# Patient Record
Sex: MALE | Race: WHITE | NOT HISPANIC OR LATINO | Employment: FULL TIME | URBAN - NONMETROPOLITAN AREA
[De-identification: names, ages, dates, MRNs, and addresses within clinical notes are randomized per-mention and may not be internally consistent; named-entity substitution may affect disease eponyms.]

---

## 2017-01-03 ENCOUNTER — OFFICE VISIT (OUTPATIENT)
Dept: FAMILY MEDICINE CLINIC | Facility: CLINIC | Age: 32
End: 2017-01-03

## 2017-01-03 VITALS
HEIGHT: 71 IN | DIASTOLIC BLOOD PRESSURE: 100 MMHG | OXYGEN SATURATION: 98 % | SYSTOLIC BLOOD PRESSURE: 160 MMHG | WEIGHT: 258 LBS | TEMPERATURE: 99 F | BODY MASS INDEX: 36.12 KG/M2 | HEART RATE: 77 BPM

## 2017-01-03 DIAGNOSIS — M25.521 RIGHT ELBOW PAIN: Primary | ICD-10-CM

## 2017-01-03 PROCEDURE — 96372 THER/PROPH/DIAG INJ SC/IM: CPT | Performed by: NURSE PRACTITIONER

## 2017-01-03 PROCEDURE — 99213 OFFICE O/P EST LOW 20 MIN: CPT | Performed by: NURSE PRACTITIONER

## 2017-01-03 RX ORDER — KETOROLAC TROMETHAMINE 30 MG/ML
60 INJECTION, SOLUTION INTRAMUSCULAR; INTRAVENOUS ONCE
Status: COMPLETED | OUTPATIENT
Start: 2017-01-03 | End: 2017-01-03

## 2017-01-03 RX ORDER — DEXAMETHASONE SODIUM PHOSPHATE 4 MG/ML
8 INJECTION, SOLUTION INTRA-ARTICULAR; INTRALESIONAL; INTRAMUSCULAR; INTRAVENOUS; SOFT TISSUE ONCE
Status: COMPLETED | OUTPATIENT
Start: 2017-01-03 | End: 2017-01-03

## 2017-01-03 RX ORDER — KETOROLAC TROMETHAMINE 10 MG/1
10 TABLET, FILM COATED ORAL EVERY 8 HOURS
Qty: 9 TABLET | Refills: 0 | Status: SHIPPED | OUTPATIENT
Start: 2017-01-03 | End: 2017-04-27

## 2017-01-03 RX ADMIN — DEXAMETHASONE SODIUM PHOSPHATE 8 MG: 4 INJECTION, SOLUTION INTRA-ARTICULAR; INTRALESIONAL; INTRAMUSCULAR; INTRAVENOUS; SOFT TISSUE at 16:57

## 2017-01-03 RX ADMIN — KETOROLAC TROMETHAMINE 60 MG: 30 INJECTION, SOLUTION INTRAMUSCULAR; INTRAVENOUS at 16:58

## 2017-01-03 NOTE — PROGRESS NOTES
"Subjective   Nghia Roach is a 31 y.o. male.     Chief Complaint   Patient presents with   • Elbow Pain       History of Present Illness     Right elbow pain-began after jimmy twyla when he was arm wrestling.  He reports the pain is in the joint but most prominently into the tissue surrounding his elbow.  He reports when he starts hurting the pain radiates upward toward his elbow and down his forearm.  Pain is increased with any activity.  He reports some \"throbbing pulsing\" sensation that does feels somewhat numb at times.  He reports he does not have any loss of sensation.  Sensation is similar to when elbow is hit.  The throbbing \"is just like a toothache.\"  He is right hand dominant.  He reports he has taken tylenol and Ibuprofen that does help some.  NSAID does seem to help the most but nothing relieves the pain the most.  Not at goal.     The following portions of the patient's history were reviewed and updated as appropriate: allergies, current medications, past family history, past medical history, past social history, past surgical history and problem list.    Review of Systems   Musculoskeletal: Positive for arthralgias (right elbow) and myalgias (right upper and lower arm).   All other systems reviewed and are negative.      Objective     Visit Vitals   • /100 (BP Location: Left arm, Patient Position: Sitting)   • Pulse 77   • Temp 99 °F (37.2 °C) (Tympanic)   • Ht 71\" (180.3 cm)   • Wt 258 lb (117 kg)   • SpO2 98%   • BMI 35.98 kg/m2       Physical Exam   Constitutional: He is oriented to person, place, and time. He appears well-developed and well-nourished. No distress.   HENT:   Head: Normocephalic and atraumatic.   Right Ear: External ear normal.   Left Ear: External ear normal.   Nose: Nose normal.   Mouth/Throat: No oropharyngeal exudate.   Eyes: EOM are normal. Pupils are equal, round, and reactive to light.   Neck: Normal range of motion. Neck supple. No thyromegaly present. "   Cardiovascular: Normal rate, regular rhythm, normal heart sounds and intact distal pulses.    No murmur heard.  Pulmonary/Chest: Effort normal and breath sounds normal. No respiratory distress.   Abdominal: Soft. Bowel sounds are normal. There is no tenderness.   Musculoskeletal:        Right elbow: He exhibits swelling (along top of arm below elbow). He exhibits normal range of motion. Tenderness found. Medial epicondyle, lateral epicondyle and olecranon process tenderness noted.        Lumbar back: He exhibits decreased range of motion, tenderness, pain and spasm.   Decreased lumbar curvature, there is pain with forward flexion   Lymphadenopathy:     He has no cervical adenopathy.   Neurological: He is alert and oriented to person, place, and time.   Skin: Skin is warm and dry. He is not diaphoretic. No pallor.   Psychiatric: He has a normal mood and affect. His behavior is normal. Judgment and thought content normal. His affect is not inappropriate. Cognition and memory are normal.   Denies thoughts of hurting self or others.   Nursing note and vitals reviewed.      Assessment/Plan     Nghia was seen today for elbow pain.    Diagnoses and all orders for this visit:    Right elbow pain  -     XR elbow 3+ vw right  -     ketorolac (TORADOL) injection 60 mg; Inject 60 mg into the shoulder, thigh, or buttocks 1 (One) Time.  -     dexamethasone (DECADRON) injection 8 mg; Inject 2 mL into the shoulder, thigh, or buttocks 1 (One) Time.  -     ketorolac (TORADOL) 10 MG tablet; Take 1 tablet by mouth Every 8 (Eight) Hours.      Injections today for acute relief  Xray to be done if not improved by Friday  NSAIDs for pain relief.  Do not take any OTC meds while taking Toradol.  Understands disease processes and need for medications.  Understands reasons for urgent and emergent care.  Patient (& family) verbalized agreement for treatment plan.   Heat/ice.  Avoid overuse   RTC PRN for non-resolving symptoms.

## 2017-01-03 NOTE — MR AVS SNAPSHOT
Nghia Roach   1/3/2017 4:00 PM   Office Visit    Dept Phone:  [phone]   Encounter #:  [id number]    Provider:  CORDELIA Mojica   Department:  [facility] FAMILY MEDICINE                Your Full Care Plan              Today's Medication Changes          These changes are accurate as of: 1/3/17  4:58 PM.  If you have any questions, ask your nurse or doctor.               New Medication(s)Ordered:     ketorolac 10 MG tablet   Commonly known as:  TORADOL   Take 1 tablet by mouth Every 8 (Eight) Hours.   Started by:  CORDELIA Mojica         Stop taking medication(s)listed here:     PredniSONE 5 MG tablet therapy pack dosepak   Stopped by:  CORDELIA Mojica           sulfamethoxazole-trimethoprim 800-160 MG per tablet   Commonly known as:  BACTRIM DS   Stopped by:  CORDELIA Mojica                Where to Get Your Medications      These medications were sent to [facility] [address] [place] [address] AT [address] - [phone]  - [phone]   [address] [place] [id number]     Phone:  [phone]     ketorolac 10 MG tablet                  Your Updated Medication List          This list is accurate as of: 1/3/17  4:58 PM.  Always use your most recent med list.                allopurinol 100 MG tablet   Commonly known as:  ZYLOPRIM       amitriptyline 10 MG tablet   Commonly known as:  ELAVIL       amLODIPine 10 MG tablet   Commonly known as:  NORVASC   Take 1 tablet by mouth Daily.       atorvastatin 10 MG tablet   Commonly known as:  LIPITOR       budesonide-formoterol 160-4.5 MCG/ACT inhaler   Commonly known as:  SYMBICORT       CIALIS 5 MG tablet   Generic drug:  tadalafil       fluticasone 50 MCG/ACT nasal spray   Commonly known as:  FLONASE   2 sprays into each nostril Daily. Administer 2 sprays in each nostril for each dose.       ipratropium-albuterol  MCG/ACT  inhaler   Commonly known as:  COMBIVENT RESPIMAT       ketorolac 10 MG tablet   Commonly known as:  TORADOL   Take 1 tablet by mouth Every 8 (Eight) Hours.       montelukast 10 MG tablet   Commonly known as:  SINGULAIR   TAKE 1 TABLET BY MOUTH EVERY DAY       pantoprazole 40 MG EC tablet   Commonly known as:  PROTONIX   TAKE 1 TABLET BY MOUTH EVERY DAY       raNITIdine 150 MG tablet   Commonly known as:  ZANTAC   Take 1 tablet by mouth 2 (Two) Times a Day.       Testosterone Cypionate 200 MG/ML injection   Commonly known as:  DEPOTESTOTERONE CYPIONATE       topiramate 50 MG tablet   Commonly known as:  TOPAMAX   Take 1 tablet by mouth Daily.       valsartan 160 MG tablet   Commonly known as:  DIOVAN   Take 1 tablet by mouth Daily.               We Performed the Following     XR elbow 3+ vw right       You Were Diagnosed With        Codes Comments    Right elbow pain    -  Primary ICD-10-CM: M25.521  ICD-9-CM: 719.42       Medications to be Given to You by a Medical Professional     Due       Frequency    1/3/2017 ketorolac (TORADOL) injection 60 mg  Once    1/3/2017 dexamethasone (DECADRON) injection 8 mg  Once      Instructions     None    Patient Instructions History      Upcoming Appointments     Visit Type Date Time Department    OFFICE VISIT 1/3/2017  4:00 PM [facility]    OFFICE VISIT 1/11/2017  3:00 PM [facility]      MyChart Signup     Our records indicate that you have declined [facility] sign[facility]. If you would like to sign up for [facility], please email [email or web address] or call [phone] to obtain an activation code.             Other Info from Your Visit           Your Appointments     Jan 11, 2017  3:00 PM EST   Office Visit with CORDELIA Alexandre   [facility] (--)    [address] [facility] [address] [phone]           Please arrive 10 minutes early, bring a complete list of all medications and bring any  "previous records or diagnostic testing results.              Allergies     Erythromycin        Reason for Visit     Elbow Pain           Vital Signs     Blood Pressure Pulse Temperature Height Weight Oxygen Saturation    160/100 (BP Location: Left arm, Patient Position: Sitting) 77 99 °F (37.2 °C) (Tympanic) 71\" (180.3 cm) 258 lb (117 kg) 98%    Body Mass Index Smoking Status                35.98 kg/m2 Never Smoker          Problems and Diagnoses Noted     Right elbow pain    -  Primary        "

## 2017-01-19 RX ORDER — ATORVASTATIN CALCIUM 10 MG/1
TABLET, FILM COATED ORAL
Qty: 30 TABLET | Refills: 5 | Status: SHIPPED | OUTPATIENT
Start: 2017-01-19 | End: 2017-01-26 | Stop reason: SDUPTHER

## 2017-01-26 ENCOUNTER — OFFICE VISIT (OUTPATIENT)
Dept: FAMILY MEDICINE CLINIC | Facility: CLINIC | Age: 32
End: 2017-01-26

## 2017-01-26 VITALS
HEIGHT: 71 IN | HEART RATE: 75 BPM | OXYGEN SATURATION: 97 % | DIASTOLIC BLOOD PRESSURE: 80 MMHG | SYSTOLIC BLOOD PRESSURE: 122 MMHG | BODY MASS INDEX: 36.12 KG/M2 | TEMPERATURE: 98.5 F | WEIGHT: 258 LBS

## 2017-01-26 DIAGNOSIS — G44.221 CHRONIC TENSION-TYPE HEADACHE, INTRACTABLE: ICD-10-CM

## 2017-01-26 DIAGNOSIS — J45.30 MILD PERSISTENT ASTHMA WITHOUT COMPLICATION: ICD-10-CM

## 2017-01-26 DIAGNOSIS — R10.11 RIGHT UPPER QUADRANT ABDOMINAL PAIN: ICD-10-CM

## 2017-01-26 DIAGNOSIS — R94.31 ABNORMAL EKG: ICD-10-CM

## 2017-01-26 DIAGNOSIS — K21.00 GASTROESOPHAGEAL REFLUX DISEASE WITH ESOPHAGITIS: Primary | ICD-10-CM

## 2017-01-26 PROBLEM — K21.9 ACID REFLUX: Status: ACTIVE | Noted: 2017-01-26

## 2017-01-26 LAB
ALBUMIN SERPL-MCNC: 4.5 G/DL (ref 3.5–5)
ALBUMIN/GLOB SERPL: 1.7 G/DL (ref 1.5–2.5)
ALP SERPL-CCNC: 61 U/L (ref 46–116)
ALT SERPL W P-5'-P-CCNC: 47 U/L (ref 10–44)
ANION GAP SERPL CALCULATED.3IONS-SCNC: 6.4 MMOL/L (ref 3.6–11.2)
AST SERPL-CCNC: 33 U/L (ref 10–34)
BASOPHILS # BLD AUTO: 0.02 10*3/MM3 (ref 0–0.3)
BASOPHILS NFR BLD AUTO: 0.3 % (ref 0–2)
BILIRUB SERPL-MCNC: 1.5 MG/DL (ref 0.2–1.8)
BUN BLD-MCNC: 13 MG/DL (ref 7–21)
BUN/CREAT SERPL: 8.6 (ref 7–25)
CALCIUM SPEC-SCNC: 9.2 MG/DL (ref 7.7–10)
CHLORIDE SERPL-SCNC: 109 MMOL/L (ref 99–112)
CO2 SERPL-SCNC: 28.6 MMOL/L (ref 24.3–31.9)
CREAT BLD-MCNC: 1.51 MG/DL (ref 0.43–1.29)
DEPRECATED RDW RBC AUTO: 40.6 FL (ref 37–54)
EOSINOPHIL # BLD AUTO: 0.35 10*3/MM3 (ref 0–0.7)
EOSINOPHIL NFR BLD AUTO: 5.8 % (ref 0–5)
ERYTHROCYTE [DISTWIDTH] IN BLOOD BY AUTOMATED COUNT: 13.4 % (ref 11.5–14.5)
GFR SERPL CREATININE-BSD FRML MDRD: 54 ML/MIN/1.73
GLOBULIN UR ELPH-MCNC: 2.7 GM/DL
GLUCOSE BLD-MCNC: 87 MG/DL (ref 70–110)
HCT VFR BLD AUTO: 50.6 % (ref 42–52)
HGB BLD-MCNC: 17.6 G/DL (ref 14–18)
IMM GRANULOCYTES # BLD: 0.02 10*3/MM3 (ref 0–0.03)
IMM GRANULOCYTES NFR BLD: 0.3 % (ref 0–0.5)
LYMPHOCYTES # BLD AUTO: 1.53 10*3/MM3 (ref 1–3)
LYMPHOCYTES NFR BLD AUTO: 25.2 % (ref 21–51)
MCH RBC QN AUTO: 29.1 PG (ref 27–33)
MCHC RBC AUTO-ENTMCNC: 34.8 G/DL (ref 33–37)
MCV RBC AUTO: 83.8 FL (ref 80–94)
MONOCYTES # BLD AUTO: 0.64 10*3/MM3 (ref 0.1–0.9)
MONOCYTES NFR BLD AUTO: 10.5 % (ref 0–10)
NEUTROPHILS # BLD AUTO: 3.51 10*3/MM3 (ref 1.4–6.5)
NEUTROPHILS NFR BLD AUTO: 57.9 % (ref 30–70)
OSMOLALITY SERPL CALC.SUM OF ELEC: 286.3 MOSM/KG (ref 273–305)
PLATELET # BLD AUTO: 204 10*3/MM3 (ref 130–400)
PMV BLD AUTO: 12.1 FL (ref 6–10)
POTASSIUM BLD-SCNC: 4.3 MMOL/L (ref 3.5–5.3)
PROT SERPL-MCNC: 7.2 G/DL (ref 6–8)
RBC # BLD AUTO: 6.04 10*6/MM3 (ref 4.7–6.1)
SODIUM BLD-SCNC: 144 MMOL/L (ref 135–153)
WBC NRBC COR # BLD: 6.07 10*3/MM3 (ref 4.5–12.5)

## 2017-01-26 PROCEDURE — 99214 OFFICE O/P EST MOD 30 MIN: CPT | Performed by: NURSE PRACTITIONER

## 2017-01-26 PROCEDURE — 36415 COLL VENOUS BLD VENIPUNCTURE: CPT | Performed by: NURSE PRACTITIONER

## 2017-01-26 PROCEDURE — 93005 ELECTROCARDIOGRAM TRACING: CPT | Performed by: NURSE PRACTITIONER

## 2017-01-26 PROCEDURE — 80053 COMPREHEN METABOLIC PANEL: CPT | Performed by: NURSE PRACTITIONER

## 2017-01-26 PROCEDURE — 85025 COMPLETE CBC W/AUTO DIFF WBC: CPT | Performed by: NURSE PRACTITIONER

## 2017-01-26 RX ORDER — BUDESONIDE AND FORMOTEROL FUMARATE DIHYDRATE 160; 4.5 UG/1; UG/1
2 AEROSOL RESPIRATORY (INHALATION)
Qty: 1 INHALER | Refills: 5 | Status: SHIPPED | OUTPATIENT
Start: 2017-01-26 | End: 2017-04-27 | Stop reason: SDUPTHER

## 2017-01-26 RX ORDER — RANITIDINE 150 MG/1
150 TABLET ORAL 2 TIMES DAILY
Qty: 60 TABLET | Refills: 5 | Status: SHIPPED | OUTPATIENT
Start: 2017-01-26 | End: 2017-04-27 | Stop reason: SDUPTHER

## 2017-01-26 RX ORDER — MONTELUKAST SODIUM 10 MG/1
10 TABLET ORAL NIGHTLY
Qty: 30 TABLET | Refills: 5 | Status: SHIPPED | OUTPATIENT
Start: 2017-01-26 | End: 2017-04-27 | Stop reason: SDUPTHER

## 2017-01-26 RX ORDER — ALLOPURINOL 100 MG/1
100 TABLET ORAL 2 TIMES DAILY
Qty: 60 TABLET | Refills: 5 | Status: SHIPPED | OUTPATIENT
Start: 2017-01-26 | End: 2017-04-27 | Stop reason: SDUPTHER

## 2017-01-26 RX ORDER — ATORVASTATIN CALCIUM 10 MG/1
10 TABLET, FILM COATED ORAL DAILY
Qty: 30 TABLET | Refills: 5 | Status: SHIPPED | OUTPATIENT
Start: 2017-01-26 | End: 2017-04-27 | Stop reason: SDUPTHER

## 2017-01-26 RX ORDER — AMLODIPINE BESYLATE 10 MG/1
10 TABLET ORAL DAILY
Qty: 30 TABLET | Refills: 5 | Status: SHIPPED | OUTPATIENT
Start: 2017-01-26 | End: 2017-04-27 | Stop reason: SDUPTHER

## 2017-01-26 RX ORDER — DICYCLOMINE HYDROCHLORIDE 10 MG/1
10 CAPSULE ORAL
Qty: 120 CAPSULE | Refills: 5 | Status: SHIPPED | OUTPATIENT
Start: 2017-01-26 | End: 2017-04-27 | Stop reason: SDUPTHER

## 2017-01-26 RX ORDER — VALSARTAN 160 MG/1
160 TABLET ORAL DAILY
Qty: 30 TABLET | Refills: 5 | Status: SHIPPED | OUTPATIENT
Start: 2017-01-26 | End: 2017-04-27 | Stop reason: SDUPTHER

## 2017-01-26 RX ORDER — AMITRIPTYLINE HYDROCHLORIDE 10 MG/1
10 TABLET, FILM COATED ORAL NIGHTLY
Qty: 30 TABLET | Refills: 5 | Status: SHIPPED | OUTPATIENT
Start: 2017-01-26 | End: 2017-04-27 | Stop reason: SDUPTHER

## 2017-01-26 RX ORDER — PANTOPRAZOLE SODIUM 40 MG/1
40 TABLET, DELAYED RELEASE ORAL DAILY
Qty: 30 TABLET | Refills: 5 | Status: SHIPPED | OUTPATIENT
Start: 2017-01-26 | End: 2017-04-27 | Stop reason: SDUPTHER

## 2017-01-26 RX ORDER — TOPIRAMATE 50 MG/1
50 TABLET, FILM COATED ORAL DAILY
Qty: 30 TABLET | Refills: 5 | Status: SHIPPED | OUTPATIENT
Start: 2017-01-26 | End: 2017-04-27 | Stop reason: SDUPTHER

## 2017-01-26 RX ORDER — SUCRALFATE ORAL 1 G/10ML
1 SUSPENSION ORAL 2 TIMES DAILY PRN
Qty: 420 ML | Refills: 2 | Status: SHIPPED | OUTPATIENT
Start: 2017-01-26 | End: 2017-04-27 | Stop reason: SDUPTHER

## 2017-01-26 NOTE — MR AVS SNAPSHOT
Nghia Roach   1/26/2017 10:20 AM   Office Visit    Dept Phone:  [phone]   Encounter #:  [id number]    Provider:  CORDELIA Alexandre   Department:  [facility] FAMILY MEDICINE                Your Full Care Plan              Today's Medication Changes          These changes are accurate as of: 1/26/17 11:15 AM.  If you have any questions, ask your nurse or doctor.               New Medication(s)Ordered:     dicyclomine 10 MG capsule   Commonly known as:  BENTYL   Take 1 capsule by mouth 4 (Four) Times a Day Before Meals & at Bedtime.   Started by:  CORDELIA Alexandre       sucralfate 1 GM/10ML suspension   Commonly known as:  CARAFATE   Take 10 mL by mouth 2 (Two) Times a Day As Needed (heart burn).   Started by:  CORDELIA Alexandre         Medication(s)that have changed:     atorvastatin 10 MG tablet   Commonly known as:  LIPITOR   Take 1 tablet by mouth Daily.   What changed:  See the new instructions.   Changed by:  CORDELIA Alexandre       montelukast 10 MG tablet   Commonly known as:  SINGULAIR   Take 1 tablet by mouth Every Night.   What changed:  See the new instructions.   Changed by:  CORDELIA Alexandre       pantoprazole 40 MG EC tablet   Commonly known as:  PROTONIX   Take 1 tablet by mouth Daily.   What changed:  See the new instructions.   Changed by:  CORDELIA Alexandre            Where to Get Your Medications      These medications were sent to [facility] [address] [address] AT [facility] [address] - [phone]  - [phone]   [address] [address]     Phone:  [phone]     allopurinol 100 MG tablet    amitriptyline 10 MG tablet    amLODIPine 10 MG tablet    atorvastatin 10 MG tablet    budesonide-formoterol 160-4.5 MCG/ACT inhaler    dicyclomine 10 MG capsule    ipratropium-albuterol   MCG/ACT inhaler    montelukast 10 MG tablet    pantoprazole 40 MG EC tablet    raNITIdine 150 MG tablet    sucralfate 1 GM/10ML suspension    topiramate 50 MG tablet    valsartan 160 MG tablet                  Your Updated Medication List          This list is accurate as of: 1/26/17 11:15 AM.  Always use your most recent med list.                allopurinol 100 MG tablet   Commonly known as:  ZYLOPRIM   Take 1 tablet by mouth 2 (Two) Times a Day.       amitriptyline 10 MG tablet   Commonly known as:  ELAVIL   Take 1 tablet by mouth Every Night.       amLODIPine 10 MG tablet   Commonly known as:  NORVASC   Take 1 tablet by mouth Daily.       atorvastatin 10 MG tablet   Commonly known as:  LIPITOR   Take 1 tablet by mouth Daily.       budesonide-formoterol 160-4.5 MCG/ACT inhaler   Commonly known as:  SYMBICORT   Inhale 2 puffs 2 (Two) Times a Day.       CIALIS 5 MG tablet   Generic drug:  tadalafil       dicyclomine 10 MG capsule   Commonly known as:  BENTYL   Take 1 capsule by mouth 4 (Four) Times a Day Before Meals & at Bedtime.       fluticasone 50 MCG/ACT nasal spray   Commonly known as:  FLONASE   2 sprays into each nostril Daily. Administer 2 sprays in each nostril for each dose.       ipratropium-albuterol  MCG/ACT inhaler   Commonly known as:  COMBIVENT RESPIMAT   Inhale 1 puff 4 (Four) Times a Day As Needed for wheezing. 1-2 PUFFS EVERY 4 HOURS AS NEEDED       ketorolac 10 MG tablet   Commonly known as:  TORADOL   Take 1 tablet by mouth Every 8 (Eight) Hours.       montelukast 10 MG tablet   Commonly known as:  SINGULAIR   Take 1 tablet by mouth Every Night.       pantoprazole 40 MG EC tablet   Commonly known as:  PROTONIX   Take 1 tablet by mouth Daily.       raNITIdine 150 MG tablet   Commonly known as:  ZANTAC   Take 1 tablet by mouth 2 (Two) Times a Day.       sucralfate 1 GM/10ML suspension   Commonly known as:  CARAFATE   Take 10 mL by mouth 2 (Two) Times a Day As Needed (heart burn).        Testosterone Cypionate 200 MG/ML injection   Commonly known as:  DEPOTESTOTERONE CYPIONATE       topiramate 50 MG tablet   Commonly known as:  TOPAMAX   Take 1 tablet by mouth Daily.       valsartan 160 MG tablet   Commonly known as:  DIOVAN   Take 1 tablet by mouth Daily.               We Performed the Following     Ambulatory Referral to Cardiology     CBC & Differential     CBC Auto Differential     Comprehensive Metabolic Panel     ECG 12 Lead     Helicobacter Pylori, IgA IgG IgM       You Were Diagnosed With        Codes Comments    Gastroesophageal reflux disease with esophagitis    -  Primary ICD-10-CM: K21.0  ICD-9-CM: 530.11     Chronic tension-type headache, intractable     ICD-10-CM: G44.221  ICD-9-CM: 339.12     Mild persistent asthma without complication     ICD-10-CM: J45.30  ICD-9-CM: 493.90     Right upper quadrant abdominal pain     ICD-10-CM: R10.11  ICD-9-CM: 789.01     Abnormal EKG     ICD-10-CM: R94.31  ICD-9-CM: 794.31       Instructions     None    Patient Instructions History      Upcoming Appointments     Visit Type Date Time Department    OFFICE VISIT 1/26/2017 10:20 AM [facility]    OFFICE VISIT 4/27/2017  3:00 PM [facility]      [employer] Signup     Our records indicate that you have declined [facility] signup. If you would like to sign up for [employer], please email [email or web address] or call [phone] to obtain an activation code.             Other Info from Your Visit           Your Appointments     Apr 27, 2017  3:00 PM EDT   Office Visit with CORDELIA Alexandre   [facility] (--)    [address] [place] [id number] [phone]           Please arrive 10 minutes early, bring a complete list of all medications and bring any previous records or diagnostic testing results.              Allergies     Erythromycin        Reason for Visit     Hyperlipidemia stomach pAIN    Asthma      "Osteoarthritis     Fibromyalgia           Vital Signs     Blood Pressure Pulse Temperature Height Weight Oxygen Saturation    122/80 (BP Location: Left arm, Patient Position: Sitting, Cuff Size: Adult) 75 98.5 °F (36.9 °C) (Oral) 71\" (180.3 cm) 258 lb (117 kg) 97%    Body Mass Index Smoking Status                35.98 kg/m2 Never Smoker          Problems and Diagnoses Noted     Acid reflux disease    Asthma    Chronic tension headache    Abdominal pain, right upper quadrant        Abnormal EKG          Results         "

## 2017-01-26 NOTE — PROGRESS NOTES
Subjective   Nghia Roach is a 31 y.o. male.     History of Present Illness     Stomach pain/Right upper quad pain  Worse with heavy or spicy meal. Right sided pain at times. Has episodes of constipation then diarrhea. Has cramping in his epigastric region. Most recent colonoscopy approximately three years ago. He also had an EGD at the time which showed Esophagitis .    Diarrhea for 24 hours. Had been constipated for several days.   Ultrasound of gallbladder is on file from 2014.       GERD with chest discomfort   Some exacerbation with large meals or eating prior to laying down. Recent EGD on file. Pain in the epigastric area does radiate upward and between his shoulder blades at times. Nghia has had a cardiac evaluation less than 5 years ago with stress test. His EKG has shown abnormal in the past. He is agreeable for a return to cardiology for evaluation and understands the reasons for emergent medical attention.     Tension headache  Less often than daily. Much improved with use of Topamax. Denies side effects.     Asthma  Stable with current medications. No recent exacerbations.             The following portions of the patient's history were reviewed and updated as appropriate: allergies, current medications, past family history, past medical history, past social history, past surgical history and problem list.    Review of Systems   Constitutional: Negative for activity change, appetite change, chills, diaphoresis and fever.   HENT: Positive for sneezing.    Eyes: Negative.    Respiratory: Positive for chest tightness. Negative for cough, shortness of breath and wheezing.    Cardiovascular: Negative for chest pain, palpitations and leg swelling.   Gastrointestinal: Positive for abdominal pain, constipation and diarrhea. Negative for anal bleeding, blood in stool, nausea and vomiting.   Endocrine: Negative.    Genitourinary: Negative for frequency.   Musculoskeletal: Positive for arthralgias, back pain,  myalgias and neck pain. Negative for neck stiffness.   Skin: Negative for pallor and rash.   Allergic/Immunologic: Positive for environmental allergies. Negative for food allergies and immunocompromised state.   Neurological: Positive for headaches. Negative for dizziness, tremors, seizures, syncope, facial asymmetry, speech difficulty, weakness, light-headedness and numbness.   Hematological: Negative for adenopathy. Does not bruise/bleed easily.   Psychiatric/Behavioral: Negative for self-injury and suicidal ideas. The patient is not nervous/anxious.    All other systems reviewed and are negative.      Objective   Physical Exam   Constitutional: He is oriented to person, place, and time. He appears well-developed and well-nourished.   HENT:   Head: Normocephalic and atraumatic.   Right Ear: External ear normal.   Left Ear: External ear normal.   Nose: Nose normal.   Mouth/Throat: Oropharynx is clear and moist. No oropharyngeal exudate.   Eyes: EOM are normal. Pupils are equal, round, and reactive to light.   Neck: Normal range of motion. Neck supple. No tracheal deviation present. No thyromegaly present.   Cardiovascular: Normal rate, regular rhythm, normal heart sounds and intact distal pulses.  Exam reveals no gallop and no friction rub.    No murmur heard.  EKG reviewed by MARIANELA LESLIE and JAYNE Miles PA-C   Pulmonary/Chest: Effort normal and breath sounds normal. No respiratory distress. He has no wheezes. He has no rales. He exhibits no tenderness.   Abdominal: Soft. Bowel sounds are normal. He exhibits no distension, no fluid wave, no ascites and no mass. There is no hepatosplenomegaly. There is tenderness in the right upper quadrant and epigastric area. There is no rebound, no guarding, no tenderness at McBurney's point and negative Bustamante's sign.   Musculoskeletal: Normal range of motion.        Lumbar back: He exhibits tenderness, pain and spasm.   Decreased lumbar curvature, there is pain with forward  flexion. DTR's +2. No edema.    Lymphadenopathy:     He has no cervical adenopathy.   Neurological: He is alert and oriented to person, place, and time. He has normal strength and normal reflexes.   CN 2-12 grossly intact    Skin: Skin is warm and dry. No rash noted. No erythema. No pallor.   Psychiatric: He has a normal mood and affect. His speech is normal and behavior is normal. Judgment and thought content normal. His affect is not inappropriate. Cognition and memory are normal.   Denies thoughts of hurting self or others.   Nursing note and vitals reviewed.      Assessment/Plan   Nghia was seen today for hyperlipidemia, asthma, osteoarthritis and fibromyalgia.    Diagnoses and all orders for this visit:    Gastroesophageal reflux disease with esophagitis  -     ECG 12 Lead  -     NM HIDA Scan With Pharmacological Intervention; Future    Chronic tension-type headache, intractable  -     topiramate (TOPAMAX) 50 MG tablet; Take 1 tablet by mouth Daily.    Mild persistent asthma without complication    Right upper quadrant abdominal pain  -     Cancel: NM HIDA Scan With Pharmacological Intervention  -     Comprehensive Metabolic Panel  -     CBC & Differential  -     Helicobacter Pylori, IgA IgG IgM  -     NM HIDA Scan With Pharmacological Intervention; Future  -     CBC Auto Differential    Abnormal EKG  -     Ambulatory Referral to Cardiology    Other orders  -     ipratropium-albuterol (COMBIVENT RESPIMAT)  MCG/ACT inhaler; Inhale 1 puff 4 (Four) Times a Day As Needed for wheezing. 1-2 PUFFS EVERY 4 HOURS AS NEEDED  -     budesonide-formoterol (SYMBICORT) 160-4.5 MCG/ACT inhaler; Inhale 2 puffs 2 (Two) Times a Day.  -     atorvastatin (LIPITOR) 10 MG tablet; Take 1 tablet by mouth Daily.  -     amLODIPine (NORVASC) 10 MG tablet; Take 1 tablet by mouth Daily.  -     amitriptyline (ELAVIL) 10 MG tablet; Take 1 tablet by mouth Every Night.  -     allopurinol (ZYLOPRIM) 100 MG tablet; Take 1 tablet by mouth 2  (Two) Times a Day.  -     montelukast (SINGULAIR) 10 MG tablet; Take 1 tablet by mouth Every Night.  -     pantoprazole (PROTONIX) 40 MG EC tablet; Take 1 tablet by mouth Daily.  -     raNITIdine (ZANTAC) 150 MG tablet; Take 1 tablet by mouth 2 (Two) Times a Day.  -     valsartan (DIOVAN) 160 MG tablet; Take 1 tablet by mouth Daily.  -     dicyclomine (BENTYL) 10 MG capsule; Take 1 capsule by mouth 4 (Four) Times a Day Before Meals & at Bedtime.  -     sucralfate (CARAFATE) 1 GM/10ML suspension; Take 10 mL by mouth 2 (Two) Times a Day As Needed (heart burn).        I have discussed his EKG  In detail today with Nghia. He agrees to cardiology evaluation.   Schedule hida scan.   Labs today.  Follow up Monday January 30, 2017 is symptoms not resolved.   Continue current medications. Add Carafate prn and Bentyl.   Encouraged pt to obtain the stool samples that were ordered in October 2016 for further testing.   Will refer back to GI in symptoms not improved or as indicated. Routine visits q 3 months.

## 2017-01-27 LAB
H PYLORI IGA SER IA-ACNC: <9 UNITS (ref 0–8.9)
H PYLORI IGG SER IA-ACNC: <0.9 U/ML (ref 0–0.8)
H PYLORI IGM SER-ACNC: <9 UNITS (ref 0–8.9)

## 2017-01-30 DIAGNOSIS — R94.4 DECREASED GLOMERULAR FILTRATION RATE (GFR): Primary | ICD-10-CM

## 2017-02-04 ENCOUNTER — HOSPITAL ENCOUNTER (OUTPATIENT)
Dept: NUCLEAR MEDICINE | Facility: HOSPITAL | Age: 32
Discharge: HOME | End: 2017-02-04

## 2017-02-04 DIAGNOSIS — R10.11 RIGHT UPPER QUADRANT ABDOMINAL PAIN: ICD-10-CM

## 2017-02-04 DIAGNOSIS — K21.00 GASTROESOPHAGEAL REFLUX DISEASE WITH ESOPHAGITIS: ICD-10-CM

## 2017-02-04 PROCEDURE — 78226 HEPATOBILIARY SYSTEM IMAGING: CPT | Performed by: RADIOLOGY

## 2017-02-04 PROCEDURE — A9537 TC99M MEBROFENIN: HCPCS | Performed by: FAMILY MEDICINE

## 2017-02-04 PROCEDURE — 78226 HEPATOBILIARY SYSTEM IMAGING: CPT

## 2017-02-04 PROCEDURE — 0 TECHNETIUM TC 99M MEBROFENIN KIT: Performed by: FAMILY MEDICINE

## 2017-02-04 RX ORDER — KIT FOR THE PREPARATION OF TECHNETIUM TC 99M MEBROFENIN 45 MG/10ML
1 INJECTION, POWDER, LYOPHILIZED, FOR SOLUTION INTRAVENOUS
Status: COMPLETED | OUTPATIENT
Start: 2017-02-04 | End: 2017-02-04

## 2017-02-04 RX ADMIN — MEBROFENIN 1 DOSE: 45 INJECTION, POWDER, LYOPHILIZED, FOR SOLUTION INTRAVENOUS at 13:00

## 2017-02-17 ENCOUNTER — TRANSCRIBE ORDERS (OUTPATIENT)
Dept: ADMINISTRATIVE | Facility: HOSPITAL | Age: 32
End: 2017-02-17

## 2017-02-17 DIAGNOSIS — N17.9 ACUTE KIDNEY INJURY (NONTRAUMATIC) (HCC): Primary | ICD-10-CM

## 2017-03-05 DIAGNOSIS — G44.221 CHRONIC TENSION-TYPE HEADACHE, INTRACTABLE: ICD-10-CM

## 2017-03-06 RX ORDER — TOPIRAMATE 50 MG/1
TABLET, FILM COATED ORAL
Qty: 30 TABLET | Refills: 0 | OUTPATIENT
Start: 2017-03-06

## 2017-03-06 NOTE — TELEPHONE ENCOUNTER
I called the Pharmacy and they said you were the one who has filled this med. They had a fill done on 3/4/17. It is waiting for the pt to pick it up.  PKF

## 2017-03-10 ENCOUNTER — TRANSCRIBE ORDERS (OUTPATIENT)
Dept: ADMINISTRATIVE | Facility: HOSPITAL | Age: 32
End: 2017-03-10

## 2017-03-10 ENCOUNTER — HOSPITAL ENCOUNTER (OUTPATIENT)
Dept: ULTRASOUND IMAGING | Facility: HOSPITAL | Age: 32
Discharge: HOME | End: 2017-03-10
Attending: INTERNAL MEDICINE | Admitting: INTERNAL MEDICINE

## 2017-03-10 ENCOUNTER — LAB (OUTPATIENT)
Dept: LAB | Facility: HOSPITAL | Age: 32
End: 2017-03-10
Attending: INTERNAL MEDICINE

## 2017-03-10 DIAGNOSIS — N17.9 ACUTE KIDNEY FAILURE, UNSPECIFIED (HCC): Primary | ICD-10-CM

## 2017-03-10 DIAGNOSIS — N17.9 ACUTE KIDNEY INJURY (NONTRAUMATIC) (HCC): ICD-10-CM

## 2017-03-10 DIAGNOSIS — N17.9 ACUTE KIDNEY FAILURE, UNSPECIFIED (HCC): ICD-10-CM

## 2017-03-10 LAB
ALBUMIN SERPL-MCNC: 4.4 G/DL (ref 3.5–5)
ALBUMIN UR-MCNC: 6.9 MG/L
ALBUMIN/GLOB SERPL: 1.8 G/DL (ref 1.5–2.5)
ALP SERPL-CCNC: 55 U/L (ref 40–129)
ALT SERPL W P-5'-P-CCNC: 41 U/L (ref 10–44)
ANION GAP SERPL CALCULATED.3IONS-SCNC: 5.4 MMOL/L (ref 3.6–11.2)
AST SERPL-CCNC: 35 U/L (ref 10–34)
BACTERIA UR QL AUTO: ABNORMAL /HPF
BASOPHILS # BLD AUTO: 0.02 10*3/MM3 (ref 0–0.3)
BASOPHILS NFR BLD AUTO: 0.3 % (ref 0–2)
BILIRUB SERPL-MCNC: 1.8 MG/DL (ref 0.2–1.8)
BILIRUB UR QL STRIP: NEGATIVE
BUN BLD-MCNC: 13 MG/DL (ref 7–21)
BUN/CREAT SERPL: 10.2 (ref 7–25)
CALCIUM SPEC-SCNC: 9.4 MG/DL (ref 7.7–10)
CHLORIDE SERPL-SCNC: 108 MMOL/L (ref 99–112)
CLARITY UR: CLEAR
CO2 SERPL-SCNC: 25.6 MMOL/L (ref 24.3–31.9)
COLOR UR: YELLOW
CREAT BLD-MCNC: 1.28 MG/DL (ref 0.43–1.29)
CREAT UR-MCNC: 311.5 MG/DL
CREAT UR-MCNC: 311.5 MG/DL
DEPRECATED RDW RBC AUTO: 39.3 FL (ref 37–54)
EOSINOPHIL # BLD AUTO: 0.29 10*3/MM3 (ref 0–0.7)
EOSINOPHIL NFR BLD AUTO: 3.7 % (ref 0–5)
ERYTHROCYTE [DISTWIDTH] IN BLOOD BY AUTOMATED COUNT: 13.1 % (ref 11.5–14.5)
GFR SERPL CREATININE-BSD FRML MDRD: 65 ML/MIN/1.73
GLOBULIN UR ELPH-MCNC: 2.5 GM/DL
GLUCOSE BLD-MCNC: 82 MG/DL (ref 70–110)
GLUCOSE UR STRIP-MCNC: NEGATIVE MG/DL
HCT VFR BLD AUTO: 47.6 % (ref 42–52)
HGB BLD-MCNC: 16.8 G/DL (ref 14–18)
HGB UR QL STRIP.AUTO: NEGATIVE
HYALINE CASTS UR QL AUTO: ABNORMAL /LPF
IMM GRANULOCYTES # BLD: 0.03 10*3/MM3 (ref 0–0.03)
IMM GRANULOCYTES NFR BLD: 0.4 % (ref 0–0.5)
KETONES UR QL STRIP: ABNORMAL
LEUKOCYTE ESTERASE UR QL STRIP.AUTO: NEGATIVE
LYMPHOCYTES # BLD AUTO: 1.72 10*3/MM3 (ref 1–3)
LYMPHOCYTES NFR BLD AUTO: 21.9 % (ref 21–51)
MCH RBC QN AUTO: 29.3 PG (ref 27–33)
MCHC RBC AUTO-ENTMCNC: 35.3 G/DL (ref 33–37)
MCV RBC AUTO: 83.1 FL (ref 80–94)
MICROALBUMIN/CREAT UR: 2.2 MG/G
MONOCYTES # BLD AUTO: 0.73 10*3/MM3 (ref 0.1–0.9)
MONOCYTES NFR BLD AUTO: 9.3 % (ref 0–10)
NEUTROPHILS # BLD AUTO: 5.06 10*3/MM3 (ref 1.4–6.5)
NEUTROPHILS NFR BLD AUTO: 64.4 % (ref 30–70)
NITRITE UR QL STRIP: NEGATIVE
OSMOLALITY SERPL CALC.SUM OF ELEC: 276.7 MOSM/KG (ref 273–305)
PH UR STRIP.AUTO: 6 [PH] (ref 5–8)
PLATELET # BLD AUTO: 206 10*3/MM3 (ref 130–400)
PMV BLD AUTO: 11.4 FL (ref 6–10)
POTASSIUM BLD-SCNC: 3.7 MMOL/L (ref 3.5–5.3)
PROT SERPL-MCNC: 6.9 G/DL (ref 6–8)
PROT UR QL STRIP: NEGATIVE
PROT UR-MCNC: 10 MG/DL
PROT/CREAT UR: 32.1 MG/G CREA (ref 0–200)
RBC # BLD AUTO: 5.73 10*6/MM3 (ref 4.7–6.1)
RBC # UR: ABNORMAL /HPF
REF LAB TEST METHOD: ABNORMAL
SODIUM BLD-SCNC: 139 MMOL/L (ref 135–153)
SP GR UR STRIP: 1.03 (ref 1–1.03)
SQUAMOUS #/AREA URNS HPF: ABNORMAL /HPF
URATE SERPL-MCNC: 6.3 MG/DL (ref 3.7–7)
UROBILINOGEN UR QL STRIP: ABNORMAL
WBC NRBC COR # BLD: 7.85 10*3/MM3 (ref 4.5–12.5)
WBC UR QL AUTO: ABNORMAL /HPF

## 2017-03-10 PROCEDURE — 82043 UR ALBUMIN QUANTITATIVE: CPT | Performed by: INTERNAL MEDICINE

## 2017-03-10 PROCEDURE — 76775 US EXAM ABDO BACK WALL LIM: CPT

## 2017-03-10 PROCEDURE — 81001 URINALYSIS AUTO W/SCOPE: CPT | Performed by: INTERNAL MEDICINE

## 2017-03-10 PROCEDURE — 76775 US EXAM ABDO BACK WALL LIM: CPT | Performed by: RADIOLOGY

## 2017-03-10 PROCEDURE — 84156 ASSAY OF PROTEIN URINE: CPT | Performed by: INTERNAL MEDICINE

## 2017-03-10 PROCEDURE — 85025 COMPLETE CBC W/AUTO DIFF WBC: CPT | Performed by: INTERNAL MEDICINE

## 2017-03-10 PROCEDURE — 82570 ASSAY OF URINE CREATININE: CPT | Performed by: INTERNAL MEDICINE

## 2017-03-10 PROCEDURE — 80053 COMPREHEN METABOLIC PANEL: CPT | Performed by: INTERNAL MEDICINE

## 2017-03-10 PROCEDURE — 84550 ASSAY OF BLOOD/URIC ACID: CPT | Performed by: INTERNAL MEDICINE

## 2017-03-10 PROCEDURE — 36415 COLL VENOUS BLD VENIPUNCTURE: CPT

## 2017-03-12 ENCOUNTER — LAB (OUTPATIENT)
Dept: LAB | Facility: HOSPITAL | Age: 32
End: 2017-03-12
Attending: INTERNAL MEDICINE

## 2017-03-12 ENCOUNTER — TRANSCRIBE ORDERS (OUTPATIENT)
Dept: ADMINISTRATIVE | Facility: HOSPITAL | Age: 32
End: 2017-03-12

## 2017-03-12 DIAGNOSIS — N17.9 ACUTE KIDNEY INJURY (HCC): ICD-10-CM

## 2017-03-12 DIAGNOSIS — N17.9 ACUTE KIDNEY INJURY (HCC): Primary | ICD-10-CM

## 2017-03-12 LAB
ALBUMIN UR-MCNC: 3.6 MG/L
COLLECT DURATION TIME UR: 24 HRS
CREAT CL 24H UR+SERPL-VRATE: 121.3 L/24 HR (ref 95–205.9)
CREAT CL 24H UR+SERPL-VRATE: 84.2 ML/MIN (ref 63–143)
CREAT UR-MCNC: 149.1 MG/DL
CREAT UR-MCNC: 150 MG/DL
CREATINE 24H UR-MRATE: 2.1 G/24 HR (ref 0.98–2.2)
CREATINE 24H UR-MRATE: 2.1 G/24 HR (ref 0.98–2.2)
MICRO TOTAL PROTEIN: 3.5 MG/DL
MICROALBUMIN 24H MFR UR: 49 MG/24 HR (ref 0–29)
MICROALBUMIN/CREAT UR: 2.4 MG/G
PROT UR-MCNC: 3.7 MG/DL
PROT/CREAT UR: 24.8 MG/G CREA (ref 0–200)
SPECIMEN VOL 24H UR: 1400 ML
SPECIMEN VOL 24H UR: 1400 ML

## 2017-03-12 PROCEDURE — 81050 URINALYSIS VOLUME MEASURE: CPT | Performed by: INTERNAL MEDICINE

## 2017-03-12 PROCEDURE — 82043 UR ALBUMIN QUANTITATIVE: CPT | Performed by: INTERNAL MEDICINE

## 2017-03-12 PROCEDURE — 82575 CREATININE CLEARANCE TEST: CPT | Performed by: INTERNAL MEDICINE

## 2017-03-12 PROCEDURE — 82570 ASSAY OF URINE CREATININE: CPT | Performed by: INTERNAL MEDICINE

## 2017-03-12 PROCEDURE — 84156 ASSAY OF PROTEIN URINE: CPT | Performed by: INTERNAL MEDICINE

## 2017-04-27 ENCOUNTER — OFFICE VISIT (OUTPATIENT)
Dept: FAMILY MEDICINE CLINIC | Facility: CLINIC | Age: 32
End: 2017-04-27

## 2017-04-27 VITALS
SYSTOLIC BLOOD PRESSURE: 140 MMHG | DIASTOLIC BLOOD PRESSURE: 90 MMHG | BODY MASS INDEX: 35.98 KG/M2 | HEART RATE: 81 BPM | OXYGEN SATURATION: 97 % | WEIGHT: 257 LBS | TEMPERATURE: 98.5 F | HEIGHT: 71 IN

## 2017-04-27 DIAGNOSIS — J45.20 MILD INTERMITTENT ASTHMA WITHOUT COMPLICATION: ICD-10-CM

## 2017-04-27 DIAGNOSIS — N18.2 CHRONIC RENAL INSUFFICIENCY, STAGE 2 (MILD): ICD-10-CM

## 2017-04-27 DIAGNOSIS — E78.2 MIXED HYPERLIPIDEMIA: ICD-10-CM

## 2017-04-27 DIAGNOSIS — M79.7 FIBROMYALGIA: ICD-10-CM

## 2017-04-27 DIAGNOSIS — H61.23 IMPACTED CERUMEN OF BOTH EARS: ICD-10-CM

## 2017-04-27 DIAGNOSIS — K21.00 GASTROESOPHAGEAL REFLUX DISEASE WITH ESOPHAGITIS: Primary | ICD-10-CM

## 2017-04-27 DIAGNOSIS — G44.221 CHRONIC TENSION-TYPE HEADACHE, INTRACTABLE: ICD-10-CM

## 2017-04-27 DIAGNOSIS — J01.01 ACUTE RECURRENT MAXILLARY SINUSITIS: ICD-10-CM

## 2017-04-27 LAB
ALBUMIN SERPL-MCNC: 4.7 G/DL (ref 3.5–5)
ALBUMIN/GLOB SERPL: 1.6 G/DL (ref 1.5–2.5)
ALP SERPL-CCNC: 69 U/L (ref 40–129)
ALT SERPL W P-5'-P-CCNC: 36 U/L (ref 10–44)
ANION GAP SERPL CALCULATED.3IONS-SCNC: 8 MMOL/L (ref 3.6–11.2)
AST SERPL-CCNC: 28 U/L (ref 10–34)
BILIRUB SERPL-MCNC: 1.5 MG/DL (ref 0.2–1.8)
BUN BLD-MCNC: 12 MG/DL (ref 7–21)
BUN/CREAT SERPL: 10.6 (ref 7–25)
CALCIUM SPEC-SCNC: 9.9 MG/DL (ref 7.7–10)
CHLORIDE SERPL-SCNC: 103 MMOL/L (ref 99–112)
CO2 SERPL-SCNC: 30 MMOL/L (ref 24.3–31.9)
CREAT BLD-MCNC: 1.13 MG/DL (ref 0.43–1.29)
GFR SERPL CREATININE-BSD FRML MDRD: 75 ML/MIN/1.73
GLOBULIN UR ELPH-MCNC: 3 GM/DL
GLUCOSE BLD-MCNC: 80 MG/DL (ref 70–110)
OSMOLALITY SERPL CALC.SUM OF ELEC: 280 MOSM/KG (ref 273–305)
POTASSIUM BLD-SCNC: 4 MMOL/L (ref 3.5–5.3)
PROT SERPL-MCNC: 7.7 G/DL (ref 6–8)
SODIUM BLD-SCNC: 141 MMOL/L (ref 135–153)

## 2017-04-27 PROCEDURE — 96372 THER/PROPH/DIAG INJ SC/IM: CPT | Performed by: NURSE PRACTITIONER

## 2017-04-27 PROCEDURE — 36415 COLL VENOUS BLD VENIPUNCTURE: CPT | Performed by: NURSE PRACTITIONER

## 2017-04-27 PROCEDURE — 99214 OFFICE O/P EST MOD 30 MIN: CPT | Performed by: NURSE PRACTITIONER

## 2017-04-27 PROCEDURE — 80053 COMPREHEN METABOLIC PANEL: CPT | Performed by: NURSE PRACTITIONER

## 2017-04-27 PROCEDURE — 69210 REMOVE IMPACTED EAR WAX UNI: CPT | Performed by: NURSE PRACTITIONER

## 2017-04-27 RX ORDER — AMOXICILLIN 500 MG/1
500 CAPSULE ORAL 3 TIMES DAILY
Qty: 30 CAPSULE | Refills: 0 | Status: SHIPPED | OUTPATIENT
Start: 2017-04-27 | End: 2017-06-12

## 2017-04-27 RX ORDER — AMITRIPTYLINE HYDROCHLORIDE 10 MG/1
10 TABLET, FILM COATED ORAL NIGHTLY
Qty: 30 TABLET | Refills: 5 | Status: SHIPPED | OUTPATIENT
Start: 2017-04-27 | End: 2017-10-25 | Stop reason: SDUPTHER

## 2017-04-27 RX ORDER — CEFTRIAXONE 1 G/1
1 INJECTION, POWDER, FOR SOLUTION INTRAMUSCULAR; INTRAVENOUS ONCE
Status: COMPLETED | OUTPATIENT
Start: 2017-04-27 | End: 2017-04-27

## 2017-04-27 RX ORDER — TOPIRAMATE 50 MG/1
50 TABLET, FILM COATED ORAL DAILY
Qty: 30 TABLET | Refills: 5 | Status: SHIPPED | OUTPATIENT
Start: 2017-04-27 | End: 2017-11-28 | Stop reason: SDUPTHER

## 2017-04-27 RX ORDER — AMLODIPINE BESYLATE 10 MG/1
10 TABLET ORAL DAILY
Qty: 30 TABLET | Refills: 5 | Status: SHIPPED | OUTPATIENT
Start: 2017-04-27 | End: 2017-10-25 | Stop reason: SDUPTHER

## 2017-04-27 RX ORDER — FLUTICASONE PROPIONATE 50 MCG
2 SPRAY, SUSPENSION (ML) NASAL DAILY
Qty: 1 EACH | Refills: 1
Start: 2017-04-27 | End: 2017-11-28 | Stop reason: SDUPTHER

## 2017-04-27 RX ORDER — ATORVASTATIN CALCIUM 10 MG/1
10 TABLET, FILM COATED ORAL DAILY
Qty: 30 TABLET | Refills: 5 | Status: SHIPPED | OUTPATIENT
Start: 2017-04-27 | End: 2017-11-28 | Stop reason: SDUPTHER

## 2017-04-27 RX ORDER — BUDESONIDE AND FORMOTEROL FUMARATE DIHYDRATE 160; 4.5 UG/1; UG/1
2 AEROSOL RESPIRATORY (INHALATION)
Qty: 1 INHALER | Refills: 5 | Status: SHIPPED | OUTPATIENT
Start: 2017-04-27 | End: 2017-11-28 | Stop reason: SDUPTHER

## 2017-04-27 RX ORDER — SUCRALFATE ORAL 1 G/10ML
1 SUSPENSION ORAL 2 TIMES DAILY PRN
Qty: 420 ML | Refills: 5 | Status: SHIPPED | OUTPATIENT
Start: 2017-04-27 | End: 2017-10-06

## 2017-04-27 RX ORDER — ALLOPURINOL 100 MG/1
100 TABLET ORAL 2 TIMES DAILY
Qty: 60 TABLET | Refills: 5 | Status: SHIPPED | OUTPATIENT
Start: 2017-04-27 | End: 2017-10-25 | Stop reason: SDUPTHER

## 2017-04-27 RX ORDER — MONTELUKAST SODIUM 10 MG/1
10 TABLET ORAL NIGHTLY
Qty: 30 TABLET | Refills: 5 | Status: SHIPPED | OUTPATIENT
Start: 2017-04-27 | End: 2017-11-28 | Stop reason: SDUPTHER

## 2017-04-27 RX ORDER — PANTOPRAZOLE SODIUM 40 MG/1
40 TABLET, DELAYED RELEASE ORAL DAILY
Qty: 30 TABLET | Refills: 5 | Status: SHIPPED | OUTPATIENT
Start: 2017-04-27 | End: 2017-08-28 | Stop reason: ALTCHOICE

## 2017-04-27 RX ORDER — RANITIDINE 150 MG/1
150 TABLET ORAL 2 TIMES DAILY
Qty: 60 TABLET | Refills: 5 | Status: SHIPPED | OUTPATIENT
Start: 2017-04-27 | End: 2017-08-28 | Stop reason: ALTCHOICE

## 2017-04-27 RX ORDER — DICYCLOMINE HYDROCHLORIDE 10 MG/1
10 CAPSULE ORAL
Qty: 120 CAPSULE | Refills: 5 | Status: SHIPPED | OUTPATIENT
Start: 2017-04-27 | End: 2017-11-28

## 2017-04-27 RX ORDER — VALSARTAN 160 MG/1
160 TABLET ORAL DAILY
Qty: 30 TABLET | Refills: 5 | Status: SHIPPED | OUTPATIENT
Start: 2017-04-27 | End: 2017-08-28 | Stop reason: DRUGHIGH

## 2017-04-27 RX ADMIN — CEFTRIAXONE 1 G: 1 INJECTION, POWDER, FOR SOLUTION INTRAMUSCULAR; INTRAVENOUS at 16:27

## 2017-04-27 NOTE — PROGRESS NOTES
Subjective   Nghia Roach is a 31 y.o. male.     No chief complaint on file.      History of Present Illness     Chronic sinus infection - mildly sore throat from drainage. Sinus pressure. Symptoms present for several weeks. Green sinus drainage.     Fibromyalgia - chronic pain. Has been seen by pain clinic in the past. Currently not taking any arthritis medications due to recent dx of renal impairment.     Hypertension: at goal. Evidence of target organ damage: none and chronic kidney disease.  Encouraged to continue to work on diet and exercise plan.   Reminded of the importance of salt avoidance.  Continue current medication  Remain under care of nephrology     Asthma  Stable on current medication regimen.     Low Testosterone   Stable. Under the care of Dr. Pa.     Gerd   Improved with current treatment. Medications are controlling symptoms. Pt understands the possible side effects and states desire to continue current medication.     Hyperlipidemia   Working on diet and exercise.     Tension headaches  Controlled with Topamax. Headaches are less often than weekly. No side effects .    Renal impairment- under the care of nephrology.     The following portions of the patient's history were reviewed and updated as appropriate: allergies, current medications, past family history, past medical history, past social history, past surgical history and problem list.    Review of Systems   Constitutional: Negative for activity change, appetite change, chills, fatigue and fever.   HENT: Positive for congestion, ear pain, postnasal drip, sinus pressure and sore throat. Negative for facial swelling, hearing loss, trouble swallowing and voice change.    Eyes: Negative for pain, discharge and visual disturbance.   Respiratory: Positive for cough. Negative for apnea, chest tightness, shortness of breath and wheezing.    Cardiovascular: Negative for chest pain, palpitations and leg swelling.   Gastrointestinal: Negative for  "abdominal pain, blood in stool, constipation, diarrhea, nausea and vomiting.   Endocrine: Negative.    Genitourinary: Negative for dysuria.   Musculoskeletal: Negative for arthralgias and neck stiffness.   Skin: Negative for color change.   Allergic/Immunologic: Positive for environmental allergies.   Neurological: Negative for headaches.   Hematological: Negative.    Psychiatric/Behavioral: Negative for confusion and suicidal ideas. The patient is not nervous/anxious.        Objective     /90 (BP Location: Left arm, Patient Position: Sitting, Cuff Size: Adult)  Pulse 81  Temp 98.5 °F (36.9 °C) (Tympanic)   Ht 71\" (180.3 cm)  Wt 257 lb (117 kg)  SpO2 97%  BMI 35.84 kg/m2  Lab on 03/12/2017   Component Date Value Ref Range Status   • Protein/Creatinine Ratio, Urine 03/12/2017 24.8  0.0 - 200.0 mg/G Crea Final   • Creatinine, Urine 03/12/2017 149.1  mg/dL Final   • Total Protein, Urine 03/12/2017 3.7  mg/dL Final   • Microalbumin/Creatinine Ratio 03/12/2017 2.4  mg/g Final   • Creatinine, Urine 03/12/2017 150.0  mg/dL Final   • Microalbumin, Urine 03/12/2017 3.6  mg/L Final   • Creatinine, 24H 03/12/2017 2.10  0.98 - 2.20 g/24 hr Final   • Creatinine, Urine 03/12/2017 150.0  mg/dL Final   • 24H Urine Volume 03/12/2017 1400  mL Final   • Time (Hours) 03/12/2017 24  hrs Final   • 24H Urine Volume 03/12/2017 1400  mL Final   • MICRO TOTAL PROTEIN 24 HOUR URINE 03/12/2017 49* 0 - 29 mg/24 hr Final   • Micro Total  Protein 03/12/2017 3.5  mg/dL Final   • Time (Hours) 03/12/2017 24  hrs Final   • Creatinine Clearance 03/12/2017 84.2  63.0 - 143.0 ml/min Final   • Creatinine, Urine 03/12/2017 150.0  mg/dL Final   • Time (Hours) 03/12/2017 24  hrs Final   • Creatinine, 24H 03/12/2017 2.10  0.98 - 2.20 g/24 hr Final   • CREATININE CLEARANCE L/24 HOUR 03/12/2017 121.3  95.0 - 205.9 L/24 hr Final       Physical Exam   Constitutional: He is oriented to person, place, and time. He appears well-developed and " well-nourished.   HENT:   Head: Normocephalic.   Right Ear: Hearing, tympanic membrane and external ear normal. There is drainage.   Left Ear: Hearing, tympanic membrane and external ear normal. There is drainage.   Nose: Right sinus exhibits maxillary sinus tenderness and frontal sinus tenderness. Left sinus exhibits maxillary sinus tenderness and frontal sinus tenderness.   Mouth/Throat: Oropharyngeal exudate and posterior oropharyngeal erythema present. No posterior oropharyngeal edema.   Bilateral ear canals impacted with cerumen, removed via provider with flexi-loop. Honey colored cerumen removed, tolerated well.    Eyes: Pupils are equal, round, and reactive to light.   Neck: Normal range of motion. Neck supple. No tracheal deviation present. No thyromegaly present.   Cardiovascular: Normal rate, regular rhythm and normal heart sounds.  Exam reveals no gallop and no friction rub.    No murmur heard.  Pulmonary/Chest: Effort normal and breath sounds normal. No respiratory distress. He has no wheezes. He has no rales. He exhibits no tenderness.   Abdominal: Soft. Bowel sounds are normal. He exhibits no distension and no mass. There is no tenderness. There is no rebound and no guarding.   Musculoskeletal: Normal range of motion.   Neurological: He is alert and oriented to person, place, and time. He has normal reflexes.   CN 2-12 grossly intact    Skin: Skin is warm and dry. No rash noted. No erythema.   Psychiatric: He has a normal mood and affect. His speech is normal and behavior is normal. Judgment and thought content normal. Cognition and memory are normal.   Vitals reviewed.      Assessment/Plan     Problem List Items Addressed This Visit        Cardiovascular and Mediastinum    Hyperlipidemia    Relevant Medications    atorvastatin (LIPITOR) 10 MG tablet       Respiratory    Asthma    Relevant Medications    budesonide-formoterol (SYMBICORT) 160-4.5 MCG/ACT inhaler    ipratropium-albuterol (COMBIVENT  RESPIMAT)  MCG/ACT inhaler    montelukast (SINGULAIR) 10 MG tablet       Digestive    Acid reflux - Primary    Relevant Medications    dicyclomine (BENTYL) 10 MG capsule    pantoprazole (PROTONIX) 40 MG EC tablet    raNITIdine (ZANTAC) 150 MG tablet    sucralfate (CARAFATE) 1 GM/10ML suspension       Nervous and Auditory    Tension headache, chronic    Relevant Medications    topiramate (TOPAMAX) 50 MG tablet    allopurinol (ZYLOPRIM) 100 MG tablet    amitriptyline (ELAVIL) 10 MG tablet    amLODIPine (NORVASC) 10 MG tablet       Musculoskeletal and Integument    Fibromyalgia      Other Visit Diagnoses     Chronic renal insufficiency, stage 2 (mild)        Relevant Orders    Comprehensive Metabolic Panel    Acute recurrent maxillary sinusitis        Relevant Medications    cefTRIAXone (ROCEPHIN) injection 1 g (Start on 4/27/2017  4:45 PM)    Impacted cerumen of both ears          Sinus infection- Fluids, rest, symptomatic treatment advised. Discussed symptoms to report as well as reasons to seek urgent or emergent medical attention. Understanding stated.   RTC 2-5 days if not improved, sooner if condition worsens/changes. Symptomatic care advised as well as reasons for urgent or emergent care. Pt / family state understanding.     Bilateral ear canals impacted with cerumen, removed via provider with flexi-loop. Honey colored cerumen removed, tolerated well.   No q tips.   I have discussed diagnosis in detail today allowing time for questions and answers. Pt is aware of reasons to seek urgent or emergent medical care as well as reasons to return to the clinic for evaluation. Possible side effects, interactions and progression of symptoms discussed as well. Pt / family states understanding.   Repeat CMP today.  Refill routine medication.   Follow up 3-4 months, sooner if needed.          This document has been electronically signed by:  CORDELIA Flores, NP-C

## 2017-05-04 ENCOUNTER — CLINICAL SUPPORT (OUTPATIENT)
Dept: FAMILY MEDICINE CLINIC | Facility: CLINIC | Age: 32
End: 2017-05-04

## 2017-05-04 DIAGNOSIS — J45.21 MILD INTERMITTENT ASTHMA WITH ACUTE EXACERBATION: Primary | ICD-10-CM

## 2017-05-04 PROCEDURE — 96372 THER/PROPH/DIAG INJ SC/IM: CPT | Performed by: NURSE PRACTITIONER

## 2017-05-04 RX ORDER — METHYLPREDNISOLONE ACETATE 80 MG/ML
80 INJECTION, SUSPENSION INTRA-ARTICULAR; INTRALESIONAL; INTRAMUSCULAR; SOFT TISSUE ONCE
Status: COMPLETED | OUTPATIENT
Start: 2017-05-04 | End: 2017-05-04

## 2017-05-04 RX ADMIN — METHYLPREDNISOLONE ACETATE 80 MG: 80 INJECTION, SUSPENSION INTRA-ARTICULAR; INTRALESIONAL; INTRAMUSCULAR; SOFT TISSUE at 14:19

## 2017-05-12 ENCOUNTER — TELEPHONE (OUTPATIENT)
Dept: FAMILY MEDICINE CLINIC | Facility: CLINIC | Age: 32
End: 2017-05-12

## 2017-05-17 ENCOUNTER — TELEPHONE (OUTPATIENT)
Dept: FAMILY MEDICINE CLINIC | Facility: CLINIC | Age: 32
End: 2017-05-17

## 2017-05-17 DIAGNOSIS — J45.21 MILD INTERMITTENT ASTHMA WITH ACUTE EXACERBATION: Primary | ICD-10-CM

## 2017-05-17 RX ORDER — DESLORATADINE 5 MG/1
5 TABLET ORAL DAILY
Qty: 30 TABLET | Refills: 6 | Status: SHIPPED | OUTPATIENT
Start: 2017-05-17 | End: 2017-10-06

## 2017-05-18 ENCOUNTER — TELEPHONE (OUTPATIENT)
Dept: FAMILY MEDICINE CLINIC | Facility: CLINIC | Age: 32
End: 2017-05-18

## 2017-05-18 ENCOUNTER — HOSPITAL ENCOUNTER (EMERGENCY)
Facility: HOSPITAL | Age: 32
Discharge: HOME | End: 2017-05-18
Attending: EMERGENCY MEDICINE | Admitting: EMERGENCY MEDICINE

## 2017-05-18 ENCOUNTER — APPOINTMENT (OUTPATIENT)
Dept: GENERAL RADIOLOGY | Facility: HOSPITAL | Age: 32
End: 2017-05-18

## 2017-05-18 VITALS
RESPIRATION RATE: 18 BRPM | OXYGEN SATURATION: 98 % | HEIGHT: 71 IN | BODY MASS INDEX: 35.7 KG/M2 | WEIGHT: 255 LBS | DIASTOLIC BLOOD PRESSURE: 97 MMHG | SYSTOLIC BLOOD PRESSURE: 164 MMHG | TEMPERATURE: 98.2 F | HEART RATE: 84 BPM

## 2017-05-18 DIAGNOSIS — R06.00 DYSPNEA, UNSPECIFIED TYPE: Primary | ICD-10-CM

## 2017-05-18 LAB
ALBUMIN SERPL-MCNC: 4.3 G/DL (ref 3.5–5)
ALBUMIN/GLOB SERPL: 1.5 G/DL (ref 1.5–2.5)
ALP SERPL-CCNC: 59 U/L (ref 40–129)
ALT SERPL W P-5'-P-CCNC: 23 U/L (ref 10–44)
ANION GAP SERPL CALCULATED.3IONS-SCNC: 7.4 MMOL/L (ref 3.6–11.2)
AST SERPL-CCNC: 23 U/L (ref 10–34)
BASOPHILS # BLD AUTO: 0.02 10*3/MM3 (ref 0–0.3)
BASOPHILS NFR BLD AUTO: 0.3 % (ref 0–2)
BILIRUB SERPL-MCNC: 2 MG/DL (ref 0.2–1.8)
BNP SERPL-MCNC: <2 PG/ML (ref 0–100)
BUN BLD-MCNC: 14 MG/DL (ref 7–21)
BUN/CREAT SERPL: 11 (ref 7–25)
CALCIUM SPEC-SCNC: 9.3 MG/DL (ref 7.7–10)
CHLORIDE SERPL-SCNC: 104 MMOL/L (ref 99–112)
CK MB SERPL-CCNC: 2.25 NG/ML (ref 0–5)
CK MB SERPL-RTO: 1.2 % (ref 0–3)
CK SERPL-CCNC: 187 U/L (ref 24–204)
CO2 SERPL-SCNC: 27.6 MMOL/L (ref 24.3–31.9)
CREAT BLD-MCNC: 1.27 MG/DL (ref 0.43–1.29)
DEPRECATED RDW RBC AUTO: 39.3 FL (ref 37–54)
EOSINOPHIL # BLD AUTO: 0.15 10*3/MM3 (ref 0–0.7)
EOSINOPHIL NFR BLD AUTO: 2.3 % (ref 0–5)
ERYTHROCYTE [DISTWIDTH] IN BLOOD BY AUTOMATED COUNT: 13.2 % (ref 11.5–14.5)
GFR SERPL CREATININE-BSD FRML MDRD: 66 ML/MIN/1.73
GLOBULIN UR ELPH-MCNC: 2.9 GM/DL
GLUCOSE BLD-MCNC: 92 MG/DL (ref 70–110)
HCT VFR BLD AUTO: 48.8 % (ref 42–52)
HGB BLD-MCNC: 17.4 G/DL (ref 14–18)
IMM GRANULOCYTES # BLD: 0.01 10*3/MM3 (ref 0–0.03)
IMM GRANULOCYTES NFR BLD: 0.2 % (ref 0–0.5)
LYMPHOCYTES # BLD AUTO: 1.58 10*3/MM3 (ref 1–3)
LYMPHOCYTES NFR BLD AUTO: 24.4 % (ref 21–51)
MCH RBC QN AUTO: 29.9 PG (ref 27–33)
MCHC RBC AUTO-ENTMCNC: 35.7 G/DL (ref 33–37)
MCV RBC AUTO: 84 FL (ref 80–94)
MONOCYTES # BLD AUTO: 0.51 10*3/MM3 (ref 0.1–0.9)
MONOCYTES NFR BLD AUTO: 7.9 % (ref 0–10)
NEUTROPHILS # BLD AUTO: 4.21 10*3/MM3 (ref 1.4–6.5)
NEUTROPHILS NFR BLD AUTO: 64.9 % (ref 30–70)
OSMOLALITY SERPL CALC.SUM OF ELEC: 277.7 MOSM/KG (ref 273–305)
PLATELET # BLD AUTO: 193 10*3/MM3 (ref 130–400)
PMV BLD AUTO: 11.8 FL (ref 6–10)
POTASSIUM BLD-SCNC: 3.3 MMOL/L (ref 3.5–5.3)
PROT SERPL-MCNC: 7.2 G/DL (ref 6–8)
RBC # BLD AUTO: 5.81 10*6/MM3 (ref 4.7–6.1)
SODIUM BLD-SCNC: 139 MMOL/L (ref 135–153)
TROPONIN I SERPL-MCNC: <0.006 NG/ML
TROPONIN I SERPL-MCNC: <0.006 NG/ML
WBC NRBC COR # BLD: 6.48 10*3/MM3 (ref 4.5–12.5)

## 2017-05-18 PROCEDURE — 99284 EMERGENCY DEPT VISIT MOD MDM: CPT

## 2017-05-18 PROCEDURE — 80053 COMPREHEN METABOLIC PANEL: CPT | Performed by: PHYSICIAN ASSISTANT

## 2017-05-18 PROCEDURE — 71020 XR CHEST 2 VW: CPT | Performed by: RADIOLOGY

## 2017-05-18 PROCEDURE — 82553 CREATINE MB FRACTION: CPT | Performed by: PHYSICIAN ASSISTANT

## 2017-05-18 PROCEDURE — 93005 ELECTROCARDIOGRAM TRACING: CPT | Performed by: PHYSICIAN ASSISTANT

## 2017-05-18 PROCEDURE — 36415 COLL VENOUS BLD VENIPUNCTURE: CPT

## 2017-05-18 PROCEDURE — 93010 ELECTROCARDIOGRAM REPORT: CPT | Performed by: INTERNAL MEDICINE

## 2017-05-18 PROCEDURE — 83880 ASSAY OF NATRIURETIC PEPTIDE: CPT | Performed by: PHYSICIAN ASSISTANT

## 2017-05-18 PROCEDURE — 82550 ASSAY OF CK (CPK): CPT | Performed by: PHYSICIAN ASSISTANT

## 2017-05-18 PROCEDURE — 84484 ASSAY OF TROPONIN QUANT: CPT | Performed by: PHYSICIAN ASSISTANT

## 2017-05-18 PROCEDURE — 71020 HC CHEST PA AND LATERAL: CPT

## 2017-05-18 PROCEDURE — 85025 COMPLETE CBC W/AUTO DIFF WBC: CPT | Performed by: PHYSICIAN ASSISTANT

## 2017-05-18 RX ORDER — SODIUM CHLORIDE 0.9 % (FLUSH) 0.9 %
10 SYRINGE (ML) INJECTION AS NEEDED
Status: DISCONTINUED | OUTPATIENT
Start: 2017-05-18 | End: 2017-05-18 | Stop reason: HOSPADM

## 2017-05-22 ENCOUNTER — TELEPHONE (OUTPATIENT)
Dept: FAMILY MEDICINE CLINIC | Facility: CLINIC | Age: 32
End: 2017-05-22

## 2017-05-23 DIAGNOSIS — J45.21 MILD INTERMITTENT ASTHMA WITH ACUTE EXACERBATION: Primary | ICD-10-CM

## 2017-05-23 RX ORDER — METHYLPREDNISOLONE 4 MG/1
TABLET ORAL
Qty: 21 TABLET | Refills: 0 | Status: ON HOLD | OUTPATIENT
Start: 2017-05-23 | End: 2017-08-04

## 2017-05-25 ENCOUNTER — TELEPHONE (OUTPATIENT)
Dept: FAMILY MEDICINE CLINIC | Facility: CLINIC | Age: 32
End: 2017-05-25

## 2017-06-12 ENCOUNTER — OFFICE VISIT (OUTPATIENT)
Dept: FAMILY MEDICINE CLINIC | Facility: CLINIC | Age: 32
End: 2017-06-12

## 2017-06-12 VITALS
HEART RATE: 94 BPM | WEIGHT: 256 LBS | HEIGHT: 71 IN | DIASTOLIC BLOOD PRESSURE: 90 MMHG | OXYGEN SATURATION: 97 % | TEMPERATURE: 98.6 F | BODY MASS INDEX: 35.84 KG/M2 | SYSTOLIC BLOOD PRESSURE: 140 MMHG

## 2017-06-12 DIAGNOSIS — R13.10 DYSPHAGIA, UNSPECIFIED TYPE: ICD-10-CM

## 2017-06-12 DIAGNOSIS — J06.9 ACUTE URI: ICD-10-CM

## 2017-06-12 DIAGNOSIS — R49.0 HOARSENESS: Primary | ICD-10-CM

## 2017-06-12 LAB
EXPIRATION DATE: NORMAL
INTERNAL CONTROL: NORMAL
Lab: NORMAL
S PYO AG THROAT QL: NEGATIVE

## 2017-06-12 PROCEDURE — 86403 PARTICLE AGGLUT ANTBDY SCRN: CPT | Performed by: NURSE PRACTITIONER

## 2017-06-12 PROCEDURE — 99214 OFFICE O/P EST MOD 30 MIN: CPT | Performed by: NURSE PRACTITIONER

## 2017-06-12 PROCEDURE — 87880 STREP A ASSAY W/OPTIC: CPT | Performed by: NURSE PRACTITIONER

## 2017-06-12 RX ORDER — AMOXICILLIN 500 MG/1
500 CAPSULE ORAL 3 TIMES DAILY
Qty: 30 CAPSULE | Refills: 0 | Status: ON HOLD | OUTPATIENT
Start: 2017-06-12 | End: 2017-08-04

## 2017-06-12 RX ORDER — ALBUTEROL SULFATE 90 UG/1
2 AEROSOL, METERED RESPIRATORY (INHALATION) EVERY 4 HOURS PRN
Qty: 1 INHALER | Refills: 5 | Status: SHIPPED | OUTPATIENT
Start: 2017-06-12 | End: 2017-11-28 | Stop reason: SDUPTHER

## 2017-06-12 RX ORDER — ECHINACEA PURPUREA EXTRACT 125 MG
1 TABLET ORAL AS NEEDED
Qty: 1 EACH | Refills: 3 | Status: SHIPPED | OUTPATIENT
Start: 2017-06-12 | End: 2017-08-28

## 2017-06-12 NOTE — PROGRESS NOTES
"Subjective   Nghia Roach is a 31 y.o. male.     Chief Complaint   Patient presents with   • Sore Throat   • Difficulty Swallowing       History of Present Illness     Sore throat for one week.   Difficulty swallowing - exacerbation over the past week. Has occurred in the past. Was told on last EGD that he would need esophogeal dilation. He has not scheduled for follow up. Foods such as corn bread or think meats are difficult going down. He is able to drink fluid between bites which seems to force the food down.     No fever, no chills, no cough. Does not feel tired or sick in any other way.        The following portions of the patient's history were reviewed and updated as appropriate: allergies, current medications, past family history, past medical history, past social history, past surgical history and problem list.    Review of Systems   Constitutional: Negative for activity change, appetite change and diaphoresis.   HENT: Positive for sore throat and voice change.    Eyes: Negative.    Respiratory: Positive for chest tightness (at times when exposed to extreme heat or chemical exposure ). Negative for cough, shortness of breath and wheezing.    Cardiovascular: Negative for chest pain and palpitations.   Gastrointestinal: Negative for abdominal pain, blood in stool, constipation, diarrhea, nausea and vomiting.   Endocrine: Positive for heat intolerance.   Genitourinary: Negative for difficulty urinating and dysuria.   Musculoskeletal: Positive for arthralgias and back pain.   Skin: Negative for rash.   Neurological: Negative.    Hematological: Negative.    Psychiatric/Behavioral: Negative for dysphoric mood, self-injury and suicidal ideas. The patient is not nervous/anxious.    All other systems reviewed and are negative.      Objective     /90 (BP Location: Left arm, Patient Position: Sitting, Cuff Size: Adult)  Pulse 94  Temp 98.6 °F (37 °C) (Oral)   Ht 71\" (180.3 cm)  Wt 256 lb (116 kg)  SpO2 97%  " BMI 35.7 kg/m2      Physical Exam   Constitutional: He is oriented to person, place, and time. He appears well-developed and well-nourished. No distress.   HENT:   Head: Normocephalic and atraumatic.   Right Ear: External ear normal. Tympanic membrane is erythematous. A middle ear effusion is present.   Left Ear: External ear normal. Tympanic membrane is erythematous. A middle ear effusion is present.   Nose: Nose normal.   Mouth/Throat: Uvula is midline. Oropharyngeal exudate and posterior oropharyngeal erythema present. No posterior oropharyngeal edema or tonsillar abscesses.   No difficulty swallowing saliva or breathing     Eyes: Pupils are equal, round, and reactive to light.   Neck: Trachea normal and normal range of motion. Neck supple. No tracheal deviation present. No thyromegaly present.   Cardiovascular: Normal rate, regular rhythm and normal heart sounds.  Exam reveals no gallop and no friction rub.    No murmur heard.  Pulmonary/Chest: Effort normal and breath sounds normal. No respiratory distress. He has no wheezes. He has no rales. He exhibits no tenderness.   Abdominal: Soft. Bowel sounds are normal. He exhibits no distension and no mass. There is no tenderness. There is no rebound and no guarding.   Musculoskeletal: Normal range of motion.   Neurological: He is alert and oriented to person, place, and time. He has normal reflexes.   CN 2-12 grossly intact    Skin: Skin is warm and dry. No rash noted. He is not diaphoretic. No erythema.   Psychiatric: He has a normal mood and affect. His speech is normal and behavior is normal. Judgment and thought content normal. Cognition and memory are normal.   Nursing note and vitals reviewed.      Assessment/Plan     Problem List Items Addressed This Visit        Digestive    Difficulty swallowing    Relevant Orders    Ambulatory Referral to ENT (Otolaryngology) (Completed)       Other    Hoarseness - Primary    Relevant Orders    Ambulatory Referral to ENT  (Otolaryngology) (Completed)    POCT rapid strep A (Completed)      Other Visit Diagnoses     Acute URI        Relevant Medications    albuterol (PROVENTIL HFA;VENTOLIN HFA) 108 (90 BASE) MCG/ACT inhaler    amoxicillin (AMOXIL) 500 MG capsule        Amoxicillin as prescribed.   Use of albuterol prn.  Refer to ENT for evaluation.  Magic mouthwash script provided for 15 ml po q 8 hours prn use. Do not eat or drink for 1 hour after taking due to choking risk.  Tylenol/motrin for the pain.   Fluids, rest, symptomatic treatment advised. Discussed symptoms to report as well as reasons to seek urgent or emergent medical attention. Understanding stated.   RTC 2-5 days if not improved, sooner if condition worsens/changes. Symptomatic care advised as well as reasons for urgent or emergent care. Pt / family state understanding.                This document has been electronically signed by:  CORDELIA Flores, NP-C

## 2017-07-25 ENCOUNTER — TELEPHONE (OUTPATIENT)
Dept: SURGERY | Facility: CLINIC | Age: 32
End: 2017-07-25

## 2017-07-26 ENCOUNTER — OFFICE VISIT (OUTPATIENT)
Dept: UROLOGY | Facility: CLINIC | Age: 32
End: 2017-07-26

## 2017-07-26 DIAGNOSIS — N52.02 CORPORO-VENOUS OCCLUSIVE ERECTILE DYSFUNCTION: Primary | ICD-10-CM

## 2017-07-26 PROCEDURE — 99213 OFFICE O/P EST LOW 20 MIN: CPT | Performed by: UROLOGY

## 2017-07-26 NOTE — PROGRESS NOTES
Chief Complaint:          Chief Complaint   Patient presents with   • Hypogonadism       HPI:   31 y.o. male.    31-year-old white male well known to me on testosterone who ran out of his Cialis.  This was before his vacation he's here for refill of his Cialis.  This works very well formed gives him a good erection he's having no burning, blood, discharge, fevers chills, he is doing well on his testosterone with excellent improvement.  I'll see him back in a month and a half for review of this appropriate labs      Past Medical History:        Past Medical History:   Diagnosis Date   • Allergic rhinitis    • Asthma    • Atrial flutter    • Diarrhea    • Disturbance of skin sensation    • GERD (gastroesophageal reflux disease)    • Gout    • History of MRI of spine 12/16/2014    Done at [facility]    • Hyperlipidemia    • Hypertension    • Lymphadenitis    • Malaise and fatigue    • Osteoarthrosis    • Rash    • Renal disorder          Current Meds:     Current Outpatient Prescriptions   Medication Sig Dispense Refill   • albuterol (PROVENTIL HFA;VENTOLIN HFA) 108 (90 BASE) MCG/ACT inhaler Inhale 2 puffs Every 4 (Four) Hours As Needed for Shortness of Air. 1 inhaler 5   • allopurinol (ZYLOPRIM) 100 MG tablet Take 1 tablet by mouth 2 (Two) Times a Day. 60 tablet 5   • amitriptyline (ELAVIL) 10 MG tablet Take 1 tablet by mouth Every Night. 30 tablet 5   • amLODIPine (NORVASC) 10 MG tablet Take 1 tablet by mouth Daily. 30 tablet 5   • amoxicillin (AMOXIL) 500 MG capsule Take 1 capsule by mouth 3 (Three) Times a Day. 30 capsule 0   • atorvastatin (LIPITOR) 10 MG tablet Take 1 tablet by mouth Daily. 30 tablet 5   • budesonide-formoterol (SYMBICORT) 160-4.5 MCG/ACT inhaler Inhale 2 puffs 2 (Two) Times a Day. 1 inhaler 5   • CIALIS 5 MG tablet   5   • desloratadine (CLARINEX) 5 MG tablet Take 1 tablet by mouth Daily. 30 tablet 6   • dicyclomine (BENTYL) 10 MG capsule Take 1 capsule by mouth 4 (Four) Times a Day Before  Meals & at Bedtime. 120 capsule 5   • fluticasone (FLONASE) 50 MCG/ACT nasal spray 2 sprays into each nostril Daily. Administer 2 sprays in each nostril for each dose. 1 each 1   • ipratropium-albuterol (COMBIVENT RESPIMAT)  MCG/ACT inhaler Inhale 1 puff 4 (Four) Times a Day As Needed for Wheezing. 1-2 PUFFS EVERY 4 HOURS AS NEEDED 4 g 11   • MethylPREDNISolone (MEDROL, JOSEF,) 4 MG tablet Take as directed on package instructions. 21 tablet 0   • montelukast (SINGULAIR) 10 MG tablet Take 1 tablet by mouth Every Night. 30 tablet 5   • pantoprazole (PROTONIX) 40 MG EC tablet Take 1 tablet by mouth Daily. 30 tablet 5   • raNITIdine (ZANTAC) 150 MG tablet Take 1 tablet by mouth 2 (Two) Times a Day. 60 tablet 5   • sodium chloride (OCEAN NASAL SPRAY) 0.65 % nasal spray 1 spray into each nostril As Needed for Congestion. 1 each 3   • sucralfate (CARAFATE) 1 GM/10ML suspension Take 10 mL by mouth 2 (Two) Times a Day As Needed (heart burn). 420 mL 5   • Testosterone Cypionate (DEPOTESTOTERONE CYPIONATE) 200 MG/ML injection   1   • topiramate (TOPAMAX) 50 MG tablet Take 1 tablet by mouth Daily. 30 tablet 5   • valsartan (DIOVAN) 160 MG tablet Take 1 tablet by mouth Daily. 30 tablet 5     No current facility-administered medications for this visit.         Allergies:      Allergies   Allergen Reactions   • Erythromycin         Past Surgical History:     Past Surgical History:   Procedure Laterality Date   • COLONOSCOPY     • VASECTOMY           Social History:     Social History     Social History   • Marital status:      Spouse name: Radha    • Number of children: N/A   • Years of education: N/A     Occupational History   • [employer]      Social History Main Topics   • Smoking status: Never Smoker   • Smokeless tobacco: Never Used   • Alcohol use No   • Drug use: No   • Sexual activity: Yes     Partners: Female     Other Topics Concern   • Not on file     Social History Narrative       Family History:      Family History   Problem Relation Age of Onset   • Asthma Other    • Cancer Other    • COPD Other    • Diabetes Other    • Stroke Other    • Asthma Mother    • COPD Mother    • Asthma Father    • Depression Father    • Drug abuse Father    • Arthritis Maternal Grandmother    • Cancer Maternal Grandmother    • Diabetes Maternal Grandmother    • Heart disease Maternal Grandmother    • Hyperlipidemia Maternal Grandmother    • Stroke Paternal Grandfather        Review of Systems:     Review of Systems   Constitutional: Negative.    HENT: Negative.    Eyes: Negative.    Respiratory: Negative.    Cardiovascular: Negative.    Gastrointestinal: Negative.    Endocrine: Negative.    Musculoskeletal: Negative.    Allergic/Immunologic: Negative.    Neurological: Negative.    Hematological: Negative.    Psychiatric/Behavioral: Negative.        Physical Exam:     Physical Exam   Constitutional: He is oriented to person, place, and time. He appears well-developed and well-nourished.   HENT:   Head: Normocephalic and atraumatic.   Eyes: Conjunctivae and EOM are normal. Pupils are equal, round, and reactive to light.   Neck: Normal range of motion.   Cardiovascular: Normal rate, regular rhythm, normal heart sounds and intact distal pulses.    Pulmonary/Chest: Effort normal and breath sounds normal.   Abdominal: Soft. Bowel sounds are normal.   Musculoskeletal: Normal range of motion.   Neurological: He is alert and oriented to person, place, and time. He has normal reflexes.   Skin: Skin is warm and dry.   Psychiatric: He has a normal mood and affect. His behavior is normal. Judgment and thought content normal.   Nursing note and vitals reviewed.      Procedure:       Assessment:   No diagnosis found.  No orders of the defined types were placed in this encounter.      Plan:   Erectile [id number] discussed the anatomy and physiology of the penis and the endothelium.  We discussed the various treatment options available  including oral medication and its various forms.  Penile injections, vacuum erection device and surgical intervention reserved for only the most severe cases.  We discussed the need for testosterone and about 20% of cases of erectile dysfunction. He gave him a refill of his medication Cialis 5 mg daily and I'll see him back based on this for his testosterone monitoring appropriately.           This document has been electronically signed by STEPHEN BANGURA MD July 26, 2017 3:28 PM

## 2017-07-27 PROBLEM — N52.02 CORPORO-VENOUS OCCLUSIVE ERECTILE DYSFUNCTION: Status: ACTIVE | Noted: 2017-07-27

## 2017-08-01 ENCOUNTER — OFFICE VISIT (OUTPATIENT)
Dept: SURGERY | Facility: CLINIC | Age: 32
End: 2017-08-01

## 2017-08-01 VITALS
BODY MASS INDEX: 35.84 KG/M2 | HEART RATE: 85 BPM | HEIGHT: 71 IN | DIASTOLIC BLOOD PRESSURE: 80 MMHG | WEIGHT: 256 LBS | SYSTOLIC BLOOD PRESSURE: 135 MMHG

## 2017-08-01 DIAGNOSIS — R49.0 HOARSENESS: ICD-10-CM

## 2017-08-01 DIAGNOSIS — R13.10 DYSPHAGIA, UNSPECIFIED TYPE: ICD-10-CM

## 2017-08-01 DIAGNOSIS — K21.00 GASTROESOPHAGEAL REFLUX DISEASE WITH ESOPHAGITIS: Primary | ICD-10-CM

## 2017-08-01 PROCEDURE — 99204 OFFICE O/P NEW MOD 45 MIN: CPT | Performed by: SURGERY

## 2017-08-01 RX ORDER — AZELASTINE HYDROCHLORIDE, FLUTICASONE PROPIONATE 137; 50 UG/1; UG/1
SPRAY, METERED NASAL
COMMUNITY
End: 2017-10-06

## 2017-08-01 RX ORDER — AMLODIPINE BESYLATE AND BENAZEPRIL HYDROCHLORIDE 10; 20 MG/1; MG/1
1 CAPSULE ORAL DAILY
Status: ON HOLD | COMMUNITY
End: 2017-08-04

## 2017-08-01 NOTE — PROGRESS NOTES
8/1/2017    Patient Information  Nghia Roach  [address] [facility] [place] [address]  1985  [phone] (home)     Chief Complaint   Patient presents with   • Heartburn     G[facility] Referral       HPI  Patient is a 31-year-old white male referred by Dr. Alston.  He has diagnosed him with laryngeal pharyngeal reflux.  Patient reports he has severe heartburn and water brash.  This been going on for many years.  He also has a chronic cough and hoarseness.    Review of Systems:    Review of systems reviewed and confirmed    General: weight goes up and down  Integumentary: rash  Eyes: eyesight problems, glasses, red eyes, eyes itch, discharge from eyes  ENT: earache, recurrent sore throat and hoarseness  Respiratory: shortness of breath, chronic cough and wheezing  Gastrointestinal: nausea/vomiting, heartburn, diarrhea, constipation, change in stool and abdominal pain  Cardiovascular: chest pain and rapid heart rate  Neurological: dizziness  Psychiatric: insomnia and mood swings  Hematologic/Lymphatic: swollen glands  Genitourinary: frequent urination  Musculoskeletal: painful joints, sore muscles, back pain and joint stiffness  Endocrine: negative  Breasts: negative      Patient Active Problem List   Diagnosis   • Osteoarthrosis   • Fibromyalgia   • Tension headache, chronic   • Hyperlipidemia   • Asthma   • Acid reflux   • Hoarseness   • Difficulty swallowing   • Corporo-venous occlusive erectile dysfunction         Past Medical History:   Diagnosis Date   • Allergic rhinitis    • Asthma    • Atrial flutter    • Diarrhea    • Disturbance of skin sensation    • GERD (gastroesophageal reflux disease)    • Gout    • History of MRI of spine 12/16/2014    Done at [facility]    • Hyperlipidemia    • Hypertension    • Lymphadenitis    • Malaise and fatigue    • Osteoarthrosis    • Rash    • Renal disorder          Past Surgical History:   Procedure Laterality Date   • COLONOSCOPY     • VASECTOMY           Family  History   Problem Relation Age of Onset   • Asthma Other    • Cancer Other    • COPD Other    • Diabetes Other    • Stroke Other    • Asthma Mother    • COPD Mother    • Asthma Father    • Depression Father    • Drug abuse Father    • Arthritis Maternal Grandmother    • Cancer Maternal Grandmother    • Diabetes Maternal Grandmother    • Heart disease Maternal Grandmother    • Hyperlipidemia Maternal Grandmother    • Stroke Paternal Grandfather          Social History   Substance Use Topics   • Smoking status: Never Smoker   • Smokeless tobacco: Never Used   • Alcohol use No       Current Outpatient Prescriptions   Medication Sig Dispense Refill   • allopurinol (ZYLOPRIM) 100 MG tablet Take 1 tablet by mouth 2 (Two) Times a Day. 60 tablet 5   • amitriptyline (ELAVIL) 10 MG tablet Take 1 tablet by mouth Every Night. 30 tablet 5   • amLODIPine-benazepril (LOTREL) 10-20 MG per capsule Take 1 capsule by mouth Daily.     • Azelastine-Fluticasone 137-50 MCG/ACT suspension into each nostril.     • budesonide-formoterol (SYMBICORT) 160-4.5 MCG/ACT inhaler Inhale 2 puffs 2 (Two) Times a Day. 1 inhaler 5   • CIALIS 5 MG tablet   5   • desloratadine (CLARINEX) 5 MG tablet Take 1 tablet by mouth Daily. 30 tablet 6   • dicyclomine (BENTYL) 10 MG capsule Take 1 capsule by mouth 4 (Four) Times a Day Before Meals & at Bedtime. 120 capsule 5   • fluticasone (FLONASE) 50 MCG/ACT nasal spray 2 sprays into each nostril Daily. Administer 2 sprays in each nostril for each dose. 1 each 1   • ipratropium-albuterol (COMBIVENT RESPIMAT)  MCG/ACT inhaler Inhale 1 puff 4 (Four) Times a Day As Needed for Wheezing. 1-2 PUFFS EVERY 4 HOURS AS NEEDED 4 g 11   • montelukast (SINGULAIR) 10 MG tablet Take 1 tablet by mouth Every Night. 30 tablet 5   • raNITIdine (ZANTAC) 150 MG tablet Take 1 tablet by mouth 2 (Two) Times a Day. 60 tablet 5   • sodium chloride (OCEAN NASAL SPRAY) 0.65 % nasal spray 1 spray into each nostril As Needed for  Congestion. 1 each 3   • sucralfate (CARAFATE) 1 GM/10ML suspension Take 10 mL by mouth 2 (Two) Times a Day As Needed (heart burn). 420 mL 5   • Testosterone Cypionate (DEPOTESTOTERONE CYPIONATE) 200 MG/ML injection   1   • topiramate (TOPAMAX) 50 MG tablet Take 1 tablet by mouth Daily. 30 tablet 5   • valsartan (DIOVAN) 160 MG tablet Take 1 tablet by mouth Daily. 30 tablet 5   • albuterol (PROVENTIL HFA;VENTOLIN HFA) 108 (90 BASE) MCG/ACT inhaler Inhale 2 puffs Every 4 (Four) Hours As Needed for Shortness of Air. 1 inhaler 5   • amLODIPine (NORVASC) 10 MG tablet Take 1 tablet by mouth Daily. 30 tablet 5   • amoxicillin (AMOXIL) 500 MG capsule Take 1 capsule by mouth 3 (Three) Times a Day. 30 capsule 0   • atorvastatin (LIPITOR) 10 MG tablet Take 1 tablet by mouth Daily. 30 tablet 5   • MethylPREDNISolone (MEDROL, JOSEF,) 4 MG tablet Take as directed on package instructions. 21 tablet 0   • pantoprazole (PROTONIX) 40 MG EC tablet Take 1 tablet by mouth Daily. 30 tablet 5     No current facility-administered medications for this visit.          Allergies  Erythromycin      Physical Exam   Constitutional: He is oriented to person, place, and time. He appears well-developed and well-nourished. No distress.   HENT:   Head: Normocephalic.   Right Ear: External ear normal.   Left Ear: External ear normal.   Nose: Nose normal.   Mouth/Throat: Oropharynx is clear and moist.   Eyes: Conjunctivae and EOM are normal. Right eye exhibits no discharge. Left eye exhibits no discharge.   Neck: Normal range of motion. No JVD present. No tracheal deviation present. No thyromegaly present.   Cardiovascular: Normal rate, regular rhythm, normal heart sounds and intact distal pulses.  Exam reveals no gallop and no friction rub.    No murmur heard.  Pulmonary/Chest: Effort normal and breath sounds normal. No stridor. No respiratory distress. He has no wheezes. He has no rales. He exhibits no tenderness.   Abdominal: Soft. Bowel sounds are  "normal. He exhibits no distension and no mass. There is no tenderness. There is no rebound and no guarding. No hernia.   Genitourinary: Rectal exam shows guaiac negative stool.   Musculoskeletal: Normal range of motion. He exhibits no edema, tenderness or deformity.   Lymphadenopathy:     He has no cervical adenopathy.   Neurological: He is alert and oriented to person, place, and time. He has normal reflexes. He displays normal reflexes. No cranial nerve deficit. He exhibits normal muscle tone. Coordination normal.   Skin: Skin is warm and dry. No rash noted. He is not diaphoretic. No erythema. No pallor.   Psychiatric: He has a normal mood and affect. His behavior is normal. Judgment and thought content normal.   Nursing note and vitals reviewed.        /80  Pulse 85  Ht 71\" (180.3 cm)  Wt 256 lb (116 kg)  BMI 35.7 kg/m2        ASSESSMENT  Gastroesophageal reflux disease        PLAN    EGD with pH study        Efra Suero MD    "

## 2017-08-02 PROBLEM — K21.00 GASTROESOPHAGEAL REFLUX DISEASE WITH ESOPHAGITIS: Status: ACTIVE | Noted: 2017-08-02

## 2017-08-02 PROBLEM — R13.10 DYSPHAGIA: Status: ACTIVE | Noted: 2017-08-02

## 2017-08-03 ENCOUNTER — TELEPHONE (OUTPATIENT)
Dept: SURGERY | Facility: CLINIC | Age: 32
End: 2017-08-03

## 2017-08-04 ENCOUNTER — ANESTHESIA EVENT (OUTPATIENT)
Dept: PERIOP | Facility: HOSPITAL | Age: 32
End: 2017-08-04

## 2017-08-04 ENCOUNTER — HOSPITAL ENCOUNTER (OUTPATIENT)
Facility: HOSPITAL | Age: 32
Setting detail: HOSPITAL OUTPATIENT SURGERY
Discharge: HOME | End: 2017-08-04
Attending: SURGERY | Admitting: SURGERY

## 2017-08-04 ENCOUNTER — ANESTHESIA (OUTPATIENT)
Dept: PERIOP | Facility: HOSPITAL | Age: 32
End: 2017-08-04

## 2017-08-04 VITALS
OXYGEN SATURATION: 95 % | HEIGHT: 71 IN | SYSTOLIC BLOOD PRESSURE: 144 MMHG | RESPIRATION RATE: 20 BRPM | HEART RATE: 73 BPM | DIASTOLIC BLOOD PRESSURE: 87 MMHG | TEMPERATURE: 98.1 F | WEIGHT: 255 LBS | BODY MASS INDEX: 35.7 KG/M2

## 2017-08-04 DIAGNOSIS — R13.10 DYSPHAGIA, UNSPECIFIED TYPE: ICD-10-CM

## 2017-08-04 DIAGNOSIS — R49.0 HOARSENESS: ICD-10-CM

## 2017-08-04 DIAGNOSIS — K21.00 GASTROESOPHAGEAL REFLUX DISEASE WITH ESOPHAGITIS: ICD-10-CM

## 2017-08-04 PROCEDURE — 25010000002 PROPOFOL 10 MG/ML EMULSION: Performed by: NURSE ANESTHETIST, CERTIFIED REGISTERED

## 2017-08-04 PROCEDURE — 25010000002 PROPOFOL 1000 MG/ML EMULSION: Performed by: NURSE ANESTHETIST, CERTIFIED REGISTERED

## 2017-08-04 PROCEDURE — 45380 COLONOSCOPY AND BIOPSY: CPT | Performed by: SURGERY

## 2017-08-04 PROCEDURE — 91035 G-ESOPH REFLX TST W/ELECTROD: CPT | Performed by: SURGERY

## 2017-08-04 RX ORDER — OXYCODONE HYDROCHLORIDE AND ACETAMINOPHEN 5; 325 MG/1; MG/1
1 TABLET ORAL ONCE AS NEEDED
Status: DISCONTINUED | OUTPATIENT
Start: 2017-08-04 | End: 2017-08-04 | Stop reason: HOSPADM

## 2017-08-04 RX ORDER — IPRATROPIUM BROMIDE AND ALBUTEROL SULFATE 2.5; .5 MG/3ML; MG/3ML
3 SOLUTION RESPIRATORY (INHALATION) ONCE AS NEEDED
Status: DISCONTINUED | OUTPATIENT
Start: 2017-08-04 | End: 2017-08-04 | Stop reason: HOSPADM

## 2017-08-04 RX ORDER — SODIUM CHLORIDE, SODIUM LACTATE, POTASSIUM CHLORIDE, CALCIUM CHLORIDE 600; 310; 30; 20 MG/100ML; MG/100ML; MG/100ML; MG/100ML
125 INJECTION, SOLUTION INTRAVENOUS CONTINUOUS
Status: DISCONTINUED | OUTPATIENT
Start: 2017-08-04 | End: 2017-08-04 | Stop reason: HOSPADM

## 2017-08-04 RX ORDER — PROPOFOL 10 MG/ML
VIAL (ML) INTRAVENOUS AS NEEDED
Status: DISCONTINUED | OUTPATIENT
Start: 2017-08-04 | End: 2017-08-04 | Stop reason: SURG

## 2017-08-04 RX ORDER — ONDANSETRON 2 MG/ML
4 INJECTION INTRAMUSCULAR; INTRAVENOUS ONCE AS NEEDED
Status: DISCONTINUED | OUTPATIENT
Start: 2017-08-04 | End: 2017-08-04 | Stop reason: HOSPADM

## 2017-08-04 RX ORDER — FENTANYL CITRATE 50 UG/ML
50 INJECTION, SOLUTION INTRAMUSCULAR; INTRAVENOUS
Status: DISCONTINUED | OUTPATIENT
Start: 2017-08-04 | End: 2017-08-04 | Stop reason: HOSPADM

## 2017-08-04 RX ORDER — MEPERIDINE HYDROCHLORIDE 25 MG/ML
12.5 INJECTION INTRAMUSCULAR; INTRAVENOUS; SUBCUTANEOUS
Status: DISCONTINUED | OUTPATIENT
Start: 2017-08-04 | End: 2017-08-04 | Stop reason: HOSPADM

## 2017-08-04 RX ORDER — SODIUM CHLORIDE 0.9 % (FLUSH) 0.9 %
1-10 SYRINGE (ML) INJECTION AS NEEDED
Status: DISCONTINUED | OUTPATIENT
Start: 2017-08-04 | End: 2017-08-04 | Stop reason: HOSPADM

## 2017-08-04 RX ADMIN — PROPOFOL 180 MCG/KG/MIN: 10 INJECTION, EMULSION INTRAVENOUS at 08:35

## 2017-08-04 RX ADMIN — PROPOFOL 50 MG: 10 INJECTION, EMULSION INTRAVENOUS at 08:35

## 2017-08-04 RX ADMIN — SODIUM CHLORIDE, POTASSIUM CHLORIDE, SODIUM LACTATE AND CALCIUM CHLORIDE 125 ML/HR: 600; 310; 30; 20 INJECTION, SOLUTION INTRAVENOUS at 08:11

## 2017-08-04 NOTE — OP NOTE
Nghia Roach  8/4/2017      Operative Progress Note:   Surgeon and Assistant: Dr. Suero     Pre-Operative Diagnosis: gastroesophageal reflux disease     Post-Operative Diagnosis: same with hiatal hernia, severe ulcerative esophagitis     Procedure(s):  EGD with biopsies and placement of pH probe     Indication: patient is a 31-year-old white male above diagnosis     Type of Anesthesia Administered: IV general     Estimated Blood Loss: Minimal     Blood Products: None     Specimen Obtained/Removed: antrum, duodenal, esophageal biopsies     Complication(s):  None     Graft/Implant/Prosthetics/Implanted Device/Transplants: pH probe at 32 cm     Findings: duodenum normal, stomach normal except for hiatal hernia, esophagitis with linear ulcerations.  Gastroesophageal junction at 38 cm    Operative Report:  Patient was taken operating room and placed in a left lateral decubitus position.  IV sedation was given per anesthesia.  Gastroscope was introduced and passed into the duodenum.  The scope was slowly withdrawn.  I examined the duodenum, stomach and esophagus thoroughly.  Biopsies were taken as indicated above.  Findings are listed as above.    After recovery patient will be discharged home and seen back in the office in one week.       Electronically Signed by: MD Marvin Hayes disclaimer:  Much of this encounter note is an electronic transcription/translation of spoken language to printed text. The electronic translation of spoken language may permit erroneous, or at times, nonsensical words or phrases to be inadvertently transcribed; Although I have reviewed the note for such errors, some may still exist.

## 2017-08-04 NOTE — PLAN OF CARE
Problem: Patient Care Overview (Adult)  Goal: Discharge Needs Assessment  Outcome: Ongoing (interventions implemented as appropriate)    08/04/17 9730   Discharge Needs Assessment   Concerns To Be Addressed no discharge needs identified

## 2017-08-04 NOTE — PLAN OF CARE
Problem: GI Endoscopy (Adult)  Goal: Signs and Symptoms of Listed Potential Problems Will be Absent or Manageable (GI Endoscopy)  Outcome: Ongoing (interventions implemented as appropriate)    08/04/17 07   GI Endoscopy   Problems Assessed (GI Endoscopy) all   Problems Present (GI Endoscopy) none

## 2017-08-04 NOTE — ANESTHESIA POSTPROCEDURE EVALUATION
Patient: Nghia Roach    Procedure Summary     Date Anesthesia Start Anesthesia Stop Room / Location    08/04/17 0832 0850  [facility] OR 07 / [facility]       Procedure Diagnosis Surgeon Provider    ESOPHAGOGASTRODUODENOSCOPY AND BRAVO (N/A Esophagus) Hoarseness; Gastroesophageal reflux disease with esophagitis; Dysphagia, unspecified type  (Hoarseness [R49.0]; Gastroesophageal reflux disease with esophagitis [K21.0]; Dysphagia, unspecified type [R13.10]) MD Anthony Tovar MD          Anesthesia Type: general  Last vitals  BP   144/87 (08/04/17 0921)    Temp   98.1 °F (36.7 °C) (08/04/17 0921)    Pulse   73 (08/04/17 0921)   Resp   20 (08/04/17 0921)    SpO2   95 % (08/04/17 0921)      Post Anesthesia Care and Evaluation    Patient location during evaluation: bedside  Patient participation: complete - patient participated  Level of consciousness: awake and alert  Pain score: 1  Pain management: adequate  Airway patency: patent  Anesthetic complications: No anesthetic complications  PONV Status: none  Cardiovascular status: acceptable  Respiratory status: acceptable  Hydration status: acceptable

## 2017-08-04 NOTE — DISCHARGE INSTRUCTIONS
General Anesthesia, Adult, Care After  Refer to this sheet in the next few weeks. These instructions provide you with information on caring for yourself after your procedure. Your health care provider may also give you more specific instructions. Your treatment has been planned according to current medical practices, but problems sometimes occur. Call your health care provider if you have any problems or questions after your procedure.  WHAT TO EXPECT AFTER THE PROCEDURE  After the procedure, it is typical to experience:  · Sleepiness.  · Nausea and vomiting.  HOME CARE INSTRUCTIONS  · For the first 24 hours after general anesthesia:  ¨ Have a responsible person with you.  ¨ Do not drive a car. If you are alone, do not take public transportation.  ¨ Do not drink alcohol.  ¨ Do not take medicine that has not been prescribed by your health care provider.  ¨ Do not sign important papers or make important decisions.  ¨ You may resume a normal diet and activities as directed by your health care provider.  · Change bandages (dressings) as directed.  · If you have questions or problems that seem related to general anesthesia, call the hospital and ask for the anesthetist or anesthesiologist on call.  SEEK MEDICAL CARE IF:  · You have nausea and vomiting that continue the day after anesthesia.  · You develop a rash.  SEEK IMMEDIATE MEDICAL CARE IF:    · You have difficulty breathing.  · You have chest pain.  · You have any allergic problems.  This information is not intended to replace advice given to you by your health care provider. Make sure you discuss any questions you have with your health care provider.  Document Released: 03/26/2002 Document Revised: 01/08/2016 Document Reviewed: 07/03/2014  [employer] Interactive Patient Education ©2016 [employer]  Esophagogastroduodenoscopy, Care After  Refer to this sheet in the next few weeks. These instructions provide you with information about caring for yourself after your  procedure. Your health care provider may also give you more specific instructions. Your treatment has been planned according to current medical practices, but problems sometimes occur. Call your health care provider if you have any problems or questions after your procedure.  WHAT TO EXPECT AFTER THE PROCEDURE  After your procedure, it is typical to feel:  · Soreness in your throat.  · Pain with swallowing.  · Sick to your stomach (nauseous).  · Bloated.  · Dizzy.  · Fatigued.  HOME CARE INSTRUCTIONS  · Do not eat or drink anything until the numbing medicine (local anesthetic) has worn off and your gag reflex has returned. You will know that the local anesthetic has worn off when you can swallow comfortably.  · Do not drive or operate machinery until directed by your health care provider.  · Take medicines only as directed by your health care provider.  SEEK MEDICAL CARE IF:    · You cannot stop coughing.  · You are not urinating at all or less than usual.  SEEK IMMEDIATE MEDICAL CARE IF:  · You have difficulty swallowing.  · You cannot eat or drink.  · You have worsening throat or chest pain.  · You have dizziness or lightheadedness or you faint.  · You have nausea or vomiting.  · You have chills.  · You have a fever.  · You have severe abdominal pain.  · You have black, tarry, or bloody stools.  This information is not intended to replace advice given to you by your health care provider. Make sure you discuss any questions you have with your health care provider.  Document Released: 12/04/2013 Document Revised: 01/08/2016 Document Reviewed: 12/04/2013  [employer] Interactive Patient Education ©2016 [employer]    Esophagitis    Esophagitis is inflammation of the esophagus. It can involve swelling, soreness, and pain in the esophagus. This condition can make it difficult and painful to swallow.  CAUSES    Most causes of esophagitis are not serious. Many different factors can cause esophagitis,  including:  · Gastroesophageal reflux disease (GERD). This is when acid from your stomach flows up into the esophagus.  · Recurrent vomiting.  · An allergic-type reaction.  · Certain medicines, especially those that come in large pills.  · Ingestion of harmful chemicals, such as household cleaning products.  · Heavy alcohol use.  · An infection of the esophagus.  · Radiation treatment for cancer.  · Certain diseases such as sarcoidosis, Crohn's disease, and scleroderma. These diseases may cause recurrent esophagitis.  SYMPTOMS    · Trouble swallowing.  · Painful swallowing.  · Chest pain.  · Difficulty breathing.  · Nausea.  · Vomiting.  · Abdominal pain.  DIAGNOSIS    Your caregiver will take your history and do a physical exam. Depending upon what your caregiver finds, certain tests may also be done, including:  · Barium X-ray. You will drink a solution that coats the esophagus, and X-rays will be taken.  · Endoscopy. A lighted tube is put down the esophagus so your caregiver can examine the area.  · Allergy tests. These can sometimes be arranged through follow-up visits.  TREATMENT    Treatment will depend on the cause of your esophagitis. In some cases, steroids or other medicines may be given to help relieve your symptoms or to treat the underlying cause of your condition. Medicines that may be recommended include:  · Viscous lidocaine, to soothe the esophagus.  · Antacids.  · Acid reducers.  · Proton pump inhibitors.  · Antiviral medicines for certain viral infections of the esophagus.  · Antifungal medicines for certain fungal infections of the esophagus.  · Antibiotic medicines, depending on the cause of the esophagitis.  HOME CARE INSTRUCTIONS    · Avoid foods and drinks that seem to make your symptoms worse.  · Eat small, frequent meals instead of large meals.  · Avoid eating for the 3 hours prior to your bedtime.  · If you have trouble taking pills, use a pill splitter to decrease the size and likelihood  of the pill getting stuck or injuring the esophagus on the way down. Drinking water after taking a pill also helps.  · Stop smoking if you smoke.  · Maintain a healthy weight.  · Wear loose-fitting clothing. Do not wear anything tight around your waist that causes pressure on your stomach.  · Raise the head of your bed 6 to 8 inches with wood blocks to help you sleep. Extra pillows will not help.  · Only take over-the-counter or prescription medicines as directed by your caregiver.  SEEK IMMEDIATE MEDICAL CARE IF:  · You have severe chest pain that radiates into your arm, neck, or jaw.  · You feel sweaty, dizzy, or lightheaded.  · You have shortness of breath.  · You vomit blood.  · You have difficulty or pain with swallowing.  · You have bloody or black, tarry stools.  · You have a fever.  · You have a burning sensation in the chest more than 3 times a week for more than 2 weeks.  · You cannot swallow, drink, or eat.  · You drool because you cannot swallow your saliva.  MAKE SURE YOU:  · Understand these instructions.  · Will watch your condition.  · Will get help right away if you are not doing well or get worse.  This information is not intended to replace advice given to you by your health care provider. Make sure you discuss any questions you have with your health care provider.  Document Released: 01/25/2006 Document Revised: 01/08/2016 Document Reviewed: 04/13/2016  [employer] Interactive Patient Education ©2016 [employer]    Hiatal Hernia  A hiatal hernia occurs when part of your stomach slides above the muscle that separates your abdomen from your chest (diaphragm). You can be born with a hiatal hernia (congenital), or it may develop over time. In almost all cases of hiatal hernia, only the top part of the stomach pushes through.      Many people have a hiatal hernia with no symptoms. The larger the hernia, the more likely that you will have symptoms. In some cases, a hiatal hernia allows stomach acid to  flow back into the tube that carries food from your mouth to your stomach (esophagus). This may cause heartburn symptoms. Severe heartburn symptoms may mean you have developed a condition called gastroesophageal reflux disease (GERD).    CAUSES    Hiatal hernias are caused by a weakness in the opening (hiatus) where your esophagus passes through your diaphragm to attach to the upper part of your stomach. You may be born with a weakness in your hiatus, or a weakness can develop.  RISK FACTORS  Older age is a major risk factor for a hiatal hernia. Anything that increases pressure on your diaphragm can also increase your risk of a hiatal hernia. This includes:  · Pregnancy.  · Excess weight.  · Frequent constipation.  SIGNS AND SYMPTOMS    People with a hiatal hernia often have no symptoms. If symptoms develop, they are almost always caused by GERD. They may include:  · Heartburn.  · Belching.  · Indigestion.  · Trouble swallowing.  · Coughing or wheezing.   · Sore throat.  · Hoarseness.  · Chest pain.  DIAGNOSIS    A hiatal hernia is sometimes found during an exam for another problem. Your health care provider may suspect a hiatal hernia if you have symptoms of GERD. Tests may be done to diagnose GERD. These may include:  · X-rays of your stomach or chest.  · An upper gastrointestinal (GI) series. This is an X-ray exam of your GI tract involving the use of a chalky liquid that you swallow. The liquid shows up clearly on the X-ray.  · Endoscopy. This is a procedure to look into your stomach using a thin, flexible tube that has a tiny camera and light on the end of it.  TREATMENT    If you have no symptoms, you may not need treatment. If you have symptoms, treatment may include:  · Dietary and lifestyle changes to help reduce GERD symptoms.  · Medicines. These may include:  ¨ Over-the-counter antacids.  ¨ Medicines that make your stomach empty more quickly.  ¨ Medicines that block the production of stomach acid (H2  blockers).  ¨ Stronger medicines to reduce stomach acid (proton pump inhibitors).  · You may need surgery to repair the hernia if other treatments are not helping.  HOME CARE INSTRUCTIONS    · Take all medicines as directed by your health care provider.  · Quit smoking, if you smoke.  · Try to achieve and maintain a healthy body weight.  · Eat frequent small meals instead of three large meals a day. This keeps your stomach from getting too full.  ¨ Eat slowly.  ¨ Do not lie down right after eating.   ¨ Do not eat 1-2 hours before bed.    · Do not drink beverages with caffeine. These include cola, coffee, cocoa, and tea.  · Do not drink alcohol.  · Avoid foods that can make symptoms of GERD worse. These may include:  ¨ Fatty foods.  ¨ Citrus fruits.  ¨ Other foods and drinks that contain acid.  · Avoid putting pressure on your belly. Anything that puts pressure on your belly increases the amount of acid that may be pushed up into your esophagus.    ¨ Avoid bending over, especially after eating.  ¨ Raise the head of your bed by putting blocks under the legs. This keeps your head and esophagus higher than your stomach.  ¨ Do not wear tight clothing around your chest or stomach.  ¨ Try not to strain when having a bowel movement, when urinating, or when lifting heavy objects.  SEEK MEDICAL CARE IF:  · Your symptoms are not controlled with medicines or lifestyle changes.  · You are having trouble swallowing.  · You have coughing or wheezing that will not go away.  SEEK IMMEDIATE MEDICAL CARE IF:  · Your pain is getting worse.  · Your pain spreads to your arms, neck, jaw, teeth, or back.  · You have shortness of breath.  · You sweat for no reason.  · You feel sick to your stomach (nauseous) or vomit.  · You vomit blood.  · You have bright red blood in your stools.  · You have black, tarry stools.    This information is not intended to replace advice given to you by your health care provider. Make sure you discuss any  questions you have with your health care provider.  Document Released: 03/09/2005 Document Revised: 01/08/2016 Document Reviewed: 12/05/2014  [employer] Interactive Patient Education ©2016 [employer]

## 2017-08-04 NOTE — ANESTHESIA POSTPROCEDURE EVALUATION
Patient: Nghia Roach    Procedure Summary     Date Anesthesia Start Anesthesia Stop Room / Location    08/04/17 0832 0850  [facility] / [facility]       Procedure Diagnosis Surgeon Provider    ESOPHAGOGASTRODUODENOSCOPY AND BRAVO (N/A Esophagus) Hoarseness; Gastroesophageal reflux disease with esophagitis; Dysphagia, unspecified type  (Hoarseness [R49.0]; Gastroesophageal reflux disease with esophagitis [K21.0]; Dysphagia, unspecified type [R13.10]) MD Anthony Tovar MD          Anesthesia Type: general  Last vitals  BP        Temp        Pulse       Resp        SpO2          Post Anesthesia Care and Evaluation    Patient location during evaluation: PHASE II  Patient participation: complete - patient participated  Level of consciousness: awake and alert  Pain score: 1  Pain management: adequate  Airway patency: patent  Anesthetic complications: No anesthetic complications  PONV Status: controlled  Cardiovascular status: acceptable  Respiratory status: acceptable  Hydration status: acceptable

## 2017-08-04 NOTE — PLAN OF CARE
Problem: Patient Care Overview (Adult)  Goal: Plan of Care Review  Outcome: Ongoing (interventions implemented as appropriate)    08/04/17 0724   Coping/Psychosocial Response Interventions   Plan Of Care Reviewed With patient   Patient Care Overview   Progress progress toward functional goals as expected

## 2017-08-04 NOTE — H&P (VIEW-ONLY)
8/1/2017    Patient Information  Nghia Roach  [address] [facility] [place] [address]  1985  [phone] (home)     Chief Complaint   Patient presents with   • Heartburn     G[facility] Referral       HPI  Patient is a 31-year-old white male referred by Dr. Alston.  He has diagnosed him with laryngeal pharyngeal reflux.  Patient reports he has severe heartburn and water brash.  This been going on for many years.  He also has a chronic cough and hoarseness.    Review of Systems:    Review of systems reviewed and confirmed    General: weight goes up and down  Integumentary: rash  Eyes: eyesight problems, glasses, red eyes, eyes itch, discharge from eyes  ENT: earache, recurrent sore throat and hoarseness  Respiratory: shortness of breath, chronic cough and wheezing  Gastrointestinal: nausea/vomiting, heartburn, diarrhea, constipation, change in stool and abdominal pain  Cardiovascular: chest pain and rapid heart rate  Neurological: dizziness  Psychiatric: insomnia and mood swings  Hematologic/Lymphatic: swollen glands  Genitourinary: frequent urination  Musculoskeletal: painful joints, sore muscles, back pain and joint stiffness  Endocrine: negative  Breasts: negative      Patient Active Problem List   Diagnosis   • Osteoarthrosis   • Fibromyalgia   • Tension headache, chronic   • Hyperlipidemia   • Asthma   • Acid reflux   • Hoarseness   • Difficulty swallowing   • Corporo-venous occlusive erectile dysfunction         Past Medical History:   Diagnosis Date   • Allergic rhinitis    • Asthma    • Atrial flutter    • Diarrhea    • Disturbance of skin sensation    • GERD (gastroesophageal reflux disease)    • Gout    • History of MRI of spine 12/16/2014    Done at [facility]    • Hyperlipidemia    • Hypertension    • Lymphadenitis    • Malaise and fatigue    • Osteoarthrosis    • Rash    • Renal disorder          Past Surgical History:   Procedure Laterality Date   • COLONOSCOPY     • VASECTOMY           Family  History   Problem Relation Age of Onset   • Asthma Other    • Cancer Other    • COPD Other    • Diabetes Other    • Stroke Other    • Asthma Mother    • COPD Mother    • Asthma Father    • Depression Father    • Drug abuse Father    • Arthritis Maternal Grandmother    • Cancer Maternal Grandmother    • Diabetes Maternal Grandmother    • Heart disease Maternal Grandmother    • Hyperlipidemia Maternal Grandmother    • Stroke Paternal Grandfather          Social History   Substance Use Topics   • Smoking status: Never Smoker   • Smokeless tobacco: Never Used   • Alcohol use No       Current Outpatient Prescriptions   Medication Sig Dispense Refill   • allopurinol (ZYLOPRIM) 100 MG tablet Take 1 tablet by mouth 2 (Two) Times a Day. 60 tablet 5   • amitriptyline (ELAVIL) 10 MG tablet Take 1 tablet by mouth Every Night. 30 tablet 5   • amLODIPine-benazepril (LOTREL) 10-20 MG per capsule Take 1 capsule by mouth Daily.     • Azelastine-Fluticasone 137-50 MCG/ACT suspension into each nostril.     • budesonide-formoterol (SYMBICORT) 160-4.5 MCG/ACT inhaler Inhale 2 puffs 2 (Two) Times a Day. 1 inhaler 5   • CIALIS 5 MG tablet   5   • desloratadine (CLARINEX) 5 MG tablet Take 1 tablet by mouth Daily. 30 tablet 6   • dicyclomine (BENTYL) 10 MG capsule Take 1 capsule by mouth 4 (Four) Times a Day Before Meals & at Bedtime. 120 capsule 5   • fluticasone (FLONASE) 50 MCG/ACT nasal spray 2 sprays into each nostril Daily. Administer 2 sprays in each nostril for each dose. 1 each 1   • ipratropium-albuterol (COMBIVENT RESPIMAT)  MCG/ACT inhaler Inhale 1 puff 4 (Four) Times a Day As Needed for Wheezing. 1-2 PUFFS EVERY 4 HOURS AS NEEDED 4 g 11   • montelukast (SINGULAIR) 10 MG tablet Take 1 tablet by mouth Every Night. 30 tablet 5   • raNITIdine (ZANTAC) 150 MG tablet Take 1 tablet by mouth 2 (Two) Times a Day. 60 tablet 5   • sodium chloride (OCEAN NASAL SPRAY) 0.65 % nasal spray 1 spray into each nostril As Needed for  Congestion. 1 each 3   • sucralfate (CARAFATE) 1 GM/10ML suspension Take 10 mL by mouth 2 (Two) Times a Day As Needed (heart burn). 420 mL 5   • Testosterone Cypionate (DEPOTESTOTERONE CYPIONATE) 200 MG/ML injection   1   • topiramate (TOPAMAX) 50 MG tablet Take 1 tablet by mouth Daily. 30 tablet 5   • valsartan (DIOVAN) 160 MG tablet Take 1 tablet by mouth Daily. 30 tablet 5   • albuterol (PROVENTIL HFA;VENTOLIN HFA) 108 (90 BASE) MCG/ACT inhaler Inhale 2 puffs Every 4 (Four) Hours As Needed for Shortness of Air. 1 inhaler 5   • amLODIPine (NORVASC) 10 MG tablet Take 1 tablet by mouth Daily. 30 tablet 5   • amoxicillin (AMOXIL) 500 MG capsule Take 1 capsule by mouth 3 (Three) Times a Day. 30 capsule 0   • atorvastatin (LIPITOR) 10 MG tablet Take 1 tablet by mouth Daily. 30 tablet 5   • MethylPREDNISolone (MEDROL, JOSEF,) 4 MG tablet Take as directed on package instructions. 21 tablet 0   • pantoprazole (PROTONIX) 40 MG EC tablet Take 1 tablet by mouth Daily. 30 tablet 5     No current facility-administered medications for this visit.          Allergies  Erythromycin      Physical Exam   Constitutional: He is oriented to person, place, and time. He appears well-developed and well-nourished. No distress.   HENT:   Head: Normocephalic.   Right Ear: External ear normal.   Left Ear: External ear normal.   Nose: Nose normal.   Mouth/Throat: Oropharynx is clear and moist.   Eyes: Conjunctivae and EOM are normal. Right eye exhibits no discharge. Left eye exhibits no discharge.   Neck: Normal range of motion. No JVD present. No tracheal deviation present. No thyromegaly present.   Cardiovascular: Normal rate, regular rhythm, normal heart sounds and intact distal pulses.  Exam reveals no gallop and no friction rub.    No murmur heard.  Pulmonary/Chest: Effort normal and breath sounds normal. No stridor. No respiratory distress. He has no wheezes. He has no rales. He exhibits no tenderness.   Abdominal: Soft. Bowel sounds are  "normal. He exhibits no distension and no mass. There is no tenderness. There is no rebound and no guarding. No hernia.   Genitourinary: Rectal exam shows guaiac negative stool.   Musculoskeletal: Normal range of motion. He exhibits no edema, tenderness or deformity.   Lymphadenopathy:     He has no cervical adenopathy.   Neurological: He is alert and oriented to person, place, and time. He has normal reflexes. He displays normal reflexes. No cranial nerve deficit. He exhibits normal muscle tone. Coordination normal.   Skin: Skin is warm and dry. No rash noted. He is not diaphoretic. No erythema. No pallor.   Psychiatric: He has a normal mood and affect. His behavior is normal. Judgment and thought content normal.   Nursing note and vitals reviewed.        /80  Pulse 85  Ht 71\" (180.3 cm)  Wt 256 lb (116 kg)  BMI 35.7 kg/m2        ASSESSMENT  Gastroesophageal reflux disease        PLAN    EGD with pH study        Efra Suero MD    "

## 2017-08-04 NOTE — ANESTHESIA PREPROCEDURE EVALUATION
Anesthesia Evaluation     Patient summary reviewed   NPO Solid Status: > 8 hours  NPO Liquid Status: > 8 hours     Airway   Mallampati: II  TM distance: >3 FB  Neck ROM: full  no difficulty expected  Dental          Pulmonary - normal exam   (+) asthma,   Cardiovascular - normal exam    (+) hypertension, dysrhythmias Atrial Flutter, hyperlipidemia      Neuro/Psych  (+) headaches,    GI/Hepatic/Renal/Endo    (+)  GERD,     Musculoskeletal     (+) myalgias,   Abdominal  - normal exam   Substance History      OB/GYN          Other            Phys Exam Other: Lower partial                            Anesthesia Plan    ASA 2     general     intravenous induction   Anesthetic plan and risks discussed with patient.

## 2017-08-08 LAB
LAB AP CASE REPORT: NORMAL
Lab: NORMAL
PATH REPORT.FINAL DX SPEC: NORMAL

## 2017-08-15 ENCOUNTER — OFFICE VISIT (OUTPATIENT)
Dept: SURGERY | Facility: CLINIC | Age: 32
End: 2017-08-15

## 2017-08-15 VITALS
HEIGHT: 71 IN | BODY MASS INDEX: 35.7 KG/M2 | HEART RATE: 73 BPM | DIASTOLIC BLOOD PRESSURE: 80 MMHG | WEIGHT: 255 LBS | SYSTOLIC BLOOD PRESSURE: 140 MMHG | RESPIRATION RATE: 18 BRPM | TEMPERATURE: 97.6 F

## 2017-08-15 DIAGNOSIS — R49.0 HOARSENESS: ICD-10-CM

## 2017-08-15 DIAGNOSIS — R13.10 DYSPHAGIA, UNSPECIFIED TYPE: ICD-10-CM

## 2017-08-15 DIAGNOSIS — K21.00 GASTROESOPHAGEAL REFLUX DISEASE WITH ESOPHAGITIS: Primary | ICD-10-CM

## 2017-08-15 PROCEDURE — 99214 OFFICE O/P EST MOD 30 MIN: CPT | Performed by: SURGERY

## 2017-08-15 NOTE — PROGRESS NOTES
8/15/2017    Patient Information  Nghia Roach  [address] [facility] [place] [address]  1985  [phone] (home)     Chief Complaint   Patient presents with   • Follow-up     EGD and Bravo       HPI  Patient is a 31-year-old white male severe reflux disease.  EGD showed him to have ulcerative esophagitis.  DeMeester scores are in the 200s.  He is extremely symptomatic    Review of Systems:  ROS reviewed and confirmed.  General: weight goes up and down  Integumentary: rash  Eyes: eyesight problems, glasses, red eyes, eyes itch, discharge from eyes  ENT: earache, recurrent sore throat and hoarseness  Respiratory: shortness of breath, chronic cough and wheezing  Gastrointestinal: nausea/vomiting, heartburn, diarrhea, constipation, change in stool and abdominal pain  Cardiovascular: chest pain and rapid heart rate  Neurological: dizziness  Psychiatric: insomnia and mood swings  Hematologic/Lymphatic: swollen glands  Genitourinary: frequent urination  Musculoskeletal: painful joints, sore muscles, back pain and joint stiffness  Endocrine: negative  Breasts: negative      Patient Active Problem List   Diagnosis   • Osteoarthrosis   • Fibromyalgia   • Tension headache, chronic   • Hyperlipidemia   • Asthma   • Acid reflux   • Hoarseness   • Difficulty swallowing   • Corporo-venous occlusive erectile dysfunction   • Gastroesophageal reflux disease with esophagitis   • Dysphagia         Past Medical History:   Diagnosis Date   • Allergic rhinitis    • Asthma    • Atrial flutter    • Diarrhea    • Disturbance of skin sensation    • GERD (gastroesophageal reflux disease)    • Gout    • History of MRI of spine 12/16/2014    Done at [facility]    • Hyperlipidemia    • Hypertension    • Lymphadenitis    • Malaise and fatigue    • Osteoarthrosis    • Rash    • Renal disorder          Past Surgical History:   Procedure Laterality Date   • BRAVO PROCEDURE N/A 8/4/2017    Procedure: ESOPHAGOGASTRODUODENOSCOPY AND BRAVO;   Surgeon: Efra Suero MD;  Location: [facility] OR;  Service:    • COLONOSCOPY  2014   • VASECTOMY           Family History   Problem Relation Age of Onset   • Asthma Other    • Cancer Other    • COPD Other    • Diabetes Other    • Stroke Other    • Asthma Mother    • COPD Mother    • Asthma Father    • Depression Father    • Drug abuse Father    • Arthritis Maternal Grandmother    • Cancer Maternal Grandmother    • Diabetes Maternal Grandmother    • Heart disease Maternal Grandmother    • Hyperlipidemia Maternal Grandmother    • Stroke Paternal Grandfather          Social History   Substance Use Topics   • Smoking status: Never Smoker   • Smokeless tobacco: Never Used   • Alcohol use Yes      Comment: occasional       Current Outpatient Prescriptions   Medication Sig Dispense Refill   • albuterol (PROVENTIL HFA;VENTOLIN HFA) 108 (90 BASE) MCG/ACT inhaler Inhale 2 puffs Every 4 (Four) Hours As Needed for Shortness of Air. 1 inhaler 5   • allopurinol (ZYLOPRIM) 100 MG tablet Take 1 tablet by mouth 2 (Two) Times a Day. 60 tablet 5   • amitriptyline (ELAVIL) 10 MG tablet Take 1 tablet by mouth Every Night. 30 tablet 5   • amLODIPine (NORVASC) 10 MG tablet Take 1 tablet by mouth Daily. 30 tablet 5   • atorvastatin (LIPITOR) 10 MG tablet Take 1 tablet by mouth Daily. 30 tablet 5   • Azelastine-Fluticasone 137-50 MCG/ACT suspension into each nostril.     • budesonide-formoterol (SYMBICORT) 160-4.5 MCG/ACT inhaler Inhale 2 puffs 2 (Two) Times a Day. 1 inhaler 5   • CIALIS 5 MG tablet   5   • desloratadine (CLARINEX) 5 MG tablet Take 1 tablet by mouth Daily. 30 tablet 6   • dicyclomine (BENTYL) 10 MG capsule Take 1 capsule by mouth 4 (Four) Times a Day Before Meals & at Bedtime. 120 capsule 5   • fluticasone (FLONASE) 50 MCG/ACT nasal spray 2 sprays into each nostril Daily. Administer 2 sprays in each nostril for each dose. 1 each 1   • ipratropium-albuterol (COMBIVENT RESPIMAT)  MCG/ACT inhaler Inhale 1 puff 4 (Four)  "Times a Day As Needed for Wheezing. 1-2 PUFFS EVERY 4 HOURS AS NEEDED 4 g 11   • montelukast (SINGULAIR) 10 MG tablet Take 1 tablet by mouth Every Night. 30 tablet 5   • pantoprazole (PROTONIX) 40 MG EC tablet Take 1 tablet by mouth Daily. 30 tablet 5   • raNITIdine (ZANTAC) 150 MG tablet Take 1 tablet by mouth 2 (Two) Times a Day. 60 tablet 5   • sodium chloride (OCEAN NASAL SPRAY) 0.65 % nasal spray 1 spray into each nostril As Needed for Congestion. 1 each 3   • sucralfate (CARAFATE) 1 GM/10ML suspension Take 10 mL by mouth 2 (Two) Times a Day As Needed (heart burn). 420 mL 5   • Testosterone Cypionate (DEPOTESTOTERONE CYPIONATE) 200 MG/ML injection   1   • topiramate (TOPAMAX) 50 MG tablet Take 1 tablet by mouth Daily. 30 tablet 5   • valsartan (DIOVAN) 160 MG tablet Take 1 tablet by mouth Daily. 30 tablet 5     No current facility-administered medications for this visit.          Allergies  Erythromycin      Physical Exam  Gen. 31-year-old white male no distress    Abdomen soft    /80  Pulse 73  Temp 97.6 °F (36.4 °C)  Resp 18  Ht 71\" (180.3 cm)  Wt 255 lb (116 kg)  BMI 35.57 kg/m2        ASSESSMENT  Severe gastroesophageal reflux disease        PLAN    EMS, barium swallow followed by laparoscopic Nissen fundoplication.  We'll see him back after the EMS and barium swallow scheduled for surgery.        Efra Suero MD    "

## 2017-08-16 ENCOUNTER — TELEPHONE (OUTPATIENT)
Dept: SURGERY | Facility: CLINIC | Age: 32
End: 2017-08-16

## 2017-08-24 ENCOUNTER — TELEPHONE (OUTPATIENT)
Dept: SURGERY | Facility: CLINIC | Age: 32
End: 2017-08-24

## 2017-08-25 ENCOUNTER — TRANSCRIBE ORDERS (OUTPATIENT)
Dept: ADMINISTRATIVE | Facility: HOSPITAL | Age: 32
End: 2017-08-25

## 2017-08-25 DIAGNOSIS — J38.00: Primary | ICD-10-CM

## 2017-08-28 ENCOUNTER — OFFICE VISIT (OUTPATIENT)
Dept: FAMILY MEDICINE CLINIC | Facility: CLINIC | Age: 32
End: 2017-08-28

## 2017-08-28 VITALS
WEIGHT: 263 LBS | OXYGEN SATURATION: 97 % | TEMPERATURE: 99 F | DIASTOLIC BLOOD PRESSURE: 80 MMHG | HEIGHT: 71 IN | BODY MASS INDEX: 36.82 KG/M2 | SYSTOLIC BLOOD PRESSURE: 140 MMHG | HEART RATE: 76 BPM

## 2017-08-28 DIAGNOSIS — M79.7 FIBROMYALGIA: ICD-10-CM

## 2017-08-28 DIAGNOSIS — M15.9 OSTEOARTHRITIS OF MULTIPLE JOINTS, UNSPECIFIED OSTEOARTHRITIS TYPE: ICD-10-CM

## 2017-08-28 DIAGNOSIS — K21.00 GASTROESOPHAGEAL REFLUX DISEASE WITH ESOPHAGITIS: ICD-10-CM

## 2017-08-28 DIAGNOSIS — N52.02 CORPORO-VENOUS OCCLUSIVE ERECTILE DYSFUNCTION: ICD-10-CM

## 2017-08-28 DIAGNOSIS — Z13.9 SCREENING: ICD-10-CM

## 2017-08-28 DIAGNOSIS — K44.9 HERNIA, HIATAL: ICD-10-CM

## 2017-08-28 DIAGNOSIS — G89.4 CHRONIC PAIN SYNDROME: ICD-10-CM

## 2017-08-28 DIAGNOSIS — E78.2 MIXED HYPERLIPIDEMIA: ICD-10-CM

## 2017-08-28 DIAGNOSIS — G44.229 CHRONIC TENSION-TYPE HEADACHE, NOT INTRACTABLE: ICD-10-CM

## 2017-08-28 DIAGNOSIS — M1A.09X0 CHRONIC GOUT OF MULTIPLE SITES, UNSPECIFIED CAUSE: ICD-10-CM

## 2017-08-28 DIAGNOSIS — R13.10 DYSPHAGIA, UNSPECIFIED TYPE: Primary | ICD-10-CM

## 2017-08-28 DIAGNOSIS — J45.40 MODERATE PERSISTENT ASTHMA WITHOUT COMPLICATION: ICD-10-CM

## 2017-08-28 DIAGNOSIS — I10 ESSENTIAL HYPERTENSION: ICD-10-CM

## 2017-08-28 PROCEDURE — 99214 OFFICE O/P EST MOD 30 MIN: CPT | Performed by: NURSE PRACTITIONER

## 2017-08-28 RX ORDER — RANITIDINE 300 MG/1
300 TABLET ORAL NIGHTLY
COMMUNITY
End: 2017-11-28

## 2017-08-28 RX ORDER — VALSARTAN AND HYDROCHLOROTHIAZIDE 160; 12.5 MG/1; MG/1
1 TABLET, FILM COATED ORAL DAILY
Qty: 30 TABLET | Refills: 5 | Status: SHIPPED | OUTPATIENT
Start: 2017-08-28 | End: 2017-11-28 | Stop reason: SDUPTHER

## 2017-08-28 NOTE — PROGRESS NOTES
Subjective   Nghia Roach is a 31 y.o. male.     Chief Complaint   Patient presents with   • Hypertension   • Back Pain   • Heartburn   • Asthma       History of Present Illness     Patient is here today to discuss multiple chronic disease processes.    GERD - recent GI consult is on file for review. Patient is currently taking Zantac 300 mg at bedtime.  PPI treatment was recently stopped by GI.  He does use Carafate when necessary to help control symptoms.    New diagnosis hiatal hernia-patient is under the care of Dr. Suero at this time. Has additional testing is  scheduled.       Chronic back pain - pt has been seen by ortho and neurology in the past. He has been evaluated for joint injections.  Patient has recently been seen by Dr. Sena.  He complains of chronic mid back pain and neck pain.  Patient has been present for greater than 5 years.  He has been to physical therapy in the past.  He does have radiology on file in [facility] as well as on file and all scripts.  He rates his pain as 8 on pain scale rating.  At times pain interferes with his ability to be active with his young family.  He works in a factory and feels pain constantly.  Pain is described as sharp and constant.  Pain is made worse with prolonged standing or lifting.  He denies any substance abuse or addiction.    Hyponatremia-stable on Lipitor 10 mg 1 daily    IBS symptoms-under the care of GI.  Currently taking Bentyl.  Feel symptoms are stable.    Gout-chronic of multiple sites.  Currently taking allopurinol.  No recent gout flare.    Chronic headaches-patient reports improvement in headaches with use of prophylactic Topamax.  He denies any side effects    Hypertension-patient does check his blood pressure frequently.  He does feel as if his hands and feet are swelling at times.  He is drinking adequate water.  At times his blood pressure is mildly elevated.    Erectile dysfunction-patient is currently taking Cialis 5 mg daily and testosterone  "injections.  He is under the care of urology.    Asthma-patient is under care asthma and allergy.  He has not had any recent exacerbation of symptoms.  He usually has exacerbations during the fall and spring allergy seasons.  He is currently taking Clarinex, albuterol, Symbicort, Combivent and Singulair.        The following portions of the patient's history were reviewed and updated as appropriate: allergies, current medications, past family history, past medical history, past social history, past surgical history and problem list.    Review of Systems   Constitutional: Positive for fatigue. Negative for chills and fever.   Eyes: Negative for visual disturbance.   Respiratory: Negative for cough, chest tightness, shortness of breath and wheezing.    Cardiovascular: Negative for chest pain and palpitations.   Gastrointestinal: Positive for abdominal pain (Epigastric at times, worse after large meals). Negative for blood in stool, constipation, nausea and vomiting.   Endocrine: Negative.    Genitourinary: Negative for difficulty urinating and dysuria.   Musculoskeletal: Positive for arthralgias, back pain and neck pain.   Skin: Negative for rash.   Allergic/Immunologic: Positive for environmental allergies.   Neurological: Positive for headaches. Negative for facial asymmetry and speech difficulty.   Hematological: Negative.    Psychiatric/Behavioral: Positive for sleep disturbance (Sleeping well with current medication regimen). Negative for dysphoric mood and suicidal ideas. The patient is not nervous/anxious.        Objective     /80 (BP Location: Left arm, Patient Position: Sitting)  Pulse 76  Temp 99 °F (37.2 °C) (Tympanic)   Ht 71\" (180.3 cm)  Wt 263 lb (119 kg)  SpO2 97%  BMI 36.68 kg/m2  Admission on 08/04/2017, Discharged on 08/04/2017   Component Date Value Ref Range Status   • Case Report 08/04/2017    Final                    Value:Surgical Pathology Report                         Case: " [id number]                                  Authorizing Provider:  Efra Suero MD         Collected:           08/04/2017 08:40 AM          Ordering Location:     [facility]      Received:            08/04/2017 09:30 AM                                 OPERATING ROOM DEPARTMENT                                                    Pathologist:           Oscar Martínez MD                                                      Specimens:   1) - Small Intestine, Duodenum, bx duodenum                                                         2) - Stomach, bx antrum                                                                             3) - Esophagus, bx esophagus                                                              • Final Diagnosis 08/04/2017    Final                    Value:This result contains rich text formatting which cannot be displayed here.       Physical Exam   Constitutional: He is oriented to person, place, and time. He appears well-developed and well-nourished. No distress.   HENT:   Head: Normocephalic and atraumatic.   Right Ear: External ear normal.   Left Ear: External ear normal.   Nose: Nose normal.   Mouth/Throat: Oropharynx is clear and moist. No oropharyngeal exudate.   Eyes: EOM are normal. Pupils are equal, round, and reactive to light.   Neck: Normal range of motion. Neck supple. No tracheal deviation present. No thyromegaly present.   Cardiovascular: Normal rate, regular rhythm, normal heart sounds and intact distal pulses.  Exam reveals no gallop and no friction rub.    No murmur heard.  Pulmonary/Chest: Effort normal and breath sounds normal. No respiratory distress. He has no wheezes. He has no rales. He exhibits no tenderness.   Abdominal: Soft. Bowel sounds are normal. He exhibits no distension and no mass. There is no tenderness. There is no rebound and no guarding.   Musculoskeletal: Normal range of motion.        Lumbar back: He exhibits tenderness, pain and  spasm.   Decreased lumbar curvature, there is pain with forward flexion. DTR's +2. No edema.     Positive tender points bilateral of above and below the waist.   Lymphadenopathy:     He has no cervical adenopathy.   Neurological: He is alert and oriented to person, place, and time. He has normal reflexes.   CN 2-12 grossly intact    Skin: Skin is warm and dry. No rash noted. He is not diaphoretic. No erythema. No pallor.   Psychiatric: He has a normal mood and affect. His speech is normal and behavior is normal. Judgment and thought content normal. His affect is not inappropriate. Cognition and memory are normal.   Denies thoughts of hurting self or others.   Nursing note and vitals reviewed.      Assessment/Plan     Problem List Items Addressed This Visit        Cardiovascular and Mediastinum    Hyperlipidemia    Hypertension    Relevant Medications    valsartan-hydrochlorothiazide (DIOVAN HCT) 160-12.5 MG per tablet       Respiratory    Asthma    Hernia, hiatal    Relevant Medications    raNITIdine (ZANTAC) 300 MG tablet       Digestive    Difficulty swallowing - Primary    Gastroesophageal reflux disease with esophagitis    Overview     Added automatically from request for surgery [id number]         Relevant Medications    raNITIdine (ZANTAC) 300 MG tablet       Nervous and Auditory    Tension headache, chronic       Musculoskeletal and Integument    Osteoarthrosis    Fibromyalgia    Chronic gout of multiple sites       Genitourinary    Corporo-venous occlusive erectile dysfunction      Other Visit Diagnoses     Chronic pain syndrome        Relevant Orders    Ambulatory Referral to Pain Management (Completed)        I have discussed diagnosis in detail today allowing time for questions and answers. Pt is aware of reasons to seek urgent or emergent medical care as well as reasons to return to the clinic for evaluation. Possible side effects, interactions and progression of symptoms discussed as well. Pt / family  states understanding.   I have requested staff obtained Mr. Roach is MRI from his old chart and placed in Epic.    Add HCTZ 12. 5 mg to his current Diovan dose.  Refer to Dr. Schreiber in [facility] for pain clinic consult.   Remain under the care of urology.  Remain under the care of a GI/Dr. Suero for further investigation of his GI symptoms.  Continue current medication regimen.  Most recent GI and surgical consultation been reviewed and discussed with patient today.  Greater than for chronic conditions have been reviewed and discussed today.  Recommend fasting labs.  Recommend patient follow-up in 2-3 months, sooner if needed.         This document has been electronically signed by:  CORDELIA Flores, NP-C

## 2017-08-30 ENCOUNTER — HOSPITAL ENCOUNTER (OUTPATIENT)
Dept: GENERAL RADIOLOGY | Facility: HOSPITAL | Age: 32
Discharge: HOME | End: 2017-08-30
Attending: SURGERY | Admitting: SURGERY

## 2017-08-30 ENCOUNTER — HOSPITAL ENCOUNTER (OUTPATIENT)
Dept: GENERAL RADIOLOGY | Facility: HOSPITAL | Age: 32
Discharge: HOME | End: 2017-08-30
Attending: SURGERY

## 2017-08-30 DIAGNOSIS — R13.10 DYSPHAGIA, UNSPECIFIED TYPE: ICD-10-CM

## 2017-08-30 DIAGNOSIS — R49.0 HOARSENESS: ICD-10-CM

## 2017-08-30 DIAGNOSIS — K21.00 GASTROESOPHAGEAL REFLUX DISEASE WITH ESOPHAGITIS: ICD-10-CM

## 2017-08-30 PROBLEM — M1A.09X0 CHRONIC GOUT OF MULTIPLE SITES: Status: ACTIVE | Noted: 2017-08-30

## 2017-08-30 PROBLEM — I10 HYPERTENSION: Status: ACTIVE | Noted: 2017-08-30

## 2017-08-30 PROBLEM — M54.9 BACK ACHE: Status: ACTIVE | Noted: 2017-08-30

## 2017-08-30 PROBLEM — K44.9 HERNIA, HIATAL: Status: ACTIVE | Noted: 2017-08-30

## 2017-08-30 PROCEDURE — 74220 X-RAY XM ESOPHAGUS 1CNTRST: CPT

## 2017-08-30 PROCEDURE — 74246 X-RAY XM UPR GI TRC 2CNTRST: CPT

## 2017-08-30 PROCEDURE — 74246 X-RAY XM UPR GI TRC 2CNTRST: CPT | Performed by: RADIOLOGY

## 2017-09-07 ENCOUNTER — HOSPITAL ENCOUNTER (OUTPATIENT)
Dept: CT IMAGING | Facility: HOSPITAL | Age: 32
Discharge: HOME | End: 2017-09-07
Attending: OTOLARYNGOLOGY

## 2017-09-07 ENCOUNTER — HOSPITAL ENCOUNTER (OUTPATIENT)
Dept: CT IMAGING | Facility: HOSPITAL | Age: 32
Discharge: HOME | End: 2017-09-07
Attending: OTOLARYNGOLOGY | Admitting: OTOLARYNGOLOGY

## 2017-09-07 DIAGNOSIS — J38.00: ICD-10-CM

## 2017-09-07 LAB — CREAT BLDA-MCNC: 1.2 MG/DL (ref 0.6–1.3)

## 2017-09-07 PROCEDURE — 0 IOPAMIDOL 61 % SOLUTION: Performed by: OTOLARYNGOLOGY

## 2017-09-07 PROCEDURE — 70491 CT SOFT TISSUE NECK W/DYE: CPT | Performed by: RADIOLOGY

## 2017-09-07 PROCEDURE — 71270 CT THORAX DX C-/C+: CPT

## 2017-09-07 PROCEDURE — 71270 CT THORAX DX C-/C+: CPT | Performed by: RADIOLOGY

## 2017-09-07 PROCEDURE — 82565 ASSAY OF CREATININE: CPT

## 2017-09-07 PROCEDURE — 70491 CT SOFT TISSUE NECK W/DYE: CPT

## 2017-09-07 RX ADMIN — IOPAMIDOL 100 ML: 612 INJECTION, SOLUTION INTRAVENOUS at 11:45

## 2017-09-14 ENCOUNTER — HOSPITAL ENCOUNTER (OUTPATIENT)
Dept: PREOP | Facility: HOSPITAL | Age: 32
Setting detail: HOSPITAL OUTPATIENT SURGERY
Discharge: HOME | End: 2017-09-14
Attending: SURGERY | Admitting: SURGERY

## 2017-09-14 VITALS
BODY MASS INDEX: 36.12 KG/M2 | OXYGEN SATURATION: 98 % | TEMPERATURE: 98.5 F | WEIGHT: 258 LBS | HEART RATE: 82 BPM | HEIGHT: 71 IN | SYSTOLIC BLOOD PRESSURE: 132 MMHG | RESPIRATION RATE: 20 BRPM | DIASTOLIC BLOOD PRESSURE: 73 MMHG

## 2017-09-14 DIAGNOSIS — K21.00 GASTROESOPHAGEAL REFLUX DISEASE WITH ESOPHAGITIS: ICD-10-CM

## 2017-09-14 DIAGNOSIS — R13.10 DYSPHAGIA, UNSPECIFIED TYPE: ICD-10-CM

## 2017-09-14 DIAGNOSIS — R49.0 HOARSENESS: ICD-10-CM

## 2017-09-14 PROCEDURE — 91010 ESOPHAGUS MOTILITY STUDY: CPT

## 2017-09-14 RX ADMIN — LIDOCAINE HYDROCHLORIDE 15 ML: 20 SOLUTION ORAL; TOPICAL at 11:24

## 2017-09-14 NOTE — PLAN OF CARE
Problem: GI Endoscopy (Adult)  Goal: Signs and Symptoms of Listed Potential Problems Will be Absent or Manageable (GI Endoscopy)  Outcome: Ongoing (interventions implemented as appropriate)    09/14/17 1112   GI Endoscopy   Problems Assessed (GI Endoscopy) all   Problems Present (GI Endoscopy) none

## 2017-09-14 NOTE — NURSING NOTE
Patient tolerated EMS well. EMS catheter inserted in right nare for exam, then removed once exam finished. Patient has no signs or symptoms of discomfort. V/S good. Patient discharged home.

## 2017-09-15 ENCOUNTER — OFFICE VISIT (OUTPATIENT)
Dept: SURGERY | Facility: CLINIC | Age: 32
End: 2017-09-15

## 2017-09-15 VITALS
WEIGHT: 258 LBS | RESPIRATION RATE: 20 BRPM | HEART RATE: 80 BPM | HEIGHT: 71 IN | BODY MASS INDEX: 36.12 KG/M2 | DIASTOLIC BLOOD PRESSURE: 70 MMHG | SYSTOLIC BLOOD PRESSURE: 130 MMHG

## 2017-09-15 DIAGNOSIS — R49.0 HOARSENESS: ICD-10-CM

## 2017-09-15 DIAGNOSIS — K21.00 GASTROESOPHAGEAL REFLUX DISEASE WITH ESOPHAGITIS: ICD-10-CM

## 2017-09-15 DIAGNOSIS — J45.40 MODERATE PERSISTENT ASTHMA WITHOUT COMPLICATION: ICD-10-CM

## 2017-09-15 DIAGNOSIS — R13.10 DYSPHAGIA, UNSPECIFIED TYPE: ICD-10-CM

## 2017-09-15 DIAGNOSIS — K44.9 HERNIA, HIATAL: Primary | ICD-10-CM

## 2017-09-15 PROCEDURE — 99214 OFFICE O/P EST MOD 30 MIN: CPT | Performed by: SURGERY

## 2017-09-15 NOTE — PROGRESS NOTES
9/15/2017    Patient Information  Nghia Roach  [address] [facility] [place] [address]  1985  [phone] (home)     Chief Complaint   Patient presents with   • Follow-up     FU UGI AND BARIUM SWALLOW       HPI  Patient is a 32-year-old white male who has severe reflux disease.  His DeMeester scores are in the 200s.  EGD shows him to have ulcerative esophagitis.  He reports all this is been going on for years.  EMS shows some nonspecific esophageal motility disorder is related to his reflux.    Review of Systems:  The Review of Systems has been reviewed and confirmed.    General: weight goes up and down  Integumentary: rash  Eyes: eyesight problems, glasses, red eyes, eyes itch, discharge from eyes  ENT: earache, recurrent sore throat and hoarseness  Respiratory: shortness of breath, chronic cough and wheezing  Gastrointestinal: nausea/vomiting, heartburn, diarrhea, constipation, change in stool and abdominal pain  Cardiovascular: chest pain and rapid heart rate  Neurological: dizziness  Psychiatric: insomnia and mood swings  Hematologic/Lymphatic: swollen glands  Genitourinary: frequent urination  Musculoskeletal: painful joints, sore muscles, back pain and joint stiffness  Endocrine: negative  Breasts: negative      Patient Active Problem List   Diagnosis   • Osteoarthrosis   • Fibromyalgia   • Tension headache, chronic   • Hyperlipidemia   • Asthma   • Acid reflux   • Hoarseness   • Difficulty swallowing   • Corporo-venous occlusive erectile dysfunction   • Gastroesophageal reflux disease with esophagitis   • Dysphagia   • Hernia, hiatal   • Back ache   • Chronic gout of multiple sites   • Hypertension         Past Medical History:   Diagnosis Date   • Allergic rhinitis    • Asthma    • Atrial flutter    • Diarrhea    • Disturbance of skin sensation    • GERD (gastroesophageal reflux disease)    • Gout    • History of MRI of spine 12/16/2014    Done at [facility]    • Hyperlipidemia    • Hypertension    •  Lymphadenitis    • Malaise and fatigue    • Osteoarthrosis    • Rash    • Renal disorder          Past Surgical History:   Procedure Laterality Date   • BRAVO PROCEDURE N/A 8/4/2017    Procedure: ESOPHAGOGASTRODUODENOSCOPY AND FULLER;  Surgeon: Efra Suero MD;  Location: [facility];  Service:    • COLONOSCOPY  2014   • VASECTOMY           Family History   Problem Relation Age of Onset   • Asthma Other    • Cancer Other    • COPD Other    • Diabetes Other    • Stroke Other    • Asthma Mother    • COPD Mother    • Asthma Father    • Depression Father    • Drug abuse Father    • Arthritis Maternal Grandmother    • Cancer Maternal Grandmother    • Diabetes Maternal Grandmother    • Heart disease Maternal Grandmother    • Hyperlipidemia Maternal Grandmother    • Stroke Paternal Grandfather          Social History   Substance Use Topics   • Smoking status: Never Smoker   • Smokeless tobacco: Never Used   • Alcohol use No       Current Outpatient Prescriptions   Medication Sig Dispense Refill   • albuterol (PROVENTIL HFA;VENTOLIN HFA) 108 (90 BASE) MCG/ACT inhaler Inhale 2 puffs Every 4 (Four) Hours As Needed for Shortness of Air. 1 inhaler 5   • allopurinol (ZYLOPRIM) 100 MG tablet Take 1 tablet by mouth 2 (Two) Times a Day. 60 tablet 5   • amitriptyline (ELAVIL) 10 MG tablet Take 1 tablet by mouth Every Night. 30 tablet 5   • amLODIPine (NORVASC) 10 MG tablet Take 1 tablet by mouth Daily. 30 tablet 5   • atorvastatin (LIPITOR) 10 MG tablet Take 1 tablet by mouth Daily. 30 tablet 5   • Azelastine-Fluticasone 137-50 MCG/ACT suspension into each nostril.     • budesonide-formoterol (SYMBICORT) 160-4.5 MCG/ACT inhaler Inhale 2 puffs 2 (Two) Times a Day. 1 inhaler 5   • CIALIS 5 MG tablet   5   • desloratadine (CLARINEX) 5 MG tablet Take 1 tablet by mouth Daily. 30 tablet 6   • dicyclomine (BENTYL) 10 MG capsule Take 1 capsule by mouth 4 (Four) Times a Day Before Meals & at Bedtime. 120 capsule 5   • fluticasone (FLONASE)  50 MCG/ACT nasal spray 2 sprays into each nostril Daily. Administer 2 sprays in each nostril for each dose. 1 each 1   • ipratropium-albuterol (COMBIVENT RESPIMAT)  MCG/ACT inhaler Inhale 1 puff 4 (Four) Times a Day As Needed for Wheezing. 1-2 PUFFS EVERY 4 HOURS AS NEEDED 4 g 11   • montelukast (SINGULAIR) 10 MG tablet Take 1 tablet by mouth Every Night. 30 tablet 5   • raNITIdine (ZANTAC) 300 MG tablet Take 300 mg by mouth Every Night.     • sucralfate (CARAFATE) 1 GM/10ML suspension Take 10 mL by mouth 2 (Two) Times a Day As Needed (heart burn). 420 mL 5   • Testosterone Cypionate (DEPOTESTOTERONE CYPIONATE) 200 MG/ML injection   1   • topiramate (TOPAMAX) 50 MG tablet Take 1 tablet by mouth Daily. 30 tablet 5   • valsartan-hydrochlorothiazide (DIOVAN HCT) 160-12.5 MG per tablet Take 1 tablet by mouth Daily. 30 tablet 5     No current facility-administered medications for this visit.          Allergies  Erythromycin      Physical Exam   Constitutional: He is oriented to person, place, and time. He appears well-developed and well-nourished. No distress.   HENT:   Head: Normocephalic.   Right Ear: External ear normal.   Left Ear: External ear normal.   Nose: Nose normal.   Mouth/Throat: Oropharynx is clear and moist.   Eyes: Conjunctivae and EOM are normal. Right eye exhibits no discharge. Left eye exhibits no discharge.   Neck: Normal range of motion. No JVD present. No tracheal deviation present. No thyromegaly present.   Cardiovascular: Normal rate, regular rhythm, normal heart sounds and intact distal pulses.  Exam reveals no gallop and no friction rub.    No murmur heard.  Pulmonary/Chest: Effort normal and breath sounds normal. No stridor. No respiratory distress. He has no wheezes. He has no rales. He exhibits no tenderness.   Abdominal: Soft. Bowel sounds are normal. He exhibits no distension and no mass. There is no tenderness. There is no rebound and no guarding. No hernia.   Genitourinary: Rectal  "exam shows guaiac negative stool.   Musculoskeletal: Normal range of motion. He exhibits no edema, tenderness or deformity.   Lymphadenopathy:     He has no cervical adenopathy.   Neurological: He is alert and oriented to person, place, and time. He has normal reflexes. He displays normal reflexes. No cranial nerve deficit. He exhibits normal muscle tone. Coordination normal.   Skin: Skin is warm and dry. No rash noted. He is not diaphoretic. No erythema. No pallor.   Psychiatric: He has a normal mood and affect. His behavior is normal. Judgment and thought content normal.   Nursing note and vitals reviewed.        /70  Pulse 80  Resp 20  Ht 71\" (180.3 cm)  Wt 258 lb (117 kg)  BMI 35.98 kg/m2        ASSESSMENT  Severe gastroesophageal reflux disease with hiatal hernia        PLAN    Laparoscopic Nissen fundoplication and repair of hiatal hernia.  Risks, benefits, and possible complications have been discussed with the patient in great detail.  Pathophysiology is been discussed.  Statistical outcomes of the procedures been discussed.  Occasional material has been given to the patient about the procedure and gastroesophageal reflux disease in general.  I have also encouraged him to go on U tube to actually visualize the procedure.  He understands and agrees to the outlined plan.        Efra Suero MD    "

## 2017-09-18 PROBLEM — J45.40 MODERATE PERSISTENT ASTHMA WITHOUT COMPLICATION: Status: ACTIVE | Noted: 2017-09-18

## 2017-09-20 DIAGNOSIS — M25.50 ARTHRALGIA, UNSPECIFIED JOINT: ICD-10-CM

## 2017-09-20 DIAGNOSIS — M79.7 FIBROMYALGIA: Primary | ICD-10-CM

## 2017-10-05 ENCOUNTER — OFFICE VISIT (OUTPATIENT)
Dept: UROLOGY | Facility: CLINIC | Age: 32
End: 2017-10-05

## 2017-10-05 DIAGNOSIS — N52.02 CORPORO-VENOUS OCCLUSIVE ERECTILE DYSFUNCTION: ICD-10-CM

## 2017-10-05 DIAGNOSIS — R79.89 LOW TESTOSTERONE: Primary | ICD-10-CM

## 2017-10-05 LAB
DEPRECATED RDW RBC AUTO: 40.9 FL (ref 37–54)
ERYTHROCYTE [DISTWIDTH] IN BLOOD BY AUTOMATED COUNT: 13.4 % (ref 11.5–14.5)
HCT VFR BLD AUTO: 48.1 % (ref 42–52)
HGB BLD-MCNC: 17.1 G/DL (ref 14–18)
MCH RBC QN AUTO: 30.3 PG (ref 27–33)
MCHC RBC AUTO-ENTMCNC: 35.6 G/DL (ref 33–37)
MCV RBC AUTO: 85.1 FL (ref 80–94)
PLATELET # BLD AUTO: 195 10*3/MM3 (ref 130–400)
PMV BLD AUTO: 12.3 FL (ref 6–10)
PSA SERPL-MCNC: 0.28 NG/ML (ref 0–4)
RBC # BLD AUTO: 5.65 10*6/MM3 (ref 4.7–6.1)
TESTOST SERPL-MCNC: >1500 NG/DL (ref 123.06–813.86)
WBC NRBC COR # BLD: 7.6 10*3/MM3 (ref 4.5–12.5)

## 2017-10-05 PROCEDURE — 84403 ASSAY OF TOTAL TESTOSTERONE: CPT | Performed by: UROLOGY

## 2017-10-05 PROCEDURE — 85027 COMPLETE CBC AUTOMATED: CPT | Performed by: UROLOGY

## 2017-10-05 PROCEDURE — 36415 COLL VENOUS BLD VENIPUNCTURE: CPT | Performed by: UROLOGY

## 2017-10-05 PROCEDURE — 99213 OFFICE O/P EST LOW 20 MIN: CPT | Performed by: UROLOGY

## 2017-10-05 PROCEDURE — 84153 ASSAY OF PSA TOTAL: CPT | Performed by: UROLOGY

## 2017-10-05 RX ORDER — TESTOSTERONE CYPIONATE 200 MG/ML
INJECTION, SOLUTION INTRAMUSCULAR
Qty: 10 ML | Refills: 2 | Status: SHIPPED | OUTPATIENT
Start: 2017-10-05 | End: 2018-10-11 | Stop reason: SDUPTHER

## 2017-10-05 NOTE — PROGRESS NOTES
"Chief Complaint:          Chief Complaint   Patient presents with   • Erectile Dysfunction       HPI:   32 y.o. male.  32-year-old white male on hormonal therapy.Patient returns today for follow-up.  He is been on testosterone replacement therapy.  He reports a dramatic improvement in his Sánchez questionnaire: -SÁNCHEZ-androgen deficiency in the age male questionnaire  The patient was queried regarding the androgen deficiency in the age male questionnaire.  This is a validated questionnaire that was performed on a set of 314 [place] male physicians when it was positive it correlated directly with a 94% chance of low testosterone.  Patient indicates there is a decrease in libido or sex drive, a lack of energy, Decreased  strength and endurance, a decreased \"enjoyment of life\", sad and grumpy feelings with significant difficulty maintaining erections.  He is also been a recent deterioration regarding work performance.  He reports weight loss.  He has good facility and the use of subcutaneous and intramuscular injections as well as comfort level and using the medication in a sterile fashion.  He understands he should use only the prescribed dose.  He's here for appropriate lab monitoring regarding this.  He understands this is a controlled substance and therefore must be watched closely will not be refilled and the medical loss or miss calculation of the dose.  He is very happy with the treatment and therefore wants to continue it.        Past Medical History:        Past Medical History:   Diagnosis Date   • Allergic rhinitis    • Asthma    • Atrial flutter    • Diarrhea    • Disturbance of skin sensation    • GERD (gastroesophageal reflux disease)    • Gout    • History of MRI of spine 12/16/2014    Done at [facility]    • Hyperlipidemia    • Hypertension    • Lymphadenitis    • Malaise and fatigue    • Osteoarthrosis    • Rash    • Renal disorder          Current Meds:     Current Outpatient Prescriptions   Medication " Sig Dispense Refill   • albuterol (PROVENTIL HFA;VENTOLIN HFA) 108 (90 BASE) MCG/ACT inhaler Inhale 2 puffs Every 4 (Four) Hours As Needed for Shortness of Air. 1 inhaler 5   • allopurinol (ZYLOPRIM) 100 MG tablet Take 1 tablet by mouth 2 (Two) Times a Day. 60 tablet 5   • amitriptyline (ELAVIL) 10 MG tablet Take 1 tablet by mouth Every Night. 30 tablet 5   • amLODIPine (NORVASC) 10 MG tablet Take 1 tablet by mouth Daily. 30 tablet 5   • atorvastatin (LIPITOR) 10 MG tablet Take 1 tablet by mouth Daily. 30 tablet 5   • Azelastine-Fluticasone 137-50 MCG/ACT suspension into each nostril.     • budesonide-formoterol (SYMBICORT) 160-4.5 MCG/ACT inhaler Inhale 2 puffs 2 (Two) Times a Day. 1 inhaler 5   • CIALIS 5 MG tablet   5   • desloratadine (CLARINEX) 5 MG tablet Take 1 tablet by mouth Daily. 30 tablet 6   • dicyclomine (BENTYL) 10 MG capsule Take 1 capsule by mouth 4 (Four) Times a Day Before Meals & at Bedtime. 120 capsule 5   • fluticasone (FLONASE) 50 MCG/ACT nasal spray 2 sprays into each nostril Daily. Administer 2 sprays in each nostril for each dose. 1 each 1   • ipratropium-albuterol (COMBIVENT RESPIMAT)  MCG/ACT inhaler Inhale 1 puff 4 (Four) Times a Day As Needed for Wheezing. 1-2 PUFFS EVERY 4 HOURS AS NEEDED 4 g 11   • montelukast (SINGULAIR) 10 MG tablet Take 1 tablet by mouth Every Night. 30 tablet 5   • raNITIdine (ZANTAC) 300 MG tablet Take 300 mg by mouth Every Night.     • sucralfate (CARAFATE) 1 GM/10ML suspension Take 10 mL by mouth 2 (Two) Times a Day As Needed (heart burn). 420 mL 5   • Testosterone Cypionate (DEPOTESTOTERONE CYPIONATE) 200 MG/ML injection   1   • topiramate (TOPAMAX) 50 MG tablet Take 1 tablet by mouth Daily. 30 tablet 5   • valsartan-hydrochlorothiazide (DIOVAN HCT) 160-12.5 MG per tablet Take 1 tablet by mouth Daily. 30 tablet 5     No current facility-administered medications for this visit.         Allergies:      Allergies   Allergen Reactions   • Erythromycin          Past Surgical History:     Past Surgical History:   Procedure Laterality Date   • BRAVO PROCEDURE N/A 8/4/2017    Procedure: ESOPHAGOGASTRODUODENOSCOPY AND FULELR;  Surgeon: Efra Suero MD;  Location: [facility];  Service:    • COLONOSCOPY  2014   • VASECTOMY           Social History:     Social History     Social History   • Marital status:      Spouse name: Radha    • Number of children: N/A   • Years of education: N/A     Occupational History   • [employer]      Social History Main Topics   • Smoking status: Never Smoker   • Smokeless tobacco: Never Used   • Alcohol use No   • Drug use: No   • Sexual activity: Yes     Partners: Female     Other Topics Concern   • Not on file     Social History Narrative       Family History:     Family History   Problem Relation Age of Onset   • Asthma Other    • Cancer Other    • COPD Other    • Diabetes Other    • Stroke Other    • Asthma Mother    • COPD Mother    • Asthma Father    • Depression Father    • Drug abuse Father    • Arthritis Maternal Grandmother    • Cancer Maternal Grandmother    • Diabetes Maternal Grandmother    • Heart disease Maternal Grandmother    • Hyperlipidemia Maternal Grandmother    • Stroke Paternal Grandfather        Review of Systems:     Review of Systems   Constitutional: Negative.    HENT: Negative.    Eyes: Negative.    Respiratory: Negative.    Cardiovascular: Negative.    Gastrointestinal: Negative.    Endocrine: Negative.    Musculoskeletal: Negative.    Allergic/Immunologic: Negative.    Neurological: Negative.    Hematological: Negative.    Psychiatric/Behavioral: Negative.        Physical Exam:     Physical Exam   Constitutional: He is oriented to person, place, and time. He appears well-developed and well-nourished.   HENT:   Head: Normocephalic and atraumatic.   Eyes: Conjunctivae and EOM are normal. Pupils are equal, round, and reactive to light.   Neck: Normal range of motion.   Cardiovascular: Normal rate, regular  rhythm, normal heart sounds and intact distal pulses.    Pulmonary/Chest: Effort normal and breath sounds normal.   Abdominal: Soft. Bowel sounds are normal.   Genitourinary: Rectum normal, prostate normal and penis normal.   Musculoskeletal: Normal range of motion.   Neurological: He is alert and oriented to person, place, and time. He has normal reflexes.   Skin: Skin is warm and dry.   Psychiatric: He has a normal mood and affect. His behavior is normal. Judgment and thought content normal.   Nursing note and vitals reviewed.      Procedure:       Assessment:   No diagnosis found.  No orders of the defined types were placed in this encounter.      Plan:   -Low testosterone: patient is here for follow-up.  Since beginning the medication he's been very pleased.  He reports a dramatic improvement in his erections, ability to achieve and maintain an erection, improvement in libido, increase in frequency of morning erections, a noticeable weight loss consistent with the treatment.  No development of breast problems or abnormalities.  He's going to have appropriate safety laboratory parameters checked.   He understands that the new data implicates testosterone with the development of prostate cancer and this is all but been disproven and the medical literature as well as the risks of cardiovascular disease which is actually also been disproven.  He understands that while he is a candidate for topical therapy if he is in contact with children this is not an option because it's been shown to accentuate genitalia development at an early age that this frequently irreversible.  He also understands this is a controlled substance and as such will not be prescribed without appropriate follow-up and appropriate laboratory investigation.  He understands effects on spermatogenesis including the fact that this is not always completely reversible and not always completely limited his ability to father a child.  He has demonstrated  facility in the technique of both intramuscular and subcutaneous injection.  And has been taught sterility one drawing up the medication.  Days getting appropriate safety labs including a PSA, CBC and testosterone level.  Erectile dysfunction-we discussed the anatomy and physiology of the penis and the endothelium.  We discussed the various forms of erectile dysfunction including peripheral venous occlusive disease, postoperative, secondary to radiation treatments of the prostate, and arterial inflow.  We discussed the various treatment options available including oral medication and its various forms.  We discussed the use of both generic and non-generic Viagra.  We discussed Cialis and a longer half-life of 17 hours as well as the other 2 medications.  We discussed cost involved with this including the fact that the generic is much cheaper but is taken has multiple pills because they are 20 mg dosages.  We did discuss the other alternatives including Penile injections, vacuum erection devices and surgical intervention reserved for only the most severe cases.  We discussed the need for testosterone in about 20% of cases of erectile dysfunction.  He has adequate supplies of Cialis           This document has been electronically signed by STEPHEN BANGURA MD October 5, 2017 3:39 PM

## 2017-10-06 ENCOUNTER — APPOINTMENT (OUTPATIENT)
Dept: PREADMISSION TESTING | Facility: HOSPITAL | Age: 32
End: 2017-10-06

## 2017-10-06 DIAGNOSIS — R49.0 HOARSENESS: ICD-10-CM

## 2017-10-06 DIAGNOSIS — K21.00 GASTROESOPHAGEAL REFLUX DISEASE WITH ESOPHAGITIS: ICD-10-CM

## 2017-10-06 DIAGNOSIS — R13.10 DYSPHAGIA, UNSPECIFIED TYPE: ICD-10-CM

## 2017-10-06 PROBLEM — R79.89 LOW TESTOSTERONE: Status: ACTIVE | Noted: 2017-10-06

## 2017-10-06 LAB
ALBUMIN SERPL-MCNC: 4.4 G/DL (ref 3.5–5)
ALBUMIN/GLOB SERPL: 1.6 G/DL (ref 1.5–2.5)
ALP SERPL-CCNC: 56 U/L (ref 40–129)
ALT SERPL W P-5'-P-CCNC: 43 U/L (ref 10–44)
ANION GAP SERPL CALCULATED.3IONS-SCNC: 10.1 MMOL/L (ref 3.6–11.2)
AST SERPL-CCNC: 32 U/L (ref 10–34)
BILIRUB SERPL-MCNC: 1.3 MG/DL (ref 0.2–1.8)
BUN BLD-MCNC: 13 MG/DL (ref 7–21)
BUN/CREAT SERPL: 10.6 (ref 7–25)
CALCIUM SPEC-SCNC: 9.3 MG/DL (ref 7.7–10)
CHLORIDE SERPL-SCNC: 104 MMOL/L (ref 99–112)
CO2 SERPL-SCNC: 23.9 MMOL/L (ref 24.3–31.9)
CREAT BLD-MCNC: 1.23 MG/DL (ref 0.43–1.29)
DEPRECATED RDW RBC AUTO: 40.9 FL (ref 37–54)
ERYTHROCYTE [DISTWIDTH] IN BLOOD BY AUTOMATED COUNT: 13.4 % (ref 11.5–14.5)
GFR SERPL CREATININE-BSD FRML MDRD: 68 ML/MIN/1.73
GLOBULIN UR ELPH-MCNC: 2.7 GM/DL
GLUCOSE BLD-MCNC: 92 MG/DL (ref 70–110)
HCT VFR BLD AUTO: 47.3 % (ref 42–52)
HGB BLD-MCNC: 16.8 G/DL (ref 14–18)
MCH RBC QN AUTO: 29.9 PG (ref 27–33)
MCHC RBC AUTO-ENTMCNC: 35.5 G/DL (ref 33–37)
MCV RBC AUTO: 84.3 FL (ref 80–94)
OSMOLALITY SERPL CALC.SUM OF ELEC: 275.4 MOSM/KG (ref 273–305)
PLATELET # BLD AUTO: 198 10*3/MM3 (ref 130–400)
PMV BLD AUTO: 11.9 FL (ref 6–10)
POTASSIUM BLD-SCNC: 3.5 MMOL/L (ref 3.5–5.3)
PROT SERPL-MCNC: 7.1 G/DL (ref 6–8)
RBC # BLD AUTO: 5.61 10*6/MM3 (ref 4.7–6.1)
SODIUM BLD-SCNC: 138 MMOL/L (ref 135–153)
WBC NRBC COR # BLD: 7.01 10*3/MM3 (ref 4.5–12.5)

## 2017-10-06 PROCEDURE — 80053 COMPREHEN METABOLIC PANEL: CPT | Performed by: SURGERY

## 2017-10-06 PROCEDURE — 85027 COMPLETE CBC AUTOMATED: CPT | Performed by: SURGERY

## 2017-10-06 PROCEDURE — 36415 COLL VENOUS BLD VENIPUNCTURE: CPT

## 2017-10-06 RX ORDER — LORATADINE 10 MG/1
10 TABLET ORAL NIGHTLY
COMMUNITY
End: 2017-11-28 | Stop reason: SDUPTHER

## 2017-10-06 NOTE — PAT
Called eduardo at dr villar  Lab orders were held for preop  Said  Can release them and do today

## 2017-10-06 NOTE — DISCHARGE INSTRUCTIONS
TAKE the following medications the morning of surgery:  All heart or blood pressure medications    Please discontinue all blood thinners and anticoagulants (except aspirin) prior to surgery as per your surgeon and cardiologist instructions.  Aspirin may be continued up to the day prior to surgery.    HOLD all diabetic medications the morning of surgery as order by physician.    Please follow instructions on use of prep cloths provided by nurse. Return instruction sheet with stickers attached to pre-op nurse on day of surgery.  Surgery date 10/11/17  Arrival time  6:30  General Instructions:  • Do NOT eat or drink after midnight  10/10/17which includes water, mints, or gum.  • You may brush your teeth. Dental appliances that are removable must be taken out day of surgery.  • Do NOT smoke, chew tobacco, or drink alcohol within 24 hours prior to surgery.  • Bring medications in original bottles, any inhalers and if applicable your C-PAP/BI-PAP machine  • Bring any papers given to you in the doctor’s office  • Wear clean, comfortable clothes and socks  • Do NOT wear contact lenses or make-up or dark nail polish.  Bring a case for your glasses if applicable.  • Bring crutches or walker if applicable  • Leave all other valuables and jewelry at home  • If you were given a blood bank armband, continue to wear it until discharged.    Preventing a Surgical Site Infection:  • Shower on the morning of surgery using a fresh bar of anti-bacterial soap (such as Dial) and clean washcloth.  Dry with a clean towel and dress in clean clothing.  • For 2 to 3 days before surgery, avoid shaving with a razor near where you will have surgery because the razor can irritate skin and make it easier to develop an infection.  Ask your surgeon if you will be receiving antibiotics prior to surgery.  • Make sure you, your family, and all healthcare providers clean their hands with soap and water or an alcohol-based hand  before caring for  you or your wound.  • If at all possible, quit smoking as many days before surgery as you can.    Day of Surgery:  Upon arrival, a pre-op nurse and anesthesiologist will review your health history, obtain vital signs, and answer questions you may have.  The only belongings needed at this time will be your home medications and if applicable you C-PAP/BI-PAP machine.  If you are staying overnight, your family can leave the rest of your belongings in the car and bring them to your room later.  A pre-op nurse will start an IV and you may receive medication in preparation for surgery.  Due to patient privacy and limited space, only one member of your family will be able to accompany you in the pre-op area.  While you are in surgery your family should notify the waiting room  if they leave the waiting room area and provide a contact number.  Please be aware that surgery does come with discomfort.  We want to make every effort to control your discomfort so please discuss any uncontrolled symptoms with your nurse.  Your doctor will most likely have prescribed pain medications.  If you are going home after surgery you will receive individualized written care instructions before being discharged.  A responsible adult must drive you to and from the hospital on the day of surgery and stay with you for 24 hours.  If you are staying overnight following surgery, you will be transported to your hospital room following the recovery period.

## 2017-10-09 ENCOUNTER — OFFICE VISIT (OUTPATIENT)
Dept: FAMILY MEDICINE CLINIC | Facility: CLINIC | Age: 32
End: 2017-10-09

## 2017-10-09 VITALS
HEIGHT: 71 IN | HEART RATE: 83 BPM | BODY MASS INDEX: 37.52 KG/M2 | WEIGHT: 268 LBS | TEMPERATURE: 98.9 F | DIASTOLIC BLOOD PRESSURE: 82 MMHG | OXYGEN SATURATION: 97 % | SYSTOLIC BLOOD PRESSURE: 136 MMHG

## 2017-10-09 DIAGNOSIS — Z02.4 ENCOUNTER FOR CDL (COMMERCIAL DRIVING LICENSE) EXAM: Primary | ICD-10-CM

## 2017-10-09 LAB
BILIRUB BLD-MCNC: ABNORMAL MG/DL
CLARITY, POC: CLEAR
COLOR UR: YELLOW
GLUCOSE UR STRIP-MCNC: NEGATIVE MG/DL
KETONES UR QL: ABNORMAL
LEUKOCYTE EST, POC: NEGATIVE
NITRITE UR-MCNC: NEGATIVE MG/ML
PH UR: 5.5 [PH] (ref 5–8)
PROT UR STRIP-MCNC: NEGATIVE MG/DL
RBC # UR STRIP: NEGATIVE /UL
SP GR UR: 1.03 (ref 1–1.03)
UROBILINOGEN UR QL: NORMAL

## 2017-10-09 PROCEDURE — 81002 URINALYSIS NONAUTO W/O SCOPE: CPT | Performed by: PHYSICIAN ASSISTANT

## 2017-10-09 PROCEDURE — DOTPHY: Performed by: PHYSICIAN ASSISTANT

## 2017-10-11 ENCOUNTER — ANESTHESIA EVENT (OUTPATIENT)
Dept: PERIOP | Facility: HOSPITAL | Age: 32
End: 2017-10-11

## 2017-10-11 ENCOUNTER — ANESTHESIA (OUTPATIENT)
Dept: PERIOP | Facility: HOSPITAL | Age: 32
End: 2017-10-11

## 2017-10-11 ENCOUNTER — HOSPITAL ENCOUNTER (INPATIENT)
Facility: HOSPITAL | Age: 32
LOS: 1 days | Discharge: HOME | End: 2017-10-12
Attending: SURGERY | Admitting: SURGERY

## 2017-10-11 DIAGNOSIS — R49.0 HOARSENESS: ICD-10-CM

## 2017-10-11 DIAGNOSIS — R13.10 DYSPHAGIA, UNSPECIFIED TYPE: ICD-10-CM

## 2017-10-11 DIAGNOSIS — J45.40 MODERATE PERSISTENT ASTHMA WITHOUT COMPLICATION: ICD-10-CM

## 2017-10-11 DIAGNOSIS — K44.9 HERNIA, HIATAL: ICD-10-CM

## 2017-10-11 PROCEDURE — 25010000002 PHENYLEPHRINE PER 1 ML: Performed by: NURSE ANESTHETIST, CERTIFIED REGISTERED

## 2017-10-11 PROCEDURE — 0BQT0ZZ REPAIR DIAPHRAGM, OPEN APPROACH: ICD-10-PCS | Performed by: SURGERY

## 2017-10-11 PROCEDURE — 25010000002 MORPHINE PER 10 MG: Performed by: NURSE ANESTHETIST, CERTIFIED REGISTERED

## 2017-10-11 PROCEDURE — 25010000002 FENTANYL CITRATE (PF) 100 MCG/2ML SOLUTION: Performed by: NURSE ANESTHETIST, CERTIFIED REGISTERED

## 2017-10-11 PROCEDURE — 25010000002 CEFOXITIN: Performed by: SURGERY

## 2017-10-11 PROCEDURE — 0DV40ZZ RESTRICTION OF ESOPHAGOGASTRIC JUNCTION, OPEN APPROACH: ICD-10-PCS | Performed by: SURGERY

## 2017-10-11 PROCEDURE — 25010000002 PROPOFOL 10 MG/ML EMULSION: Performed by: NURSE ANESTHETIST, CERTIFIED REGISTERED

## 2017-10-11 PROCEDURE — 25010000002 HYDROMORPHONE PER 4 MG: Performed by: ANESTHESIOLOGY

## 2017-10-11 PROCEDURE — 94799 UNLISTED PULMONARY SVC/PX: CPT

## 2017-10-11 PROCEDURE — 43281 LAP PARAESOPHAG HERN REPAIR: CPT | Performed by: SURGERY

## 2017-10-11 PROCEDURE — 25010000002 MIDAZOLAM PER 1 MG: Performed by: NURSE ANESTHETIST, CERTIFIED REGISTERED

## 2017-10-11 RX ORDER — DICYCLOMINE HYDROCHLORIDE 10 MG/1
10 CAPSULE ORAL 2 TIMES DAILY
Status: CANCELLED | OUTPATIENT
Start: 2017-10-11

## 2017-10-11 RX ORDER — MORPHINE SULFATE 10 MG/ML
6 INJECTION INTRAMUSCULAR; INTRAVENOUS; SUBCUTANEOUS
Status: DISCONTINUED | OUTPATIENT
Start: 2017-10-11 | End: 2017-10-12 | Stop reason: HOSPADM

## 2017-10-11 RX ORDER — PROPOFOL 10 MG/ML
VIAL (ML) INTRAVENOUS AS NEEDED
Status: DISCONTINUED | OUTPATIENT
Start: 2017-10-11 | End: 2017-10-11 | Stop reason: SURG

## 2017-10-11 RX ORDER — BUPIVACAINE HYDROCHLORIDE 5 MG/ML
INJECTION, SOLUTION EPIDURAL; INTRACAUDAL AS NEEDED
Status: DISCONTINUED | OUTPATIENT
Start: 2017-10-11 | End: 2017-10-11 | Stop reason: HOSPADM

## 2017-10-11 RX ORDER — SODIUM CHLORIDE 9 MG/ML
INJECTION, SOLUTION INTRAVENOUS AS NEEDED
Status: DISCONTINUED | OUTPATIENT
Start: 2017-10-11 | End: 2017-10-11 | Stop reason: HOSPADM

## 2017-10-11 RX ORDER — ATORVASTATIN CALCIUM 10 MG/1
10 TABLET, FILM COATED ORAL DAILY
Status: DISCONTINUED | OUTPATIENT
Start: 2017-10-11 | End: 2017-10-12 | Stop reason: HOSPADM

## 2017-10-11 RX ORDER — MIDAZOLAM HYDROCHLORIDE 1 MG/ML
INJECTION INTRAMUSCULAR; INTRAVENOUS AS NEEDED
Status: DISCONTINUED | OUTPATIENT
Start: 2017-10-11 | End: 2017-10-11 | Stop reason: SURG

## 2017-10-11 RX ORDER — OXYCODONE AND ACETAMINOPHEN 10; 325 MG/1; MG/1
1 TABLET ORAL EVERY 4 HOURS PRN
Status: DISCONTINUED | OUTPATIENT
Start: 2017-10-11 | End: 2017-10-12 | Stop reason: HOSPADM

## 2017-10-11 RX ORDER — NALOXONE HCL 0.4 MG/ML
0.4 VIAL (ML) INJECTION
Status: DISCONTINUED | OUTPATIENT
Start: 2017-10-11 | End: 2017-10-12 | Stop reason: HOSPADM

## 2017-10-11 RX ORDER — ONDANSETRON 4 MG/1
4 TABLET, FILM COATED ORAL EVERY 6 HOURS PRN
Status: DISCONTINUED | OUTPATIENT
Start: 2017-10-11 | End: 2017-10-12 | Stop reason: HOSPADM

## 2017-10-11 RX ORDER — LIDOCAINE HYDROCHLORIDE 20 MG/ML
INJECTION, SOLUTION INFILTRATION; PERINEURAL AS NEEDED
Status: DISCONTINUED | OUTPATIENT
Start: 2017-10-11 | End: 2017-10-11 | Stop reason: SURG

## 2017-10-11 RX ORDER — MONTELUKAST SODIUM 10 MG/1
10 TABLET ORAL NIGHTLY
Status: DISCONTINUED | OUTPATIENT
Start: 2017-10-11 | End: 2017-10-12 | Stop reason: HOSPADM

## 2017-10-11 RX ORDER — VECURONIUM BROMIDE 1 MG/ML
INJECTION, POWDER, LYOPHILIZED, FOR SOLUTION INTRAVENOUS AS NEEDED
Status: DISCONTINUED | OUTPATIENT
Start: 2017-10-11 | End: 2017-10-11 | Stop reason: SURG

## 2017-10-11 RX ORDER — IPRATROPIUM BROMIDE AND ALBUTEROL SULFATE 2.5; .5 MG/3ML; MG/3ML
3 SOLUTION RESPIRATORY (INHALATION) ONCE AS NEEDED
Status: DISCONTINUED | OUTPATIENT
Start: 2017-10-11 | End: 2017-10-11 | Stop reason: HOSPADM

## 2017-10-11 RX ORDER — HYDROCHLOROTHIAZIDE 12.5 MG/1
12.5 CAPSULE, GELATIN COATED ORAL
Status: CANCELLED | OUTPATIENT
Start: 2017-10-11

## 2017-10-11 RX ORDER — MORPHINE SULFATE 2 MG/ML
INJECTION, SOLUTION INTRAMUSCULAR; INTRAVENOUS AS NEEDED
Status: DISCONTINUED | OUTPATIENT
Start: 2017-10-11 | End: 2017-10-11 | Stop reason: SURG

## 2017-10-11 RX ORDER — ALBUTEROL SULFATE 2.5 MG/3ML
SOLUTION RESPIRATORY (INHALATION) EVERY 4 HOURS PRN
Status: DISCONTINUED | OUTPATIENT
Start: 2017-10-11 | End: 2017-10-12 | Stop reason: HOSPADM

## 2017-10-11 RX ORDER — BUDESONIDE AND FORMOTEROL FUMARATE DIHYDRATE 160; 4.5 UG/1; UG/1
2 AEROSOL RESPIRATORY (INHALATION)
Status: DISCONTINUED | OUTPATIENT
Start: 2017-10-11 | End: 2017-10-12 | Stop reason: HOSPADM

## 2017-10-11 RX ORDER — ALLOPURINOL 100 MG/1
100 TABLET ORAL EVERY 12 HOURS SCHEDULED
Status: DISCONTINUED | OUTPATIENT
Start: 2017-10-11 | End: 2017-10-12 | Stop reason: HOSPADM

## 2017-10-11 RX ORDER — CETIRIZINE HYDROCHLORIDE 10 MG/1
10 TABLET ORAL DAILY
Status: CANCELLED | OUTPATIENT
Start: 2017-10-11

## 2017-10-11 RX ORDER — SODIUM CHLORIDE, SODIUM LACTATE, POTASSIUM CHLORIDE, CALCIUM CHLORIDE 600; 310; 30; 20 MG/100ML; MG/100ML; MG/100ML; MG/100ML
125 INJECTION, SOLUTION INTRAVENOUS CONTINUOUS
Status: DISCONTINUED | OUTPATIENT
Start: 2017-10-11 | End: 2017-10-12 | Stop reason: HOSPADM

## 2017-10-11 RX ORDER — FENTANYL CITRATE 50 UG/ML
INJECTION, SOLUTION INTRAMUSCULAR; INTRAVENOUS AS NEEDED
Status: DISCONTINUED | OUTPATIENT
Start: 2017-10-11 | End: 2017-10-11 | Stop reason: SURG

## 2017-10-11 RX ORDER — VALSARTAN 160 MG/1
160 TABLET ORAL NIGHTLY
Status: CANCELLED | OUTPATIENT
Start: 2017-10-11

## 2017-10-11 RX ORDER — FLUTICASONE PROPIONATE 50 MCG
2 SPRAY, SUSPENSION (ML) NASAL DAILY
Status: CANCELLED | OUTPATIENT
Start: 2017-10-11

## 2017-10-11 RX ORDER — ONDANSETRON 2 MG/ML
4 INJECTION INTRAMUSCULAR; INTRAVENOUS EVERY 6 HOURS PRN
Status: DISCONTINUED | OUTPATIENT
Start: 2017-10-11 | End: 2017-10-12 | Stop reason: HOSPADM

## 2017-10-11 RX ORDER — SODIUM CHLORIDE 0.9 % (FLUSH) 0.9 %
1-10 SYRINGE (ML) INJECTION AS NEEDED
Status: DISCONTINUED | OUTPATIENT
Start: 2017-10-11 | End: 2017-10-11 | Stop reason: HOSPADM

## 2017-10-11 RX ORDER — ONDANSETRON 4 MG/1
4 TABLET, ORALLY DISINTEGRATING ORAL EVERY 6 HOURS PRN
Status: DISCONTINUED | OUTPATIENT
Start: 2017-10-11 | End: 2017-10-12 | Stop reason: HOSPADM

## 2017-10-11 RX ORDER — MONTELUKAST SODIUM 10 MG/1
10 TABLET ORAL NIGHTLY
Status: CANCELLED | OUTPATIENT
Start: 2017-10-11

## 2017-10-11 RX ORDER — IPRATROPIUM BROMIDE AND ALBUTEROL SULFATE 2.5; .5 MG/3ML; MG/3ML
3 SOLUTION RESPIRATORY (INHALATION) EVERY 4 HOURS PRN
Status: CANCELLED | OUTPATIENT
Start: 2017-10-11

## 2017-10-11 RX ORDER — AMLODIPINE BESYLATE 10 MG/1
10 TABLET ORAL DAILY
Status: DISCONTINUED | OUTPATIENT
Start: 2017-10-11 | End: 2017-10-12 | Stop reason: HOSPADM

## 2017-10-11 RX ORDER — DICYCLOMINE HYDROCHLORIDE 10 MG/1
10 CAPSULE ORAL
Status: DISCONTINUED | OUTPATIENT
Start: 2017-10-11 | End: 2017-10-12 | Stop reason: HOSPADM

## 2017-10-11 RX ORDER — FENTANYL CITRATE 50 UG/ML
50 INJECTION, SOLUTION INTRAMUSCULAR; INTRAVENOUS
Status: DISCONTINUED | OUTPATIENT
Start: 2017-10-11 | End: 2017-10-11 | Stop reason: HOSPADM

## 2017-10-11 RX ORDER — MEPERIDINE HYDROCHLORIDE 25 MG/ML
12.5 INJECTION INTRAMUSCULAR; INTRAVENOUS; SUBCUTANEOUS
Status: DISCONTINUED | OUTPATIENT
Start: 2017-10-11 | End: 2017-10-11 | Stop reason: HOSPADM

## 2017-10-11 RX ORDER — AMITRIPTYLINE HYDROCHLORIDE 10 MG/1
10 TABLET, FILM COATED ORAL NIGHTLY
Status: DISCONTINUED | OUTPATIENT
Start: 2017-10-11 | End: 2017-10-12 | Stop reason: HOSPADM

## 2017-10-11 RX ORDER — ALLOPURINOL 100 MG/1
100 TABLET ORAL 2 TIMES DAILY
Status: CANCELLED | OUTPATIENT
Start: 2017-10-11

## 2017-10-11 RX ORDER — BUDESONIDE AND FORMOTEROL FUMARATE DIHYDRATE 160; 4.5 UG/1; UG/1
2 AEROSOL RESPIRATORY (INHALATION)
Status: CANCELLED | OUTPATIENT
Start: 2017-10-11

## 2017-10-11 RX ORDER — TESTOSTERONE CYPIONATE 200 MG/ML
100 INJECTION, SOLUTION INTRAMUSCULAR ONCE
Status: CANCELLED | OUTPATIENT
Start: 2017-10-11

## 2017-10-11 RX ORDER — ONDANSETRON 2 MG/ML
4 INJECTION INTRAMUSCULAR; INTRAVENOUS ONCE AS NEEDED
Status: DISCONTINUED | OUTPATIENT
Start: 2017-10-11 | End: 2017-10-11 | Stop reason: HOSPADM

## 2017-10-11 RX ORDER — FLUTICASONE PROPIONATE 50 MCG
2 SPRAY, SUSPENSION (ML) NASAL DAILY
Status: DISCONTINUED | OUTPATIENT
Start: 2017-10-11 | End: 2017-10-12 | Stop reason: HOSPADM

## 2017-10-11 RX ORDER — HYDROMORPHONE HCL 110MG/55ML
PATIENT CONTROLLED ANALGESIA SYRINGE INTRAVENOUS AS NEEDED
Status: DISCONTINUED | OUTPATIENT
Start: 2017-10-11 | End: 2017-10-11 | Stop reason: SURG

## 2017-10-11 RX ORDER — AMLODIPINE BESYLATE 10 MG/1
10 TABLET ORAL NIGHTLY
Status: CANCELLED | OUTPATIENT
Start: 2017-10-11

## 2017-10-11 RX ORDER — OXYCODONE HYDROCHLORIDE AND ACETAMINOPHEN 5; 325 MG/1; MG/1
1 TABLET ORAL ONCE AS NEEDED
Status: DISCONTINUED | OUTPATIENT
Start: 2017-10-11 | End: 2017-10-11 | Stop reason: HOSPADM

## 2017-10-11 RX ORDER — ALBUTEROL SULFATE 2.5 MG/3ML
2.5 SOLUTION RESPIRATORY (INHALATION) EVERY 6 HOURS PRN
Status: CANCELLED | OUTPATIENT
Start: 2017-10-11

## 2017-10-11 RX ORDER — TOPIRAMATE 25 MG/1
50 TABLET ORAL NIGHTLY
Status: CANCELLED | OUTPATIENT
Start: 2017-10-11

## 2017-10-11 RX ORDER — MAGNESIUM HYDROXIDE 1200 MG/15ML
LIQUID ORAL AS NEEDED
Status: DISCONTINUED | OUTPATIENT
Start: 2017-10-11 | End: 2017-10-11 | Stop reason: HOSPADM

## 2017-10-11 RX ORDER — AMITRIPTYLINE HYDROCHLORIDE 10 MG/1
10 TABLET, FILM COATED ORAL NIGHTLY
Status: CANCELLED | OUTPATIENT
Start: 2017-10-11

## 2017-10-11 RX ORDER — ATORVASTATIN CALCIUM 10 MG/1
10 TABLET, FILM COATED ORAL NIGHTLY
Status: CANCELLED | OUTPATIENT
Start: 2017-10-11

## 2017-10-11 RX ORDER — FAMOTIDINE 20 MG/1
20 TABLET, FILM COATED ORAL DAILY
Status: CANCELLED | OUTPATIENT
Start: 2017-10-11

## 2017-10-11 RX ADMIN — FENTANYL CITRATE 50 MCG: 50 INJECTION INTRAMUSCULAR; INTRAVENOUS at 10:20

## 2017-10-11 RX ADMIN — ATORVASTATIN CALCIUM 10 MG: 10 TABLET, FILM COATED ORAL at 12:30

## 2017-10-11 RX ADMIN — OXYCODONE HYDROCHLORIDE AND ACETAMINOPHEN 1 TABLET: 10; 325 TABLET ORAL at 18:48

## 2017-10-11 RX ADMIN — VECURONIUM BROMIDE 5 MG: 1 INJECTION, POWDER, LYOPHILIZED, FOR SOLUTION INTRAVENOUS at 07:56

## 2017-10-11 RX ADMIN — PROPOFOL 150 MG: 10 INJECTION, EMULSION INTRAVENOUS at 07:56

## 2017-10-11 RX ADMIN — SODIUM CHLORIDE, POTASSIUM CHLORIDE, SODIUM LACTATE AND CALCIUM CHLORIDE 125 ML/HR: 600; 310; 30; 20 INJECTION, SOLUTION INTRAVENOUS at 07:06

## 2017-10-11 RX ADMIN — VECURONIUM BROMIDE 3 MG: 1 INJECTION, POWDER, LYOPHILIZED, FOR SOLUTION INTRAVENOUS at 08:35

## 2017-10-11 RX ADMIN — AMITRIPTYLINE HYDROCHLORIDE 10 MG: 10 TABLET, FILM COATED ORAL at 19:50

## 2017-10-11 RX ADMIN — VECURONIUM BROMIDE 2 MG: 1 INJECTION, POWDER, LYOPHILIZED, FOR SOLUTION INTRAVENOUS at 08:20

## 2017-10-11 RX ADMIN — MONTELUKAST SODIUM 10 MG: 10 TABLET ORAL at 19:51

## 2017-10-11 RX ADMIN — SODIUM CHLORIDE, POTASSIUM CHLORIDE, SODIUM LACTATE AND CALCIUM CHLORIDE: 600; 310; 30; 20 INJECTION, SOLUTION INTRAVENOUS at 07:50

## 2017-10-11 RX ADMIN — SODIUM CHLORIDE, POTASSIUM CHLORIDE, SODIUM LACTATE AND CALCIUM CHLORIDE 125 ML/HR: 600; 310; 30; 20 INJECTION, SOLUTION INTRAVENOUS at 12:27

## 2017-10-11 RX ADMIN — FENTANYL CITRATE 50 MCG: 50 INJECTION INTRAMUSCULAR; INTRAVENOUS at 10:06

## 2017-10-11 RX ADMIN — AMLODIPINE BESYLATE 10 MG: 10 TABLET ORAL at 12:29

## 2017-10-11 RX ADMIN — FENTANYL CITRATE 100 MCG: 50 INJECTION INTRAMUSCULAR; INTRAVENOUS at 07:51

## 2017-10-11 RX ADMIN — OXYCODONE HYDROCHLORIDE AND ACETAMINOPHEN 1 TABLET: 10; 325 TABLET ORAL at 15:15

## 2017-10-11 RX ADMIN — PHENYLEPHRINE HYDROCHLORIDE 100 MCG: 10 INJECTION, SOLUTION INTRAMUSCULAR; INTRAVENOUS; SUBCUTANEOUS at 08:35

## 2017-10-11 RX ADMIN — OXYCODONE HYDROCHLORIDE AND ACETAMINOPHEN 1 TABLET: 10; 325 TABLET ORAL at 11:37

## 2017-10-11 RX ADMIN — FLUTICASONE PROPIONATE 2 SPRAY: 50 SPRAY, METERED NASAL at 12:30

## 2017-10-11 RX ADMIN — MORPHINE SULFATE 4 MG: 2 INJECTION, SOLUTION INTRAMUSCULAR; INTRAVENOUS at 09:53

## 2017-10-11 RX ADMIN — ALLOPURINOL 100 MG: 100 TABLET ORAL at 12:29

## 2017-10-11 RX ADMIN — CEFOXITIN 2 G: 2 INJECTION, POWDER, FOR SOLUTION INTRAVENOUS at 14:37

## 2017-10-11 RX ADMIN — MORPHINE SULFATE 3 MG: 2 INJECTION, SOLUTION INTRAMUSCULAR; INTRAVENOUS at 08:57

## 2017-10-11 RX ADMIN — SODIUM CHLORIDE, POTASSIUM CHLORIDE, SODIUM LACTATE AND CALCIUM CHLORIDE: 600; 310; 30; 20 INJECTION, SOLUTION INTRAVENOUS at 08:35

## 2017-10-11 RX ADMIN — DICYCLOMINE HYDROCHLORIDE 10 MG: 10 CAPSULE ORAL at 19:51

## 2017-10-11 RX ADMIN — SODIUM CHLORIDE, POTASSIUM CHLORIDE, SODIUM LACTATE AND CALCIUM CHLORIDE 125 ML/HR: 600; 310; 30; 20 INJECTION, SOLUTION INTRAVENOUS at 19:51

## 2017-10-11 RX ADMIN — MIDAZOLAM HYDROCHLORIDE 2 MG: 1 INJECTION, SOLUTION INTRAMUSCULAR; INTRAVENOUS at 07:51

## 2017-10-11 RX ADMIN — DICYCLOMINE HYDROCHLORIDE 10 MG: 10 CAPSULE ORAL at 17:33

## 2017-10-11 RX ADMIN — MORPHINE SULFATE 3 MG: 2 INJECTION, SOLUTION INTRAMUSCULAR; INTRAVENOUS at 08:14

## 2017-10-11 RX ADMIN — CEFOXITIN 2 G: 2 INJECTION, POWDER, FOR SOLUTION INTRAVENOUS at 19:51

## 2017-10-11 RX ADMIN — OXYCODONE HYDROCHLORIDE AND ACETAMINOPHEN 1 TABLET: 10; 325 TABLET ORAL at 22:37

## 2017-10-11 RX ADMIN — BUDESONIDE AND FORMOTEROL FUMARATE DIHYDRATE 2 PUFF: 160; 4.5 AEROSOL RESPIRATORY (INHALATION) at 19:35

## 2017-10-11 RX ADMIN — ALLOPURINOL 100 MG: 100 TABLET ORAL at 19:51

## 2017-10-11 RX ADMIN — DICYCLOMINE HYDROCHLORIDE 10 MG: 10 CAPSULE ORAL at 12:29

## 2017-10-11 RX ADMIN — LIDOCAINE HYDROCHLORIDE 40 MG: 20 INJECTION, SOLUTION INFILTRATION; PERINEURAL at 07:51

## 2017-10-11 RX ADMIN — HYDROMORPHONE HYDROCHLORIDE 1 MG: 2 INJECTION, SOLUTION INTRAMUSCULAR; INTRAVENOUS; SUBCUTANEOUS at 10:24

## 2017-10-11 RX ADMIN — CEFAZOLIN 1 G: 1 INJECTION, POWDER, FOR SOLUTION INTRAMUSCULAR; INTRAVENOUS; PARENTERAL at 08:05

## 2017-10-11 NOTE — ANESTHESIA POSTPROCEDURE EVALUATION
Patient: Nghia Roach    Procedure Summary     Date Anesthesia Start Anesthesia Stop Room / Location    10/11/17 [id number]  [facility] OR 01 / [facility]       Procedure Diagnosis Surgeon Provider    NISSEN FUNDOPLICATION LAPAROSCOPIC (N/A Abdomen); HIATAL HERNIA REPAIR LAPAROSCOPIC (N/A Abdomen) Hoarseness; Hernia, hiatal; Moderate persistent asthma without complication; Dysphagia, unspecified type  (Hoarseness [R49.0]; Hernia, hiatal [K44.9]; Moderate persistent asthma without complication [J45.40]; Dysphagia, unspecified type [R13.10]) MD Reyes Tovar MD          Anesthesia Type: general  Last vitals  BP   157/74 (10/11/17 1053)    Temp   100 °F (37.8 °C) (10/11/17 1053)    Pulse   70 (10/11/17 1053)   Resp   18 (10/11/17 1053)    SpO2   94 % (10/11/17 1053)      Post Anesthesia Care and Evaluation    Patient location during evaluation: PACU  Patient participation: complete - patient participated  Level of consciousness: awake and alert  Pain score: 1  Pain management: adequate  Airway patency: patent  Anesthetic complications: No anesthetic complications  PONV Status: controlled  Cardiovascular status: acceptable  Respiratory status: acceptable  Hydration status: acceptable

## 2017-10-11 NOTE — OP NOTE
Nghia Roach  10/11/2017      Operative Progress Note:   Surgeon and Assistant: Dr. Suero     Pre-Operative Diagnosis: severe gastroesophageal reflux disease     Post-Operative Diagnosis: same     Procedure(s):  laparoscopic Nissen fundoplication and repair of hiatal hernia       Indication: patient is a 32-year-old white male     Type of Anesthesia Administered: general endotracheal infiltration Marcaine 0.5% with epinephrine Estimated Blood Loss: Minimal     Blood Products: None     Specimen Obtained/Removed:None     Complication(s):  None     Graft/Implant/Prosthetics/Implanted Device/Transplants: None     Findings: normal anatomies, small hiatal hernia.  Passage of the 56 Scottish bougie was was difficult and had to use the glide scope in order to accomplish this.    Operative Report:  Patient was taken operating room and laid in a supine position on the operating table. General endotracheal anesthesia was induced. Patient was placed in a low lithotomy position. 5 trochars were placed in the routine fashion under direct vision. The one at the umbilicus was placed by Ordaz technique. Marcaine 0.5% with epinephrine was infiltrated in each of the trocar sites prior to placing the trochars. The patient was then placed in a reverse Trendelenburg position. The liver was retracted with a Ronnie retractor through the epigastric port.. My assistant used a grasper through the left flank trocar to retract tissue to aid with the dissection during the entire case. I then dissected out the gastrohepatic ligament using Harmonic scalpel. I dissected a posterior window to the esophagus. I encountered the posterior vagus took great care not to injure this. I dissected out both the left and right lucinda. I dissected out anterior to the esophagus as well. Harmonic scalpel was used for all this. Using Harmonic scalpel I also dissected out the greater curvature of the stomach. A Penrose drain was placed around the esophagus in routine  fashion and used for retraction. I then used 30 Surgidek sutures to close the hiatus. A 56 Swiss lighted bougie was placed. I brought the fundus of the stomach posterior to the esophagus and created a 360° wrap. O Surgidek ×3 was used to create the wrap. Bites of the esophagus were taken with each of the sutures. This created a very floppy wrap, and excellent position. Pictures were taken. I then removed all trochars under direct vision. The trocar site in the left upper quadrant was closed with a oh Vicryls suture. Fascia at the umbilical incision was closed with oh Vicryls ×2. Wounds are then closed with subcuticular 4-0 Monocryl. Marcaine 0.5% with epinephrine is infiltrated in the umbilical site. Pressure dressing was placed at the umbilicus. Dermabond was placed at all of the wounds. The procedures and terminated. Sponge, instrument, and needle counts correct ×2. Patient tolerated procedure very well and was returned recovery room in satisfactory condition.       Electronically Signed by: MD Marvin Hayes disclaimer:  Much of this encounter note is an electronic transcription/translation of spoken language to printed text. The electronic translation of spoken language may permit erroneous, or at times, nonsensical words or phrases to be inadvertently transcribed; Although I have reviewed the note for such errors, some may still exist.

## 2017-10-11 NOTE — ANESTHESIA PROCEDURE NOTES
Airway  Urgency: elective    Date/Time: 10/11/2017 7:55 AM  Airway not difficult    General Information and Staff    Patient location during procedure: OR  Anesthesiologist: ROSHAN GARCIAS  CRNA: GEOVANNI CLARK    Indications and Patient Condition  Indications for airway management: airway protection    Preoxygenated: yes  MILS maintained throughout  Mask difficulty assessment: 2 - vent by mask + OA or adjuvant +/- NMBA    Final Airway Details  Final airway type: endotracheal airway      Successful airway: ETT  Cuffed: yes   Successful intubation technique: direct laryngoscopy and video laryngoscopy  Endotracheal tube insertion site: oral  ETT size: 7.5 mm  Placement verified by: chest auscultation   Measured from: lips  ETT to lips (cm): 22  Number of attempts at approach: 1    Additional Comments  No trauma or change to dentition     Right central incisor chipped prior to induction witnessed per trav ramirez rn

## 2017-10-11 NOTE — PLAN OF CARE
Problem: Pain, Acute (Adult)  Goal: Acceptable Pain Control/Comfort Level  Outcome: Ongoing (interventions implemented as appropriate)    Problem: Infection, Risk/Actual (Adult)  Goal: Identify Related Risk Factors and Signs and Symptoms  Outcome: Ongoing (interventions implemented as appropriate)  Goal: Infection Prevention/Resolution  Outcome: Ongoing (interventions implemented as appropriate)

## 2017-10-11 NOTE — H&P (VIEW-ONLY)
9/15/2017    Patient Information  Nghia Roach  [address] [facility] [place] [address]  1985  [phone] (home)     Chief Complaint   Patient presents with   • Follow-up     FU UGI AND BARIUM SWALLOW       HPI  Patient is a 32-year-old white male who has severe reflux disease.  His DeMeester scores are in the 200s.  EGD shows him to have ulcerative esophagitis.  He reports all this is been going on for years.  EMS shows some nonspecific esophageal motility disorder is related to his reflux.    Review of Systems:  The Review of Systems has been reviewed and confirmed.    General: weight goes up and down  Integumentary: rash  Eyes: eyesight problems, glasses, red eyes, eyes itch, discharge from eyes  ENT: earache, recurrent sore throat and hoarseness  Respiratory: shortness of breath, chronic cough and wheezing  Gastrointestinal: nausea/vomiting, heartburn, diarrhea, constipation, change in stool and abdominal pain  Cardiovascular: chest pain and rapid heart rate  Neurological: dizziness  Psychiatric: insomnia and mood swings  Hematologic/Lymphatic: swollen glands  Genitourinary: frequent urination  Musculoskeletal: painful joints, sore muscles, back pain and joint stiffness  Endocrine: negative  Breasts: negative      Patient Active Problem List   Diagnosis   • Osteoarthrosis   • Fibromyalgia   • Tension headache, chronic   • Hyperlipidemia   • Asthma   • Acid reflux   • Hoarseness   • Difficulty swallowing   • Corporo-venous occlusive erectile dysfunction   • Gastroesophageal reflux disease with esophagitis   • Dysphagia   • Hernia, hiatal   • Back ache   • Chronic gout of multiple sites   • Hypertension         Past Medical History:   Diagnosis Date   • Allergic rhinitis    • Asthma    • Atrial flutter    • Diarrhea    • Disturbance of skin sensation    • GERD (gastroesophageal reflux disease)    • Gout    • History of MRI of spine 12/16/2014    Done at [facility]    • Hyperlipidemia    • Hypertension    •  Lymphadenitis    • Malaise and fatigue    • Osteoarthrosis    • Rash    • Renal disorder          Past Surgical History:   Procedure Laterality Date   • BRAVO PROCEDURE N/A 8/4/2017    Procedure: ESOPHAGOGASTRODUODENOSCOPY AND FULLER;  Surgeon: Efra Suero MD;  Location: [facility];  Service:    • COLONOSCOPY  2014   • VASECTOMY           Family History   Problem Relation Age of Onset   • Asthma Other    • Cancer Other    • COPD Other    • Diabetes Other    • Stroke Other    • Asthma Mother    • COPD Mother    • Asthma Father    • Depression Father    • Drug abuse Father    • Arthritis Maternal Grandmother    • Cancer Maternal Grandmother    • Diabetes Maternal Grandmother    • Heart disease Maternal Grandmother    • Hyperlipidemia Maternal Grandmother    • Stroke Paternal Grandfather          Social History   Substance Use Topics   • Smoking status: Never Smoker   • Smokeless tobacco: Never Used   • Alcohol use No       Current Outpatient Prescriptions   Medication Sig Dispense Refill   • albuterol (PROVENTIL HFA;VENTOLIN HFA) 108 (90 BASE) MCG/ACT inhaler Inhale 2 puffs Every 4 (Four) Hours As Needed for Shortness of Air. 1 inhaler 5   • allopurinol (ZYLOPRIM) 100 MG tablet Take 1 tablet by mouth 2 (Two) Times a Day. 60 tablet 5   • amitriptyline (ELAVIL) 10 MG tablet Take 1 tablet by mouth Every Night. 30 tablet 5   • amLODIPine (NORVASC) 10 MG tablet Take 1 tablet by mouth Daily. 30 tablet 5   • atorvastatin (LIPITOR) 10 MG tablet Take 1 tablet by mouth Daily. 30 tablet 5   • Azelastine-Fluticasone 137-50 MCG/ACT suspension into each nostril.     • budesonide-formoterol (SYMBICORT) 160-4.5 MCG/ACT inhaler Inhale 2 puffs 2 (Two) Times a Day. 1 inhaler 5   • CIALIS 5 MG tablet   5   • desloratadine (CLARINEX) 5 MG tablet Take 1 tablet by mouth Daily. 30 tablet 6   • dicyclomine (BENTYL) 10 MG capsule Take 1 capsule by mouth 4 (Four) Times a Day Before Meals & at Bedtime. 120 capsule 5   • fluticasone (FLONASE)  50 MCG/ACT nasal spray 2 sprays into each nostril Daily. Administer 2 sprays in each nostril for each dose. 1 each 1   • ipratropium-albuterol (COMBIVENT RESPIMAT)  MCG/ACT inhaler Inhale 1 puff 4 (Four) Times a Day As Needed for Wheezing. 1-2 PUFFS EVERY 4 HOURS AS NEEDED 4 g 11   • montelukast (SINGULAIR) 10 MG tablet Take 1 tablet by mouth Every Night. 30 tablet 5   • raNITIdine (ZANTAC) 300 MG tablet Take 300 mg by mouth Every Night.     • sucralfate (CARAFATE) 1 GM/10ML suspension Take 10 mL by mouth 2 (Two) Times a Day As Needed (heart burn). 420 mL 5   • Testosterone Cypionate (DEPOTESTOTERONE CYPIONATE) 200 MG/ML injection   1   • topiramate (TOPAMAX) 50 MG tablet Take 1 tablet by mouth Daily. 30 tablet 5   • valsartan-hydrochlorothiazide (DIOVAN HCT) 160-12.5 MG per tablet Take 1 tablet by mouth Daily. 30 tablet 5     No current facility-administered medications for this visit.          Allergies  Erythromycin      Physical Exam   Constitutional: He is oriented to person, place, and time. He appears well-developed and well-nourished. No distress.   HENT:   Head: Normocephalic.   Right Ear: External ear normal.   Left Ear: External ear normal.   Nose: Nose normal.   Mouth/Throat: Oropharynx is clear and moist.   Eyes: Conjunctivae and EOM are normal. Right eye exhibits no discharge. Left eye exhibits no discharge.   Neck: Normal range of motion. No JVD present. No tracheal deviation present. No thyromegaly present.   Cardiovascular: Normal rate, regular rhythm, normal heart sounds and intact distal pulses.  Exam reveals no gallop and no friction rub.    No murmur heard.  Pulmonary/Chest: Effort normal and breath sounds normal. No stridor. No respiratory distress. He has no wheezes. He has no rales. He exhibits no tenderness.   Abdominal: Soft. Bowel sounds are normal. He exhibits no distension and no mass. There is no tenderness. There is no rebound and no guarding. No hernia.   Genitourinary: Rectal  "exam shows guaiac negative stool.   Musculoskeletal: Normal range of motion. He exhibits no edema, tenderness or deformity.   Lymphadenopathy:     He has no cervical adenopathy.   Neurological: He is alert and oriented to person, place, and time. He has normal reflexes. He displays normal reflexes. No cranial nerve deficit. He exhibits normal muscle tone. Coordination normal.   Skin: Skin is warm and dry. No rash noted. He is not diaphoretic. No erythema. No pallor.   Psychiatric: He has a normal mood and affect. His behavior is normal. Judgment and thought content normal.   Nursing note and vitals reviewed.        /70  Pulse 80  Resp 20  Ht 71\" (180.3 cm)  Wt 258 lb (117 kg)  BMI 35.98 kg/m2        ASSESSMENT  Severe gastroesophageal reflux disease with hiatal hernia        PLAN    Laparoscopic Nissen fundoplication and repair of hiatal hernia.  Risks, benefits, and possible complications have been discussed with the patient in great detail.  Pathophysiology is been discussed.  Statistical outcomes of the procedures been discussed.  Occasional material has been given to the patient about the procedure and gastroesophageal reflux disease in general.  I have also encouraged him to go on U tube to actually visualize the procedure.  He understands and agrees to the outlined plan.        Efra Suero MD    "

## 2017-10-11 NOTE — ANESTHESIA PREPROCEDURE EVALUATION
Anesthesia Evaluation     Patient summary reviewed and Nursing notes reviewed   no history of anesthetic complications:  NPO Solid Status: > 8 hours  NPO Liquid Status: > 8 hours     Airway   Mallampati: II  TM distance: >3 FB  Neck ROM: full  no difficulty expected  Dental - normal exam         Pulmonary - normal exam   (+) asthma,   (-) not a smoker  Cardiovascular - normal exam  Exercise tolerance: good (4-7 METS)    NYHA Classification: II    (+) hypertension, dysrhythmias Atrial Flutter, hyperlipidemia  (-) past MI, angina      Neuro/Psych  (+) headaches,    (-) seizures, CVA  GI/Hepatic/Renal/Endo    (+) obesity,  hiatal hernia, GERD, renal disease,   (-) diabetes, hypothyroidism    Musculoskeletal     (+) back pain, myalgias,   (-) radiculopathy  Abdominal  - normal exam    Bowel sounds: normal.   Substance History - negative use     OB/GYN negative ob/gyn ROS         Other   (+) arthritis         Phys Exam Other: Lower partial                                Anesthesia Plan    ASA 3     general     intravenous induction   Anesthetic plan and risks discussed with patient.  Use of blood products discussed with patient  Consented to blood products.

## 2017-10-12 VITALS
TEMPERATURE: 98.9 F | RESPIRATION RATE: 18 BRPM | HEART RATE: 97 BPM | OXYGEN SATURATION: 95 % | HEIGHT: 71 IN | SYSTOLIC BLOOD PRESSURE: 124 MMHG | DIASTOLIC BLOOD PRESSURE: 76 MMHG | WEIGHT: 265 LBS | BODY MASS INDEX: 37.1 KG/M2

## 2017-10-12 PROCEDURE — 63710000001 DIPHENHYDRAMINE PER 50 MG: Performed by: SURGERY

## 2017-10-12 PROCEDURE — 25010000002 ENOXAPARIN PER 10 MG: Performed by: SURGERY

## 2017-10-12 PROCEDURE — 25010000002 CEFOXITIN: Performed by: SURGERY

## 2017-10-12 PROCEDURE — 94799 UNLISTED PULMONARY SVC/PX: CPT

## 2017-10-12 PROCEDURE — 99024 POSTOP FOLLOW-UP VISIT: CPT | Performed by: SURGERY

## 2017-10-12 RX ORDER — POLYETHYLENE GLYCOL 3350 17 G/17G
17 POWDER, FOR SOLUTION ORAL DAILY
Status: DISCONTINUED | OUTPATIENT
Start: 2017-10-12 | End: 2017-10-12 | Stop reason: HOSPADM

## 2017-10-12 RX ORDER — SIMETHICONE 80 MG
80 TABLET,CHEWABLE ORAL 4 TIMES DAILY PRN
Status: DISCONTINUED | OUTPATIENT
Start: 2017-10-12 | End: 2017-10-12 | Stop reason: HOSPADM

## 2017-10-12 RX ORDER — OXYCODONE AND ACETAMINOPHEN 10; 325 MG/1; MG/1
1 TABLET ORAL EVERY 4 HOURS PRN
Qty: 20 TABLET | Refills: 0
Start: 2017-10-12 | End: 2017-10-21

## 2017-10-12 RX ORDER — DIPHENHYDRAMINE HCL 25 MG
50 CAPSULE ORAL EVERY 6 HOURS PRN
Status: DISCONTINUED | OUTPATIENT
Start: 2017-10-12 | End: 2017-10-12 | Stop reason: HOSPADM

## 2017-10-12 RX ADMIN — OXYCODONE HYDROCHLORIDE AND ACETAMINOPHEN 1 TABLET: 10; 325 TABLET ORAL at 10:11

## 2017-10-12 RX ADMIN — SODIUM CHLORIDE, POTASSIUM CHLORIDE, SODIUM LACTATE AND CALCIUM CHLORIDE 125 ML/HR: 600; 310; 30; 20 INJECTION, SOLUTION INTRAVENOUS at 12:16

## 2017-10-12 RX ADMIN — ENOXAPARIN SODIUM 40 MG: 40 INJECTION SUBCUTANEOUS at 08:03

## 2017-10-12 RX ADMIN — OXYCODONE HYDROCHLORIDE AND ACETAMINOPHEN 1 TABLET: 10; 325 TABLET ORAL at 14:13

## 2017-10-12 RX ADMIN — OXYCODONE HYDROCHLORIDE AND ACETAMINOPHEN 1 TABLET: 10; 325 TABLET ORAL at 06:22

## 2017-10-12 RX ADMIN — SODIUM CHLORIDE, POTASSIUM CHLORIDE, SODIUM LACTATE AND CALCIUM CHLORIDE 125 ML/HR: 600; 310; 30; 20 INJECTION, SOLUTION INTRAVENOUS at 04:09

## 2017-10-12 RX ADMIN — AMLODIPINE BESYLATE 10 MG: 10 TABLET ORAL at 08:03

## 2017-10-12 RX ADMIN — POLYETHYLENE GLYCOL 3350 17 G: 17 POWDER, FOR SOLUTION ORAL at 10:11

## 2017-10-12 RX ADMIN — ATORVASTATIN CALCIUM 10 MG: 10 TABLET, FILM COATED ORAL at 08:03

## 2017-10-12 RX ADMIN — BUDESONIDE AND FORMOTEROL FUMARATE DIHYDRATE 2 PUFF: 160; 4.5 AEROSOL RESPIRATORY (INHALATION) at 07:06

## 2017-10-12 RX ADMIN — ALLOPURINOL 100 MG: 100 TABLET ORAL at 08:03

## 2017-10-12 RX ADMIN — DIPHENHYDRAMINE HYDROCHLORIDE 50 MG: 25 CAPSULE ORAL at 04:09

## 2017-10-12 RX ADMIN — CEFOXITIN 2 G: 2 INJECTION, POWDER, FOR SOLUTION INTRAVENOUS at 02:22

## 2017-10-12 RX ADMIN — OXYCODONE HYDROCHLORIDE AND ACETAMINOPHEN 1 TABLET: 10; 325 TABLET ORAL at 02:21

## 2017-10-12 RX ADMIN — DICYCLOMINE HYDROCHLORIDE 10 MG: 10 CAPSULE ORAL at 08:03

## 2017-10-12 NOTE — PLAN OF CARE
Problem: Patient Care Overview (Adult)  Goal: Plan of Care Review  Outcome: Ongoing (interventions implemented as appropriate)  Goal: Adult Individualization and Mutuality  Outcome: Ongoing (interventions implemented as appropriate)  Goal: Discharge Needs Assessment  Outcome: Ongoing (interventions implemented as appropriate)    Problem: Perioperative Period (Adult)  Goal: Signs and Symptoms of Listed Potential Problems Will be Absent or Manageable (Perioperative Period)  Outcome: Ongoing (interventions implemented as appropriate)    Problem: Pain, Acute (Adult)  Goal: Identify Related Risk Factors and Signs and Symptoms  Outcome: Ongoing (interventions implemented as appropriate)  Goal: Acceptable Pain Control/Comfort Level  Outcome: Ongoing (interventions implemented as appropriate)    Problem: Infection, Risk/Actual (Adult)  Goal: Identify Related Risk Factors and Signs and Symptoms  Outcome: Ongoing (interventions implemented as appropriate)  Goal: Infection Prevention/Resolution  Outcome: Ongoing (interventions implemented as appropriate)

## 2017-10-12 NOTE — PROGRESS NOTES
Postoperative day #1 laparoscopic Nissen.    Feels okay except for some bloating and constipation.  Tolerated diet    Afebrile vital stable.    Wounds with good.

## 2017-10-12 NOTE — PAYOR COMM NOTE
"  Contact: Floresita Epps RN @ [facility]  Phone: [phone]  Fax: [phone]    Ref# [id number]  Inpatient status          Delroy Elder (32 y.o. Male)     Date of Birth Social Security Number Address Home Phone MRN    1985  [address] [facility] [address] [phone] [id number]    Sabianism Marital Status          None        Admission Date Admission Type Admitting Provider Attending Provider Department, Room/Bed    10/11/17 Elective Efra Suero MD Brown, Donald E, MD [address], [address]/    Discharge Date Discharge Disposition Discharge Destination         Home or Self Care             Attending Provider: Efra Suero MD     Allergies:  Erythromycin    Isolation:  None   Infection:  None   Code Status:  FULL    Ht:  71\" (180.3 cm)   Wt:  265 lb (120 kg)    Admission Cmt:  None   Principal Problem:  None                Active Insurance as of 10/11/2017     Primary Coverage     Payor Plan Insurance Group Employer/Plan Group    CIGNA CIGNA [id number]     Payor Plan Address Payor Plan Phone Number Effective From Effective To    [address] [phone] 1/1/2016     [place] [address]       Subscriber Name Subscriber Birth Date Member ID       DELROY ELDER 1985 [id number]                 Emergency Contacts      (Rel.) Home Phone Work Phone Mobile Phone    Radha Elder (Spouse) -- -- [phone]               History & Physical      Efra Suero MD at 9/15/2017  2:45 PM          9/15/2017    Patient Information  Delroy Griffin [facility] [address]  1985  [phone] (home)     Chief Complaint   Patient presents with   • Follow-up     FU UGI AND BARIUM SWALLOW       HPI  Patient is a 32-year-old white male who has severe reflux disease.  His DeMeester scores are in the 200s.  EGD shows him to have ulcerative esophagitis.  He reports all this is been going on for years.  EMS shows some nonspecific esophageal " motility disorder is related to his reflux.    Review of Systems:  The Review of Systems has been reviewed and confirmed.    General: weight goes up and down  Integumentary: rash  Eyes: eyesight problems, glasses, red eyes, eyes itch, discharge from eyes  ENT: earache, recurrent sore throat and hoarseness  Respiratory: shortness of breath, chronic cough and wheezing  Gastrointestinal: nausea/vomiting, heartburn, diarrhea, constipation, change in stool and abdominal pain  Cardiovascular: chest pain and rapid heart rate  Neurological: dizziness  Psychiatric: insomnia and mood swings  Hematologic/Lymphatic: swollen glands  Genitourinary: frequent urination  Musculoskeletal: painful joints, sore muscles, back pain and joint stiffness  Endocrine: negative  Breasts: negative      Patient Active Problem List   Diagnosis   • Osteoarthrosis   • Fibromyalgia   • Tension headache, chronic   • Hyperlipidemia   • Asthma   • Acid reflux   • Hoarseness   • Difficulty swallowing   • Corporo-venous occlusive erectile dysfunction   • Gastroesophageal reflux disease with esophagitis   • Dysphagia   • Hernia, hiatal   • Back ache   • Chronic gout of multiple sites   • Hypertension         Past Medical History:   Diagnosis Date   • Allergic rhinitis    • Asthma    • Atrial flutter    • Diarrhea    • Disturbance of skin sensation    • GERD (gastroesophageal reflux disease)    • Gout    • History of MRI of spine 12/16/2014    Done at [facility]    • Hyperlipidemia    • Hypertension    • Lymphadenitis    • Malaise and fatigue    • Osteoarthrosis    • Rash    • Renal disorder          Past Surgical History:   Procedure Laterality Date   • BRAVO PROCEDURE N/A 8/4/2017    Procedure: ESOPHAGOGASTRODUODENOSCOPY AND FULLER;  Surgeon: Efra Suero MD;  Location: [facility];  Service:    • COLONOSCOPY  2014   • VASECTOMY           Family History   Problem Relation Age of Onset   • Asthma Other    • Cancer Other    • COPD Other    • Diabetes  Other    • Stroke Other    • Asthma Mother    • COPD Mother    • Asthma Father    • Depression Father    • Drug abuse Father    • Arthritis Maternal Grandmother    • Cancer Maternal Grandmother    • Diabetes Maternal Grandmother    • Heart disease Maternal Grandmother    • Hyperlipidemia Maternal Grandmother    • Stroke Paternal Grandfather          Social History   Substance Use Topics   • Smoking status: Never Smoker   • Smokeless tobacco: Never Used   • Alcohol use No       Current Outpatient Prescriptions   Medication Sig Dispense Refill   • albuterol (PROVENTIL HFA;VENTOLIN HFA) 108 (90 BASE) MCG/ACT inhaler Inhale 2 puffs Every 4 (Four) Hours As Needed for Shortness of Air. 1 inhaler 5   • allopurinol (ZYLOPRIM) 100 MG tablet Take 1 tablet by mouth 2 (Two) Times a Day. 60 tablet 5   • amitriptyline (ELAVIL) 10 MG tablet Take 1 tablet by mouth Every Night. 30 tablet 5   • amLODIPine (NORVASC) 10 MG tablet Take 1 tablet by mouth Daily. 30 tablet 5   • atorvastatin (LIPITOR) 10 MG tablet Take 1 tablet by mouth Daily. 30 tablet 5   • Azelastine-Fluticasone 137-50 MCG/ACT suspension into each nostril.     • budesonide-formoterol (SYMBICORT) 160-4.5 MCG/ACT inhaler Inhale 2 puffs 2 (Two) Times a Day. 1 inhaler 5   • CIALIS 5 MG tablet   5   • desloratadine (CLARINEX) 5 MG tablet Take 1 tablet by mouth Daily. 30 tablet 6   • dicyclomine (BENTYL) 10 MG capsule Take 1 capsule by mouth 4 (Four) Times a Day Before Meals & at Bedtime. 120 capsule 5   • fluticasone (FLONASE) 50 MCG/ACT nasal spray 2 sprays into each nostril Daily. Administer 2 sprays in each nostril for each dose. 1 each 1   • ipratropium-albuterol (COMBIVENT RESPIMAT)  MCG/ACT inhaler Inhale 1 puff 4 (Four) Times a Day As Needed for Wheezing. 1-2 PUFFS EVERY 4 HOURS AS NEEDED 4 g 11   • montelukast (SINGULAIR) 10 MG tablet Take 1 tablet by mouth Every Night. 30 tablet 5   • raNITIdine (ZANTAC) 300 MG tablet Take 300 mg by mouth Every Night.     •  sucralfate (CARAFATE) 1 GM/10ML suspension Take 10 mL by mouth 2 (Two) Times a Day As Needed (heart burn). 420 mL 5   • Testosterone Cypionate (DEPOTESTOTERONE CYPIONATE) 200 MG/ML injection   1   • topiramate (TOPAMAX) 50 MG tablet Take 1 tablet by mouth Daily. 30 tablet 5   • valsartan-hydrochlorothiazide (DIOVAN HCT) 160-12.5 MG per tablet Take 1 tablet by mouth Daily. 30 tablet 5     No current facility-administered medications for this visit.          Allergies  Erythromycin      Physical Exam   Constitutional: He is oriented to person, place, and time. He appears well-developed and well-nourished. No distress.   HENT:   Head: Normocephalic.   Right Ear: External ear normal.   Left Ear: External ear normal.   Nose: Nose normal.   Mouth/Throat: Oropharynx is clear and moist.   Eyes: Conjunctivae and EOM are normal. Right eye exhibits no discharge. Left eye exhibits no discharge.   Neck: Normal range of motion. No JVD present. No tracheal deviation present. No thyromegaly present.   Cardiovascular: Normal rate, regular rhythm, normal heart sounds and intact distal pulses.  Exam reveals no gallop and no friction rub.    No murmur heard.  Pulmonary/Chest: Effort normal and breath sounds normal. No stridor. No respiratory distress. He has no wheezes. He has no rales. He exhibits no tenderness.   Abdominal: Soft. Bowel sounds are normal. He exhibits no distension and no mass. There is no tenderness. There is no rebound and no guarding. No hernia.   Genitourinary: Rectal exam shows guaiac negative stool.   Musculoskeletal: Normal range of motion. He exhibits no edema, tenderness or deformity.   Lymphadenopathy:     He has no cervical adenopathy.   Neurological: He is alert and oriented to person, place, and time. He has normal reflexes. He displays normal reflexes. No cranial nerve deficit. He exhibits normal muscle tone. Coordination normal.   Skin: Skin is warm and dry. No rash noted. He is not diaphoretic. No  "erythema. No pallor.   Psychiatric: He has a normal mood and affect. His behavior is normal. Judgment and thought content normal.   Nursing note and vitals reviewed.        /70  Pulse 80  Resp 20  Ht 71\" (180.3 cm)  Wt 258 lb (117 kg)  BMI 35.98 kg/m2        ASSESSMENT  Severe gastroesophageal reflux disease with hiatal hernia        PLAN    Laparoscopic Nissen fundoplication and repair of hiatal hernia.  Risks, benefits, and possible complications have been discussed with the patient in great detail.  Pathophysiology is been discussed.  Statistical outcomes of the procedures been discussed.  Occasional material has been given to the patient about the procedure and gastroesophageal reflux disease in general.  I have also encouraged him to go on U tube to actually visualize the procedure.  He understands and agrees to the outlined plan.        Efra Suero MD       Electronically signed by Efra Suero MD at 9/15/2017  3:04 PM      Efra Suero MD at 10/11/2017  7:49 AM            H&P reviewed. The patient was examined and there are no changes to the H&P.         Electronically signed by Efra Suero MD at 10/11/2017  7:49 AM    Source Note             9/15/2017    Patient Information  Nghia Roach  [address] [facility] [address]  1985  [phone] (home)     Chief Complaint   Patient presents with   • Follow-up     FU UGI AND BARIUM SWALLOW       HPI  Patient is a 32-year-old white male who has severe reflux disease.  His DeMeester scores are in the 200s.  EGD shows him to have ulcerative esophagitis.  He reports all this is been going on for years.  EMS shows some nonspecific esophageal motility disorder is related to his reflux.    Review of Systems:  The Review of Systems has been reviewed and confirmed.    General: weight goes up and down  Integumentary: rash  Eyes: eyesight problems, glasses, red eyes, eyes itch, discharge from eyes  ENT: earache, recurrent sore throat and " hoarseness  Respiratory: shortness of breath, chronic cough and wheezing  Gastrointestinal: nausea/vomiting, heartburn, diarrhea, constipation, change in stool and abdominal pain  Cardiovascular: chest pain and rapid heart rate  Neurological: dizziness  Psychiatric: insomnia and mood swings  Hematologic/Lymphatic: swollen glands  Genitourinary: frequent urination  Musculoskeletal: painful joints, sore muscles, back pain and joint stiffness  Endocrine: negative  Breasts: negative      Patient Active Problem List   Diagnosis   • Osteoarthrosis   • Fibromyalgia   • Tension headache, chronic   • Hyperlipidemia   • Asthma   • Acid reflux   • Hoarseness   • Difficulty swallowing   • Corporo-venous occlusive erectile dysfunction   • Gastroesophageal reflux disease with esophagitis   • Dysphagia   • Hernia, hiatal   • Back ache   • Chronic gout of multiple sites   • Hypertension         Past Medical History:   Diagnosis Date   • Allergic rhinitis    • Asthma    • Atrial flutter    • Diarrhea    • Disturbance of skin sensation    • GERD (gastroesophageal reflux disease)    • Gout    • History of MRI of spine 12/16/2014    Done at [facility]    • Hyperlipidemia    • Hypertension    • Lymphadenitis    • Malaise and fatigue    • Osteoarthrosis    • Rash    • Renal disorder          Past Surgical History:   Procedure Laterality Date   • BRAVO PROCEDURE N/A 8/4/2017    Procedure: ESOPHAGOGASTRODUODENOSCOPY AND FULLER;  Surgeon: Efra Suero MD;  Location: [facility];  Service:    • COLONOSCOPY  2014   • VASECTOMY           Family History   Problem Relation Age of Onset   • Asthma Other    • Cancer Other    • COPD Other    • Diabetes Other    • Stroke Other    • Asthma Mother    • COPD Mother    • Asthma Father    • Depression Father    • Drug abuse Father    • Arthritis Maternal Grandmother    • Cancer Maternal Grandmother    • Diabetes Maternal Grandmother    • Heart disease Maternal Grandmother    • Hyperlipidemia Maternal  Grandmother    • Stroke Paternal Grandfather          Social History   Substance Use Topics   • Smoking status: Never Smoker   • Smokeless tobacco: Never Used   • Alcohol use No       Current Outpatient Prescriptions   Medication Sig Dispense Refill   • albuterol (PROVENTIL HFA;VENTOLIN HFA) 108 (90 BASE) MCG/ACT inhaler Inhale 2 puffs Every 4 (Four) Hours As Needed for Shortness of Air. 1 inhaler 5   • allopurinol (ZYLOPRIM) 100 MG tablet Take 1 tablet by mouth 2 (Two) Times a Day. 60 tablet 5   • amitriptyline (ELAVIL) 10 MG tablet Take 1 tablet by mouth Every Night. 30 tablet 5   • amLODIPine (NORVASC) 10 MG tablet Take 1 tablet by mouth Daily. 30 tablet 5   • atorvastatin (LIPITOR) 10 MG tablet Take 1 tablet by mouth Daily. 30 tablet 5   • Azelastine-Fluticasone 137-50 MCG/ACT suspension into each nostril.     • budesonide-formoterol (SYMBICORT) 160-4.5 MCG/ACT inhaler Inhale 2 puffs 2 (Two) Times a Day. 1 inhaler 5   • CIALIS 5 MG tablet   5   • desloratadine (CLARINEX) 5 MG tablet Take 1 tablet by mouth Daily. 30 tablet 6   • dicyclomine (BENTYL) 10 MG capsule Take 1 capsule by mouth 4 (Four) Times a Day Before Meals & at Bedtime. 120 capsule 5   • fluticasone (FLONASE) 50 MCG/ACT nasal spray 2 sprays into each nostril Daily. Administer 2 sprays in each nostril for each dose. 1 each 1   • ipratropium-albuterol (COMBIVENT RESPIMAT)  MCG/ACT inhaler Inhale 1 puff 4 (Four) Times a Day As Needed for Wheezing. 1-2 PUFFS EVERY 4 HOURS AS NEEDED 4 g 11   • montelukast (SINGULAIR) 10 MG tablet Take 1 tablet by mouth Every Night. 30 tablet 5   • raNITIdine (ZANTAC) 300 MG tablet Take 300 mg by mouth Every Night.     • sucralfate (CARAFATE) 1 GM/10ML suspension Take 10 mL by mouth 2 (Two) Times a Day As Needed (heart burn). 420 mL 5   • Testosterone Cypionate (DEPOTESTOTERONE CYPIONATE) 200 MG/ML injection   1   • topiramate (TOPAMAX) 50 MG tablet Take 1 tablet by mouth Daily. 30 tablet 5   •  "valsartan-hydrochlorothiazide (DIOVAN HCT) 160-12.5 MG per tablet Take 1 tablet by mouth Daily. 30 tablet 5     No current facility-administered medications for this visit.          Allergies  Erythromycin      Physical Exam   Constitutional: He is oriented to person, place, and time. He appears well-developed and well-nourished. No distress.   HENT:   Head: Normocephalic.   Right Ear: External ear normal.   Left Ear: External ear normal.   Nose: Nose normal.   Mouth/Throat: Oropharynx is clear and moist.   Eyes: Conjunctivae and EOM are normal. Right eye exhibits no discharge. Left eye exhibits no discharge.   Neck: Normal range of motion. No JVD present. No tracheal deviation present. No thyromegaly present.   Cardiovascular: Normal rate, regular rhythm, normal heart sounds and intact distal pulses.  Exam reveals no gallop and no friction rub.    No murmur heard.  Pulmonary/Chest: Effort normal and breath sounds normal. No stridor. No respiratory distress. He has no wheezes. He has no rales. He exhibits no tenderness.   Abdominal: Soft. Bowel sounds are normal. He exhibits no distension and no mass. There is no tenderness. There is no rebound and no guarding. No hernia.   Genitourinary: Rectal exam shows guaiac negative stool.   Musculoskeletal: Normal range of motion. He exhibits no edema, tenderness or deformity.   Lymphadenopathy:     He has no cervical adenopathy.   Neurological: He is alert and oriented to person, place, and time. He has normal reflexes. He displays normal reflexes. No cranial nerve deficit. He exhibits normal muscle tone. Coordination normal.   Skin: Skin is warm and dry. No rash noted. He is not diaphoretic. No erythema. No pallor.   Psychiatric: He has a normal mood and affect. His behavior is normal. Judgment and thought content normal.   Nursing note and vitals reviewed.        /70  Pulse 80  Resp 20  Ht 71\" (180.3 cm)  Wt 258 lb (117 kg)  BMI 35.98 kg/m2      "   ASSESSMENT  Severe gastroesophageal reflux disease with hiatal hernia        PLAN    Laparoscopic Nissen fundoplication and repair of hiatal hernia.  Risks, benefits, and possible complications have been discussed with the patient in great detail.  Pathophysiology is been discussed.  Statistical outcomes of the procedures been discussed.  Occasional material has been given to the patient about the procedure and gastroesophageal reflux disease in general.  I have also encouraged him to go on U tube to actually visualize the procedure.  He understands and agrees to the outlined plan.        Efra Suero MD        Electronically signed by Efra Suero MD at 9/15/2017  3:04 PM              Emergency Department Notes     No notes of this type exist for this encounter.           Physician Progress Notes (last 24 hours) (Notes from 10/11/2017  4:44 PM through 10/12/2017  4:44 PM)      Efra Suero MD at 10/12/2017  8:35 AM  Version 1 of 1         Postoperative day #1 laparoscopic Nissen.    Feels okay except for some bloating and constipation.  Tolerated diet    Afebrile vital stable.    Wounds with good.     Electronically signed by Efra Suero MD at 10/12/2017  8:36 AM        All medication doses during the admission are shown, including meds that are no longer on order.     Scheduled Meds Sorted by Name for Nghia Roach as of 10/12/17 0214       1 Day 3 Days 7 Days 10 Days < Today >    Legend:                           Inactive     Active     Other Encounter    Linked               Medications 10/03 10/04 10/05 10/06 10/07 10/08 10/09 10/10 10/11 10/12   allopurinol (ZYLOPRIM) tablet 100 mg  Dose: 100 mg Freq: Every 12 Hours Scheduled Route: PO  Start: 10/11/17 1200    Admin Instructions:   ([facility]) Take with food if GI upset occurs.            1229       1951 2100        0803       2100          amitriptyline (ELAVIL) tablet 10 mg  Dose: 10 mg Freq: Nightly Route: PO  Start: 10/11/17 2100             1950       2100 2100          amLODIPine (NORVASC) tablet 10 mg  Dose: 10 mg Freq: Daily Route: PO  Start: 10/11/17 1200    Admin Instructions:   Avoid grapefruit juice.            1229        0803          atorvastatin (LIPITOR) tablet 10 mg  Dose: 10 mg Freq: Daily Route: PO  Start: 10/11/17 1200    Admin Instructions:   Avoid grapefruit juice.            1230        0803          budesonide-formoterol (SYMBICORT) 160-4.5 MCG/ACT inhaler 2 puff  Dose: 2 puff Freq: 2 Times Daily - RT Route: IN  Start: 10/11/17 1145    Admin Instructions:   Shake well.  Rinse mouth after use, do not swallow water.  Send aerosols to pharmacy in ziplock bag for proper disposal.            (2631) [C]       [id number]       1900          ceFAZolin (ANCEF) 1 g/100 mL 0.9% NS IVPB (mbp)  Dose: 1 g Freq: Once Route: IV  Indications of Use: SURGICAL PROPHYLAXIS  Start: 10/11/17 0700 End: 10/11/17 0805    Admin Instructions:   Administer within 1 hour of surgical incision. Redose 4 hours from pre-op dose if procedure ongoing or >1.5 L blood loss.  Activate vial before using.            0805           cefoxitin (MEFOXIN) 2 g/100 mL 0.9% NS (MBP)  Dose: 2 g Freq: Every 6 Hours Route: IV  Indications of Use: SURGICAL PROPHYLAXIS  Start: 10/11/17 1400 End: 10/12/17 0252    Admin Instructions:   Time first dose from pre-op dose.  Activate vial before using.            4565       1952        0225          dicyclomine (BENTYL) capsule 10 mg  Dose: 10 mg Freq: 4 Times Daily Before Meals & Nightly Route: PO  Start: 10/11/17 1200            1229       1733       1952 2100        0803       ([phone]          enoxaparin (LOVENOX) syringe 40 mg  Dose: 40 mg Freq: Daily Route: SC  Start: 10/12/17 0900    Admin Instructions:   Give subcutaneous in abdomen only. Do not massage site after injection. Do not change dose time until after 48 hrs.             0803          fluticasone (FLONASE) 50 MCG/ACT nasal  spray 2 spray  Dose: 2 spray Freq: Daily Route: NA  Start: 10/11/17 1200            1230        (1357)          montelukast (SINGULAIR) tablet 10 mg  Dose: 10 mg Freq: Nightly Route: PO  Start: 10/11/17 2100            1951 2100        2100          polyethylene glycol (MIRALAX) powder 17 g  Dose: 17 g Freq: Daily Route: PO  Start: 10/12/17 0915    Admin Instructions:   Mix dose in 6-8 ounces of water.             1011          Medications 10/03 10/04 10/05 10/06 10/07 10/08 10/09 10/10 10/11 10/12         Continuous Meds Sorted by Name for Nghia Roach as of 10/12/17 1644      Legend:         Inactive     Active     Other Encounter    Linked               Medications 10/03 10/04 10/05 10/06 10/07 10/08 10/09 10/10 10/11 10/12   lactated ringers infusion  Rate: 125 mL/hr Freq: Continuous Route: IV  Last Dose: 125 mL/hr (10/12/17 1216)  Start: 10/11/17 0730            0706       0750       0835       1227       1951        0409       1216                PRN Meds Sorted by Name for Nghia Roach as of 10/12/17 1644      Legend:         Inactive     Active     Other Encounter    Linked               Medications 10/03 10/04 10/05 10/06 10/07 10/08 10/09 10/10 10/11 10/12   albuterol (PROVENTIL) nebulizer solution 0.083% 2.5 mg/3mL  Freq: Every 4 Hours PRN Route: NEBULIZATION  PRN Reason: Shortness of Air  Start: 10/11/17 1105                bupivacaine (PF) (MARCAINE) 0.5 % injection  Freq: As Needed  Start: 10/11/17 0755 End: 10/11/17 0954            0755       0954-D/C'd       diphenhydrAMINE (BENADRYL) capsule 50 mg  Dose: 50 mg Freq: Every 6 Hours PRN Route: PO  PRN Reason: Itching  Start: 10/12/17 0355    Admin Instructions:   This med may be ordered in other forms and routes. Before giving verify the last time the drug was given by any route/form.             0409          fentaNYL citrate (PF) (SUBLIMAZE) injection  Freq: As Needed Route: IV  PRN Reason: Severe Pain   Start: 10/11/17 0751 End: 10/11/17 0945             0751       0954-D/C'd       fentaNYL citrate (PF) (SUBLIMAZE) injection 50 mcg  Dose: 50 mcg Freq: Every 5 Minutes PRN Route: IV  PRN Reasons: Moderate Pain ,Severe Pain   Start: 10/11/17 1003 End: 10/11/17 1055    Admin Instructions:   Maximum total dose of fentanyl is 100 mcg.  If given for pain, use the following pain scale:  Mild Pain = Pain Score of 1-3, CPOT 1-2  Moderate Pain = Pain Score of 4-6, CPOT 3-4  Severe Pain = Pain Score of 7-10, CPOT 5-8            1006       1020       1055-D/C'd       HYDROmorphone (DILAUDID) injection  Freq: As Needed  PRN Reason: Severe Pain   Start: 10/11/17 1024 End: 10/11/17 1024            1024       1024-D/C'd       ipratropium-albuterol (DUO-NEB) nebulizer solution 3 mL  Dose: 3 mL Freq: Once As Needed Route: NEBULIZATION  PRN Reasons: Wheezing,Shortness of Air  PRN Comment: bronchospasm  Start: 10/11/17 1003 End: 10/11/17 1055            1055-D/C'd       lidocaine (XYLOCAINE) 2% injection  Freq: As Needed Route: IJ  Start: 10/11/17 0751 End: 10/11/17 0954            0751       0954-D/C'd       meperidine (DEMEROL) injection 12.5 mg  Dose: 12.5 mg Freq: Every 5 Minutes PRN Route: IV  PRN Reason: Shivering  PRN Comment: May repeat x 1  Start: 10/11/17 1003 End: 10/11/17 1055    Admin Instructions:   If given for pain, use the following pain scale:  Mild Pain = Pain Score of 1-3, CPOT 1-2  Moderate Pain = Pain Score of 4-6, CPOT 3-4  Severe Pain = Pain Score of 7-10, CPOT 5-8            1055-D/C'd       midazolam (VERSED) injection  Freq: As Needed Route: IV  Start: 10/11/17 0751 End: 10/11/17 0954            0751       0954-D/C'd       morphine injection  Freq: As Needed  PRN Reason: Severe Pain   Start: 10/11/17 0814 End: 10/11/17 0954            0814       0857       0953       0954-D/C'd       Morphine injection 6 mg  Dose: 6 mg Freq: Every 2 Hours PRN Route: IV  PRN Reason: Severe Pain   Start: 10/11/17 1105 End: 10/21/17 1104    Admin Instructions:   If  given for pain, use the following pain scale:  Mild Pain = Pain Score of 1-3, CPOT 1-2  Moderate Pain = Pain Score of 4-6, CPOT 3-4  Severe Pain = Pain Score of 7-10, CPOT 5-8                And  naloxone (NARCAN) injection 0.4 mg  Dose: 0.4 mg Freq: Every 5 Minutes PRN Route: IV  PRN Reason: Respiratory Depression  Start: 10/11/17 1105    Admin Instructions:   If respiratory rate is less than 8 breaths/minute or patient is difficult to arouse stop any narcotics and contact physician.   Administer slow IV push. Repeat as ordered until patient's respiratory rate is greater than 12 breaths/minute.                ondansetron (ZOFRAN) injection 4 mg  Dose: 4 mg Freq: Once As Needed Route: IV  PRN Reasons: Nausea,Vomiting  PRN Comment: may repeat times one dose  Start: 10/11/17 1003 End: 10/11/17 1055    Admin Instructions:   If BOTH ondansetron (ZOFRAN) and promethazine (PHENERGAN) are ordered use ondansetron first and THEN promethazine IF ondansetron is ineffective.            1055-D/C'd       ondansetron (ZOFRAN) tablet 4 mg  Dose: 4 mg Freq: Every 6 Hours PRN Route: PO  PRN Reasons: Nausea,Vomiting  Start: 10/11/17 1105    Admin Instructions:   Give Zofran first. Give Phenergan if Zofran not effective. Then if Phenergan not effective, give metoclopramide.                Or  ondansetron ODT (ZOFRAN-ODT) disintegrating tablet 4 mg  Dose: 4 mg Freq: Every 6 Hours PRN Route: PO  PRN Reasons: Nausea,Vomiting  Start: 10/11/17 1105    Admin Instructions:   Give Zofran first. Give Phenergan if Zofran not effective. Then if Phenergan not effective, give metoclopramide.  Place on tongue and allow to dissolve.                Or  ondansetron (ZOFRAN) injection 4 mg  Dose: 4 mg Freq: Every 6 Hours PRN Route: IV  PRN Reasons: Nausea,Vomiting  Start: 10/11/17 1105    Admin Instructions:   Give Zofran first. Give Phenergan if Zofran not effective. Then if Phenergan not effective, give metoclopramide.                 oxyCODONE-acetaminophen (PERCOCET)  MG per tablet 1 tablet  Dose: 1 tablet Freq: Every 4 Hours PRN Route: PO  PRN Reason: Moderate Pain   Start: 10/11/17 1105 End: 10/21/17 1104    Admin Instructions:   Do not exceed 4 grams of acetaminophen in a 24 hr period.    If given for pain, use the following pain scale:   Mild Pain = Pain Score of 1-3, CPOT 1-2  Moderate Pain = Pain Score of 4-6, CPOT 3-4  Severe Pain = Pain Score of 7-10, CPOT 5-8            1137       1515       1848       2237        0221       0622       1011       1413          oxyCODONE-acetaminophen (PERCOCET) 5-325 MG per tablet 1 tablet  Dose: 1 tablet Freq: Once As Needed Route: PO  PRN Comment: pain  Start: 10/11/17 1003 End: 10/11/17 1055    Admin Instructions:   Do not exceed 4 grams of acetaminophen in a 24 hr period.    If given for pain, use the following pain scale:   Mild Pain = Pain Score of 1-3, CPOT 1-2  Moderate Pain = Pain Score of 4-6, CPOT 3-4  Severe Pain = Pain Score of 7-10, CPOT 5-8            1055-D/C'd       phenylephrine (CAROLE-SYNEPHRINE) injection  Freq: As Needed Route: IV  Start: 10/11/17 0835 End: 10/11/17 0954            0835       0954-D/C'd       Propofol (DIPRIVAN) injection  Freq: As Needed Route: IV  Start: 10/11/17 0756 End: 10/11/17 0954            0756       0954-D/C'd       simethicone (MYLICON) chewable tablet 80 mg  Dose: 80 mg Freq: 4 Times Daily PRN Route: PO  PRN Reason: Flatulence  Start: 10/12/17 0837                sodium chloride 0.9 % flush 1-10 mL  Dose: 1-10 mL Freq: As Needed Route: IV  PRN Reason: Line Care  Start: 10/11/17 0646 End: 10/11/17 0958            0958-D/C'd       sodium chloride 0.9 % solution  Freq: As Needed  Start: 10/11/17 0755 End: 10/11/17 0954            0755       0954-D/C'd       sterile water irrigation solution  Freq: As Needed  Start: 10/11/17 0755 End: 10/11/17 0954            0755       0954-D/C'd       vecuronium (NORCURON) injection  Freq: As Needed  Start:  10/11/17 0756 End: 10/11/17 0954            0756       0820       0835       0954-D/C'd       Medications 10/03 10/04 10/05 10/06 10/07 10/08 10/09 10/10 10/11 10/12           Consult Notes (last 24 hours) (Notes from 10/11/2017  4:44 PM through 10/12/2017  4:44 PM)     No notes of this type exist for this encounter.

## 2017-10-12 NOTE — DISCHARGE INSTR - DIET
No carbonated beverages.  Clear liquid diet advance slowly as tolerated.  ( see enclosed instructions )

## 2017-10-12 NOTE — DISCHARGE INSTR - ACTIVITY
No heavy lifting of more than 10 pounds until released by Dr. Suero.     May shower, otherwise keep incisions clean and dry.

## 2017-10-13 NOTE — PAYOR COMM NOTE
"[facility]  NPI:[id number]    Utilization Review  Contact: Randi Gardner RN  Phone: [phone]  Fax:[phone]    DISCHARGE NOTIFICATION  Discharged to home on 10/12/2017  REFERENCE# [id number]  Delroy Elder (32 y.o. Male)     Date of Birth Social Security Number Address Home Phone MRN    1985  [address] [place] [address] [phone] [id number]    [employer] Marital Status          None        Admission Date Admission Type Admitting Provider Attending Provider Department, Room/Bed    10/11/17 Elective Efra Suero MD  [address], [address]/    Discharge Date Discharge Disposition Discharge Destination        10/12/2017 Home or Self Care             Attending Provider: (none)    Allergies:  Erythromycin    Isolation:  None   Infection:  None   Code Status:  Prior    Ht:  71\" (180.3 cm)   Wt:  265 lb (120 kg)    Admission Cmt:  None   Principal Problem:  None                Active Insurance as of 10/11/2017     Primary Coverage     Payor Plan Insurance Group Employer/Plan Group    [employer] [id number]     Payor Plan Address Payor Plan Phone Number Effective From Effective To    [address] [phone] 1/1/2016     [place] [id number]       Subscriber Name Subscriber Birth Date Member ID       DELROY ELDER 1985 [id number]                 Emergency Contacts      (Rel.) Home Phone Work Phone Mobile Phone    Matthew Elderisha (Spouse) -- -- [phone]            Discharge Summary     No notes of this type exist for this encounter.        "

## 2017-10-17 NOTE — DISCHARGE SUMMARY
Date of Discharge:  10/17/2017    Discharge Diagnosis: severe gastroesophageal reflux disease and hiatal hernia, asthma    Presenting Problem/History of Present Illness  Hoarseness [R49.0]  Hernia, hiatal [K44.9]  Moderate persistent asthma without complication [J45.40]  Dysphagia, unspecified type [R13.10]  Hernia, hiatal [K44.9]       Hospital Course  Patient is a 31-year-old white male with diagnosis as listed above.  He underwent the procedures listed below.  He is maintained in the hospital overnight and discharged home the following day    Procedures Performed  Procedure(s):  NISSEN FUNDOPLICATION LAPAROSCOPIC  HIATAL HERNIA REPAIR LAPAROSCOPIC       Consults:  Consults     No orders found from 9/12/2017 to 10/12/2017.          Condition on Discharge:  Stable    Vital Signs       Physical Exam:     General Appearance:    Alert, cooperative, in no acute distress   Head:    Normocephalic, without obvious abnormality, atraumatic   Eyes:            Lids and lashes normal, conjunctivae and sclerae normal, no   icterus, no pallor, corneas clear, PERRLA   Ears:    Ears appear intact with no abnormalities noted   Throat:   No oral lesions, no thrush, oral mucosa moist   Neck:   No adenopathy, supple, trachea midline, no thyromegaly, no   carotid bruit, no JVD   Back:     No kyphosis present, no scoliosis present, no skin lesions,      erythema or scars, no tenderness to percussion or                   palpation,   range of motion normal   Lungs:     Clear to auscultation,respirations regular, even and                  unlabored    Heart:    Regular rhythm and normal rate, normal S1 and S2, no            murmur, no gallop, no rub, no click   Chest Wall:    No abnormalities observed   Abdomen:   Appropriately tender for this stage postop.  Wounds look good   Rectal:     Deferred   Extremities:   Moves all extremities well, no edema, no cyanosis, no             redness   Pulses:   Pulses palpable and equal bilaterally    Skin:   No bleeding, bruising or rash   Lymph nodes:   No palpable adenopathy   Neurologic:   Cranial nerves 2 - 12 grossly intact, sensation intact, DTR       present and equal bilaterally         Discharge DispositionHome or Self Care    Discharge Medications   Nghia Roach   Home Medication Instructions [id number]    Printed on:10/17/17 0919   Medication Information                      albuterol (PROVENTIL HFA;VENTOLIN HFA) 108 (90 BASE) MCG/ACT inhaler  Inhale 2 puffs Every 4 (Four) Hours As Needed for Shortness of Air.             allopurinol (ZYLOPRIM) 100 MG tablet  Take 1 tablet by mouth 2 (Two) Times a Day.             amitriptyline (ELAVIL) 10 MG tablet  Take 1 tablet by mouth Every Night.             amLODIPine (NORVASC) 10 MG tablet  Take 1 tablet by mouth Daily.             atorvastatin (LIPITOR) 10 MG tablet  Take 1 tablet by mouth Daily.             budesonide-formoterol (SYMBICORT) 160-4.5 MCG/ACT inhaler  Inhale 2 puffs 2 (Two) Times a Day.             CIALIS 5 MG tablet  Take 5 mg by mouth Every Night.             dicyclomine (BENTYL) 10 MG capsule  Take 1 capsule by mouth 4 (Four) Times a Day Before Meals & at Bedtime.             fluticasone (FLONASE) 50 MCG/ACT nasal spray  2 sprays into each nostril Daily. Administer 2 sprays in each nostril for each dose.             ipratropium-albuterol (COMBIVENT RESPIMAT)  MCG/ACT inhaler  Inhale 1 puff 4 (Four) Times a Day As Needed for Wheezing. 1-2 PUFFS EVERY 4 HOURS AS NEEDED             loratadine (CLARITIN) 10 MG tablet  Take 10 mg by mouth Every Night.             montelukast (SINGULAIR) 10 MG tablet  Take 1 tablet by mouth Every Night.             oxyCODONE-acetaminophen (PERCOCET)  MG per tablet  Take 1 tablet by mouth Every 4 (Four) Hours As Needed for Pain             raNITIdine (ZANTAC) 300 MG tablet  Take 300 mg by mouth Every Night.             Testosterone Cypionate (DEPO-TESTOSTERONE) 200 MG/ML injection  He is to use  1/2 cc every Monday and Thursday SQ             topiramate (TOPAMAX) 50 MG tablet  Take 1 tablet by mouth Daily.             valsartan-hydrochlorothiazide (DIOVAN HCT) 160-12.5 MG per tablet  Take 1 tablet by mouth Daily.                 Discharge Disposition  Patient is discharged home.  He is to have a soft diet, stable condition, prognosis is good.     Efra Suero MD  10/17/17  9:19 AM            Dragon disclaimer:  Much of this encounter note is an electronic transcription/translation of spoken language to printed text. The electronic translation of spoken language may permit erroneous, or at times, nonsensical words or phrases to be inadvertently transcribed; Although I have reviewed the note for such errors, some may still exist.

## 2017-10-18 ENCOUNTER — OFFICE VISIT (OUTPATIENT)
Dept: FAMILY MEDICINE CLINIC | Facility: CLINIC | Age: 32
End: 2017-10-18

## 2017-10-18 VITALS
HEIGHT: 71 IN | BODY MASS INDEX: 36.82 KG/M2 | HEART RATE: 82 BPM | SYSTOLIC BLOOD PRESSURE: 140 MMHG | OXYGEN SATURATION: 97 % | TEMPERATURE: 98.2 F | WEIGHT: 263 LBS | DIASTOLIC BLOOD PRESSURE: 80 MMHG

## 2017-10-18 DIAGNOSIS — K44.9 HERNIA, HIATAL: ICD-10-CM

## 2017-10-18 DIAGNOSIS — M79.7 FIBROMYALGIA: ICD-10-CM

## 2017-10-18 DIAGNOSIS — R79.89 LOW TESTOSTERONE: ICD-10-CM

## 2017-10-18 DIAGNOSIS — K21.00 GASTROESOPHAGEAL REFLUX DISEASE WITH ESOPHAGITIS: ICD-10-CM

## 2017-10-18 DIAGNOSIS — I10 ESSENTIAL HYPERTENSION: ICD-10-CM

## 2017-10-18 DIAGNOSIS — J45.40 MODERATE PERSISTENT ASTHMA WITHOUT COMPLICATION: ICD-10-CM

## 2017-10-18 DIAGNOSIS — M1A.09X0 IDIOPATHIC CHRONIC GOUT OF MULTIPLE SITES WITHOUT TOPHUS: ICD-10-CM

## 2017-10-18 DIAGNOSIS — N52.02 CORPORO-VENOUS OCCLUSIVE ERECTILE DYSFUNCTION: ICD-10-CM

## 2017-10-18 DIAGNOSIS — M15.9 PRIMARY OSTEOARTHRITIS INVOLVING MULTIPLE JOINTS: ICD-10-CM

## 2017-10-18 DIAGNOSIS — E78.2 MIXED HYPERLIPIDEMIA: ICD-10-CM

## 2017-10-18 DIAGNOSIS — B37.0 ORAL CANDIDIASIS: Primary | ICD-10-CM

## 2017-10-18 PROCEDURE — 99214 OFFICE O/P EST MOD 30 MIN: CPT | Performed by: NURSE PRACTITIONER

## 2017-10-18 RX ORDER — FLUCONAZOLE 150 MG/1
150 TABLET ORAL ONCE
Qty: 1 TABLET | Refills: 0 | Status: SHIPPED | OUTPATIENT
Start: 2017-10-18 | End: 2017-10-18

## 2017-10-18 NOTE — PROGRESS NOTES
Subjective   Nghia Roach is a 32 y.o. male.     Chief Complaint   Patient presents with   • hospital follow up       History of Present Illness   Hospital follow up Following recent GI surgery for hiatal hernia repair and severe gastroesophageal reflux disease.    Hiatal hernia-repaired 10/11/2017 by Dr. Efra Suero at [facility].  Patient reports that he continues to have some mild heartburn.  He does report that he is able to get soft foods in easier than prior to surgery.  He has a follow-up scheduled with his surgeon tomorrow.    Moderate persistent asthma without constipation-patient reports at that his asthma symptoms have been mildly exacerbated with the onset of fall allergy season.  He is using his inhalers and avoiding known triggers.     Dysphagia - improved with recent surgery.    Frequent diarrhea-not tolerating food well after surgery.  Feels as if food either sets in his stomach or goes straight through him causing almost instant diarrhea.  Patient does have an appointment tomorrow to follow-up with his surgeon.  He is on soft foods and no spots at this time.    Patient is complaining of mouth pain and tenderness as well as patches of white areas on his tongue.      History is significant for hyperlipidemia, hypertension, asthma, acid reflux, low testosterone, frequent tension headaches, chronic joint pain, fibromyalgia and erectile dysfunction.                 The following portions of the patient's history were reviewed and updated as appropriate: allergies, current medications, past family history, past medical history, past social history, past surgical history and problem list.    Review of Systems   Constitutional: Positive for activity change, appetite change and fatigue. Negative for chills, fever and unexpected weight change.   HENT: Positive for mouth sores. Negative for trouble swallowing.    Eyes: Negative for visual disturbance.   Respiratory: Positive for cough. Negative for chest  "tightness, shortness of breath and wheezing.    Cardiovascular: Negative for chest pain, palpitations and leg swelling.   Gastrointestinal: Positive for diarrhea and nausea. Negative for abdominal pain and blood in stool.   Endocrine: Negative.    Genitourinary: Negative for dysuria and flank pain.   Musculoskeletal: Positive for arthralgias and back pain.   Allergic/Immunologic: Positive for environmental allergies.   Neurological: Negative.    Hematological: Negative.    Psychiatric/Behavioral: Negative for dysphoric mood, self-injury and suicidal ideas. The patient is not nervous/anxious.    All other systems reviewed and are negative.      Objective     /80 (BP Location: Left arm, Patient Position: Sitting, Cuff Size: Adult)  Pulse 82  Temp 98.2 °F (36.8 °C) (Tympanic)   Ht 71\" (180.3 cm)  Wt 263 lb (119 kg)  SpO2 97%  BMI 36.68 kg/m2  Office Visit on 10/09/2017   Component Date Value Ref Range Status   • Color 10/09/2017 Yellow  Yellow, Straw, Dark Yellow, Natasha Final   • Clarity, UA 10/09/2017 Clear  Clear Final   • Glucose, UA 10/09/2017 Negative  Negative, 1000 mg/dL (3+) mg/dL Final   • Bilirubin 10/09/2017 Small (1+)* Negative Final   • Ketones, UA 10/09/2017 Trace* Negative Final   • Specific Gravity  10/09/2017 1.030  1.005 - 1.030 Final   • Blood, UA 10/09/2017 Negative  Negative Final   • pH, Urine 10/09/2017 5.5  5.0 - 8.0 Final   • Protein, POC 10/09/2017 Negative  Negative mg/dL Final   • Urobilinogen, UA 10/09/2017 Normal  Normal Final   • Leukocytes 10/09/2017 Negative  Negative Final   • Nitrite, UA 10/09/2017 Negative  Negative Final       Physical Exam   Constitutional: He is oriented to person, place, and time. He appears well-developed and well-nourished. No distress.   HENT:   Head: Normocephalic.   Right Ear: External ear normal.   Left Ear: External ear normal.   Nose: Nose normal.   Mouth/Throat: Oral lesions (Scattered patches of elevated white patches on tongue and oral " mucosa) present. Posterior oropharyngeal erythema present. No oropharyngeal exudate or posterior oropharyngeal edema.   Eyes: Pupils are equal, round, and reactive to light.   Neck: Normal range of motion. Neck supple. No tracheal deviation present. No thyromegaly present.   Cardiovascular: Normal rate, regular rhythm and normal heart sounds.  Exam reveals no gallop and no friction rub.    No murmur heard.  Pulmonary/Chest: Effort normal and breath sounds normal. No respiratory distress. He has no wheezes. He has no rales. He exhibits no tenderness.   Abdominal: Soft. Bowel sounds are normal. He exhibits no distension and no mass. There is no tenderness. There is no rebound and no guarding.   Musculoskeletal: Normal range of motion.        Lumbar back: He exhibits tenderness and spasm.   Neurological: He is alert and oriented to person, place, and time. He has normal reflexes.   CN 2-12 grossly intact    Skin: Skin is warm and dry. No rash noted. He is not diaphoretic. No erythema.   Psychiatric: He has a normal mood and affect. His speech is normal and behavior is normal. Judgment and thought content normal. Cognition and memory are normal.   Vitals reviewed.      Assessment/Plan     Problem List Items Addressed This Visit        Cardiovascular and Mediastinum    Hyperlipidemia    Hypertension       Respiratory    Hernia, hiatal    Moderate persistent asthma without complication    Overview     Added automatically from request for surgery [id number]            Digestive    Gastroesophageal reflux disease with esophagitis    Overview     Added automatically from request for surgery [id number]            Endocrine    Low testosterone       Musculoskeletal and Integument    Osteoarthrosis    Chronic gout of multiple sites       Genitourinary    Corporo-venous occlusive erectile dysfunction       Other    Fibromyalgia      Other Visit Diagnoses     Oral candidiasis    -  Primary    Relevant Medications    fluconazole  (DIFLUCAN) 150 MG tablet          Current Outpatient Prescriptions:   •  albuterol (PROVENTIL HFA;VENTOLIN HFA) 108 (90 BASE) MCG/ACT inhaler, Inhale 2 puffs Every 4 (Four) Hours As Needed for Shortness of Air., Disp: 1 inhaler, Rfl: 5  •  allopurinol (ZYLOPRIM) 100 MG tablet, Take 1 tablet by mouth 2 (Two) Times a Day., Disp: 60 tablet, Rfl: 5  •  amitriptyline (ELAVIL) 10 MG tablet, Take 1 tablet by mouth Every Night., Disp: 30 tablet, Rfl: 5  •  amLODIPine (NORVASC) 10 MG tablet, Take 1 tablet by mouth Daily. (Patient taking differently: Take 10 mg by mouth Every Night.), Disp: 30 tablet, Rfl: 5  •  atorvastatin (LIPITOR) 10 MG tablet, Take 1 tablet by mouth Daily. (Patient taking differently: Take 10 mg by mouth Every Night.), Disp: 30 tablet, Rfl: 5  •  budesonide-formoterol (SYMBICORT) 160-4.5 MCG/ACT inhaler, Inhale 2 puffs 2 (Two) Times a Day., Disp: 1 inhaler, Rfl: 5  •  CIALIS 5 MG tablet, Take 5 mg by mouth Every Night., Disp: , Rfl: 5  •  dicyclomine (BENTYL) 10 MG capsule, Take 1 capsule by mouth 4 (Four) Times a Day Before Meals & at Bedtime. (Patient taking differently: Take 10 mg by mouth 2 (Two) Times a Day.), Disp: 120 capsule, Rfl: 5  •  fluticasone (FLONASE) 50 MCG/ACT nasal spray, 2 sprays into each nostril Daily. Administer 2 sprays in each nostril for each dose., Disp: 1 each, Rfl: 1  •  ipratropium-albuterol (COMBIVENT RESPIMAT)  MCG/ACT inhaler, Inhale 1 puff 4 (Four) Times a Day As Needed for Wheezing. 1-2 PUFFS EVERY 4 HOURS AS NEEDED (Patient taking differently: Inhale 1 puff 4 (Four) Times a Day As Needed for Wheezing.), Disp: 4 g, Rfl: 11  •  loratadine (CLARITIN) 10 MG tablet, Take 10 mg by mouth Every Night., Disp: , Rfl:   •  montelukast (SINGULAIR) 10 MG tablet, Take 1 tablet by mouth Every Night., Disp: 30 tablet, Rfl: 5  •  oxyCODONE-acetaminophen (PERCOCET)  MG per tablet, Take 1 tablet by mouth Every 4 (Four) Hours As Needed for Pain, Disp: 20 tablet, Rfl: 0  •   raNITIdine (ZANTAC) 300 MG tablet, Take 300 mg by mouth Every Night., Disp: , Rfl:   •  Testosterone Cypionate (DEPO-TESTOSTERONE) 200 MG/ML injection, He is to use 1/2 cc every Monday and Thursday SQ (Patient taking differently: Inject 100 mg under the skin 2 (Two) Times a Week. He is to use 1/2 cc every Monday and Thursday SQ), Disp: 10 mL, Rfl: 2  •  topiramate (TOPAMAX) 50 MG tablet, Take 1 tablet by mouth Daily. (Patient taking differently: Take 50 mg by mouth Every Night.), Disp: 30 tablet, Rfl: 5  •  valsartan-hydrochlorothiazide (DIOVAN HCT) 160-12.5 MG per tablet, Take 1 tablet by mouth Daily. (Patient taking differently: Take 1 tablet by mouth Every Night.), Disp: 30 tablet, Rfl: 5  •  fluconazole (DIFLUCAN) 150 MG tablet, Take 1 tablet by mouth 1 (One) Time for 1 dose., Disp: 1 tablet, Rfl: 0  •  magic mouthwash with nystatin, Swish and spit 10 mL Every 4 (Four) Hours As Needed (thrush)., Disp: 195 mL, Rfl: 1    I have discussed diagnosis in detail today allowing time for questions and answers. Pt is aware of reasons to seek urgent or emergent medical care as well as reasons to return to the clinic for evaluation. Possible side effects, interactions and progression of symptoms discussed as well. Pt / family states understanding.   Diflucan 150 mg 1 by mouth ×1 dose  Start Magic mouthwash: Nystatin swish and swallow 2 ounces, lidocaine viscous 2 ounces, liquid Benadryl 2 ounces, Mylanta 2 ounces.  15 mL by mouth swish and swallow every 6-8 hours when necessary dispense 8 ounces.  Medication regimen has been reviewed and discussed.  Postop precautions have been reviewed and discussed.  Remain on soft, non-spicy or acidic diet.  Keep follow-up with Dr. Suero tomorrow.  Recommend plain yogurt daily.  Hospital records have been reviewed and discussed.  Follow-up 2-4 weeks, sooner if needed.           This document has been electronically signed by:  CORDELIA Flores, NP-C

## 2017-10-19 ENCOUNTER — OFFICE VISIT (OUTPATIENT)
Dept: SURGERY | Facility: CLINIC | Age: 32
End: 2017-10-19

## 2017-10-19 VITALS
SYSTOLIC BLOOD PRESSURE: 139 MMHG | HEIGHT: 71 IN | DIASTOLIC BLOOD PRESSURE: 81 MMHG | WEIGHT: 259 LBS | RESPIRATION RATE: 18 BRPM | HEART RATE: 63 BPM | BODY MASS INDEX: 36.26 KG/M2 | TEMPERATURE: 98.4 F

## 2017-10-19 DIAGNOSIS — R49.0 HOARSENESS: ICD-10-CM

## 2017-10-19 DIAGNOSIS — R13.10 DYSPHAGIA, UNSPECIFIED TYPE: ICD-10-CM

## 2017-10-19 DIAGNOSIS — K44.9 HERNIA, HIATAL: Primary | ICD-10-CM

## 2017-10-19 PROBLEM — Z98.890 POST-OPERATIVE STATE: Status: ACTIVE | Noted: 2017-10-19

## 2017-10-19 PROCEDURE — 99024 POSTOP FOLLOW-UP VISIT: CPT | Performed by: SURGERY

## 2017-10-19 NOTE — PROGRESS NOTES
10/19/2017    Patient Information  Nghia Roach  [address] [facility] [place] [address]  1985  [phone] (home)     Chief Complaint   Patient presents with   • Post-op Follow-up     Post Lap Nissen       HPI  Patient is a 32-year-old white male one week status post laparoscopic Nissen fundoplication.  He was discharged home on postop day #1.  He is having some bloating and occasional dysphagia but overall he is happy with results as he is having no more reflux    Review of Systems:  The Review of Systems has been reviewed and confirmed.    General: weight goes up and down  Integumentary: rash  Eyes: eyesight problems, glasses, red eyes, eyes itch, discharge from eyes  ENT: earache, recurrent sore throat and hoarseness  Respiratory: shortness of breath, chronic cough and wheezing  Gastrointestinal: nausea/vomiting, heartburn, diarrhea, constipation, change in stool and abdominal pain  Cardiovascular: chest pain and rapid heart rate  Neurological: dizziness  Psychiatric: insomnia and mood swings  Hematologic/Lymphatic: swollen glands  Genitourinary: frequent urination  Musculoskeletal: painful joints, sore muscles, back pain and joint stiffness  Endocrine: negative  Breasts: negative    Patient Active Problem List   Diagnosis   • Osteoarthrosis   • Fibromyalgia   • Tension headache, chronic   • Hyperlipidemia   • Asthma   • Acid reflux   • Hoarseness   • Difficulty swallowing   • Corporo-venous occlusive erectile dysfunction   • Gastroesophageal reflux disease with esophagitis   • Dysphagia   • Hernia, hiatal   • Back ache   • Chronic gout of multiple sites   • Hypertension   • Moderate persistent asthma without complication   • Low testosterone   • Post-operative state         Past Medical History:   Diagnosis Date   • Allergic rhinitis    • Asthma    • Atrial flutter    • Diarrhea    • Disturbance of skin sensation    • GERD (gastroesophageal reflux disease)    • Gout    • History of MRI of spine 12/16/2014     Done at [facility]    • Hyperlipidemia    • Hypertension    • Kidney disease     stage 2   • Lymphadenitis    • Malaise and fatigue    • Osteoarthrosis    • Rash    • Renal disorder          Past Surgical History:   Procedure Laterality Date   • BRAVO PROCEDURE N/A 8/4/2017    Procedure: ESOPHAGOGASTRODUODENOSCOPY AND FULLER;  Surgeon: Efra Suero MD;  Location:  [facility];  Service:    • COLONOSCOPY  2014   • NISSEN FUNDOPLICATION     • NISSEN FUNDOPLICATION N/A 10/11/2017    Procedure: NISSEN FUNDOPLICATION LAPAROSCOPIC;  Surgeon: Efra Suero MD;  Location:  [facility];  Service:    • ND LAP,ESOPHAGOGAST FUNDOPLASTY N/A 10/11/2017    Procedure: HIATAL HERNIA REPAIR LAPAROSCOPIC;  Surgeon: Efra Suero MD;  Location:  [facility];  Service: General   • VASECTOMY           Family History   Problem Relation Age of Onset   • Asthma Other    • Cancer Other    • COPD Other    • Diabetes Other    • Stroke Other    • Asthma Mother    • COPD Mother    • Asthma Father    • Depression Father    • Drug abuse Father    • Arthritis Maternal Grandmother    • Cancer Maternal Grandmother    • Diabetes Maternal Grandmother    • Heart disease Maternal Grandmother    • Hyperlipidemia Maternal Grandmother    • Stroke Paternal Grandfather          Social History   Substance Use Topics   • Smoking status: Never Smoker   • Smokeless tobacco: Never Used   • Alcohol use No       Current Outpatient Prescriptions   Medication Sig Dispense Refill   • albuterol (PROVENTIL HFA;VENTOLIN HFA) 108 (90 BASE) MCG/ACT inhaler Inhale 2 puffs Every 4 (Four) Hours As Needed for Shortness of Air. 1 inhaler 5   • allopurinol (ZYLOPRIM) 100 MG tablet Take 1 tablet by mouth 2 (Two) Times a Day. 60 tablet 5   • amitriptyline (ELAVIL) 10 MG tablet Take 1 tablet by mouth Every Night. 30 tablet 5   • amLODIPine (NORVASC) 10 MG tablet Take 1 tablet by mouth Daily. (Patient taking differently: Take 10 mg by mouth Every Night.) 30 tablet 5   •  atorvastatin (LIPITOR) 10 MG tablet Take 1 tablet by mouth Daily. (Patient taking differently: Take 10 mg by mouth Every Night.) 30 tablet 5   • budesonide-formoterol (SYMBICORT) 160-4.5 MCG/ACT inhaler Inhale 2 puffs 2 (Two) Times a Day. 1 inhaler 5   • CIALIS 5 MG tablet Take 5 mg by mouth Every Night.  5   • dicyclomine (BENTYL) 10 MG capsule Take 1 capsule by mouth 4 (Four) Times a Day Before Meals & at Bedtime. (Patient taking differently: Take 10 mg by mouth 2 (Two) Times a Day.) 120 capsule 5   • fluticasone (FLONASE) 50 MCG/ACT nasal spray 2 sprays into each nostril Daily. Administer 2 sprays in each nostril for each dose. 1 each 1   • ipratropium-albuterol (COMBIVENT RESPIMAT)  MCG/ACT inhaler Inhale 1 puff 4 (Four) Times a Day As Needed for Wheezing. 1-2 PUFFS EVERY 4 HOURS AS NEEDED (Patient taking differently: Inhale 1 puff 4 (Four) Times a Day As Needed for Wheezing.) 4 g 11   • loratadine (CLARITIN) 10 MG tablet Take 10 mg by mouth Every Night.     • magic mouthwash with nystatin Swish and spit 10 mL Every 4 (Four) Hours As Needed (thrush). 195 mL 1   • montelukast (SINGULAIR) 10 MG tablet Take 1 tablet by mouth Every Night. 30 tablet 5   • oxyCODONE-acetaminophen (PERCOCET)  MG per tablet Take 1 tablet by mouth Every 4 (Four) Hours As Needed for Pain 20 tablet 0   • raNITIdine (ZANTAC) 300 MG tablet Take 300 mg by mouth Every Night.     • Testosterone Cypionate (DEPO-TESTOSTERONE) 200 MG/ML injection He is to use 1/2 cc every Monday and Thursday SQ (Patient taking differently: Inject 100 mg under the skin 2 (Two) Times a Week. He is to use 1/2 cc every Monday and Thursday SQ) 10 mL 2   • topiramate (TOPAMAX) 50 MG tablet Take 1 tablet by mouth Daily. (Patient taking differently: Take 50 mg by mouth Every Night.) 30 tablet 5   • valsartan-hydrochlorothiazide (DIOVAN HCT) 160-12.5 MG per tablet Take 1 tablet by mouth Daily. (Patient taking differently: Take 1 tablet by mouth Every Night.) 30  "tablet 5     No current facility-administered medications for this visit.          Allergies  Erythromycin      Physical Exam  32-year-old white male in no distress    Wounds look excellent    /81  Pulse 63  Temp 98.4 °F (36.9 °C)  Resp 18  Ht 71\" (180.3 cm)  Wt 259 lb (117 kg)  BMI 36.12 kg/m2        ASSESSMENT  Status post Nissen fundoplication doing well        PLAN    Return in 1 month.  Stop all PPIs.  Okay to return to work.        Efra Suero MD    "

## 2017-10-31 RX ORDER — AMLODIPINE BESYLATE 10 MG/1
TABLET ORAL
Qty: 30 TABLET | Refills: 5 | Status: SHIPPED | OUTPATIENT
Start: 2017-10-31 | End: 2017-11-28 | Stop reason: SDUPTHER

## 2017-10-31 RX ORDER — ALLOPURINOL 100 MG/1
TABLET ORAL
Qty: 60 TABLET | Refills: 5 | Status: SHIPPED | OUTPATIENT
Start: 2017-10-31 | End: 2017-11-28 | Stop reason: SDUPTHER

## 2017-10-31 RX ORDER — AMITRIPTYLINE HYDROCHLORIDE 10 MG/1
TABLET, FILM COATED ORAL
Qty: 30 TABLET | Refills: 5 | Status: SHIPPED | OUTPATIENT
Start: 2017-10-31 | End: 2017-11-28 | Stop reason: SDUPTHER

## 2017-11-21 ENCOUNTER — LAB (OUTPATIENT)
Dept: FAMILY MEDICINE CLINIC | Facility: CLINIC | Age: 32
End: 2017-11-21

## 2017-11-21 DIAGNOSIS — M1A.09X0 CHRONIC GOUT OF MULTIPLE SITES, UNSPECIFIED CAUSE: ICD-10-CM

## 2017-11-21 DIAGNOSIS — N52.02 CORPORO-VENOUS OCCLUSIVE ERECTILE DYSFUNCTION: ICD-10-CM

## 2017-11-21 DIAGNOSIS — Z13.9 SCREENING: ICD-10-CM

## 2017-11-21 DIAGNOSIS — I10 ESSENTIAL HYPERTENSION: ICD-10-CM

## 2017-11-21 LAB
25(OH)D3 SERPL-MCNC: 25 NG/ML
ALBUMIN SERPL-MCNC: 4.6 G/DL (ref 3.5–5)
ALBUMIN UR-MCNC: 8.5 MG/L
ALBUMIN/GLOB SERPL: 1.8 G/DL (ref 1.5–2.5)
ALP SERPL-CCNC: 74 U/L (ref 40–129)
ALT SERPL W P-5'-P-CCNC: 32 U/L (ref 10–44)
ANION GAP SERPL CALCULATED.3IONS-SCNC: 6 MMOL/L (ref 3.6–11.2)
AST SERPL-CCNC: 26 U/L (ref 10–34)
BASOPHILS # BLD AUTO: 0.02 10*3/MM3 (ref 0–0.3)
BASOPHILS NFR BLD AUTO: 0.4 % (ref 0–2)
BILIRUB SERPL-MCNC: 1.7 MG/DL (ref 0.2–1.8)
BUN BLD-MCNC: 12 MG/DL (ref 7–21)
BUN/CREAT SERPL: 10.1 (ref 7–25)
CALCIUM SPEC-SCNC: 9.4 MG/DL (ref 7.7–10)
CHLORIDE SERPL-SCNC: 105 MMOL/L (ref 99–112)
CHOLEST SERPL-MCNC: 129 MG/DL (ref 0–200)
CO2 SERPL-SCNC: 30 MMOL/L (ref 24.3–31.9)
CREAT BLD-MCNC: 1.19 MG/DL (ref 0.43–1.29)
DEPRECATED RDW RBC AUTO: 39.6 FL (ref 37–54)
EOSINOPHIL # BLD AUTO: 0.33 10*3/MM3 (ref 0–0.7)
EOSINOPHIL NFR BLD AUTO: 5.8 % (ref 0–5)
ERYTHROCYTE [DISTWIDTH] IN BLOOD BY AUTOMATED COUNT: 13.1 % (ref 11.5–14.5)
GFR SERPL CREATININE-BSD FRML MDRD: 70 ML/MIN/1.73
GLOBULIN UR ELPH-MCNC: 2.5 GM/DL
GLUCOSE BLD-MCNC: 90 MG/DL (ref 70–110)
HBA1C MFR BLD: 5.1 % (ref 4.5–5.7)
HCT VFR BLD AUTO: 49.4 % (ref 42–52)
HDLC SERPL-MCNC: 37 MG/DL (ref 60–100)
HGB BLD-MCNC: 17.6 G/DL (ref 14–18)
IMM GRANULOCYTES # BLD: 0.01 10*3/MM3 (ref 0–0.03)
IMM GRANULOCYTES NFR BLD: 0.2 % (ref 0–0.5)
LDLC SERPL CALC-MCNC: 64 MG/DL (ref 0–100)
LDLC/HDLC SERPL: 1.72 {RATIO}
LYMPHOCYTES # BLD AUTO: 1.32 10*3/MM3 (ref 1–3)
LYMPHOCYTES NFR BLD AUTO: 23.2 % (ref 21–51)
MCH RBC QN AUTO: 29.9 PG (ref 27–33)
MCHC RBC AUTO-ENTMCNC: 35.6 G/DL (ref 33–37)
MCV RBC AUTO: 84 FL (ref 80–94)
MONOCYTES # BLD AUTO: 0.48 10*3/MM3 (ref 0.1–0.9)
MONOCYTES NFR BLD AUTO: 8.4 % (ref 0–10)
NEUTROPHILS # BLD AUTO: 3.53 10*3/MM3 (ref 1.4–6.5)
NEUTROPHILS NFR BLD AUTO: 62 % (ref 30–70)
OSMOLALITY SERPL CALC.SUM OF ELEC: 280.5 MOSM/KG (ref 273–305)
PLATELET # BLD AUTO: 215 10*3/MM3 (ref 130–400)
PMV BLD AUTO: 12.2 FL (ref 6–10)
POTASSIUM BLD-SCNC: 4.3 MMOL/L (ref 3.5–5.3)
PROT SERPL-MCNC: 7.1 G/DL (ref 6–8)
RBC # BLD AUTO: 5.88 10*6/MM3 (ref 4.7–6.1)
SODIUM BLD-SCNC: 141 MMOL/L (ref 135–153)
TRIGL SERPL-MCNC: 142 MG/DL (ref 0–150)
TSH SERPL DL<=0.05 MIU/L-ACNC: 1.42 MIU/ML (ref 0.55–4.78)
URATE SERPL-MCNC: 6.3 MG/DL (ref 3.7–7)
VIT B12 BLD-MCNC: 502 PG/ML (ref 211–911)
VLDLC SERPL-MCNC: 28.4 MG/DL
WBC NRBC COR # BLD: 5.69 10*3/MM3 (ref 4.5–12.5)

## 2017-11-21 PROCEDURE — 80053 COMPREHEN METABOLIC PANEL: CPT | Performed by: NURSE PRACTITIONER

## 2017-11-21 PROCEDURE — 84550 ASSAY OF BLOOD/URIC ACID: CPT | Performed by: NURSE PRACTITIONER

## 2017-11-21 PROCEDURE — 82607 VITAMIN B-12: CPT | Performed by: NURSE PRACTITIONER

## 2017-11-21 PROCEDURE — 36415 COLL VENOUS BLD VENIPUNCTURE: CPT

## 2017-11-21 PROCEDURE — 84443 ASSAY THYROID STIM HORMONE: CPT | Performed by: NURSE PRACTITIONER

## 2017-11-21 PROCEDURE — 84154 ASSAY OF PSA FREE: CPT | Performed by: NURSE PRACTITIONER

## 2017-11-21 PROCEDURE — 85025 COMPLETE CBC W/AUTO DIFF WBC: CPT | Performed by: NURSE PRACTITIONER

## 2017-11-21 PROCEDURE — 82306 VITAMIN D 25 HYDROXY: CPT | Performed by: NURSE PRACTITIONER

## 2017-11-21 PROCEDURE — 82043 UR ALBUMIN QUANTITATIVE: CPT | Performed by: NURSE PRACTITIONER

## 2017-11-21 PROCEDURE — 80061 LIPID PANEL: CPT | Performed by: NURSE PRACTITIONER

## 2017-11-21 PROCEDURE — 84153 ASSAY OF PSA TOTAL: CPT | Performed by: NURSE PRACTITIONER

## 2017-11-21 PROCEDURE — 83036 HEMOGLOBIN GLYCOSYLATED A1C: CPT | Performed by: NURSE PRACTITIONER

## 2017-11-22 LAB
PSA FREE MFR SERPL: 27.5 %
PSA FREE SERPL-MCNC: 0.11 NG/ML
PSA SERPL-MCNC: 0.4 NG/ML (ref 0–4)

## 2017-11-28 ENCOUNTER — OFFICE VISIT (OUTPATIENT)
Dept: FAMILY MEDICINE CLINIC | Facility: CLINIC | Age: 32
End: 2017-11-28

## 2017-11-28 VITALS
BODY MASS INDEX: 35.42 KG/M2 | TEMPERATURE: 97.7 F | WEIGHT: 253 LBS | OXYGEN SATURATION: 95 % | SYSTOLIC BLOOD PRESSURE: 130 MMHG | HEIGHT: 71 IN | HEART RATE: 74 BPM | DIASTOLIC BLOOD PRESSURE: 80 MMHG

## 2017-11-28 DIAGNOSIS — M15.9 PRIMARY OSTEOARTHRITIS INVOLVING MULTIPLE JOINTS: Primary | ICD-10-CM

## 2017-11-28 DIAGNOSIS — J06.9 ACUTE URI: ICD-10-CM

## 2017-11-28 DIAGNOSIS — M1A.09X0 IDIOPATHIC CHRONIC GOUT OF MULTIPLE SITES WITHOUT TOPHUS: ICD-10-CM

## 2017-11-28 DIAGNOSIS — G44.221 CHRONIC TENSION-TYPE HEADACHE, INTRACTABLE: ICD-10-CM

## 2017-11-28 DIAGNOSIS — M79.7 FIBROMYALGIA: ICD-10-CM

## 2017-11-28 PROCEDURE — 99214 OFFICE O/P EST MOD 30 MIN: CPT | Performed by: NURSE PRACTITIONER

## 2017-11-28 RX ORDER — ALLOPURINOL 100 MG/1
100 TABLET ORAL 2 TIMES DAILY
Qty: 60 TABLET | Refills: 5 | Status: SHIPPED | OUTPATIENT
Start: 2017-11-28 | End: 2018-07-14 | Stop reason: SDUPTHER

## 2017-11-28 RX ORDER — VALSARTAN AND HYDROCHLOROTHIAZIDE 160; 12.5 MG/1; MG/1
1 TABLET, FILM COATED ORAL DAILY
Qty: 30 TABLET | Refills: 5 | Status: SHIPPED | OUTPATIENT
Start: 2017-11-28 | End: 2018-02-22 | Stop reason: SDUPTHER

## 2017-11-28 RX ORDER — FLUTICASONE PROPIONATE 50 MCG
2 SPRAY, SUSPENSION (ML) NASAL DAILY
Start: 2017-11-28 | End: 2018-02-22 | Stop reason: SDUPTHER

## 2017-11-28 RX ORDER — MONTELUKAST SODIUM 10 MG/1
10 TABLET ORAL NIGHTLY
Qty: 30 TABLET | Refills: 5 | Status: SHIPPED | OUTPATIENT
Start: 2017-11-28 | End: 2018-02-22 | Stop reason: SDUPTHER

## 2017-11-28 RX ORDER — ATORVASTATIN CALCIUM 10 MG/1
10 TABLET, FILM COATED ORAL NIGHTLY
Qty: 30 TABLET | Refills: 5 | Status: SHIPPED | OUTPATIENT
Start: 2017-11-28 | End: 2018-02-22 | Stop reason: SDUPTHER

## 2017-11-28 RX ORDER — TOPIRAMATE 50 MG/1
50 TABLET, FILM COATED ORAL NIGHTLY
Qty: 30 TABLET | Refills: 5 | Status: SHIPPED | OUTPATIENT
Start: 2017-11-28 | End: 2018-02-22 | Stop reason: SDUPTHER

## 2017-11-28 RX ORDER — RANITIDINE 150 MG/1
150 TABLET ORAL 2 TIMES DAILY
Qty: 60 TABLET | Refills: 5 | Status: SHIPPED | OUTPATIENT
Start: 2017-11-28 | End: 2018-02-22 | Stop reason: ALTCHOICE

## 2017-11-28 RX ORDER — ALBUTEROL SULFATE 90 UG/1
2 AEROSOL, METERED RESPIRATORY (INHALATION) EVERY 4 HOURS PRN
Qty: 1 INHALER | Refills: 5 | Status: SHIPPED | OUTPATIENT
Start: 2017-11-28 | End: 2018-02-22 | Stop reason: SDUPTHER

## 2017-11-28 RX ORDER — AMITRIPTYLINE HYDROCHLORIDE 25 MG/1
25 TABLET, FILM COATED ORAL NIGHTLY PRN
Qty: 30 TABLET | Refills: 5 | Status: SHIPPED | OUTPATIENT
Start: 2017-11-28 | End: 2018-02-22 | Stop reason: SDUPTHER

## 2017-11-28 RX ORDER — LORATADINE 10 MG/1
10 TABLET ORAL NIGHTLY
Qty: 30 TABLET | Refills: 5 | Status: SHIPPED | OUTPATIENT
Start: 2017-11-28 | End: 2018-02-22 | Stop reason: SDUPTHER

## 2017-11-28 RX ORDER — BUDESONIDE AND FORMOTEROL FUMARATE DIHYDRATE 160; 4.5 UG/1; UG/1
2 AEROSOL RESPIRATORY (INHALATION)
Qty: 1 INHALER | Refills: 5 | Status: SHIPPED | OUTPATIENT
Start: 2017-11-28 | End: 2018-02-22 | Stop reason: SDUPTHER

## 2017-11-28 RX ORDER — AMLODIPINE BESYLATE 10 MG/1
10 TABLET ORAL DAILY
Qty: 30 TABLET | Refills: 5 | Status: SHIPPED | OUTPATIENT
Start: 2017-11-28 | End: 2018-02-22 | Stop reason: SDUPTHER

## 2018-01-02 ENCOUNTER — OFFICE VISIT (OUTPATIENT)
Dept: SURGERY | Facility: CLINIC | Age: 33
End: 2018-01-02

## 2018-01-02 VITALS
TEMPERATURE: 98 F | HEIGHT: 71 IN | HEART RATE: 80 BPM | BODY MASS INDEX: 36.12 KG/M2 | SYSTOLIC BLOOD PRESSURE: 127 MMHG | DIASTOLIC BLOOD PRESSURE: 80 MMHG | WEIGHT: 258 LBS

## 2018-01-02 DIAGNOSIS — R13.10 DYSPHAGIA, UNSPECIFIED TYPE: ICD-10-CM

## 2018-01-02 DIAGNOSIS — K44.9 HERNIA, HIATAL: Primary | ICD-10-CM

## 2018-01-02 DIAGNOSIS — K21.00 GASTROESOPHAGEAL REFLUX DISEASE WITH ESOPHAGITIS: ICD-10-CM

## 2018-01-02 DIAGNOSIS — R49.0 HOARSENESS: ICD-10-CM

## 2018-01-02 PROCEDURE — 99024 POSTOP FOLLOW-UP VISIT: CPT | Performed by: SURGERY

## 2018-01-02 NOTE — PROGRESS NOTES
1/2/2018    Patient Information  Nghia Roach  [address] [facility] [place] [address]  1985  [phone] (home)     Chief Complaint   Patient presents with   • Follow-up     1 month fu Lap Nissen       HPI  Patient is a 31-year-old white male is now 5 weeks status post laparoscopic Nissen fundoplication and repair of hiatal hernia.  He says he is doing great.  No more reflux.  No more sleeping setting up.  No dysphagia.  He says he is very happy    Review of Systems:  The Review of Systems has been reviewed and confirmed.    General: weight goes up and down  Integumentary: rash  Eyes: eyesight problems, glasses, red eyes, eyes itch, discharge from eyes  ENT: earache, recurrent sore throat and hoarseness  Respiratory: shortness of breath, chronic cough and wheezing  Gastrointestinal: nausea/vomiting, heartburn, diarrhea, constipation, change in stool and abdominal pain  Cardiovascular: chest pain and rapid heart rate  Neurological: dizziness  Psychiatric: insomnia and mood swings  Hematologic/Lymphatic: swollen glands  Genitourinary: frequent urination  Musculoskeletal: painful joints, sore muscles, back pain and joint stiffness  Endocrine: negative  Breasts: negative    Patient Active Problem List   Diagnosis   • Osteoarthrosis   • Fibromyalgia   • Tension headache, chronic   • Hyperlipidemia   • Asthma   • Acid reflux   • Hoarseness   • Difficulty swallowing   • Corporo-venous occlusive erectile dysfunction   • Gastroesophageal reflux disease with esophagitis   • Dysphagia   • Hernia, hiatal   • Back ache   • Chronic gout of multiple sites   • Hypertension   • Moderate persistent asthma without complication   • Low testosterone   • Post-operative state         Past Medical History:   Diagnosis Date   • Allergic rhinitis    • Asthma    • Atrial flutter    • Diarrhea    • Disturbance of skin sensation    • GERD (gastroesophageal reflux disease)    • Gout    • History of MRI of spine 12/16/2014    Done at [facility]    • Hyperlipidemia    • Hypertension    • Kidney disease     stage 2   • Lymphadenitis    • Malaise and fatigue    • Osteoarthrosis    • Rash    • Renal disorder          Past Surgical History:   Procedure Laterality Date   • BRAVO PROCEDURE N/A 8/4/2017    Procedure: ESOPHAGOGASTRODUODENOSCOPY AND FULLER;  Surgeon: Efra Suero MD;  Location:  [facility];  Service:    • COLONOSCOPY  2014   • NISSEN FUNDOPLICATION     • NISSEN FUNDOPLICATION N/A 10/11/2017    Procedure: NISSEN FUNDOPLICATION LAPAROSCOPIC;  Surgeon: Efra Suero MD;  [facility];  Service:    • NE LAP,ESOPHAGOGAST FUNDOPLASTY N/A 10/11/2017    Procedure: HIATAL HERNIA REPAIR LAPAROSCOPIC;  Surgeon: Efra Suero MD;  Location:  [facility];  Service: General   • VASECTOMY           Family History   Problem Relation Age of Onset   • Asthma Other    • Cancer Other    • COPD Other    • Diabetes Other    • Stroke Other    • Asthma Mother    • COPD Mother    • Asthma Father    • Depression Father    • Drug abuse Father    • Arthritis Maternal Grandmother    • Cancer Maternal Grandmother    • Diabetes Maternal Grandmother    • Heart disease Maternal Grandmother    • Hyperlipidemia Maternal Grandmother    • Stroke Paternal Grandfather          Social History   Substance Use Topics   • Smoking status: Never Smoker   • Smokeless tobacco: Never Used   • Alcohol use No       Current Outpatient Prescriptions   Medication Sig Dispense Refill   • albuterol (PROVENTIL HFA;VENTOLIN HFA) 108 (90 Base) MCG/ACT inhaler Inhale 2 puffs Every 4 (Four) Hours As Needed for Shortness of Air. 1 inhaler 5   • allopurinol (ZYLOPRIM) 100 MG tablet Take 1 tablet by mouth 2 (Two) Times a Day. 60 tablet 5   • amitriptyline (ELAVIL) 25 MG tablet Take 1 tablet by mouth At Night As Needed for Sleep. 30 tablet 5   • amLODIPine (NORVASC) 10 MG tablet Take 1 tablet by mouth Daily. 30 tablet 5   • atorvastatin (LIPITOR) 10 MG tablet Take 1 tablet by mouth Every Night. 30  "tablet 5   • budesonide-formoterol (SYMBICORT) 160-4.5 MCG/ACT inhaler Inhale 2 puffs 2 (Two) Times a Day. 1 inhaler 5   • CIALIS 5 MG tablet Take 5 mg by mouth Every Night.  5   • fluticasone (FLONASE) 50 MCG/ACT nasal spray 2 sprays into each nostril Daily. Administer 2 sprays in each nostril for each dose.     • ipratropium-albuterol (COMBIVENT RESPIMAT)  MCG/ACT inhaler Inhale 1 puff 4 (Four) Times a Day As Needed for Wheezing. 1-2 PUFFS EVERY 4 HOURS AS NEEDED 4 g 11   • loratadine (CLARITIN) 10 MG tablet Take 1 tablet by mouth Every Night. 30 tablet 5   • montelukast (SINGULAIR) 10 MG tablet Take 1 tablet by mouth Every Night. 30 tablet 5   • raNITIdine (ZANTAC) 150 MG tablet Take 1 tablet by mouth 2 (Two) Times a Day. 60 tablet 5   • Testosterone Cypionate (DEPO-TESTOSTERONE) 200 MG/ML injection He is to use 1/2 cc every Monday and Thursday SQ (Patient taking differently: Inject 100 mg under the skin 2 (Two) Times a Week. He is to use 1/2 cc every Monday and Thursday SQ) 10 mL 2   • topiramate (TOPAMAX) 50 MG tablet Take 1 tablet by mouth Every Night. 30 tablet 5   • valsartan-hydrochlorothiazide (DIOVAN HCT) 160-12.5 MG per tablet Take 1 tablet by mouth Daily. 30 tablet 5     No current facility-administered medications for this visit.          Allergies  Erythromycin    /80  Pulse 80  Temp 98 °F (36.7 °C)  Ht 180.3 cm (71\")  Wt 117 kg (258 lb)  BMI 35.98 kg/m2     Physical Exam  Gen. 32-year-old white male stress  Abdomen soft            ASSESSMENT  Status post laparoscopic Nissen with hiatal hernia repair.  Doing well.        PLAN    Return when necessary        Efra Suero MD    "

## 2018-02-19 RX ORDER — PANTOPRAZOLE SODIUM 40 MG/1
TABLET, DELAYED RELEASE ORAL
Qty: 30 TABLET | Refills: 5 | Status: SHIPPED | OUTPATIENT
Start: 2018-02-19 | End: 2018-02-22 | Stop reason: ALTCHOICE

## 2018-02-20 RX ORDER — AMITRIPTYLINE HYDROCHLORIDE 10 MG/1
TABLET, FILM COATED ORAL
Qty: 30 TABLET | Refills: 0 | Status: SHIPPED | OUTPATIENT
Start: 2018-02-20 | End: 2018-02-22

## 2018-02-22 ENCOUNTER — OFFICE VISIT (OUTPATIENT)
Dept: FAMILY MEDICINE CLINIC | Facility: CLINIC | Age: 33
End: 2018-02-22

## 2018-02-22 VITALS
DIASTOLIC BLOOD PRESSURE: 80 MMHG | SYSTOLIC BLOOD PRESSURE: 140 MMHG | OXYGEN SATURATION: 98 % | WEIGHT: 255 LBS | TEMPERATURE: 98 F | BODY MASS INDEX: 35.7 KG/M2 | HEART RATE: 79 BPM | HEIGHT: 71 IN

## 2018-02-22 DIAGNOSIS — Z79.899 HIGH RISK MEDICATION USE: ICD-10-CM

## 2018-02-22 DIAGNOSIS — G44.221 CHRONIC TENSION-TYPE HEADACHE, INTRACTABLE: ICD-10-CM

## 2018-02-22 DIAGNOSIS — M15.9 PRIMARY OSTEOARTHRITIS INVOLVING MULTIPLE JOINTS: ICD-10-CM

## 2018-02-22 DIAGNOSIS — K58.2 IRRITABLE BOWEL SYNDROME WITH BOTH CONSTIPATION AND DIARRHEA: Primary | ICD-10-CM

## 2018-02-22 DIAGNOSIS — J06.9 ACUTE URI: ICD-10-CM

## 2018-02-22 PROCEDURE — 99214 OFFICE O/P EST MOD 30 MIN: CPT | Performed by: NURSE PRACTITIONER

## 2018-02-22 PROCEDURE — 96372 THER/PROPH/DIAG INJ SC/IM: CPT | Performed by: NURSE PRACTITIONER

## 2018-02-22 RX ORDER — HYDROCODONE BITARTRATE AND ACETAMINOPHEN 7.5; 325 MG/1; MG/1
1 TABLET ORAL EVERY 6 HOURS PRN
Qty: 20 TABLET | Refills: 0 | Status: SHIPPED | OUTPATIENT
Start: 2018-02-22 | End: 2018-03-07 | Stop reason: SDUPTHER

## 2018-02-22 RX ORDER — FLUTICASONE PROPIONATE 50 MCG
2 SPRAY, SUSPENSION (ML) NASAL DAILY
Start: 2018-02-22 | End: 2018-03-07 | Stop reason: SDUPTHER

## 2018-02-22 RX ORDER — AMLODIPINE BESYLATE 10 MG/1
10 TABLET ORAL DAILY
Qty: 30 TABLET | Refills: 5 | Status: ON HOLD | OUTPATIENT
Start: 2018-02-22 | End: 2018-12-19

## 2018-02-22 RX ORDER — LORATADINE 10 MG/1
10 TABLET ORAL NIGHTLY
Qty: 30 TABLET | Refills: 5 | Status: SHIPPED | OUTPATIENT
Start: 2018-02-22 | End: 2019-05-28

## 2018-02-22 RX ORDER — METHYLPREDNISOLONE ACETATE 80 MG/ML
80 INJECTION, SUSPENSION INTRA-ARTICULAR; INTRALESIONAL; INTRAMUSCULAR; SOFT TISSUE ONCE
Status: COMPLETED | OUTPATIENT
Start: 2018-02-22 | End: 2018-02-22

## 2018-02-22 RX ORDER — ATORVASTATIN CALCIUM 10 MG/1
10 TABLET, FILM COATED ORAL NIGHTLY
Qty: 30 TABLET | Refills: 5 | Status: SHIPPED | OUTPATIENT
Start: 2018-02-22 | End: 2018-05-17

## 2018-02-22 RX ORDER — VALSARTAN AND HYDROCHLOROTHIAZIDE 160; 12.5 MG/1; MG/1
1 TABLET, FILM COATED ORAL DAILY
Qty: 30 TABLET | Refills: 5 | Status: SHIPPED | OUTPATIENT
Start: 2018-02-22 | End: 2018-09-07 | Stop reason: SDUPTHER

## 2018-02-22 RX ORDER — BUDESONIDE AND FORMOTEROL FUMARATE DIHYDRATE 160; 4.5 UG/1; UG/1
2 AEROSOL RESPIRATORY (INHALATION)
Qty: 1 INHALER | Refills: 5 | Status: SHIPPED | OUTPATIENT
Start: 2018-02-22 | End: 2019-07-10 | Stop reason: SDUPTHER

## 2018-02-22 RX ORDER — AMITRIPTYLINE HYDROCHLORIDE 25 MG/1
25 TABLET, FILM COATED ORAL NIGHTLY PRN
Qty: 30 TABLET | Refills: 5 | Status: SHIPPED | OUTPATIENT
Start: 2018-02-22 | End: 2018-06-06 | Stop reason: SDUPTHER

## 2018-02-22 RX ORDER — ALBUTEROL SULFATE 90 UG/1
2 AEROSOL, METERED RESPIRATORY (INHALATION) EVERY 4 HOURS PRN
Qty: 1 INHALER | Refills: 5 | Status: SHIPPED | OUTPATIENT
Start: 2018-02-22 | End: 2018-12-27 | Stop reason: SDUPTHER

## 2018-02-22 RX ORDER — MONTELUKAST SODIUM 10 MG/1
10 TABLET ORAL NIGHTLY
Qty: 30 TABLET | Refills: 5 | Status: SHIPPED | OUTPATIENT
Start: 2018-02-22 | End: 2018-09-07 | Stop reason: SDUPTHER

## 2018-02-22 RX ORDER — TOPIRAMATE 50 MG/1
50 TABLET, FILM COATED ORAL NIGHTLY
Qty: 30 TABLET | Refills: 5 | Status: SHIPPED | OUTPATIENT
Start: 2018-02-22 | End: 2018-05-17

## 2018-02-22 RX ADMIN — METHYLPREDNISOLONE ACETATE 80 MG: 80 INJECTION, SUSPENSION INTRA-ARTICULAR; INTRALESIONAL; INTRAMUSCULAR; SOFT TISSUE at 09:42

## 2018-02-22 NOTE — PROGRESS NOTES
Subjective   Nghia Roach is a 32 y.o. male.     Chief Complaint   Patient presents with   • Back Pain   • Abdominal Pain       History of Present Illness     Nissen repair last year - heartburn resolved.     IBS symptoms - frequent constipation and diarrhea. Wants a new GI provider. Patient reports a recent exacerbation of IBS symptoms.  He was receiving Bentyl which was not helpful with his symptoms.    oa- acute exacerbation of symptoms. Request steroid injection.  Patient has been seen by pain clinic in the past.  He is not currently receiving any pain medication.  He is working 50 hour work weeks and having an exacerbation of low back pain.  Low back pain is described as sharp and aching with radiation into both hips and legs.  He does experience some numbness in his lower extremities at times.  Patient denies history of substance abuse or addiction.  Rates this pain as 8 on pain scale rating of 0-10.  He does not tolerate anti-inflammatory medication due to stomach irritation.  Over-the-counter Tylenol has not been helpful.  Rest and heat has not been helpful.  Pain is interfering with his ability to perform his work duties and home duties as well.    Asthma-some mild exacerbation of the past few weeks.      The following portions of the patient's history were reviewed and updated as appropriate: allergies, current medications, past family history, past medical history, past social history, past surgical history and problem list.    Review of Systems   Constitutional: Negative.    HENT: Negative.    Eyes: Positive for pain, redness and itching.   Respiratory: Positive for cough, shortness of breath and wheezing.    Cardiovascular: Negative.    Gastrointestinal: Positive for abdominal pain, constipation, diarrhea, nausea and vomiting.   Endocrine: Negative.    Genitourinary: Negative.    Musculoskeletal: Positive for back pain, neck pain and neck stiffness.   Skin: Negative.    Allergic/Immunologic: Positive for  "environmental allergies.   Neurological: Positive for numbness.   Hematological: Negative.    Psychiatric/Behavioral: Positive for sleep disturbance. Negative for dysphoric mood, self-injury and suicidal ideas.       Objective     /80  Pulse 79  Temp 98 °F (36.7 °C) (Tympanic)   Ht 180.3 cm (71\")  Wt 116 kg (255 lb)  SpO2 98%  BMI 35.57 kg/m2  Lab on 11/21/2017   Component Date Value Ref Range Status   • Glucose 11/21/2017 90  70 - 110 mg/dL Final   • BUN 11/21/2017 12  7 - 21 mg/dL Final   • Creatinine 11/21/2017 1.19  0.43 - 1.29 mg/dL Final   • Sodium 11/21/2017 141  135 - 153 mmol/L Final   • Potassium 11/21/2017 4.3  3.5 - 5.3 mmol/L Final   • Chloride 11/21/2017 105  99 - 112 mmol/L Final   • CO2 11/21/2017 30.0  24.3 - 31.9 mmol/L Final   • Calcium 11/21/2017 9.4  7.7 - 10.0 mg/dL Final   • Total Protein 11/21/2017 7.1  6.0 - 8.0 g/dL Final   • Albumin 11/21/2017 4.60  3.50 - 5.00 g/dL Final   • ALT (SGPT) 11/21/2017 32  10 - 44 U/L Final   • AST (SGOT) 11/21/2017 26  10 - 34 U/L Final   • Alkaline Phosphatase 11/21/2017 74  40 - 129 U/L Final   • Total Bilirubin 11/21/2017 1.7  0.2 - 1.8 mg/dL Final   • eGFR Non African Amer 11/21/2017 70  >60 mL/min/1.73 Final   • Globulin 11/21/2017 2.5  gm/dL Final   • A/G Ratio 11/21/2017 1.8  1.5 - 2.5 g/dL Final   • BUN/Creatinine Ratio 11/21/2017 10.1  7.0 - 25.0 Final   • Anion Gap 11/21/2017 6.0  3.6 - 11.2 mmol/L Final   • TSH 11/21/2017 1.423  0.550 - 4.780 mIU/mL Final   • Hemoglobin A1C 11/21/2017 5.10  4.50 - 5.70 % Final   • 25 Hydroxy, Vitamin D 11/21/2017 25.0  ng/ml Final   • Uric Acid 11/21/2017 6.3  3.7 - 7.0 mg/dL Final   • Microalbumin, Urine 11/21/2017 8.5  mg/L Final   • Total Cholesterol 11/21/2017 129  0 - 200 mg/dL Final   • Triglycerides 11/21/2017 142  0 - 150 mg/dL Final   • HDL Cholesterol 11/21/2017 37* 60 - 100 mg/dL Final   • LDL Cholesterol  11/21/2017 64  0 - 100 mg/dL Final   • VLDL Cholesterol 11/21/2017 28.4  mg/dL Final   • " LDL/HDL Ratio 11/21/2017 1.72   Final   • PSA 11/21/2017 0.4  0.0 - 4.0 ng/mL Final   • PSA, Free 11/21/2017 0.11  N/A ng/mL Final   • PSA, Free % 11/21/2017 27.5  % Final   • Vitamin B-12 11/21/2017 502  211 - 911 pg/mL Final   • WBC 11/21/2017 5.69  4.50 - 12.50 10*3/mm3 Final   • RBC 11/21/2017 5.88  4.70 - 6.10 10*6/mm3 Final   • Hemoglobin 11/21/2017 17.6  14.0 - 18.0 g/dL Final   • Hematocrit 11/21/2017 49.4  42.0 - 52.0 % Final   • MCV 11/21/2017 84.0  80.0 - 94.0 fL Final   • MCH 11/21/2017 29.9  27.0 - 33.0 pg Final   • MCHC 11/21/2017 35.6  33.0 - 37.0 g/dL Final   • RDW 11/21/2017 13.1  11.5 - 14.5 % Final   • RDW-SD 11/21/2017 39.6  37.0 - 54.0 fl Final   • MPV 11/21/2017 12.2* 6.0 - 10.0 fL Final   • Platelets 11/21/2017 215  130 - 400 10*3/mm3 Final   • Neutrophil % 11/21/2017 62.0  30.0 - 70.0 % Final   • Lymphocyte % 11/21/2017 23.2  21.0 - 51.0 % Final   • Monocyte % 11/21/2017 8.4  0.0 - 10.0 % Final   • Eosinophil % 11/21/2017 5.8* 0.0 - 5.0 % Final   • Basophil % 11/21/2017 0.4  0.0 - 2.0 % Final   • Immature Grans % 11/21/2017 0.2  0.0 - 0.5 % Final   • Neutrophils, Absolute 11/21/2017 3.53  1.40 - 6.50 10*3/mm3 Final   • Lymphocytes, Absolute 11/21/2017 1.32  1.00 - 3.00 10*3/mm3 Final   • Monocytes, Absolute 11/21/2017 0.48  0.10 - 0.90 10*3/mm3 Final   • Eosinophils, Absolute 11/21/2017 0.33  0.00 - 0.70 10*3/mm3 Final   • Basophils, Absolute 11/21/2017 0.02  0.00 - 0.30 10*3/mm3 Final   • Immature Grans, Absolute 11/21/2017 0.01  0.00 - 0.03 10*3/mm3 Final   • Osmolality Calc 11/21/2017 280.5  273.0 - 305.0 mOsm/kg Final       Physical Exam   Constitutional: He is oriented to person, place, and time. He appears well-developed and well-nourished. No distress.   HENT:   Head: Normocephalic and atraumatic.   Right Ear: External ear normal.   Left Ear: External ear normal.   Nose: Nose normal.   Mouth/Throat: Oropharynx is clear and moist. No oropharyngeal exudate.   Eyes: EOM are normal. Pupils  are equal, round, and reactive to light.   Neck: Normal range of motion. Neck supple. No tracheal deviation present. No thyromegaly present.   Cardiovascular: Normal rate, regular rhythm, normal heart sounds and intact distal pulses.  Exam reveals no gallop and no friction rub.    No murmur heard.  Pulmonary/Chest: Effort normal and breath sounds normal. No respiratory distress. He has no wheezes. He has no rales. He exhibits no tenderness.   Abdominal: Soft. Normal appearance. He exhibits no distension and no mass. Bowel sounds are increased. There is no tenderness. There is no rebound and no guarding.   Musculoskeletal: Normal range of motion.        Lumbar back: He exhibits tenderness, pain and spasm.   Decreased lumbar curvature, there is pain with forward flexion. DTR's +2. No edema.    Neurological: He is alert and oriented to person, place, and time. He has normal reflexes.   CN 2-12 grossly intact    Skin: Skin is warm and dry. No rash noted. He is not diaphoretic. No erythema. No pallor.   Psychiatric: He has a normal mood and affect. His speech is normal and behavior is normal. Judgment and thought content normal. His affect is not inappropriate. Cognition and memory are normal.   Nursing note and vitals reviewed.      Assessment/Plan     Problem List Items Addressed This Visit        Digestive    Irritable bowel syndrome with both constipation and diarrhea - Primary    Relevant Medications    Eluxadoline 100 MG tablet    Other Relevant Orders    Ambulatory Referral to Gastroenterology (Completed)       Musculoskeletal and Integument    Osteoarthrosis    Relevant Medications    methylPREDNISolone acetate (DEPO-medrol) injection 80 mg (Completed)    HYDROcodone-acetaminophen (NORCO) 7.5-325 MG per tablet      Other Visit Diagnoses     High risk medication use        Relevant Orders    Urine Drug Screen - Urine, Clean Catch            Start Arnold, reviewed possible side effects. Refer to GI for  evaluation.  Proper body mechanics has been reviewed and discussed today.  Imaging on file in Epic has been reviewed and discussed with patient.  I will provide him with Norco 7.5 mg 1 by mouth every 4-6 hours when necessary #20 with no refill for his acute exacerbation of pain.  Depo-Medrol injection today.  Refill routine medication.  We'll request a Jurgen and UDS today.  Goal: Improved quality of life and reduction in pain as evidenced by pt report.   I have discussed diagnosis in detail today allowing time for questions and answers. Pt is aware of reasons to seek urgent or emergent medical care as well as reasons to return to the clinic for evaluation. Possible side effects, interactions and progression of symptoms discussed as well. Pt / family states understanding.   Follow up in 4 weeks, sooner if needed.            This document has been electronically signed by:  CORDELIA Flores, NP-C

## 2018-02-28 ENCOUNTER — TELEPHONE (OUTPATIENT)
Dept: FAMILY MEDICINE CLINIC | Facility: CLINIC | Age: 33
End: 2018-02-28

## 2018-02-28 RX ORDER — DICYCLOMINE HCL 20 MG
40 TABLET ORAL 4 TIMES DAILY
Qty: 120 TABLET | Refills: 5 | Status: SHIPPED | OUTPATIENT
Start: 2018-02-28 | End: 2019-12-03

## 2018-02-28 NOTE — TELEPHONE ENCOUNTER
----- Message from CORDELIA Alexandre sent at 2/28/2018  8:03 AM EST -----  Call in bentyl 40 mg po qid # 120 five refills.       ----- Message -----     From: Sylvia Sanchez MA     Sent: 2/27/2018   2:53 PM       To: CORDELIA Alexandre wanted to know if you would call her in the bentyl medication. He stated the other one was to expensive on his insurance.

## 2018-03-07 ENCOUNTER — OFFICE VISIT (OUTPATIENT)
Dept: FAMILY MEDICINE CLINIC | Facility: CLINIC | Age: 33
End: 2018-03-07

## 2018-03-07 VITALS
TEMPERATURE: 97.7 F | DIASTOLIC BLOOD PRESSURE: 98 MMHG | HEART RATE: 84 BPM | OXYGEN SATURATION: 98 % | BODY MASS INDEX: 35.28 KG/M2 | HEIGHT: 71 IN | SYSTOLIC BLOOD PRESSURE: 152 MMHG | WEIGHT: 252 LBS

## 2018-03-07 DIAGNOSIS — J45.901 MODERATE ASTHMA WITH EXACERBATION, UNSPECIFIED WHETHER PERSISTENT: Primary | ICD-10-CM

## 2018-03-07 DIAGNOSIS — M15.9 PRIMARY OSTEOARTHRITIS INVOLVING MULTIPLE JOINTS: ICD-10-CM

## 2018-03-07 DIAGNOSIS — I10 ESSENTIAL HYPERTENSION: ICD-10-CM

## 2018-03-07 PROCEDURE — 94640 AIRWAY INHALATION TREATMENT: CPT | Performed by: NURSE PRACTITIONER

## 2018-03-07 PROCEDURE — 99214 OFFICE O/P EST MOD 30 MIN: CPT | Performed by: NURSE PRACTITIONER

## 2018-03-07 PROCEDURE — 96372 THER/PROPH/DIAG INJ SC/IM: CPT | Performed by: NURSE PRACTITIONER

## 2018-03-07 RX ORDER — CLONIDINE HYDROCHLORIDE 0.1 MG/1
0.1 TABLET ORAL 2 TIMES DAILY PRN
Qty: 60 TABLET | Refills: 2 | Status: SHIPPED | OUTPATIENT
Start: 2018-03-07 | End: 2018-06-06 | Stop reason: SDUPTHER

## 2018-03-07 RX ORDER — GUAIFENESIN AND CODEINE PHOSPHATE 100; 10 MG/5ML; MG/5ML
5 SOLUTION ORAL 3 TIMES DAILY PRN
Qty: 236 ML | Refills: 0 | Status: SHIPPED | OUTPATIENT
Start: 2018-03-07 | End: 2018-03-22

## 2018-03-07 RX ORDER — IPRATROPIUM BROMIDE AND ALBUTEROL SULFATE 2.5; .5 MG/3ML; MG/3ML
3 SOLUTION RESPIRATORY (INHALATION)
Status: DISCONTINUED | OUTPATIENT
Start: 2018-03-07 | End: 2018-06-11

## 2018-03-07 RX ORDER — CEFTRIAXONE 1 G/1
1 INJECTION, POWDER, FOR SOLUTION INTRAMUSCULAR; INTRAVENOUS ONCE
Status: COMPLETED | OUTPATIENT
Start: 2018-03-07 | End: 2018-03-07

## 2018-03-07 RX ORDER — FLUTICASONE PROPIONATE 50 MCG
2 SPRAY, SUSPENSION (ML) NASAL DAILY
Qty: 1 BOTTLE | Refills: 2
Start: 2018-03-07 | End: 2019-07-10 | Stop reason: SDUPTHER

## 2018-03-07 RX ORDER — PREDNISONE 20 MG/1
20 TABLET ORAL 2 TIMES DAILY
Qty: 10 TABLET | Refills: 0 | Status: SHIPPED | OUTPATIENT
Start: 2018-03-07 | End: 2018-04-10

## 2018-03-07 RX ORDER — ECHINACEA PURPUREA EXTRACT 125 MG
1 TABLET ORAL AS NEEDED
Qty: 1 EACH | Refills: 3 | Status: SHIPPED | OUTPATIENT
Start: 2018-03-07 | End: 2020-01-30

## 2018-03-07 RX ORDER — HYDROCODONE BITARTRATE AND ACETAMINOPHEN 7.5; 325 MG/1; MG/1
1 TABLET ORAL EVERY 6 HOURS PRN
Qty: 20 TABLET | Refills: 0 | Status: SHIPPED | OUTPATIENT
Start: 2018-03-07 | End: 2018-03-22 | Stop reason: SDUPTHER

## 2018-03-07 RX ORDER — CLONIDINE HYDROCHLORIDE 0.1 MG/1
0.1 TABLET ORAL EVERY 12 HOURS SCHEDULED
Status: DISCONTINUED | OUTPATIENT
Start: 2018-03-07 | End: 2018-04-23

## 2018-03-07 RX ADMIN — CEFTRIAXONE 1 G: 1 INJECTION, POWDER, FOR SOLUTION INTRAMUSCULAR; INTRAVENOUS at 16:44

## 2018-03-07 NOTE — PROGRESS NOTES
Subjective   Nghia Roach is a 32 y.o. male.     Chief Complaint   Patient presents with   • Cough   • Shortness of Breath   • URI       History of Present Illness     Asthma with exacerbation - cough and shortness of breath x 3 days.   Taking all his asthma and allergy medication.    Nasal congestion - taking flonase , Having difficulty breathing through his nose.    Uncontrolled blood pressure - pt states he has nasal congestion and fatigue. Has taken his Diovan and Norvasc today.     Osteoarthritis in multiple locations.  Chronic condition.-Low back pain rated 8 on pain scale rating of 0-10.  His low back pain is described as sharp and aching with radiation into both hips and legs.  At times he has some numbness in both lower extremities.  Patient does not tolerate antiplatelet or medication well due to stomach irritation.  He does report that he received some relief with Norco which was provided at last visit.  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  He has been seen by neurology and Ortho for consult in the past.  He has degenerative disc disease in his spine.  Radiology is on file.  Patient denies any history of substance abuse or addiction.  He is requesting a refill of Norco today to help decrease his pain.    The following portions of the patient's history were reviewed and updated as appropriate: allergies, current medications, past family history, past medical history, past social history, past surgical history and problem list.    Review of Systems   Constitutional: Negative for appetite change, fatigue and unexpected weight change.   HENT: Positive for congestion, sinus pain and sinus pressure. Negative for ear pain, nosebleeds, postnasal drip, rhinorrhea, sore throat, trouble swallowing and voice change.    Eyes: Negative for pain and visual disturbance.   Respiratory: Positive for cough, shortness of breath and wheezing.    Cardiovascular: Negative for chest pain and palpitations.  "  Gastrointestinal: Negative for abdominal pain, blood in stool, constipation and diarrhea.   Endocrine: Negative for cold intolerance and polydipsia.   Genitourinary: Negative for difficulty urinating, flank pain and hematuria.   Musculoskeletal: Positive for arthralgias, back pain and neck pain. Negative for gait problem, joint swelling, myalgias and neck stiffness.   Skin: Negative for color change and rash.   Allergic/Immunologic: Negative.    Neurological: Negative for syncope, numbness and headaches.   Hematological: Negative.    Psychiatric/Behavioral: Positive for sleep disturbance (due to acute illness ). Negative for suicidal ideas.   All other systems reviewed and are negative.      Objective     /98  Pulse 84  Temp 97.7 °F (36.5 °C) (Temporal Artery )   Ht 180.3 cm (71\")  Wt 114 kg (252 lb)  SpO2 98%  BMI 35.15 kg/m2      Physical Exam   Constitutional: He is oriented to person, place, and time. He appears well-developed and well-nourished. No distress.   HENT:   Head: Atraumatic.   Right Ear: Tympanic membrane, external ear and ear canal normal.   Left Ear: Tympanic membrane, external ear and ear canal normal.   Nose: Mucosal edema present. No epistaxis.  No foreign bodies. Right sinus exhibits maxillary sinus tenderness. Right sinus exhibits no frontal sinus tenderness. Left sinus exhibits maxillary sinus tenderness. Left sinus exhibits no frontal sinus tenderness.   Mouth/Throat: Mucous membranes are normal. Oropharyngeal exudate and posterior oropharyngeal erythema present. No posterior oropharyngeal edema.   Eyes: Pupils are equal, round, and reactive to light.   Cardiovascular: Normal rate, regular rhythm and normal heart sounds.    Elevated blood pressure, given clonidine 0.1 mg , tolerated well with good blood pressure response 20 minutes post administration bp 140/80 with heart rate 80.    Pulmonary/Chest: No respiratory distress. He has no decreased breath sounds. He has wheezes in " the right middle field and the left upper field. He has no rhonchi. He has no rales.   Wheezes resolved following DuoNeb treatment.  Patient reports improvement and decrease tightness in his chest following DuoNeb treatment.   Abdominal: Soft. Normal appearance and bowel sounds are normal. He exhibits no distension. There is no tenderness.   Musculoskeletal:        Lumbar back: He exhibits tenderness and spasm.   Lymphadenopathy:     He has cervical adenopathy.   Neurological: He is alert and oriented to person, place, and time. He displays normal reflexes.   Skin: Skin is warm and dry. No rash noted. He is not diaphoretic. No erythema.   Psychiatric: He has a normal mood and affect. His speech is normal and behavior is normal. Thought content normal. Cognition and memory are normal.   Vitals reviewed.      Assessment/Plan     Problem List Items Addressed This Visit        Cardiovascular and Mediastinum    Hypertension    Relevant Medications    CloNIDine (CATAPRES) tablet 0.1 mg (Start on 3/7/2018  9:00 PM)    CloNIDine (CATAPRES) 0.1 MG tablet       Musculoskeletal and Integument    Osteoarthrosis    Relevant Medications    HYDROcodone-acetaminophen (NORCO) 7.5-325 MG per tablet      Other Visit Diagnoses     Moderate asthma with exacerbation, unspecified whether persistent    -  Primary    Relevant Medications    ipratropium-albuterol (DUO-NEB) nebulizer solution 3 mL    guaifenesin-codeine (GUAIFENESIN AC) 100-10 MG/5ML liquid    predniSONE (DELTASONE) 20 MG tablet    cefTRIAXone (ROCEPHIN) injection 1 g (Completed) (Start on 3/7/2018  5:15 PM)          Rocephin 1 g IM today, tolerated well.  Good blood pressure response following Clonidine on 0.1 mg in office today.  I provided him with a prescription for clonidine which can be used twice daily as needed with blood pressure greater than 150/90.  Patient has machine at home and does monitor his blood pressure randomly.  Asthma precautions reviewed.  Start  prednisone.  Refill Norco 7.5-325 one by mouth every 6 hours when necessary #20 with no refill.  Guaifenesin with codeine 1 teaspoon by mouth every 6 hours when necessary dispense 236 ML.  No refill.  Saline nasal spray as needed.  Order provided for a DuoNeb treatments 1 treatment every 6 hours when necessary #120 with 5 refills.  Patient has a nebulizer machine at home with supplies that he will use.  Fluids, rest, symptomatic treatment advised. Steam therapy. Dispose of tooth bush after 24 hours of starting antibiotics if ordered. Complete antibiotics as ordered.  Discussed possible side effects/interactions of medication. Discussed symptoms to report as well as reasons to seek urgent or emergent medical attention. Understanding stated.   RTC 2-5 days if not improved, sooner if condition worsens/changes. Symptomatic care advised as well as reasons for urgent or emergent care. Pt / family state understanding.     Jurgen has been reviewed as consistent.  Uds is on file.   Goal: Improved quality of life and reduction in pain as evidenced by pt report.   Patient has been instructed and counseled regarding opioid misuse and risk of addiction.  We have discussed proper storage and disposal of controlled medication.        Off work remainder of this week.  Recommend follow-up in 1-2 weeks, sooner if needed.           This document has been electronically signed by:  CORDELIA Flores, NPJose AlejandroC

## 2018-03-22 ENCOUNTER — OFFICE VISIT (OUTPATIENT)
Dept: FAMILY MEDICINE CLINIC | Facility: CLINIC | Age: 33
End: 2018-03-22

## 2018-03-22 VITALS
OXYGEN SATURATION: 97 % | WEIGHT: 257 LBS | SYSTOLIC BLOOD PRESSURE: 148 MMHG | HEIGHT: 71 IN | HEART RATE: 84 BPM | BODY MASS INDEX: 35.98 KG/M2 | DIASTOLIC BLOOD PRESSURE: 80 MMHG | TEMPERATURE: 98.4 F

## 2018-03-22 DIAGNOSIS — Z29.9 PREVENTIVE MEASURE: ICD-10-CM

## 2018-03-22 DIAGNOSIS — J45.40 MODERATE PERSISTENT ASTHMA WITHOUT COMPLICATION: Primary | ICD-10-CM

## 2018-03-22 DIAGNOSIS — M15.9 PRIMARY OSTEOARTHRITIS INVOLVING MULTIPLE JOINTS: ICD-10-CM

## 2018-03-22 DIAGNOSIS — M79.7 FIBROMYALGIA: ICD-10-CM

## 2018-03-22 PROBLEM — Z00.00 ENCOUNTER FOR PREVENTATIVE ADULT HEALTH CARE EXAMINATION: Status: ACTIVE | Noted: 2018-03-22

## 2018-03-22 PROCEDURE — 90471 IMMUNIZATION ADMIN: CPT | Performed by: NURSE PRACTITIONER

## 2018-03-22 PROCEDURE — 99395 PREV VISIT EST AGE 18-39: CPT | Performed by: NURSE PRACTITIONER

## 2018-03-22 PROCEDURE — 90732 PPSV23 VACC 2 YRS+ SUBQ/IM: CPT | Performed by: NURSE PRACTITIONER

## 2018-03-22 RX ORDER — HYDROCODONE BITARTRATE AND ACETAMINOPHEN 7.5; 325 MG/1; MG/1
1 TABLET ORAL EVERY 8 HOURS PRN
Qty: 45 TABLET | Refills: 0 | Status: SHIPPED | OUTPATIENT
Start: 2018-03-22 | End: 2018-04-23 | Stop reason: SDUPTHER

## 2018-03-22 RX ORDER — PREGABALIN 50 MG/1
50 CAPSULE ORAL 2 TIMES DAILY
Qty: 60 CAPSULE | Refills: 2 | Status: SHIPPED | OUTPATIENT
Start: 2018-03-22 | End: 2018-05-03 | Stop reason: HOSPADM

## 2018-03-22 NOTE — PROGRESS NOTES
Subjective   Nghia Roach is a 32 y.o. male.     Chief Complaint   Patient presents with   • Other     follow up on back pain and stomach and his asthma       History of Present Illness       Hypertension - stable with current medication. Denies any side effects or symptoms of complications.         Chronic pain / ddd/ fibromyalgia - would like to discuss pain medication and request for pain medication. Pain is located in his mid and low back with radiation into his right lower extremity and knee pain. He describes his pain as a burning, aching and throbbing pain. Worse with activity. 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 Has had MRI and xray. He has been seen by neurology. Becomes stiff with prolonged rest. Nsaid use must be limited due to GERD. He denies any substance abuse or addiction.  MRI on file from 01/19/2015 from [facility] showing multiple bulging disc in the thoracic spine.  Patient has failed physical therapy and spinal injections.      Asthma - exacerbation resolved . High risk for pneumonia. Needs pneumonia vaccine.     patient has asthma and is high risk for pneumonia-he is in need of a pneumonia vaccination    The following portions of the patient's history were reviewed and updated as appropriate: allergies, current medications, past family history, past medical history, past social history, past surgical history and problem list.    Review of Systems   Constitutional: Positive for activity change and fatigue.   HENT: Positive for sinus pain and sinus pressure.    Eyes: Negative.    Respiratory: Positive for cough and shortness of breath.    Cardiovascular: Negative.    Gastrointestinal: Negative.    Endocrine: Negative.    Genitourinary: Negative.    Musculoskeletal: Positive for arthralgias, back pain, myalgias and neck pain.   Skin: Positive for rash. Pallor:  right leg.   Allergic/Immunologic: Positive for environmental allergies.   Neurological: Negative.    Hematological:  "Negative.    Psychiatric/Behavioral: Negative for dysphoric mood, self-injury and suicidal ideas.       Objective     /80   Pulse 84   Temp 98.4 °F (36.9 °C) (Tympanic)   Ht 180.3 cm (70.98\")   Wt 117 kg (257 lb)   SpO2 97%   BMI 35.86 kg/m²   Lab on 11/21/2017   Component Date Value Ref Range Status   • Glucose 11/21/2017 90  70 - 110 mg/dL Final   • BUN 11/21/2017 12  7 - 21 mg/dL Final   • Creatinine 11/21/2017 1.19  0.43 - 1.29 mg/dL Final   • Sodium 11/21/2017 141  135 - 153 mmol/L Final   • Potassium 11/21/2017 4.3  3.5 - 5.3 mmol/L Final   • Chloride 11/21/2017 105  99 - 112 mmol/L Final   • CO2 11/21/2017 30.0  24.3 - 31.9 mmol/L Final   • Calcium 11/21/2017 9.4  7.7 - 10.0 mg/dL Final   • Total Protein 11/21/2017 7.1  6.0 - 8.0 g/dL Final   • Albumin 11/21/2017 4.60  3.50 - 5.00 g/dL Final   • ALT (SGPT) 11/21/2017 32  10 - 44 U/L Final   • AST (SGOT) 11/21/2017 26  10 - 34 U/L Final   • Alkaline Phosphatase 11/21/2017 74  40 - 129 U/L Final   • Total Bilirubin 11/21/2017 1.7  0.2 - 1.8 mg/dL Final   • eGFR Non African Amer 11/21/2017 70  >60 mL/min/1.73 Final   • Globulin 11/21/2017 2.5  gm/dL Final   • A/G Ratio 11/21/2017 1.8  1.5 - 2.5 g/dL Final   • BUN/Creatinine Ratio 11/21/2017 10.1  7.0 - 25.0 Final   • Anion Gap 11/21/2017 6.0  3.6 - 11.2 mmol/L Final   • TSH 11/21/2017 1.423  0.550 - 4.780 mIU/mL Final   • Hemoglobin A1C 11/21/2017 5.10  4.50 - 5.70 % Final   • 25 Hydroxy, Vitamin D 11/21/2017 25.0  ng/ml Final   • Uric Acid 11/21/2017 6.3  3.7 - 7.0 mg/dL Final   • Microalbumin, Urine 11/21/2017 8.5  mg/L Final   • Total Cholesterol 11/21/2017 129  0 - 200 mg/dL Final   • Triglycerides 11/21/2017 142  0 - 150 mg/dL Final   • HDL Cholesterol 11/21/2017 37* 60 - 100 mg/dL Final   • LDL Cholesterol  11/21/2017 64  0 - 100 mg/dL Final   • VLDL Cholesterol 11/21/2017 28.4  mg/dL Final   • LDL/HDL Ratio 11/21/2017 1.72   Final   • PSA 11/22/2017 0.4  0.0 - 4.0 ng/mL Final   • PSA, Free " 11/22/2017 0.11  N/A ng/mL Final   • PSA, Free % 11/22/2017 27.5  % Final   • Vitamin B-12 11/21/2017 502  211 - 911 pg/mL Final   • WBC 11/21/2017 5.69  4.50 - 12.50 10*3/mm3 Final   • RBC 11/21/2017 5.88  4.70 - 6.10 10*6/mm3 Final   • Hemoglobin 11/21/2017 17.6  14.0 - 18.0 g/dL Final   • Hematocrit 11/21/2017 49.4  42.0 - 52.0 % Final   • MCV 11/21/2017 84.0  80.0 - 94.0 fL Final   • MCH 11/21/2017 29.9  27.0 - 33.0 pg Final   • MCHC 11/21/2017 35.6  33.0 - 37.0 g/dL Final   • RDW 11/21/2017 13.1  11.5 - 14.5 % Final   • RDW-SD 11/21/2017 39.6  37.0 - 54.0 fl Final   • MPV 11/21/2017 12.2* 6.0 - 10.0 fL Final   • Platelets 11/21/2017 215  130 - 400 10*3/mm3 Final   • Neutrophil % 11/21/2017 62.0  30.0 - 70.0 % Final   • Lymphocyte % 11/21/2017 23.2  21.0 - 51.0 % Final   • Monocyte % 11/21/2017 8.4  0.0 - 10.0 % Final   • Eosinophil % 11/21/2017 5.8* 0.0 - 5.0 % Final   • Basophil % 11/21/2017 0.4  0.0 - 2.0 % Final   • Immature Grans % 11/21/2017 0.2  0.0 - 0.5 % Final   • Neutrophils, Absolute 11/21/2017 3.53  1.40 - 6.50 10*3/mm3 Final   • Lymphocytes, Absolute 11/21/2017 1.32  1.00 - 3.00 10*3/mm3 Final   • Monocytes, Absolute 11/21/2017 0.48  0.10 - 0.90 10*3/mm3 Final   • Eosinophils, Absolute 11/21/2017 0.33  0.00 - 0.70 10*3/mm3 Final   • Basophils, Absolute 11/21/2017 0.02  0.00 - 0.30 10*3/mm3 Final   • Immature Grans, Absolute 11/21/2017 0.01  0.00 - 0.03 10*3/mm3 Final   • Osmolality Calc 11/21/2017 280.5  273.0 - 305.0 mOsm/kg Final       Physical Exam   Constitutional: He is oriented to person, place, and time. He appears well-developed and well-nourished.   HENT:   Head: Normocephalic and atraumatic.   Right Ear: External ear normal.   Left Ear: External ear normal.   Nose: Nose normal.   Mouth/Throat: Oropharynx is clear and moist.   Eyes: EOM are normal. Pupils are equal, round, and reactive to light.   Neck: Normal range of motion. Neck supple.   Cardiovascular: Normal rate, regular rhythm,  normal heart sounds and intact distal pulses.    No murmur heard.  Pulmonary/Chest: Effort normal and breath sounds normal. No respiratory distress. He has no wheezes. He has no rales. He exhibits no tenderness.   Abdominal: Soft. Bowel sounds are normal. He exhibits no distension and no mass. There is no tenderness. There is no rebound and no guarding.   Musculoskeletal:        Lumbar back: He exhibits decreased range of motion, tenderness, pain and spasm.   Decreased lumbar curvature, there is pain with forward flexion. DTR's +2. No edema.    Neurological: He is alert and oriented to person, place, and time. He has normal reflexes.   Reflex Scores:       Brachioradialis reflexes are 2+ on the right side and 2+ on the left side.       Patellar reflexes are 2+ on the right side and 2+ on the left side.  Skin: Skin is warm and dry. No rash noted. No erythema. No pallor.   Psychiatric: He has a normal mood and affect. His speech is normal and behavior is normal. Judgment and thought content normal. His affect is not inappropriate. Cognition and memory are normal.   Nursing note and vitals reviewed.      Assessment/Plan     Problem List Items Addressed This Visit        Respiratory    Moderate persistent asthma without complication - Primary       Musculoskeletal and Integument    Osteoarthrosis    Relevant Medications    HYDROcodone-acetaminophen (NORCO) 7.5-325 MG per tablet       Other    Fibromyalgia    Relevant Medications    pregabalin (LYRICA) 50 MG capsule    Encounter for preventative adult health care examination      Other Visit Diagnoses    None.         Start Lyrica 50 mg twice daily #60 with 2 refills.  Prescription provided for Norco 7.5 one by mouth every 6-8 hours as needed for moderate to severe pain #45 with no refill.  Proper body mechanics has been reviewed and discussed today.      Jurgen has been reviewed as consistent.  Uds is on file.   Patient has been instructed and counseled regarding opioid  misuse and risk of addiction.  We have discussed proper storage and disposal of controlled medication.  As part of this patient's treatment plan they are being prescribed controlled substance/substances with potential for abuse. This patient has been made aware of the appropriate use of such medications, including potential risk of somnolence, limited ability to drive and / or work safely, and potential for overdose. It has also been made clear that these medications are for use by this patient only, without concomitant use of alcohol or other substances unless prescribed/advised by medical provider. Patient has completed controlled substance agreement detailing terms of continued prescribing of controlled substances including monitoring Jurgen reports, drug screens and pill counts. The patient was asked and states they are not receiving narcotic medication from any there provider or any provider that this office has not been made aware of. History and physical exam exhibit continued safe and appropriate use of controlled substances.    vaccine today as preventative measure due to moderate persistent asthma.    Age appropriate education and safety instruction has been provided. Preventive education/recommendation > 15 minutes. Safety, accident prevention, vaccines, health maintenance concerns, nutrition, diet, exercise and social activity.     Follow up in one month, sooner if needed. Routine labs every 3-6 months.          This document has been electronically signed by:  CORDELIA Flores, NP-C

## 2018-04-05 ENCOUNTER — OFFICE VISIT (OUTPATIENT)
Dept: UROLOGY | Facility: CLINIC | Age: 33
End: 2018-04-05

## 2018-04-05 VITALS — WEIGHT: 257.94 LBS | BODY MASS INDEX: 36.11 KG/M2 | HEIGHT: 71 IN

## 2018-04-05 DIAGNOSIS — N42.9 DISORDER OF PROSTATE: ICD-10-CM

## 2018-04-05 DIAGNOSIS — R79.89 LOW TESTOSTERONE: ICD-10-CM

## 2018-04-05 DIAGNOSIS — N52.02 CORPORO-VENOUS OCCLUSIVE ERECTILE DYSFUNCTION: ICD-10-CM

## 2018-04-05 DIAGNOSIS — R53.83 OTHER FATIGUE: Primary | ICD-10-CM

## 2018-04-05 LAB
DEPRECATED RDW RBC AUTO: 39.7 FL (ref 37–54)
ERYTHROCYTE [DISTWIDTH] IN BLOOD BY AUTOMATED COUNT: 13.1 % (ref 11.5–14.5)
ESTRADIOL SERPL HS-MCNC: 64.2 PG/ML
HCT VFR BLD AUTO: 48.4 % (ref 42–52)
HGB BLD-MCNC: 17.5 G/DL (ref 14–18)
MCH RBC QN AUTO: 30.5 PG (ref 27–33)
MCHC RBC AUTO-ENTMCNC: 36.2 G/DL (ref 33–37)
MCV RBC AUTO: 84.3 FL (ref 80–94)
PLATELET # BLD AUTO: 221 10*3/MM3 (ref 130–400)
PMV BLD AUTO: 12.4 FL (ref 6–10)
PSA SERPL-MCNC: 0.31 NG/ML (ref 0–4)
RBC # BLD AUTO: 5.74 10*6/MM3 (ref 4.7–6.1)
TESTOST SERPL-MCNC: 941.97 NG/DL (ref 123.06–813.86)
WBC NRBC COR # BLD: 6.74 10*3/MM3 (ref 4.5–12.5)

## 2018-04-05 PROCEDURE — 84153 ASSAY OF PSA TOTAL: CPT | Performed by: UROLOGY

## 2018-04-05 PROCEDURE — 36415 COLL VENOUS BLD VENIPUNCTURE: CPT | Performed by: UROLOGY

## 2018-04-05 PROCEDURE — 84403 ASSAY OF TOTAL TESTOSTERONE: CPT | Performed by: UROLOGY

## 2018-04-05 PROCEDURE — 85027 COMPLETE CBC AUTOMATED: CPT | Performed by: UROLOGY

## 2018-04-05 PROCEDURE — 82670 ASSAY OF TOTAL ESTRADIOL: CPT | Performed by: UROLOGY

## 2018-04-05 PROCEDURE — 99214 OFFICE O/P EST MOD 30 MIN: CPT | Performed by: UROLOGY

## 2018-04-05 RX ORDER — TESTOSTERONE CYPIONATE 200 MG/ML
INJECTION, SOLUTION INTRAMUSCULAR
Qty: 10 ML | Refills: 2 | Status: SHIPPED | OUTPATIENT
Start: 2018-04-05 | End: 2018-04-23

## 2018-04-05 RX ORDER — TADALAFIL 5 MG
5 TABLET ORAL NIGHTLY
Qty: 30 TABLET | Refills: 5 | Status: SHIPPED | OUTPATIENT
Start: 2018-04-05 | End: 2018-10-11 | Stop reason: SDUPTHER

## 2018-04-05 NOTE — PROGRESS NOTES
"Chief Complaint:          Chief Complaint   Patient presents with   • Erectile Dysfunction       HPI:   32 y.o. male.  Patient returns today for follow-up.  He is been on testosterone replacement therapy.  He reports a dramatic improvement in his Sánchez questionnaire: -SÁNCHEZ-androgen deficiency in the age male questionnaire  The patient was queried regarding the androgen deficiency in the age male questionnaire.  This is a validated questionnaire that was performed on a set of 314 [place] male physicians when it was positive it correlated directly with a 94% chance of low testosterone.  Patient indicates there is a decrease in libido or sex drive, a lack of energy, Decreased  strength and endurance, a decreased \"enjoyment of life\", sad and grumpy feelings with significant difficulty maintaining erections.  He is also been a recent deterioration regarding work performance.  He reports weight loss.  He has good facility and the use of subcutaneous and intramuscular injections as well as comfort level and using the medication in a sterile fashion.  He understands he should use only the prescribed dose.  He's here for appropriate lab monitoring regarding this.  He understands this is a controlled substance and therefore must be watched closely will not be refilled and the medical loss or miss calculation of the dose.  He is very happy with the treatment and therefore wants to continue it.    Past Medical History:        Past Medical History:   Diagnosis Date   • Allergic rhinitis    • Asthma    • Atrial flutter    • Diarrhea    • Disturbance of skin sensation    • GERD (gastroesophageal reflux disease)    • Gout    • History of MRI of spine 12/16/2014    Done at [facility]    • Hyperlipidemia    • Hypertension    • Kidney disease     stage 2   • Lymphadenitis    • Malaise and fatigue    • Osteoarthrosis    • Rash    • Renal disorder          Current Meds:     Current Outpatient Prescriptions   Medication Sig Dispense " Refill   • albuterol (PROVENTIL HFA;VENTOLIN HFA) 108 (90 Base) MCG/ACT inhaler Inhale 2 puffs Every 4 (Four) Hours As Needed for Shortness of Air. 1 inhaler 5   • allopurinol (ZYLOPRIM) 100 MG tablet Take 1 tablet by mouth 2 (Two) Times a Day. 60 tablet 5   • amitriptyline (ELAVIL) 25 MG tablet Take 1 tablet by mouth At Night As Needed for Sleep. 30 tablet 5   • amLODIPine (NORVASC) 10 MG tablet Take 1 tablet by mouth Daily. 30 tablet 5   • atorvastatin (LIPITOR) 10 MG tablet Take 1 tablet by mouth Every Night. 30 tablet 5   • budesonide-formoterol (SYMBICORT) 160-4.5 MCG/ACT inhaler Inhale 2 puffs 2 (Two) Times a Day. 1 inhaler 5   • CIALIS 5 MG tablet Take 5 mg by mouth Every Night.  5   • CloNIDine (CATAPRES) 0.1 MG tablet Take 1 tablet by mouth 2 (Two) Times a Day As Needed for High Blood Pressure (bp > 160/90). 60 tablet 2   • dicyclomine (BENTYL) 20 MG tablet Take 2 tablets by mouth 4 (Four) Times a Day. 120 tablet 5   • Eluxadoline 100 MG tablet Take 1 tablet by mouth 2 (Two) Times a Day. 60 tablet 2   • fluticasone (FLONASE) 50 MCG/ACT nasal spray 2 sprays into each nostril Daily. Administer 2 sprays in each nostril for each dose. 1 bottle 2   • HYDROcodone-acetaminophen (NORCO) 7.5-325 MG per tablet Take 1 tablet by mouth Every 8 (Eight) Hours As Needed for Moderate Pain . 45 tablet 0   • ipratropium-albuterol (COMBIVENT RESPIMAT)  MCG/ACT inhaler Inhale 1 puff 4 (Four) Times a Day As Needed for Wheezing. 1-2 PUFFS EVERY 4 HOURS AS NEEDED 4 g 11   • loratadine (CLARITIN) 10 MG tablet Take 1 tablet by mouth Every Night. 30 tablet 5   • montelukast (SINGULAIR) 10 MG tablet Take 1 tablet by mouth Every Night. 30 tablet 5   • predniSONE (DELTASONE) 20 MG tablet Take 1 tablet by mouth 2 (Two) Times a Day. 10 tablet 0   • pregabalin (LYRICA) 50 MG capsule Take 1 capsule by mouth 2 (Two) Times a Day. 60 capsule 2   • sodium chloride (OCEAN NASAL SPRAY) 0.65 % nasal spray 1 spray into each nostril As Needed  for Congestion. 1 each 3   • Testosterone Cypionate (DEPO-TESTOSTERONE) 200 MG/ML injection He is to use 1/2 cc every Monday and Thursday SQ (Patient taking differently: Inject 100 mg under the skin 2 (Two) Times a Week. He is to use 1/2 cc every Monday and Thursday SQ) 10 mL 2   • topiramate (TOPAMAX) 50 MG tablet Take 1 tablet by mouth Every Night. 30 tablet 5   • valsartan-hydrochlorothiazide (DIOVAN HCT) 160-12.5 MG per tablet Take 1 tablet by mouth Daily. 30 tablet 5     Current Facility-Administered Medications   Medication Dose Route Frequency Provider Last Rate Last Dose   • CloNIDine (CATAPRES) tablet 0.1 mg  0.1 mg Oral Q12H CORDELIA Alexandre       • ipratropium-albuterol (DUO-NEB) nebulizer solution 3 mL  3 mL Nebulization 4x Daily - RT CORDELIA Alexandre            Allergies:      Allergies   Allergen Reactions   • Erythromycin Rash        Past Surgical History:     Past Surgical History:   Procedure Laterality Date   • BRAVO PROCEDURE N/A 8/4/2017    Procedure: ESOPHAGOGASTRODUODENOSCOPY AND FULLER;  Surgeon: Efra Suero MD;  Location: [facility];  Service:    • COLONOSCOPY  2014   • NISSEN FUNDOPLICATION     • NISSEN FUNDOPLICATION N/A 10/11/2017    Procedure: NISSEN FUNDOPLICATION LAPAROSCOPIC;  Surgeon: Efra Suero MD;  Location: [facility];  Service:    • NV LAP,ESOPHAGOGAST FUNDOPLASTY N/A 10/11/2017    Procedure: HIATAL HERNIA REPAIR LAPAROSCOPIC;  Surgeon: Efra Suero MD;  Location: [facility];  Service: General   • VASECTOMY           Social History:     Social History     Social History   • Marital status:      Spouse name: Radha    • Number of children: N/A   • Years of education: N/A     Occupational History   • [employer]      Social History Main Topics   • Smoking status: Never Smoker   • Smokeless tobacco: Never Used   • Alcohol use No   • Drug use: No   • Sexual activity: Yes     Partners: Female     Other Topics Concern   • Not on file      Social History Narrative   • No narrative on file       Family History:     Family History   Problem Relation Age of Onset   • Asthma Other    • Cancer Other    • COPD Other    • Diabetes Other    • Stroke Other    • Asthma Mother    • COPD Mother    • Asthma Father    • Depression Father    • Drug abuse Father    • Arthritis Maternal Grandmother    • Cancer Maternal Grandmother    • Diabetes Maternal Grandmother    • Heart disease Maternal Grandmother    • Hyperlipidemia Maternal Grandmother    • Stroke Paternal Grandfather        Review of Systems:     Review of Systems   Constitutional: Negative.    HENT: Negative.    Eyes: Negative.    Respiratory: Negative.    Cardiovascular: Negative.    Gastrointestinal: Negative.    Endocrine: Negative.    Musculoskeletal: Negative.    Allergic/Immunologic: Negative.    Neurological: Negative.    Hematological: Negative.    Psychiatric/Behavioral: Negative.        Physical Exam:     Physical Exam   Constitutional: He is oriented to person, place, and time. He appears well-developed and well-nourished.   HENT:   Head: Normocephalic and atraumatic.   Eyes: Conjunctivae and EOM are normal. Pupils are equal, round, and reactive to light.   Neck: Normal range of motion.   Cardiovascular: Normal rate, regular rhythm, normal heart sounds and intact distal pulses.    Pulmonary/Chest: Effort normal and breath sounds normal.   Abdominal: Soft. Bowel sounds are normal.   Genitourinary: Rectum normal, prostate normal and penis normal.   Musculoskeletal: Normal range of motion.   Neurological: He is alert and oriented to person, place, and time. He has normal reflexes.   Skin: Skin is warm and dry.   Psychiatric: He has a normal mood and affect. His behavior is normal. Judgment and thought content normal.   Nursing note and vitals reviewed.      I have reviewed the following portions of the patient's history: allergies, current medications, past family history, past medical history,  past social history, past surgical history, problem list and ROS and confirm it's accurate.      Procedure:       Assessment/Plan:   -Low testosterone: patient is here for follow-up.  Since beginning the medication he's been very pleased.  He reports a dramatic improvement in his erections, ability to achieve and maintain an erection, improvement in libido, increase in frequency of morning erections, a noticeable weight loss consistent with the treatment.  No development of breast problems or abnormalities.  He's going to have appropriate safety laboratory parameters checked.   He understands that the new data implicates testosterone with the development of prostate cancer and this is all but been disproven and the medical literature as well as the risks of cardiovascular disease which is actually also been disproven.  He understands that while he is a candidate for topical therapy if he is in contact with children this is not an option because it's been shown to accentuate genitalia development at an early age that this frequently irreversible.  He also understands this is a controlled substance and as such will not be prescribed without appropriate follow-up and appropriate laboratory investigation.  He understands effects on spermatogenesis including the fact that this is not always completely reversible and not always completely limited his ability to father a child.  He has demonstrated facility in the technique of both intramuscular and subcutaneous injection.  And has been taught sterility one drawing up the medication.  Erectile dysfunction-we discussed the anatomy and physiology of the penis and the endothelium.  We discussed the various forms of erectile dysfunction including peripheral vascular occlusive disease, postoperative, secondary to radiation treatments of the prostate, and arterial inflow.  We discussed the various treatment options available including oral medication and its various forms.  We  discussed the use of both generic and non-generic Viagra.  We discussed Cialis and a longer half-life of 17 hours as well as the other 2 medications.  We discussed cost involved with this including the fact that the generic is much cheaper but is taken has multiple pills because they are 20 mg dosages.  We did discuss the other alternatives including Penile injections, vacuum erection devices and surgical intervention reserved for only the most severe cases.  We discussed the need for testosterone in about 20% of cases of erectile dysfunction.     Discussed the patient's BMI with him. BMI is above normal parameters. Follow-up plan includes:  educational material.          This document has been electronically signed by STEPHEN BANGURA MD April 5, 2018 1:33 PM

## 2018-04-10 ENCOUNTER — PREP FOR SURGERY (OUTPATIENT)
Dept: OTHER | Facility: HOSPITAL | Age: 33
End: 2018-04-10

## 2018-04-10 ENCOUNTER — OFFICE VISIT (OUTPATIENT)
Dept: GASTROENTEROLOGY | Facility: CLINIC | Age: 33
End: 2018-04-10

## 2018-04-10 VITALS
DIASTOLIC BLOOD PRESSURE: 81 MMHG | TEMPERATURE: 98.5 F | HEART RATE: 68 BPM | RESPIRATION RATE: 16 BRPM | HEIGHT: 71 IN | WEIGHT: 258 LBS | SYSTOLIC BLOOD PRESSURE: 133 MMHG | BODY MASS INDEX: 36.12 KG/M2

## 2018-04-10 DIAGNOSIS — R12 HEARTBURN: ICD-10-CM

## 2018-04-10 DIAGNOSIS — Z80.0 FAMILY HISTORY OF COLON CANCER: ICD-10-CM

## 2018-04-10 DIAGNOSIS — K92.1 HEMATOCHEZIA: ICD-10-CM

## 2018-04-10 DIAGNOSIS — Z12.11 COLON CANCER SCREENING: ICD-10-CM

## 2018-04-10 DIAGNOSIS — R79.89 ABNORMAL LIVER FUNCTION TESTS: ICD-10-CM

## 2018-04-10 DIAGNOSIS — R10.33 PERIUMBILICAL PAIN: Primary | ICD-10-CM

## 2018-04-10 DIAGNOSIS — R19.7 DIARRHEA: ICD-10-CM

## 2018-04-10 DIAGNOSIS — R19.7 DIARRHEA, UNSPECIFIED TYPE: ICD-10-CM

## 2018-04-10 DIAGNOSIS — R10.33 PERIUMBILICAL ABDOMINAL PAIN: Primary | ICD-10-CM

## 2018-04-10 PROCEDURE — 99244 OFF/OP CNSLTJ NEW/EST MOD 40: CPT | Performed by: INTERNAL MEDICINE

## 2018-04-10 RX ORDER — RANITIDINE 300 MG/1
CAPSULE ORAL
Refills: 0 | COMMUNITY
Start: 2018-02-21 | End: 2018-04-10

## 2018-04-10 RX ORDER — IPRATROPIUM BROMIDE AND ALBUTEROL SULFATE 2.5; .5 MG/3ML; MG/3ML
SOLUTION RESPIRATORY (INHALATION)
Refills: 5 | COMMUNITY
Start: 2018-04-04 | End: 2018-12-27 | Stop reason: SDUPTHER

## 2018-04-10 NOTE — PATIENT INSTRUCTIONS
1. Obtain medical records including previous laboratory tests.  2. Low lactose-low-residue diet.   3. Check fasting CMP with fractionation of total bilirubin.  4. Colonoscopy: Description of the procedure, risks benefits alternatives and options including non-operative options were discussed with the patient in detail.  The patient understands and wishes to proceed.  5. Discussed with the patient and his wife in detail. Opportunity was given to ask questions.

## 2018-04-10 NOTE — PROGRESS NOTES
Chief Complaint   Patient presents with   • Diarrhea   • Constipation     History of Present Illness     The patient has history of diarrhea off-and-on for the last several years perhaps since his late childhood. However over the last couple of years this has worsened.   Diarrhea is rather severe, frequency of bowel movements being 6-8 times a day.  The stools are described as loose and watery.  There is nocturnal element of diarrhea.  The diarrhea is associated with tenesmus and urgency.      The patient has history of bright red blood per rectum off and on for the last several months.  This is described as mild.  Quantity being a couple of drops at a time.  Frequency being occasional.  There is no history of associated dizziness.  The patient denies anorectal pain.      There is history of periumbilical abdominal pain off and on for the last 6-8 months.  The pain is gradual in onset, moderate in severity and cramping in character.  Frequency being 2-3 times a day.  The pain may last for several minutes.  There is no radiation of abdominal pain.  Eating worsens the abdominal discomfort.  No definite relieving factors of abdominal pain.    The patient has a history of reflux off and on for the last several years perhaps 18-20 years.  The reflux was moderately to severe.  Symptoms are described as retrosternal burning sensation, and indigestion.  There is history of occasional regurgitative symptoms.  Frequency being several times per week.  The symptoms are worse at night.  The patient has taken acid suppressive therapy in the past. He has undergone hernia surgery in October 2017 (perhaps fundoplication). Since then the patient denies significant reflux. There is history of dysphagia in the past. This has also improved after the hernia operation.    There is no history of nausea or vomiting. He denies hematemesis or melena. There is no recent weight loss.  The patient has history of abnormal liver function tests  described as mild elevation of liver enzymes for the last 3-4 years. The patient claims that recently he was found to have CKD2. Additionally his bilirubin was also noted to be elevated. The patient is not a diabetic.    There is history of unintentional weight loss of 35 pounds last year. However this has stabilized.    Patient's maternal grandfather  of colon cancer at age 40. Otherwise there is no family history of inflammatory bowel disease or chronic liver disease.       Review of Systems   Constitutional: Positive for appetite change and fatigue. Negative for chills, fever and unexpected weight change.   HENT: Positive for nosebleeds. Negative for mouth sores and trouble swallowing.    Eyes: Negative for discharge and redness.   Respiratory: Positive for apnea and shortness of breath. Negative for cough.    Cardiovascular: Negative for chest pain, palpitations and leg swelling.   Gastrointestinal: Positive for abdominal pain, blood in stool, constipation and diarrhea. Negative for abdominal distention, anal bleeding, nausea and vomiting.   Endocrine: Negative for cold intolerance, heat intolerance and polydipsia.   Genitourinary: Negative for dysuria, hematuria and urgency.   Musculoskeletal: Positive for arthralgias. Negative for joint swelling and myalgias.   Skin: Negative for rash.   Allergic/Immunologic: Negative for food allergies and immunocompromised state.   Neurological: Negative for dizziness, seizures, syncope and headaches.   Hematological: Negative for adenopathy. Does not bruise/bleed easily.   Psychiatric/Behavioral: Negative for dysphoric mood. The patient is not nervous/anxious and is not hyperactive.      Patient Active Problem List   Diagnosis   • Osteoarthrosis   • Fibromyalgia   • Tension headache, chronic   • Hyperlipidemia   • Moderate persistent asthma without complication   • Acid reflux   • Hoarseness   • Difficulty swallowing   • Corporo-venous occlusive erectile dysfunction   •  Gastroesophageal reflux disease with esophagitis   • Dysphagia   • Hernia, hiatal   • Back ache   • Chronic gout of multiple sites   • Hypertension   • Moderate persistent asthma without complication   • Low testosterone   • Post-operative state   • Irritable bowel syndrome with both constipation and diarrhea   • Encounter for preventative adult health care examination     Past Medical History:   Diagnosis Date   • Allergic rhinitis    • Asthma    • Atrial flutter    • Diarrhea    • Disturbance of skin sensation    • GERD (gastroesophageal reflux disease)    • Gout    • Gout    • History of MRI of spine 12/16/2014    Done at [facility]    • Hyperlipidemia    • Hypertension    • Kidney disease     stage 2   • Lymphadenitis    • Malaise and fatigue    • Osteoarthrosis    • Rash    • Renal disorder    • Sleep apnea      Past Surgical History:   Procedure Laterality Date   • BRAVO PROCEDURE N/A 8/4/2017    Procedure: ESOPHAGOGASTRODUODENOSCOPY AND FULLER;  Surgeon: Efra Suero MD;  Location: [facility] OR;  Service:    • COLONOSCOPY  2014   • NISSEN FUNDOPLICATION     • NISSEN FUNDOPLICATION N/A 10/11/2017    Procedure: NISSEN FUNDOPLICATION LAPAROSCOPIC;  Surgeon: Efra Suero MD;  Location: [facility] OR;  Service:    • [place] LAP,ESOPHAGOGAST FUNDOPLASTY N/A 10/11/2017    Procedure: HIATAL HERNIA REPAIR LAPAROSCOPIC;  Surgeon: Efra Suero MD;  Location: [facility] OR;  Service: General   • VASECTOMY       Family History   Problem Relation Age of Onset   • Asthma Other    • Cancer Other    • COPD Other    • Diabetes Other    • Stroke Other    • Asthma Mother    • COPD Mother    • Asthma Father    • Depression Father    • Drug abuse Father    • Arthritis Maternal Grandmother    • Cancer Maternal Grandmother    • Diabetes Maternal Grandmother    • Heart disease Maternal Grandmother    • Hyperlipidemia Maternal Grandmother    • Stroke Paternal Grandfather    • Colon cancer Maternal Grandfather      mid 40s     Social History    Substance Use Topics   • Smoking status: Never Smoker   • Smokeless tobacco: Never Used   • Alcohol use No      Comment: social       Current Outpatient Prescriptions:   •  albuterol (PROVENTIL HFA;VENTOLIN HFA) 108 (90 Base) MCG/ACT inhaler, Inhale 2 puffs Every 4 (Four) Hours As Needed for Shortness of Air., Disp: 1 inhaler, Rfl: 5  •  allopurinol (ZYLOPRIM) 100 MG tablet, Take 1 tablet by mouth 2 (Two) Times a Day., Disp: 60 tablet, Rfl: 5  •  amitriptyline (ELAVIL) 25 MG tablet, Take 1 tablet by mouth At Night As Needed for Sleep., Disp: 30 tablet, Rfl: 5  •  amLODIPine (NORVASC) 10 MG tablet, Take 1 tablet by mouth Daily., Disp: 30 tablet, Rfl: 5  •  atorvastatin (LIPITOR) 10 MG tablet, Take 1 tablet by mouth Every Night., Disp: 30 tablet, Rfl: 5  •  budesonide-formoterol (SYMBICORT) 160-4.5 MCG/ACT inhaler, Inhale 2 puffs 2 (Two) Times a Day., Disp: 1 inhaler, Rfl: 5  •  CIALIS 5 MG tablet, Take 1 tablet by mouth Every Night., Disp: 30 tablet, Rfl: 5  •  CloNIDine (CATAPRES) 0.1 MG tablet, Take 1 tablet by mouth 2 (Two) Times a Day As Needed for High Blood Pressure (bp > 160/90)., Disp: 60 tablet, Rfl: 2  •  dicyclomine (BENTYL) 20 MG tablet, Take 2 tablets by mouth 4 (Four) Times a Day., Disp: 120 tablet, Rfl: 5  •  Eluxadoline 100 MG tablet, Take 1 tablet by mouth 2 (Two) Times a Day., Disp: 60 tablet, Rfl: 2  •  fluticasone (FLONASE) 50 MCG/ACT nasal spray, 2 sprays into each nostril Daily. Administer 2 sprays in each nostril for each dose., Disp: 1 bottle, Rfl: 2  •  HYDROcodone-acetaminophen (NORCO) 7.5-325 MG per tablet, Take 1 tablet by mouth Every 8 (Eight) Hours As Needed for Moderate Pain ., Disp: 45 tablet, Rfl: 0  •  ipratropium-albuterol (COMBIVENT RESPIMAT)  MCG/ACT inhaler, Inhale 1 puff 4 (Four) Times a Day As Needed for Wheezing. 1-2 PUFFS EVERY 4 HOURS AS NEEDED, Disp: 4 g, Rfl: 11  •  ipratropium-albuterol (DUO-NEB) 0.5-2.5 mg/mL nebulizer, INHALE THE CONTENTS OF 1 VIAL VIA NEBULIZER  "Q 6 H PRN, Disp: , Rfl: 5  •  loratadine (CLARITIN) 10 MG tablet, Take 1 tablet by mouth Every Night., Disp: 30 tablet, Rfl: 5  •  montelukast (SINGULAIR) 10 MG tablet, Take 1 tablet by mouth Every Night., Disp: 30 tablet, Rfl: 5  •  pregabalin (LYRICA) 50 MG capsule, Take 1 capsule by mouth 2 (Two) Times a Day., Disp: 60 capsule, Rfl: 2  •  sodium chloride (OCEAN NASAL SPRAY) 0.65 % nasal spray, 1 spray into each nostril As Needed for Congestion., Disp: 1 each, Rfl: 3  •  Syringe, Disposable, 3 ML misc, Use 3 ml syringe with a 25 gauge  5/8 inch needle, Disp: 24 each, Rfl: 6  •  Testosterone Cypionate (DEPO-TESTOSTERONE) 200 MG/ML injection, He is to use 1/2 cc every Monday and Thursday SQ (Patient taking differently: Inject 100 mg under the skin 2 (Two) Times a Week. He is to use 1/2 cc every Monday and Thursday SQ), Disp: 10 mL, Rfl: 2  •  Testosterone Cypionate (DEPO-TESTOSTERONE) 200 MG/ML injection, He is to use 1/2 cc every Monday and Thursday SQ, Disp: 10 mL, Rfl: 2  •  topiramate (TOPAMAX) 50 MG tablet, Take 1 tablet by mouth Every Night., Disp: 30 tablet, Rfl: 5  •  valsartan-hydrochlorothiazide (DIOVAN HCT) 160-12.5 MG per tablet, Take 1 tablet by mouth Daily., Disp: 30 tablet, Rfl: 5    Current Facility-Administered Medications:   •  CloNIDine (CATAPRES) tablet 0.1 mg, 0.1 mg, Oral, Q12H, CORDELIA Alexandre  •  ipratropium-albuterol (DUO-NEB) nebulizer solution 3 mL, 3 mL, Nebulization, 4x Daily - RT, CORDELIA Alexandre    Allergies   Allergen Reactions   • Erythromycin Rash       Blood pressure 133/81, pulse 68, temperature 98.5 °F (36.9 °C), resp. rate 16, height 180.3 cm (71\"), weight 117 kg (258 lb).    Physical Exam   Constitutional: He is oriented to person, place, and time. He appears well-developed and well-nourished. No distress.   HENT:   Head: Normocephalic and atraumatic.   Right Ear: Hearing and external ear normal.   Left Ear: Hearing and external ear normal. "   Nose: Nose normal.   Mouth/Throat: Oropharynx is clear and moist and mucous membranes are normal. Mucous membranes are not pale, not dry and not cyanotic. No oral lesions. No oropharyngeal exudate.   Eyes: Conjunctivae and EOM are normal. Right eye exhibits no discharge. Left eye exhibits no discharge. No scleral icterus.   Neck: Trachea normal. Neck supple. No JVD present. No edema present. No thyroid mass and no thyromegaly present.   Cardiovascular: Normal rate, regular rhythm, S2 normal and normal heart sounds.  Exam reveals no gallop, no S3 and no friction rub.    No murmur heard.  Pulmonary/Chest: Effort normal and breath sounds normal. No respiratory distress. He has no wheezes. He has no rales. He exhibits no tenderness.   Abdominal: Soft. Normal appearance and bowel sounds are normal. He exhibits no distension, no ascites and no mass. There is no splenomegaly or hepatomegaly. There is tenderness (mild in epigastriumand moderate) in the right lower quadrant and epigastric area. There is no rigidity, no rebound and no guarding. No hernia.   Musculoskeletal: He exhibits no tenderness or deformity.     Vascular Status -  His right foot exhibits no edema. His left foot exhibits no edema.  Lymphadenopathy:     He has no cervical adenopathy.        Left: No supraclavicular adenopathy present.   Neurological: He is alert and oriented to person, place, and time. He has normal strength. No cranial nerve deficit or sensory deficit. He exhibits normal muscle tone. Coordination normal.   Skin: No rash noted. He is not diaphoretic. No cyanosis. No pallor. Nails show no clubbing.   Psychiatric: He has a normal mood and affect. His behavior is normal. Judgment and thought content normal.   Nursing note and vitals reviewed.  Stigmata of chronic liver disease:  None.  Asterixis:  None.    Laboratory Testing:  Upon review of medical records:      Dated November 21, 2017 sodium 141 potassium 4.3 chloride 105 CO2 30 BUN 12  serum creatinine 1.19 and glucose 90.  Calcium 9.4.  Total protein 7.1.  Albumin 4.60.  T bili 1.7 AST 26 ALT 32 alkaline phosphatase 74.  Total cholesterol 129.  Triglycerides 142.  TSH 1.423.  Vitamin B12 level 502.  WBC 5.69 hemoglobin 17.6 hematocrit 49.4 MCV 84.0 platelet count 215.     Abdominal Imaging:  Upon review of records:     Dated February 5, 2017 the patient underwent a Nuclear Medicine Hepatobiliary Scan which revealed:  Normal visualization of the gallbladder.  Following a fatty meal, a 46% ejection fraction was calculated.      Procedures:  Upon review of medical records:    Dated August 4, 2017 the patient underwent an upper endoscopy by Dr. Suero (surgical services), which revealed:  Gastroesophageal reflux disease, Hiatal hernia and severe ulcerative esophagitis.  Duodenum, biopsy revealed benign duodenal mucosa with no diagnostic abnormality.  Antrum, biopsy revealed compatible with reactive gastropathy.  Esophagus, biopsies revealed acute ulcerative esophagitis.  Benign foveolar epithelium.  Negative for intestinal metaplasia.  Fungal stain negative.    Assessment:      ICD-10-CM ICD-9-CM   1. Periumbilical abdominal pain R10.33 789.05   2. Diarrhea, unspecified type R19.7 787.91   3. Hematochezia K92.1 578.1   4. Heartburn R12 787.1   5. Abnormal liver function tests R79.89 790.6         Discussion:  1. History of recurrent chronic diarrhea with recent worsening, periumbilical abdominal pain and occasional hematochezia. The patient has significant tenderness in the right lower quadrant raising the possibility of underlying inflammatory bowel disease especially Crohn's disease involving the terminal ileum. Differential of diarrhea also includes celiac disease, microscopic colitis and Valsartan related enteropathy.  2. History of abnormal liver function tests.    Plan/  Patient Instructions   1. Obtain medical records including previous laboratory tests.  2. Low lactose-low-residue diet.    3. Check fasting CMP with fractionation of total bilirubin.  4. Colonoscopy: Description of the procedure, risks benefits alternatives and options including non-operative options were discussed with the patient in detail.  The patient understands and wishes to proceed.  5. Discussed with the patient and his wife in detail. Opportunity was given to ask questions.         Bang Segundo MD

## 2018-04-19 RX ORDER — SODIUM CHLORIDE 9 MG/ML
70 INJECTION, SOLUTION INTRAVENOUS CONTINUOUS PRN
Status: CANCELLED | OUTPATIENT
Start: 2018-04-19

## 2018-04-20 PROBLEM — R10.33 PERIUMBILICAL PAIN: Status: ACTIVE | Noted: 2018-04-20

## 2018-04-20 PROBLEM — Z12.11 COLON CANCER SCREENING: Status: ACTIVE | Noted: 2018-04-20

## 2018-04-20 PROBLEM — Z80.0 FAMILY HISTORY OF COLON CANCER: Status: ACTIVE | Noted: 2018-04-20

## 2018-04-20 PROBLEM — R19.7 DIARRHEA: Status: ACTIVE | Noted: 2018-04-20

## 2018-04-20 PROBLEM — K92.1 HEMATOCHEZIA: Status: ACTIVE | Noted: 2018-04-20

## 2018-04-23 ENCOUNTER — OFFICE VISIT (OUTPATIENT)
Dept: FAMILY MEDICINE CLINIC | Facility: CLINIC | Age: 33
End: 2018-04-23

## 2018-04-23 VITALS
HEART RATE: 76 BPM | BODY MASS INDEX: 35.42 KG/M2 | HEIGHT: 71 IN | TEMPERATURE: 98.5 F | DIASTOLIC BLOOD PRESSURE: 90 MMHG | OXYGEN SATURATION: 99 % | SYSTOLIC BLOOD PRESSURE: 130 MMHG | WEIGHT: 253 LBS

## 2018-04-23 DIAGNOSIS — M1A.09X0 IDIOPATHIC CHRONIC GOUT OF MULTIPLE SITES WITHOUT TOPHUS: Primary | ICD-10-CM

## 2018-04-23 DIAGNOSIS — M15.9 PRIMARY OSTEOARTHRITIS INVOLVING MULTIPLE JOINTS: ICD-10-CM

## 2018-04-23 DIAGNOSIS — S61.012A LACERATION OF LEFT THUMB WITHOUT FOREIGN BODY WITHOUT DAMAGE TO NAIL, INITIAL ENCOUNTER: ICD-10-CM

## 2018-04-23 DIAGNOSIS — Z09 ENCOUNTER FOR EXAMINATION FOLLOWING TREATMENT AT HOSPITAL: ICD-10-CM

## 2018-04-23 PROBLEM — S61.112A LACERATION OF LEFT THUMB WITHOUT FOREIGN BODY WITH DAMAGE TO NAIL: Status: ACTIVE | Noted: 2018-04-23

## 2018-04-23 PROCEDURE — 96372 THER/PROPH/DIAG INJ SC/IM: CPT | Performed by: NURSE PRACTITIONER

## 2018-04-23 PROCEDURE — 99214 OFFICE O/P EST MOD 30 MIN: CPT | Performed by: NURSE PRACTITIONER

## 2018-04-23 RX ORDER — CEPHALEXIN 500 MG/1
500 CAPSULE ORAL 3 TIMES DAILY
Qty: 30 CAPSULE | Refills: 0 | Status: SHIPPED | OUTPATIENT
Start: 2018-04-23 | End: 2018-05-03 | Stop reason: HOSPADM

## 2018-04-23 RX ORDER — KETOROLAC TROMETHAMINE 30 MG/ML
60 INJECTION, SOLUTION INTRAMUSCULAR; INTRAVENOUS ONCE
Status: COMPLETED | OUTPATIENT
Start: 2018-04-23 | End: 2018-04-23

## 2018-04-23 RX ORDER — HYDROCODONE BITARTRATE AND ACETAMINOPHEN 7.5; 325 MG/1; MG/1
1 TABLET ORAL EVERY 8 HOURS PRN
Qty: 45 TABLET | Refills: 0 | Status: SHIPPED | OUTPATIENT
Start: 2018-04-23 | End: 2018-05-17 | Stop reason: SDUPTHER

## 2018-04-23 RX ADMIN — KETOROLAC TROMETHAMINE 60 MG: 30 INJECTION, SOLUTION INTRAMUSCULAR; INTRAVENOUS at 09:17

## 2018-04-23 NOTE — PROGRESS NOTES
Subjective   Nghia Roach is a 32 y.o. male.     Chief Complaint   Patient presents with   • Follow-up     E R       History of Present Illness   ER follow up     Pt had an accident using a cutting wheel for metal grinding, it kicked back and went through his leather glove cutting his left thumb. He went to the [facility] once his wife was available that evening to drive him. He received stiches. Now it is swollen and painful.   He works in a factory.       Gout - acute exacerbation. Present for one week, both knees. Taking allopurinol.   Patient cannot do colchicine due to statin therapy.    Chronic pain / ddd/ fibromyalgia - Reports better pain control with use of [place]. Pain is located in his mid and low back with radiation into his right lower extremity and knee pain. He describes his pain as a burning, aching and throbbing pain. Worse with activity. 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 Has had MRI and xray. He has been seen by neurology. Becomes stiff with prolonged rest. Nsaid use must be limited due to GERD. He denies any substance abuse or addiction.  MRI on file from 01/19/2015 from [facility] showing multiple bulging disc in the thoracic spine.  Patient has failed physical therapy and spinal injections.    The following portions of the patient's history were reviewed and updated as appropriate: allergies, current medications, past family history, past medical history, past social history, past surgical history and problem list.    Review of Systems   Constitutional: Positive for activity change. Negative for diaphoresis, fatigue, fever and unexpected weight change.   HENT: Negative.    Eyes: Negative.    Respiratory: Negative.  Negative for choking, chest tightness, shortness of breath and wheezing.    Cardiovascular: Negative.    Gastrointestinal: Negative.    Endocrine: Negative.    Genitourinary: Negative.    Musculoskeletal: Positive for arthralgias, back pain and joint  "swelling (left thumb ). Negative for neck stiffness.   Skin: Positive for color change and wound.   Allergic/Immunologic: Positive for environmental allergies.   Neurological: Negative.    Hematological: Negative for adenopathy. Does not bruise/bleed easily.   Psychiatric/Behavioral: Negative for decreased concentration, hallucinations, sleep disturbance and suicidal ideas. The patient is not nervous/anxious.        Objective     /90   Pulse 76   Temp 98.5 °F (36.9 °C) (Oral)   Ht 180.3 cm (70.98\")   Wt 115 kg (253 lb)   SpO2 99%   BMI 35.30 kg/m²   Office Visit on 04/05/2018   Component Date Value Ref Range Status   • WBC 04/05/2018 6.74  4.50 - 12.50 10*3/mm3 Final   • RBC 04/05/2018 5.74  4.70 - 6.10 10*6/mm3 Final   • Hemoglobin 04/05/2018 17.5  14.0 - 18.0 g/dL Final   • Hematocrit 04/05/2018 48.4  42.0 - 52.0 % Final   • MCV 04/05/2018 84.3  80.0 - 94.0 fL Final   • MCH 04/05/2018 30.5  27.0 - 33.0 pg Final   • MCHC 04/05/2018 36.2  33.0 - 37.0 g/dL Final   • RDW 04/05/2018 13.1  11.5 - 14.5 % Final   • RDW-SD 04/05/2018 39.7  37.0 - 54.0 fl Final   • MPV 04/05/2018 12.4* 6.0 - 10.0 fL Final   • Platelets 04/05/2018 221  130 - 400 10*3/mm3 Final   • PSA 04/05/2018 0.310  0.000 - 4.000 ng/mL Final   • Testosterone, Total 04/05/2018 941.97* 123.06 - 813.86 ng/dL Final   • Estradiol 04/05/2018 64.2  pg/mL Final       Physical Exam   Constitutional: He is oriented to person, place, and time. Vital signs are normal. He appears well-developed and well-nourished.   Talkative and friendly adult male sitting with his left hand extended.  Sutures noted left thumb.   HENT:   Head: Normocephalic and atraumatic.   Right Ear: External ear normal.   Left Ear: External ear normal.   Nose: Nose normal.   Mouth/Throat: Oropharynx is clear and moist.   Eyes: EOM are normal. Pupils are equal, round, and reactive to light.   Neck: Normal range of motion. Neck supple.   Cardiovascular: Normal rate, regular rhythm, " normal heart sounds and intact distal pulses.    No murmur heard.  Pulmonary/Chest: Effort normal and breath sounds normal. No respiratory distress. He has no wheezes. He has no rales. He exhibits no tenderness.   Abdominal: Soft. Bowel sounds are normal. He exhibits no distension and no mass. There is no tenderness. There is no rebound and no guarding.   Musculoskeletal:        Right knee: He exhibits decreased range of motion. Tenderness found. Lateral joint line tenderness noted.        Left knee: He exhibits decreased range of motion. Tenderness found. Lateral joint line tenderness noted.        Lumbar back: He exhibits decreased range of motion, tenderness, pain and spasm.   Decreased lumbar curvature, there is pain with forward flexion. DTR's +2.   Neurological: He is alert and oriented to person, place, and time. He has normal reflexes.   Skin: Skin is warm and dry. Capillary refill takes less than 2 seconds. Laceration noted. No rash noted. No erythema. No pallor.        Psychiatric: He has a normal mood and affect. His behavior is normal. Judgment and thought content normal. His affect is not inappropriate. Cognition and memory are normal.   Denies thoughts of hurting self or others.   Nursing note and vitals reviewed.      Assessment/Plan     Problem List Items Addressed This Visit        Musculoskeletal and Integument    Osteoarthrosis    Relevant Medications    HYDROcodone-acetaminophen (NORCO) 7.5-325 MG per tablet    Chronic gout of multiple sites - Primary    Relevant Medications    ketorolac (TORADOL) injection 60 mg (Start on 4/23/2018  9:45 AM)       Other    Encounter for examination following treatment at hospital    Laceration of left thumb without foreign body without damage to nail    Overview     Seen at ER, has sutures            Other Visit Diagnoses    None.       Refill Norco 7.5 one by mouth every 8 hours when necessary #45 with no refill.    Clean left thumb daily with antibacterial  soap and water, pat dry with a freshly washed cloth, keep open to air. Report any changes such as pain, edema, erythema, drainage or other changes.   Off work for next 8 days, follow up here for suture removal in one week.   Toradol for gout exacerbation.  Start Keflex As prophylactic measure for thumb laceration.    Errors in dictation may reflect use of voice recognition software and not all errors in transcription may have been detected prior to signing.              This document has been electronically signed by:  CORDELIA Flores, NP-C

## 2018-04-25 ENCOUNTER — OFFICE VISIT (OUTPATIENT)
Dept: FAMILY MEDICINE CLINIC | Facility: CLINIC | Age: 33
End: 2018-04-25

## 2018-04-25 VITALS
HEART RATE: 72 BPM | OXYGEN SATURATION: 97 % | DIASTOLIC BLOOD PRESSURE: 90 MMHG | BODY MASS INDEX: 35.98 KG/M2 | TEMPERATURE: 97.3 F | WEIGHT: 257 LBS | HEIGHT: 71 IN | SYSTOLIC BLOOD PRESSURE: 140 MMHG

## 2018-04-25 DIAGNOSIS — S61.012D LACERATION OF LEFT THUMB WITHOUT FOREIGN BODY WITHOUT DAMAGE TO NAIL, SUBSEQUENT ENCOUNTER: Primary | ICD-10-CM

## 2018-04-25 PROCEDURE — 99213 OFFICE O/P EST LOW 20 MIN: CPT | Performed by: NURSE PRACTITIONER

## 2018-04-25 NOTE — PROGRESS NOTES
"Subjective   Nghia Roach is a 32 y.o. male.     Chief Complaint   Patient presents with   • FMLA Papers       History of Present Illness     Here for FMLA papers and evaluation of left thumb injury.   Site doing better, less erythema and no drainage. Tender to touch and very sore. Scheduled for suture removal Monday.       The following portions of the patient's history were reviewed and updated as appropriate: allergies, current medications, past family history, past medical history, past social history, past surgical history and problem list.    Review of Systems   Constitutional: Positive for activity change. Negative for diaphoresis, fatigue, fever and unexpected weight change.   HENT: Negative.    Eyes: Negative.    Respiratory: Negative.  Negative for choking, chest tightness, shortness of breath and wheezing.    Cardiovascular: Negative.    Gastrointestinal: Negative.    Endocrine: Negative.    Genitourinary: Negative.    Musculoskeletal: Positive for arthralgias, back pain and joint swelling (left thumb ). Negative for neck stiffness.   Skin: Positive for color change and wound.   Allergic/Immunologic: Positive for environmental allergies.   Neurological: Negative.    Hematological: Negative for adenopathy. Does not bruise/bleed easily.   Psychiatric/Behavioral: Negative for decreased concentration, hallucinations, sleep disturbance and suicidal ideas. The patient is not nervous/anxious.    All other systems reviewed and are negative.      Objective     /90   Pulse 72   Temp 97.3 °F (36.3 °C) (Tympanic)   Ht 180.3 cm (71\")   Wt 117 kg (257 lb)   SpO2 97%   BMI 35.84 kg/m²   Office Visit on 04/05/2018   Component Date Value Ref Range Status   • WBC 04/05/2018 6.74  4.50 - 12.50 10*3/mm3 Final   • RBC 04/05/2018 5.74  4.70 - 6.10 10*6/mm3 Final   • Hemoglobin 04/05/2018 17.5  14.0 - 18.0 g/dL Final   • Hematocrit 04/05/2018 48.4  42.0 - 52.0 % Final   • MCV 04/05/2018 84.3  80.0 - 94.0 fL Final   • " MCH 04/05/2018 30.5  27.0 - 33.0 pg Final   • MCHC 04/05/2018 36.2  33.0 - 37.0 g/dL Final   • RDW 04/05/2018 13.1  11.5 - 14.5 % Final   • RDW-SD 04/05/2018 39.7  37.0 - 54.0 fl Final   • MPV 04/05/2018 12.4* 6.0 - 10.0 fL Final   • Platelets 04/05/2018 221  130 - 400 10*3/mm3 Final   • PSA 04/05/2018 0.310  0.000 - 4.000 ng/mL Final   • Testosterone, Total 04/05/2018 941.97* 123.06 - 813.86 ng/dL Final   • Estradiol 04/05/2018 64.2  pg/mL Final       Physical Exam   Constitutional: He is oriented to person, place, and time. He appears well-developed and well-nourished. No distress.   HENT:   Head: Atraumatic.   Eyes: Pupils are equal, round, and reactive to light.   Neck: Neck supple.   Cardiovascular: Normal rate and regular rhythm.    No murmur heard.  Pulmonary/Chest: Effort normal and breath sounds normal. No respiratory distress.   Abdominal: Soft. Bowel sounds are normal. He exhibits no distension.   Neurological: He is alert and oriented to person, place, and time.   Skin: Skin is warm and dry. Capillary refill takes less than 2 seconds. Laceration noted. He is not diaphoretic.        Psychiatric: He has a normal mood and affect. His behavior is normal. Judgment and thought content normal.   Vitals reviewed.      Assessment/Plan     Problem List Items Addressed This Visit        Other    Laceration of left thumb without foreign body without damage to nail - Primary    Overview     Seen at ER, has sutures            Other Visit Diagnoses    None.       Follow up 4/30/18 for suture removal, sooner if needed.    improved . Continue to avoid overuse .FMLA papers completed. Remain off work until 5/1/18 and sutures removed.     I have discussed diagnosis in detail today allowing time for questions and answers. Pt is aware of reasons to seek urgent or emergent medical care as well as reasons to return to the clinic for evaluation. Possible side effects, interactions and progression of symptoms discussed as well.  Pt / family states understanding.     Errors in dictation may reflect use of voice recognition software and not all errors in transcription may have been detected prior to signing.                This document has been electronically signed by:  CORDELIA Flores, NP-C

## 2018-04-30 ENCOUNTER — OFFICE VISIT (OUTPATIENT)
Dept: FAMILY MEDICINE CLINIC | Facility: CLINIC | Age: 33
End: 2018-04-30

## 2018-04-30 VITALS
TEMPERATURE: 97.3 F | BODY MASS INDEX: 35.7 KG/M2 | DIASTOLIC BLOOD PRESSURE: 90 MMHG | HEIGHT: 71 IN | HEART RATE: 73 BPM | OXYGEN SATURATION: 97 % | SYSTOLIC BLOOD PRESSURE: 140 MMHG | WEIGHT: 255 LBS

## 2018-04-30 DIAGNOSIS — S61.012D LACERATION OF LEFT THUMB WITHOUT FOREIGN BODY WITHOUT DAMAGE TO NAIL, SUBSEQUENT ENCOUNTER: Primary | ICD-10-CM

## 2018-04-30 DIAGNOSIS — L91.8 INFLAMED SKIN TAG: ICD-10-CM

## 2018-04-30 PROCEDURE — 99214 OFFICE O/P EST MOD 30 MIN: CPT | Performed by: NURSE PRACTITIONER

## 2018-04-30 PROCEDURE — 17110 DESTRUCTION B9 LES UP TO 14: CPT | Performed by: NURSE PRACTITIONER

## 2018-04-30 NOTE — PROGRESS NOTES
"Subjective   Nghia Roach is a 32 y.o. male.     Chief Complaint   Patient presents with   • Other     get stitches out     Skin tag removal       History of Present Illness       Suture removal left thumb - pt had one stitch come out, two remaining. Has been taking it easy. No erythema or complication. Having some difficulty with thumb flexion as it pulls the site.       Large irritating skin tags under the left arm, catching on  Clothes and bleeding at times.       The following portions of the patient's history were reviewed and updated as appropriate: allergies, current medications, past family history, past medical history, past social history, past surgical history and problem list.    Review of Systems   Constitutional: Negative.    HENT: Negative.    Eyes: Negative.    Respiratory: Negative.    Cardiovascular: Negative.    Gastrointestinal: Negative.    Endocrine: Negative.    Genitourinary: Negative.    Musculoskeletal: Positive for arthralgias and back pain.   Skin: Positive for color change.   Allergic/Immunologic: Negative.    Neurological: Negative.    Hematological: Negative.        Objective     /90   Pulse 73   Temp 97.3 °F (36.3 °C) (Tympanic)   Ht 180.3 cm (70.98\")   Wt 116 kg (255 lb)   SpO2 97%   BMI 35.58 kg/m²   Office Visit on 04/05/2018   Component Date Value Ref Range Status   • WBC 04/05/2018 6.74  4.50 - 12.50 10*3/mm3 Final   • RBC 04/05/2018 5.74  4.70 - 6.10 10*6/mm3 Final   • Hemoglobin 04/05/2018 17.5  14.0 - 18.0 g/dL Final   • Hematocrit 04/05/2018 48.4  42.0 - 52.0 % Final   • MCV 04/05/2018 84.3  80.0 - 94.0 fL Final   • MCH 04/05/2018 30.5  27.0 - 33.0 pg Final   • MCHC 04/05/2018 36.2  33.0 - 37.0 g/dL Final   • RDW 04/05/2018 13.1  11.5 - 14.5 % Final   • RDW-SD 04/05/2018 39.7  37.0 - 54.0 fl Final   • MPV 04/05/2018 12.4* 6.0 - 10.0 fL Final   • Platelets 04/05/2018 221  130 - 400 10*3/mm3 Final   • PSA 04/05/2018 0.310  0.000 - 4.000 ng/mL Final   • " Testosterone, Total 04/05/2018 941.97* 123.06 - 813.86 ng/dL Final   • Estradiol 04/05/2018 64.2  pg/mL Final       Physical Exam   Constitutional: He is oriented to person, place, and time. He appears well-developed and well-nourished. No distress.   HENT:   Head: Atraumatic.   Mouth/Throat: No oropharyngeal exudate.   Eyes: Pupils are equal, round, and reactive to light.   Neck: Neck supple. No thyromegaly present.   Cardiovascular: Normal rate, regular rhythm and normal heart sounds.    Pulmonary/Chest: Effort normal and breath sounds normal. No respiratory distress. He exhibits no tenderness.   Abdominal: Soft. Bowel sounds are normal.   Lymphadenopathy:     He has no cervical adenopathy.   Neurological: He is alert and oriented to person, place, and time.   Skin: Skin is dry. Capillary refill takes less than 2 seconds. He is not diaphoretic. There is erythema.        Psychiatric: He has a normal mood and affect. His behavior is normal. Judgment and thought content normal.       Assessment/Plan     Problem List Items Addressed This Visit        Musculoskeletal and Integument    Inflamed skin tag       Other    Laceration of left thumb without foreign body without damage to nail - Primary    Overview     Seen at ER, has sutures            Other Visit Diagnoses    None.       I have discussed diagnosis in detail today allowing time for questions and answers. Pt is aware of reasons to seek urgent or emergent medical care as well as reasons to return to the clinic for evaluation. Possible side effects, interactions and progression of symptoms discussed as well. Pt / family states understanding.   Emotional support and active listening provided.              This document has been electronically signed by:  CORDELIA Flores, NP-C

## 2018-05-03 ENCOUNTER — ANESTHESIA (OUTPATIENT)
Dept: GASTROENTEROLOGY | Facility: HOSPITAL | Age: 33
End: 2018-05-03

## 2018-05-03 ENCOUNTER — HOSPITAL ENCOUNTER (OUTPATIENT)
Facility: HOSPITAL | Age: 33
Setting detail: HOSPITAL OUTPATIENT SURGERY
Discharge: HOME | End: 2018-05-03
Attending: INTERNAL MEDICINE | Admitting: INTERNAL MEDICINE

## 2018-05-03 ENCOUNTER — ANESTHESIA EVENT (OUTPATIENT)
Dept: GASTROENTEROLOGY | Facility: HOSPITAL | Age: 33
End: 2018-05-03

## 2018-05-03 VITALS
WEIGHT: 255 LBS | HEART RATE: 72 BPM | HEIGHT: 71 IN | OXYGEN SATURATION: 97 % | DIASTOLIC BLOOD PRESSURE: 64 MMHG | SYSTOLIC BLOOD PRESSURE: 110 MMHG | RESPIRATION RATE: 14 BRPM | TEMPERATURE: 98.4 F | BODY MASS INDEX: 35.7 KG/M2

## 2018-05-03 DIAGNOSIS — K92.1 HEMATOCHEZIA: ICD-10-CM

## 2018-05-03 DIAGNOSIS — Z80.0 FAMILY HISTORY OF COLON CANCER: ICD-10-CM

## 2018-05-03 DIAGNOSIS — Z12.11 COLON CANCER SCREENING: ICD-10-CM

## 2018-05-03 DIAGNOSIS — R19.7 DIARRHEA: ICD-10-CM

## 2018-05-03 DIAGNOSIS — R10.33 PERIUMBILICAL PAIN: ICD-10-CM

## 2018-05-03 PROCEDURE — 45380 COLONOSCOPY AND BIOPSY: CPT | Performed by: INTERNAL MEDICINE

## 2018-05-03 PROCEDURE — 25010000002 FENTANYL CITRATE (PF) 100 MCG/2ML SOLUTION: Performed by: NURSE ANESTHETIST, CERTIFIED REGISTERED

## 2018-05-03 PROCEDURE — 25010000002 ONDANSETRON PER 1 MG: Performed by: NURSE ANESTHETIST, CERTIFIED REGISTERED

## 2018-05-03 PROCEDURE — 25010000002 PROPOFOL 10 MG/ML EMULSION: Performed by: NURSE ANESTHETIST, CERTIFIED REGISTERED

## 2018-05-03 PROCEDURE — 25010000002 MIDAZOLAM PER 1 MG: Performed by: NURSE ANESTHETIST, CERTIFIED REGISTERED

## 2018-05-03 PROCEDURE — 25010000002 PROPOFOL 1000 MG/ML EMULSION: Performed by: NURSE ANESTHETIST, CERTIFIED REGISTERED

## 2018-05-03 RX ORDER — PROPOFOL 10 MG/ML
VIAL (ML) INTRAVENOUS AS NEEDED
Status: DISCONTINUED | OUTPATIENT
Start: 2018-05-03 | End: 2018-05-03 | Stop reason: SURG

## 2018-05-03 RX ORDER — MIDAZOLAM HYDROCHLORIDE 1 MG/ML
INJECTION INTRAMUSCULAR; INTRAVENOUS AS NEEDED
Status: DISCONTINUED | OUTPATIENT
Start: 2018-05-03 | End: 2018-05-03 | Stop reason: SURG

## 2018-05-03 RX ORDER — SODIUM CHLORIDE 9 MG/ML
70 INJECTION, SOLUTION INTRAVENOUS CONTINUOUS PRN
Status: DISCONTINUED | OUTPATIENT
Start: 2018-05-03 | End: 2018-05-03 | Stop reason: HOSPADM

## 2018-05-03 RX ORDER — FENTANYL CITRATE 50 UG/ML
INJECTION, SOLUTION INTRAMUSCULAR; INTRAVENOUS AS NEEDED
Status: DISCONTINUED | OUTPATIENT
Start: 2018-05-03 | End: 2018-05-03 | Stop reason: SURG

## 2018-05-03 RX ORDER — ONDANSETRON 2 MG/ML
INJECTION INTRAMUSCULAR; INTRAVENOUS AS NEEDED
Status: DISCONTINUED | OUTPATIENT
Start: 2018-05-03 | End: 2018-05-03 | Stop reason: SURG

## 2018-05-03 RX ORDER — KETAMINE HYDROCHLORIDE 50 MG/ML
INJECTION, SOLUTION, CONCENTRATE INTRAMUSCULAR; INTRAVENOUS AS NEEDED
Status: DISCONTINUED | OUTPATIENT
Start: 2018-05-03 | End: 2018-05-03 | Stop reason: SURG

## 2018-05-03 RX ORDER — LIDOCAINE HYDROCHLORIDE 20 MG/ML
INJECTION, SOLUTION INFILTRATION; PERINEURAL AS NEEDED
Status: DISCONTINUED | OUTPATIENT
Start: 2018-05-03 | End: 2018-05-03 | Stop reason: SURG

## 2018-05-03 RX ADMIN — SODIUM CHLORIDE: 9 INJECTION, SOLUTION INTRAVENOUS at 11:28

## 2018-05-03 RX ADMIN — PROPOFOL 50 MG: 10 INJECTION, EMULSION INTRAVENOUS at 11:36

## 2018-05-03 RX ADMIN — ONDANSETRON 4 MG: 2 INJECTION INTRAMUSCULAR; INTRAVENOUS at 11:31

## 2018-05-03 RX ADMIN — FENTANYL CITRATE 50 MCG: 50 INJECTION, SOLUTION INTRAMUSCULAR; INTRAVENOUS at 11:31

## 2018-05-03 RX ADMIN — PROPOFOL 50 MG: 10 INJECTION, EMULSION INTRAVENOUS at 11:32

## 2018-05-03 RX ADMIN — MIDAZOLAM HYDROCHLORIDE 1 MG: 1 INJECTION, SOLUTION INTRAMUSCULAR; INTRAVENOUS at 11:31

## 2018-05-03 RX ADMIN — SODIUM CHLORIDE 70 ML/HR: 9 INJECTION, SOLUTION INTRAVENOUS at 10:29

## 2018-05-03 RX ADMIN — KETAMINE HYDROCHLORIDE 15 MG: 50 INJECTION, SOLUTION INTRAMUSCULAR; INTRAVENOUS at 11:37

## 2018-05-03 RX ADMIN — LIDOCAINE HYDROCHLORIDE 80 MG: 20 INJECTION, SOLUTION INFILTRATION; PERINEURAL at 11:31

## 2018-05-03 RX ADMIN — PROPOFOL 120 MCG/KG/MIN: 10 INJECTION, EMULSION INTRAVENOUS at 11:33

## 2018-05-03 NOTE — INTERVAL H&P NOTE
"  H&P reviewed. The patient was examined and there are no changes to the H&P.       No recent shortness of breath or chest pains.      Blood pressure 143/78, pulse 61, temperature 98.8 °F (37.1 °C), temperature source Temporal Artery , resp. rate 16, height 180.3 cm (71\"), weight 116 kg (255 lb), SpO2 99 %.    Chest: clear to auscultation.  Cardiac exam: No S3 no murmurs.   Abdomen: soft bowel sounds present nondistended nontender.        "

## 2018-05-03 NOTE — OP NOTE
PROCEDURE:  Colonoscopy to the terminal ileum with 7 cold biopsy polypectomies and biopsies.    DATE OF PROCEDURE:  May 3, 2018    REFERRING PROVIDER:  CORDELIA Alexandre     INSTRUMENT USED:    Olympus PCF H 190 videocolonoscope.      INDICATIONS OF THE PROCEDURE: This is a 32-year-old white male with history of recurrent diarrhea and hematochezia.  Currently undergoing colonoscopy for further evaluation.    PREVIOUS COLONOSCOPY: The patient cannot remember perhaps 10 years ago.    FAMILY HISTORY OF COLON CANCER:  No first degree relatives. Grandfather had colon cancer at age 40.    BIOPSIES:  Biopsies were obtained from the terminal ileum and randomly from the colon including rectum. Biopsies were obtained from the rectal erosions. Cold biopsy polypectomy was performed in the trap was:. A cold biopsy polypectomies were performed in the recent:, 3 in the sigmoid colon and one in the rectum.       PHOTOGRAPHS:  Photographs were included in the medical records.     MEDICATIONS:  MAC.       CONSENT/PREPROCEDURE EVALUATION:  Risks, benefits, alternatives and options of the procedure including risks of sedation/anesthesia were discussed with the patient and informed consent was obtained prior to the procedure.  History and physical examination were performed and nothing precluded the test.      REPORT:  The patient was placed in left lateral decubitus position and a digital examination was performed.  Once under the influence of IV sedation, the instrument was inserted into the rectum and advanced under direct vision to cecum which was identified by the ileocecal valve, triradiate folds and appendiceal orifice. The scope was then maneuvered into the terminal ileum.        FINDINGS:      Digital rectal examination:  Good anal tone.  No perianal pathology.  No mass.        Terminal ileum: 11-12 cm.  Normal.     Cecum and ascending colon: Normal.       Hepatic flexure, transverse colon, splenic flexure:  A 3  mm sessile polyp in the transverse colon was removed with cold biopsy forceps.         Descending colon, sigmoid colon and rectum: Scant early diverticular change was seen in the left colon.  6, 3 mm sessile polyps were noted in the left colon. 2 in the descending, 3 in the sigmoid colon and one in the rectum were removed with cold biopsy forceps.  Scant erosions were noted within the rectum. Biopsies were obtained. Additional biopsies were obtained randomly from the colon including rectum.  A retroflex examination within the rectum revealed internal hemorrhoids.        The scope was then straightened, the lower GI tract was decompressed, and the scope was pulled out of the patient.  The patient tolerated the procedure well.  There were no immediate complications and the patient was transferred in stable condition for post procedure observation.      TECHNICAL DATA:   1. [place] prep score: 7 (3+2+2).  Some adherent material was noted which required significant irrigation.  2. Anus to cecal time: 5 minutes.  3. Difficulty of examination:  Average. The colon was noted to be tortuous and spastic.   4. Withdrawal time: 15 minutes.  5. Procedure time: 37 minutes  6. Retroflex examination in right colon: Yes.    7. Second look Rectum to cecum with decompression: Yes.    DIAGNOSES:    1.   2. Scant early diverticular change was seen in the left colon.  3. Colon polyps.  4. Scant erosions within the rectum.  5. Internal hemorrhoids.    RECOMMENDATIONS:     1. Dietary instructions.  2. Follow biopsies.    3. Follow-up:  2-3 weeks.    4. Followup colonoscopy:  Depending on the biopsy results.     5. Hemorrhoid care.       Thank you very much for letting me participate in the care of this patient. Please do not hesitate to call me if you have any questions.

## 2018-05-03 NOTE — ANESTHESIA PREPROCEDURE EVALUATION
Anesthesia Evaluation     Patient summary reviewed and Nursing notes reviewed   no history of anesthetic complications:  NPO Solid Status: > 8 hours  NPO Liquid Status: > 8 hours           Airway   Mallampati: I  TM distance: >3 FB  Neck ROM: full  no difficulty expected  Dental - normal exam     Pulmonary - normal exam   (+) asthma, sleep apnea,   Cardiovascular - normal exam    Rhythm: regular  Rate: normal    (+) hypertension, dysrhythmias Atrial Flutter, hyperlipidemia,       Neuro/Psych  (+) headaches, psychiatric history Anxiety and Depression,     GI/Hepatic/Renal/Endo    (+) obesity,  GERD well controlled,  renal disease CRI,     Musculoskeletal     (+) chronic pain,       ROS comment: Fibromyalgia  Abdominal  - normal exam    Abdomen: soft.  Bowel sounds: normal.   Substance History - negative use     OB/GYN negative ob/gyn ROS         Other   (+) arthritis                     Anesthesia Plan    ASA 3     MAC   (Risks and benefits discussed including risk of aspiration, recall and dental damage. All patient questions answered. Will continue with POC.)

## 2018-05-03 NOTE — ANESTHESIA POSTPROCEDURE EVALUATION
Patient: Nghia Roach    Procedure Summary     Date:  05/03/18 Room / Location:  [facility] ENDOSCOPY 2 / [facility] ENDOSCOPY    Anesthesia Start:  1128 Anesthesia Stop:      Procedure:  COLONOSCOPY WITH COLD BIOPSIES (N/A Anus) Diagnosis:       Diarrhea      Hematochezia      Family history of colon cancer      Periumbilical pain      Colon cancer screening      (Diarrhea [R19.7])      (Hematochezia [K92.1])      (Family history of colon cancer [Z80.0])      (Periumbilical pain [R10.33])      (Colon cancer screening [Z12.11])    Surgeon:  Bang Segundo MD Provider:  Rell Walsh CRNA    Anesthesia Type:  MAC ASA Status:  3          Anesthesia Type: MAC  Last vitals  BP   109/41   Temp   98   Pulse   67   Resp   14   SpO2   96     Post Anesthesia Care and Evaluation    Patient location during evaluation: bedside  Patient participation: complete - patient participated  Level of consciousness: awake and alert  Pain score: 0  Pain management: adequate  Airway patency: patent  Anesthetic complications: No anesthetic complications  PONV Status: none  Cardiovascular status: acceptable  Respiratory status: acceptable and nasal cannula  Hydration status: acceptable

## 2018-05-03 NOTE — DISCHARGE INSTRUCTIONS
"Call this afternoon for follow up appointment for 4 weeks, as the office is closed at this time      POST-OP    Rest today  No pushing,pulling,tugging,heavy lifting, or strenuous activity   No major decision making,driving,or drinking alcoholic beverages for 24 hours due to the sedation you received  Always use good hand hygiene/washing technique  No driving on pain medication.    To assist you in voiding:  Drink plenty of fluids  Listen to running water while attempting to void.    If you are unable to urinate and you have an uncomfortable urge to void or it has been   6 hours since you were discharged, return to the Emergency Room.      Postprocedure instructions:    Nothing by mouth to fully alert.  Once fully alert may have clear liquid diet.  Advance diet as tolerated.  Vital signs as routine.    Diet:     Avoid Dairy Products.    Blood Thinner Directions:    Avoid Aspirin & other NSAIDS for _7__ days. Tylenol is okay.    Treatments:    May take \"Imodium - AD\" over-the-counter.Take 1/2 tablet at night orally. The dose may be increased to 1 tablet at night or even twice a day if needed.   Adjust the dose to have 1-2 soft stools a day.    Cortizone 10- OINTMENT (Hydrocortisone 1% Ointment) Over the counter:  Apply ano-rectally & inside rectum 3 times daily for 1 week. Avoid GEL or Cream.    Sitz bath: Use 3 times daily for 2 days.    Other Instructions:    Call [facility] at [phone] or come to the Emergency Department if you experience the following: Chest pain, abdominal pain, bleeding (vomiting of blood or coffee colored material, black stools or annie blood in stools), fever/chills, nausea and vomiting or dizziness.      Follow-up:  DR. PREETI THORNTON in 4 weeks.Office phone # [phone].    Follow-up colonoscopy: Depending on the biopsy results.        "

## 2018-05-03 NOTE — H&P (VIEW-ONLY)
Chief Complaint   Patient presents with   • Diarrhea   • Constipation     History of Present Illness     The patient has history of diarrhea off-and-on for the last several years perhaps since his late childhood. However over the last couple of years this has worsened.   Diarrhea is rather severe, frequency of bowel movements being 6-8 times a day.  The stools are described as loose and watery.  There is nocturnal element of diarrhea.  The diarrhea is associated with tenesmus and urgency.      The patient has history of bright red blood per rectum off and on for the last several months.  This is described as mild.  Quantity being a couple of drops at a time.  Frequency being occasional.  There is no history of associated dizziness.  The patient denies anorectal pain.      There is history of periumbilical abdominal pain off and on for the last 6-8 months.  The pain is gradual in onset, moderate in severity and cramping in character.  Frequency being 2-3 times a day.  The pain may last for several minutes.  There is no radiation of abdominal pain.  Eating worsens the abdominal discomfort.  No definite relieving factors of abdominal pain.    The patient has a history of reflux off and on for the last several years perhaps 18-20 years.  The reflux was moderately to severe.  Symptoms are described as retrosternal burning sensation, and indigestion.  There is history of occasional regurgitative symptoms.  Frequency being several times per week.  The symptoms are worse at night.  The patient has taken acid suppressive therapy in the past. He has undergone hernia surgery in October 2017 (perhaps fundoplication). Since then the patient denies significant reflux. There is history of dysphagia in the past. This has also improved after the hernia operation.    There is no history of nausea or vomiting. He denies hematemesis or melena. There is no recent weight loss.  The patient has history of abnormal liver function tests  described as mild elevation of liver enzymes for the last 3-4 years. The patient claims that recently he was found to have CKD2. Additionally his bilirubin was also noted to be elevated. The patient is not a diabetic.    There is history of unintentional weight loss of 35 pounds last year. However this has stabilized.    Patient's maternal grandfather  of colon cancer at age 40. Otherwise there is no family history of inflammatory bowel disease or chronic liver disease.       Review of Systems   Constitutional: Positive for appetite change and fatigue. Negative for chills, fever and unexpected weight change.   HENT: Positive for nosebleeds. Negative for mouth sores and trouble swallowing.    Eyes: Negative for discharge and redness.   Respiratory: Positive for apnea and shortness of breath. Negative for cough.    Cardiovascular: Negative for chest pain, palpitations and leg swelling.   Gastrointestinal: Positive for abdominal pain, blood in stool, constipation and diarrhea. Negative for abdominal distention, anal bleeding, nausea and vomiting.   Endocrine: Negative for cold intolerance, heat intolerance and polydipsia.   Genitourinary: Negative for dysuria, hematuria and urgency.   Musculoskeletal: Positive for arthralgias. Negative for joint swelling and myalgias.   Skin: Negative for rash.   Allergic/Immunologic: Negative for food allergies and immunocompromised state.   Neurological: Negative for dizziness, seizures, syncope and headaches.   Hematological: Negative for adenopathy. Does not bruise/bleed easily.   Psychiatric/Behavioral: Negative for dysphoric mood. The patient is not nervous/anxious and is not hyperactive.      Patient Active Problem List   Diagnosis   • Osteoarthrosis   • Fibromyalgia   • Tension headache, chronic   • Hyperlipidemia   • Moderate persistent asthma without complication   • Acid reflux   • Hoarseness   • Difficulty swallowing   • Corporo-venous occlusive erectile dysfunction   •  Gastroesophageal reflux disease with esophagitis   • Dysphagia   • Hernia, hiatal   • Back ache   • Chronic gout of multiple sites   • Hypertension   • Moderate persistent asthma without complication   • Low testosterone   • Post-operative state   • Irritable bowel syndrome with both constipation and diarrhea   • Encounter for preventative adult health care examination     Past Medical History:   Diagnosis Date   • Allergic rhinitis    • Asthma    • Atrial flutter    • Diarrhea    • Disturbance of skin sensation    • GERD (gastroesophageal reflux disease)    • Gout    • Gout    • History of MRI of spine 12/16/2014    Done at [facility]    • Hyperlipidemia    • Hypertension    • Kidney disease     stage 2   • Lymphadenitis    • Malaise and fatigue    • Osteoarthrosis    • Rash    • Renal disorder    • Sleep apnea      Past Surgical History:   Procedure Laterality Date   • BRAVO PROCEDURE N/A 8/4/2017    Procedure: ESOPHAGOGASTRODUODENOSCOPY AND FULLER;  Surgeon: Efra Suero MD;  Location: [facility] OR;  Service:    • COLONOSCOPY  2014   • NISSEN FUNDOPLICATION     • NISSEN FUNDOPLICATION N/A 10/11/2017    Procedure: NISSEN FUNDOPLICATION LAPAROSCOPIC;  Surgeon: Efra Suero MD;  Location: [facility] OR;  Service:    • TX LAP,ESOPHAGOGAST FUNDOPLASTY N/A 10/11/2017    Procedure: HIATAL HERNIA REPAIR LAPAROSCOPIC;  Surgeon: Efra Suero MD;  Location: [facility] OR;  Service: General   • VASECTOMY       Family History   Problem Relation Age of Onset   • Asthma Other    • Cancer Other    • COPD Other    • Diabetes Other    • Stroke Other    • Asthma Mother    • COPD Mother    • Asthma Father    • Depression Father    • Drug abuse Father    • Arthritis Maternal Grandmother    • Cancer Maternal Grandmother    • Diabetes Maternal Grandmother    • Heart disease Maternal Grandmother    • Hyperlipidemia Maternal Grandmother    • Stroke Paternal Grandfather    • Colon cancer Maternal Grandfather      mid 40s     Social History    Substance Use Topics   • Smoking status: Never Smoker   • Smokeless tobacco: Never Used   • Alcohol use No      Comment: social       Current Outpatient Prescriptions:   •  albuterol (PROVENTIL HFA;VENTOLIN HFA) 108 (90 Base) MCG/ACT inhaler, Inhale 2 puffs Every 4 (Four) Hours As Needed for Shortness of Air., Disp: 1 inhaler, Rfl: 5  •  allopurinol (ZYLOPRIM) 100 MG tablet, Take 1 tablet by mouth 2 (Two) Times a Day., Disp: 60 tablet, Rfl: 5  •  amitriptyline (ELAVIL) 25 MG tablet, Take 1 tablet by mouth At Night As Needed for Sleep., Disp: 30 tablet, Rfl: 5  •  amLODIPine (NORVASC) 10 MG tablet, Take 1 tablet by mouth Daily., Disp: 30 tablet, Rfl: 5  •  atorvastatin (LIPITOR) 10 MG tablet, Take 1 tablet by mouth Every Night., Disp: 30 tablet, Rfl: 5  •  budesonide-formoterol (SYMBICORT) 160-4.5 MCG/ACT inhaler, Inhale 2 puffs 2 (Two) Times a Day., Disp: 1 inhaler, Rfl: 5  •  CIALIS 5 MG tablet, Take 1 tablet by mouth Every Night., Disp: 30 tablet, Rfl: 5  •  CloNIDine (CATAPRES) 0.1 MG tablet, Take 1 tablet by mouth 2 (Two) Times a Day As Needed for High Blood Pressure (bp > 160/90)., Disp: 60 tablet, Rfl: 2  •  dicyclomine (BENTYL) 20 MG tablet, Take 2 tablets by mouth 4 (Four) Times a Day., Disp: 120 tablet, Rfl: 5  •  Eluxadoline 100 MG tablet, Take 1 tablet by mouth 2 (Two) Times a Day., Disp: 60 tablet, Rfl: 2  •  fluticasone (FLONASE) 50 MCG/ACT nasal spray, 2 sprays into each nostril Daily. Administer 2 sprays in each nostril for each dose., Disp: 1 bottle, Rfl: 2  •  HYDROcodone-acetaminophen (NORCO) 7.5-325 MG per tablet, Take 1 tablet by mouth Every 8 (Eight) Hours As Needed for Moderate Pain ., Disp: 45 tablet, Rfl: 0  •  ipratropium-albuterol (COMBIVENT RESPIMAT)  MCG/ACT inhaler, Inhale 1 puff 4 (Four) Times a Day As Needed for Wheezing. 1-2 PUFFS EVERY 4 HOURS AS NEEDED, Disp: 4 g, Rfl: 11  •  ipratropium-albuterol (DUO-NEB) 0.5-2.5 mg/mL nebulizer, INHALE THE CONTENTS OF 1 VIAL VIA NEBULIZER  "Q 6 H PRN, Disp: , Rfl: 5  •  loratadine (CLARITIN) 10 MG tablet, Take 1 tablet by mouth Every Night., Disp: 30 tablet, Rfl: 5  •  montelukast (SINGULAIR) 10 MG tablet, Take 1 tablet by mouth Every Night., Disp: 30 tablet, Rfl: 5  •  pregabalin (LYRICA) 50 MG capsule, Take 1 capsule by mouth 2 (Two) Times a Day., Disp: 60 capsule, Rfl: 2  •  sodium chloride (OCEAN NASAL SPRAY) 0.65 % nasal spray, 1 spray into each nostril As Needed for Congestion., Disp: 1 each, Rfl: 3  •  Syringe, Disposable, 3 ML misc, Use 3 ml syringe with a 25 gauge  5/8 inch needle, Disp: 24 each, Rfl: 6  •  Testosterone Cypionate (DEPO-TESTOSTERONE) 200 MG/ML injection, He is to use 1/2 cc every Monday and Thursday SQ (Patient taking differently: Inject 100 mg under the skin 2 (Two) Times a Week. He is to use 1/2 cc every Monday and Thursday SQ), Disp: 10 mL, Rfl: 2  •  Testosterone Cypionate (DEPO-TESTOSTERONE) 200 MG/ML injection, He is to use 1/2 cc every Monday and Thursday SQ, Disp: 10 mL, Rfl: 2  •  topiramate (TOPAMAX) 50 MG tablet, Take 1 tablet by mouth Every Night., Disp: 30 tablet, Rfl: 5  •  valsartan-hydrochlorothiazide (DIOVAN HCT) 160-12.5 MG per tablet, Take 1 tablet by mouth Daily., Disp: 30 tablet, Rfl: 5    Current Facility-Administered Medications:   •  CloNIDine (CATAPRES) tablet 0.1 mg, 0.1 mg, Oral, Q12H, CORDELIA Alexandre  •  ipratropium-albuterol (DUO-NEB) nebulizer solution 3 mL, 3 mL, Nebulization, 4x Daily - RT, CORDELIA Alexandre    Allergies   Allergen Reactions   • Erythromycin Rash       Blood pressure 133/81, pulse 68, temperature 98.5 °F (36.9 °C), resp. rate 16, height 180.3 cm (71\"), weight 117 kg (258 lb).    Physical Exam   Constitutional: He is oriented to person, place, and time. He appears well-developed and well-nourished. No distress.   HENT:   Head: Normocephalic and atraumatic.   Right Ear: Hearing and external ear normal.   Left Ear: Hearing and external ear normal. "   Nose: Nose normal.   Mouth/Throat: Oropharynx is clear and moist and mucous membranes are normal. Mucous membranes are not pale, not dry and not cyanotic. No oral lesions. No oropharyngeal exudate.   Eyes: Conjunctivae and EOM are normal. Right eye exhibits no discharge. Left eye exhibits no discharge. No scleral icterus.   Neck: Trachea normal. Neck supple. No JVD present. No edema present. No thyroid mass and no thyromegaly present.   Cardiovascular: Normal rate, regular rhythm, S2 normal and normal heart sounds.  Exam reveals no gallop, no S3 and no friction rub.    No murmur heard.  Pulmonary/Chest: Effort normal and breath sounds normal. No respiratory distress. He has no wheezes. He has no rales. He exhibits no tenderness.   Abdominal: Soft. Normal appearance and bowel sounds are normal. He exhibits no distension, no ascites and no mass. There is no splenomegaly or hepatomegaly. There is tenderness (mild in epigastriumand moderate) in the right lower quadrant and epigastric area. There is no rigidity, no rebound and no guarding. No hernia.   Musculoskeletal: He exhibits no tenderness or deformity.     Vascular Status -  His right foot exhibits no edema. His left foot exhibits no edema.  Lymphadenopathy:     He has no cervical adenopathy.        Left: No supraclavicular adenopathy present.   Neurological: He is alert and oriented to person, place, and time. He has normal strength. No cranial nerve deficit or sensory deficit. He exhibits normal muscle tone. Coordination normal.   Skin: No rash noted. He is not diaphoretic. No cyanosis. No pallor. Nails show no clubbing.   Psychiatric: He has a normal mood and affect. His behavior is normal. Judgment and thought content normal.   Nursing note and vitals reviewed.  Stigmata of chronic liver disease:  None.  Asterixis:  None.    Laboratory Testing:  Upon review of medical records:      Dated November 21, 2017 sodium 141 potassium 4.3 chloride 105 CO2 30 BUN 12  serum creatinine 1.19 and glucose 90.  Calcium 9.4.  Total protein 7.1.  Albumin 4.60.  T bili 1.7 AST 26 ALT 32 alkaline phosphatase 74.  Total cholesterol 129.  Triglycerides 142.  TSH 1.423.  Vitamin B12 level 502.  WBC 5.69 hemoglobin 17.6 hematocrit 49.4 MCV 84.0 platelet count 215.     Abdominal Imaging:  Upon review of records:     Dated February 5, 2017 the patient underwent a Nuclear Medicine Hepatobiliary Scan which revealed:  Normal visualization of the gallbladder.  Following a fatty meal, a 46% ejection fraction was calculated.      Procedures:  Upon review of medical records:    Dated August 4, 2017 the patient underwent an upper endoscopy by Dr. Suero (surgical services), which revealed:  Gastroesophageal reflux disease, Hiatal hernia and severe ulcerative esophagitis.  Duodenum, biopsy revealed benign duodenal mucosa with no diagnostic abnormality.  Antrum, biopsy revealed compatible with reactive gastropathy.  Esophagus, biopsies revealed acute ulcerative esophagitis.  Benign foveolar epithelium.  Negative for intestinal metaplasia.  Fungal stain negative.    Assessment:      ICD-10-CM ICD-9-CM   1. Periumbilical abdominal pain R10.33 789.05   2. Diarrhea, unspecified type R19.7 787.91   3. Hematochezia K92.1 578.1   4. Heartburn R12 787.1   5. Abnormal liver function tests R79.89 790.6         Discussion:  1. History of recurrent chronic diarrhea with recent worsening, periumbilical abdominal pain and occasional hematochezia. The patient has significant tenderness in the right lower quadrant raising the possibility of underlying inflammatory bowel disease especially Crohn's disease involving the terminal ileum. Differential of diarrhea also includes celiac disease, microscopic colitis and Valsartan related enteropathy.  2. History of abnormal liver function tests.    Plan/  Patient Instructions   1. Obtain medical records including previous laboratory tests.  2. Low lactose-low-residue diet.    3. Check fasting CMP with fractionation of total bilirubin.  4. Colonoscopy: Description of the procedure, risks benefits alternatives and options including non-operative options were discussed with the patient in detail.  The patient understands and wishes to proceed.  5. Discussed with the patient and his wife in detail. Opportunity was given to ask questions.         Bang Segundo MD

## 2018-05-08 LAB
LAB AP CASE REPORT: NORMAL
Lab: NORMAL
PATH REPORT.FINAL DX SPEC: NORMAL

## 2018-05-17 ENCOUNTER — OFFICE VISIT (OUTPATIENT)
Dept: FAMILY MEDICINE CLINIC | Facility: CLINIC | Age: 33
End: 2018-05-17

## 2018-05-17 VITALS
HEART RATE: 70 BPM | SYSTOLIC BLOOD PRESSURE: 149 MMHG | WEIGHT: 254 LBS | OXYGEN SATURATION: 97 % | TEMPERATURE: 98.1 F | BODY MASS INDEX: 35.56 KG/M2 | DIASTOLIC BLOOD PRESSURE: 88 MMHG | HEIGHT: 71 IN

## 2018-05-17 DIAGNOSIS — I10 ESSENTIAL HYPERTENSION: ICD-10-CM

## 2018-05-17 DIAGNOSIS — F32.0 MILD SINGLE CURRENT EPISODE OF MAJOR DEPRESSIVE DISORDER (HCC): Primary | ICD-10-CM

## 2018-05-17 DIAGNOSIS — M15.9 PRIMARY OSTEOARTHRITIS INVOLVING MULTIPLE JOINTS: ICD-10-CM

## 2018-05-17 PROBLEM — F32.A DEPRESSION: Status: ACTIVE | Noted: 2018-05-17

## 2018-05-17 PROCEDURE — 99214 OFFICE O/P EST MOD 30 MIN: CPT | Performed by: NURSE PRACTITIONER

## 2018-05-17 RX ORDER — HYDROCODONE BITARTRATE AND ACETAMINOPHEN 7.5; 325 MG/1; MG/1
1 TABLET ORAL EVERY 8 HOURS PRN
Qty: 45 TABLET | Refills: 0 | Status: SHIPPED | OUTPATIENT
Start: 2018-05-17 | End: 2018-06-11 | Stop reason: SDUPTHER

## 2018-05-17 RX ORDER — FLUOXETINE 10 MG/1
10 CAPSULE ORAL DAILY
Qty: 30 CAPSULE | Refills: 2 | Status: SHIPPED | OUTPATIENT
Start: 2018-05-17 | End: 2018-06-11

## 2018-05-17 RX ORDER — CLONIDINE HYDROCHLORIDE 0.1 MG/1
0.1 TABLET ORAL EVERY 12 HOURS SCHEDULED
Status: DISCONTINUED | OUTPATIENT
Start: 2018-05-17 | End: 2018-06-06

## 2018-05-17 NOTE — PROGRESS NOTES
Subjective   Nghia Roach is a 32 y.o. male.     Chief Complaint   Patient presents with   • Follow-up       History of Present Illness      Lyrica made him vasques and hatful with his family. Failed cymbalta in the past due to stomach irritation. He is requesting something for depression. Is feeling down and stressed. Having difficulty dealing with family stress.   This is a hard time of the year due to some losses of family that occurred during summer months. Currently on Elavil to help him sleep. Failed lexapro and celexa in the past. No thoughts of hurting himself or others    Chronic back pain and  Ride side sciatic pain. Rates pain as 6 on psr of 0-10. Increased pain in his right lower back with radiation into the right lower extremity.  This pain is sharp and tingling.  Worse with activity or prolonged standing.  He has been seen by neurology and orthopedic consult.  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  Pain interferes with ability to sleep.  He does feel that being in chronic pain increases his depressive symptoms.  He denies any thoughts of hurting self or others.  He denies any substance abuse or addiction.  He does report that taking Norco to relieve his pain helps him be more active around the house and decreases the pain after a long workday allowing him to sleep.  Radiology is on file.  He has attempted physical therapy with no improvement.      Hypertension - patient's blood pressure upon initial evaluation is 150/100.  He will be given Clonodine 0.1 mg and evaluated in 20 minutes for response.  He reports feeling upset today due to some family issues. He does check his blood pressure at home with readings reported with in normal ranges.       The following portions of the patient's history were reviewed and updated as appropriate: allergies, current medications, past family history, past  "medical history, past social history, past surgical history and problem list.    Review of Systems   Constitutional: Negative for appetite change, fatigue and unexpected weight change.   HENT: Positive for sneezing. Negative for congestion, ear pain, nosebleeds, postnasal drip, rhinorrhea, sinus pain, sinus pressure, sore throat, trouble swallowing and voice change.    Eyes: Negative for pain and visual disturbance.   Respiratory: Negative for cough, choking, chest tightness, shortness of breath and wheezing.    Cardiovascular: Negative for chest pain, palpitations and leg swelling.   Gastrointestinal: Positive for abdominal pain, constipation and diarrhea. Negative for blood in stool.   Endocrine: Negative for cold intolerance and polydipsia.   Genitourinary: Negative for difficulty urinating, dysuria, flank pain, hematuria and penile pain.   Musculoskeletal: Positive for arthralgias, back pain and neck pain. Negative for gait problem, joint swelling and myalgias.   Skin: Negative for color change and rash.   Allergic/Immunologic: Positive for environmental allergies.   Neurological: Negative for syncope, numbness and headaches.   Hematological: Negative.    Psychiatric/Behavioral: Positive for dysphoric mood and self-injury. Negative for decreased concentration, sleep disturbance and suicidal ideas. The patient is not nervous/anxious.    All other systems reviewed and are negative.      Objective     /88   Pulse 70   Temp 98.1 °F (36.7 °C) (Tympanic)   Ht 180.3 cm (70.98\")   Wt 115 kg (254 lb)   SpO2 97%   BMI 35.44 kg/m²   Admission on 05/03/2018, Discharged on 05/03/2018   Component Date Value Ref Range Status   • Case Report 05/03/2018    Final                    Value:Surgical Pathology Report                         Case: [id number]                                  Authorizing Provider:  Bang Segundo MD          Collected:           05/03/2018 11:48 AM          Ordering Location:     [facility]" [facility]    Received:            05/04/2018 09:30 AM                                 [facility]                                                                    Pathologist:           Devin Conway MD                                                           Specimens:   1) - Small Intestine, Ileum, TERMINAL ILEUM BIOPSIES                                                2) - Large Intestine, RANDOM BIOPSIES                                                               3) - Large Intestine, Rectum, RECTAL POLYP                                                          4) - Large Intestine, Rectum, RECTAL BIOPSY ERROSION                                                5) - Large Intestine, Sigmoid Colon, SIGMOID COLON POLYPS                                                                     6) - Large Intestine, Left / Descending Colon, DESCENDING POLYP                                     7) - Large Intestine, Transverse Colon, TRANSVERSE COLON POLYP                            • Final Diagnosis 05/03/2018    Final                    Value:This result contains rich text formatting which cannot be displayed here.       Physical Exam   Constitutional: He is oriented to person, place, and time. He appears well-developed and well-nourished. No distress.   HENT:   Head: Normocephalic.   Right Ear: External ear normal.   Left Ear: External ear normal.   Nose: Nose normal.   Mouth/Throat: Oropharynx is clear and moist. No oropharyngeal exudate.   Eyes: Pupils are equal, round, and reactive to light. Right eye exhibits no discharge. Left eye exhibits no discharge.   Neck: Normal range of motion. Neck supple. No tracheal deviation present. No thyromegaly present.   Cardiovascular: Normal rate, regular rhythm and normal heart sounds.  Exam reveals no gallop and no friction rub.    No murmur heard.  Pulmonary/Chest: Effort normal and breath sounds normal. No respiratory distress. He has no wheezes. He has no rales. He  exhibits no tenderness.   Abdominal: Soft. Bowel sounds are normal. He exhibits no distension and no mass. There is no tenderness. There is no rebound and no guarding.   Musculoskeletal:        Lumbar back: He exhibits decreased range of motion, tenderness and spasm.   Lymphadenopathy:     He has no cervical adenopathy.   Neurological: He is alert and oriented to person, place, and time. He has normal strength and normal reflexes. No cranial nerve deficit or sensory deficit.   Reflex Scores:       Patellar reflexes are 2+ on the right side and 2+ on the left side.  CN 2-12 grossly intact    Skin: Skin is warm and dry. Capillary refill takes more than 3 seconds. No rash noted. He is not diaphoretic. No erythema. No pallor.   Psychiatric: He has a normal mood and affect. His behavior is normal. Judgment and thought content normal.   Vitals reviewed.    Good response of blood pressure 20 minutes post Clonidine 0.5 mg administration bp is 149/88.       Assessment/Plan     Problem List Items Addressed This Visit        Cardiovascular and Mediastinum    Essential hypertension    Relevant Medications    CloNIDine (CATAPRES) tablet 0.1 mg (Start on 5/17/2018  1:15 PM)       Musculoskeletal and Integument    Osteoarthrosis    Relevant Medications    HYDROcodone-acetaminophen (NORCO) 7.5-325 MG per tablet    Other Relevant Orders    Urine Drug Screen - Urine, Clean Catch       Other    Depression - Primary    Relevant Medications    FLUoxetine (PROzac) 10 MG capsule        I have discussed diagnosis in detail today allowing time for questions and answers. Pt is aware of reasons to seek urgent or emergent medical care as well as reasons to return to the clinic for evaluation. Possible side effects, interactions and progression of symptoms discussed as well. Pt / family states understanding.   Emotional support and active listening provided.   Proper body mechanics has been reviewed and discussed today.  Body mass index is  35.44 kg/m².  Patient's Body mass index is 35.44 kg/m². BMI is above normal parameters. Recommendations include: educational material, exercise counseling and nutrition counseling.  Declines counseling at this time for anxiety/depression. Start prozac. Discussed possible side effects and reasons to stop medication and seek immediate medical attention. Understanding stated.  Refill Norco 7.5 mg one every 8 hours as needed for pain , #45 no refills.  Jurgen has been reviewed as consistent.  Uds is on file.   Goal: Improved quality of life and reduction in pain as evidenced by pt report.   Goal: pt will report improved anxiety /depressive symptoms.   Patient has been instructed and counseled regarding opioid misuse and risk of addiction.  We have discussed proper storage and disposal of controlled medication.  As part of this patient's treatment plan they are being prescribed controlled substance/substances with potential for abuse. This patient has been made aware of the appropriate use of such medications, including potential risk of somnolence, limited ability to drive and / or work safely, and potential for overdose. It has also been made clear that these medications are for use by this patient only, without concomitant use of alcohol or other substances unless prescribed/advised by medical provider. Patient has completed controlled substance agreement detailing terms of continued prescribing of controlled substances including monitoring Jurgen reports, drug screens and pill counts. The patient was asked and states they are not receiving narcotic medication from any there provider or any provider that this office has not been made aware of. History and physical exam exhibit continued safe and appropriate use of controlled substances.   Report any bp over 150/90.     Follow up in  2 weeks - one month, sooner if needed. Routine labs every 3-6 months.     Errors in dictation may reflect use of voice recognition  software and not all errors in transcription may have been detected prior to signing.            This document has been electronically signed by:  CORDELIA Flores, NP-C

## 2018-05-22 ENCOUNTER — LAB (OUTPATIENT)
Dept: FAMILY MEDICINE CLINIC | Facility: CLINIC | Age: 33
End: 2018-05-22

## 2018-05-22 DIAGNOSIS — R10.33 UMBILICAL PAIN: Primary | ICD-10-CM

## 2018-05-22 DIAGNOSIS — R10.33 PERIUMBILICAL ABDOMINAL PAIN: ICD-10-CM

## 2018-05-22 LAB
ALBUMIN SERPL-MCNC: 4.4 G/DL (ref 3.5–5)
ALBUMIN/GLOB SERPL: 1.8 G/DL (ref 1.5–2.5)
ALP SERPL-CCNC: 58 U/L (ref 40–129)
ALT SERPL W P-5'-P-CCNC: 33 U/L (ref 10–44)
ANION GAP SERPL CALCULATED.3IONS-SCNC: 6 MMOL/L (ref 3.6–11.2)
AST SERPL-CCNC: 28 U/L (ref 10–34)
BILIRUB CONJ SERPL-MCNC: 0.4 MG/DL (ref 0–0.2)
BILIRUB INDIRECT SERPL-MCNC: 1.3 MG/DL
BILIRUB SERPL-MCNC: 1.7 MG/DL (ref 0.2–1.8)
BILIRUB SERPL-MCNC: 1.7 MG/DL (ref 0.2–1.8)
BUN BLD-MCNC: 15 MG/DL (ref 7–21)
BUN/CREAT SERPL: 12.1 (ref 7–25)
CALCIUM SPEC-SCNC: 9.3 MG/DL (ref 7.7–10)
CHLORIDE SERPL-SCNC: 102 MMOL/L (ref 99–112)
CO2 SERPL-SCNC: 30 MMOL/L (ref 24.3–31.9)
CREAT BLD-MCNC: 1.24 MG/DL (ref 0.43–1.29)
GFR SERPL CREATININE-BSD FRML MDRD: 67 ML/MIN/1.73
GLOBULIN UR ELPH-MCNC: 2.5 GM/DL
GLUCOSE BLD-MCNC: 78 MG/DL (ref 70–110)
OSMOLALITY SERPL CALC.SUM OF ELEC: 275.4 MOSM/KG (ref 273–305)
POTASSIUM BLD-SCNC: 3.7 MMOL/L (ref 3.5–5.3)
PROT SERPL-MCNC: 6.9 G/DL (ref 6–8)
SODIUM BLD-SCNC: 138 MMOL/L (ref 135–153)

## 2018-05-22 PROCEDURE — 36415 COLL VENOUS BLD VENIPUNCTURE: CPT

## 2018-05-22 PROCEDURE — 82248 BILIRUBIN DIRECT: CPT | Performed by: INTERNAL MEDICINE

## 2018-05-22 PROCEDURE — 82247 BILIRUBIN TOTAL: CPT | Performed by: INTERNAL MEDICINE

## 2018-05-22 PROCEDURE — 80053 COMPREHEN METABOLIC PANEL: CPT | Performed by: INTERNAL MEDICINE

## 2018-05-24 ENCOUNTER — OFFICE VISIT (OUTPATIENT)
Dept: GASTROENTEROLOGY | Facility: CLINIC | Age: 33
End: 2018-05-24

## 2018-05-24 VITALS
SYSTOLIC BLOOD PRESSURE: 150 MMHG | DIASTOLIC BLOOD PRESSURE: 88 MMHG | HEIGHT: 71 IN | RESPIRATION RATE: 16 BRPM | TEMPERATURE: 97.8 F | BODY MASS INDEX: 35.28 KG/M2 | HEART RATE: 64 BPM | WEIGHT: 252 LBS

## 2018-05-24 DIAGNOSIS — D12.4 ADENOMATOUS POLYP OF DESCENDING COLON: ICD-10-CM

## 2018-05-24 DIAGNOSIS — R19.7 DIARRHEA, UNSPECIFIED TYPE: Primary | ICD-10-CM

## 2018-05-24 DIAGNOSIS — R12 HEARTBURN: ICD-10-CM

## 2018-05-24 DIAGNOSIS — R63.4 WEIGHT LOSS: ICD-10-CM

## 2018-05-24 DIAGNOSIS — E66.3 OVER WEIGHT: ICD-10-CM

## 2018-05-24 PROCEDURE — 99214 OFFICE O/P EST MOD 30 MIN: CPT | Performed by: INTERNAL MEDICINE

## 2018-06-06 RX ORDER — CLONIDINE HYDROCHLORIDE 0.1 MG/1
TABLET ORAL
Qty: 60 TABLET | Refills: 5 | Status: SHIPPED | OUTPATIENT
Start: 2018-06-06 | End: 2018-08-09

## 2018-06-08 ENCOUNTER — TELEPHONE (OUTPATIENT)
Dept: GASTROENTEROLOGY | Facility: CLINIC | Age: 33
End: 2018-06-08

## 2018-06-08 NOTE — TELEPHONE ENCOUNTER
FOLLOWING-UP ON OVERDUE LABS. PATIENT STATES HE HAS ANOTHER APPT ON THE 17TH OF June AND WILL HAVE THE LABS DRAWN THEN. WILL FOLLOW-UP WITH PATIENT.

## 2018-06-09 ENCOUNTER — HOSPITAL ENCOUNTER (EMERGENCY)
Facility: HOSPITAL | Age: 33
Discharge: HOME | End: 2018-06-09
Attending: EMERGENCY MEDICINE | Admitting: EMERGENCY MEDICINE

## 2018-06-09 ENCOUNTER — APPOINTMENT (OUTPATIENT)
Dept: CT IMAGING | Facility: HOSPITAL | Age: 33
End: 2018-06-09

## 2018-06-09 VITALS
DIASTOLIC BLOOD PRESSURE: 84 MMHG | RESPIRATION RATE: 17 BRPM | OXYGEN SATURATION: 98 % | HEART RATE: 95 BPM | WEIGHT: 255 LBS | TEMPERATURE: 97.9 F | SYSTOLIC BLOOD PRESSURE: 139 MMHG | BODY MASS INDEX: 35.7 KG/M2 | HEIGHT: 71 IN

## 2018-06-09 DIAGNOSIS — M54.6 ACUTE MIDLINE THORACIC BACK PAIN: Primary | ICD-10-CM

## 2018-06-09 PROCEDURE — 25010000002 METHYLPREDNISOLONE PER 125 MG: Performed by: EMERGENCY MEDICINE

## 2018-06-09 PROCEDURE — 25010000002 ORPHENADRINE CITRATE PER 60 MG: Performed by: EMERGENCY MEDICINE

## 2018-06-09 PROCEDURE — 96372 THER/PROPH/DIAG INJ SC/IM: CPT

## 2018-06-09 PROCEDURE — 72128 CT CHEST SPINE W/O DYE: CPT

## 2018-06-09 PROCEDURE — 99283 EMERGENCY DEPT VISIT LOW MDM: CPT

## 2018-06-09 PROCEDURE — 72128 CT CHEST SPINE W/O DYE: CPT | Performed by: RADIOLOGY

## 2018-06-09 PROCEDURE — 25010000002 MORPHINE SULFATE (PF) 2 MG/ML SOLUTION: Performed by: EMERGENCY MEDICINE

## 2018-06-09 RX ORDER — MORPHINE SULFATE 2 MG/ML
4 INJECTION, SOLUTION INTRAMUSCULAR; INTRAVENOUS ONCE
Status: COMPLETED | OUTPATIENT
Start: 2018-06-09 | End: 2018-06-09

## 2018-06-09 RX ORDER — HYDROMORPHONE HCL 110MG/55ML
1 PATIENT CONTROLLED ANALGESIA SYRINGE INTRAVENOUS ONCE
Status: DISCONTINUED | OUTPATIENT
Start: 2018-06-09 | End: 2018-06-09

## 2018-06-09 RX ORDER — METHYLPREDNISOLONE SODIUM SUCCINATE 125 MG/2ML
125 INJECTION, POWDER, LYOPHILIZED, FOR SOLUTION INTRAMUSCULAR; INTRAVENOUS ONCE
Status: COMPLETED | OUTPATIENT
Start: 2018-06-09 | End: 2018-06-09

## 2018-06-09 RX ORDER — CYCLOBENZAPRINE HCL 10 MG
10 TABLET ORAL 3 TIMES DAILY PRN
Qty: 20 TABLET | Refills: 0 | Status: SHIPPED | OUTPATIENT
Start: 2018-06-09 | End: 2019-07-10

## 2018-06-09 RX ORDER — ORPHENADRINE CITRATE 30 MG/ML
60 INJECTION INTRAMUSCULAR; INTRAVENOUS ONCE
Status: COMPLETED | OUTPATIENT
Start: 2018-06-09 | End: 2018-06-09

## 2018-06-09 RX ADMIN — METHYLPREDNISOLONE SODIUM SUCCINATE 125 MG: 125 INJECTION, POWDER, FOR SOLUTION INTRAMUSCULAR; INTRAVENOUS at 10:46

## 2018-06-09 RX ADMIN — MORPHINE SULFATE 4 MG: 2 INJECTION, SOLUTION INTRAMUSCULAR; INTRAVENOUS at 10:42

## 2018-06-09 RX ADMIN — ORPHENADRINE CITRATE 60 MG: 30 INJECTION INTRAMUSCULAR; INTRAVENOUS at 10:46

## 2018-06-09 NOTE — ED NOTES
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 Pt has concerns due to PMH of \"bad discs\" in his back.\"     Colt Barrios RN  06/09/18 0968    "

## 2018-06-09 NOTE — ED PROVIDER NOTES
"Subjective     History provided by:  Patient   used: No    Back Pain   Location:  Thoracic spine  Quality:  Aching  Radiates to:  Does not radiate  Pain severity:  Moderate  Pain is:  Same all the time  Onset quality: chronic back pain, worse yesterday   Timing:  Constant  Progression:  Unchanged  Chronicity: acute on chronic   Context: lifting heavy objects    Relieved by:  Nothing  Worsened by:  Ambulation, movement and palpation  Ineffective treatments: norco 7.5.  Associated symptoms: no bladder incontinence, no bowel incontinence, no chest pain, no dysuria, no fever, no headaches, no leg pain, no numbness, no paresthesias, no perianal numbness, no tingling and no weakness    Risk factors: not obese        Review of Systems   Constitutional: Negative for activity change and fever.   HENT: Negative for congestion, rhinorrhea and sore throat.    Eyes: Negative for pain.   Respiratory: Negative for cough, shortness of breath and wheezing.    Cardiovascular: Negative for chest pain.   Gastrointestinal: Negative for abdominal distention, bowel incontinence, diarrhea, nausea and vomiting.   Genitourinary: Negative for bladder incontinence, difficulty urinating and dysuria.   Musculoskeletal: Positive for back pain. Negative for arthralgias and myalgias.   Skin: Negative for rash and wound.   Neurological: Negative for dizziness, tingling, weakness, numbness, headaches and paresthesias.   Psychiatric/Behavioral: Negative for agitation.   All other systems reviewed and are negative.      Past Medical History:   Diagnosis Date   • Allergic rhinitis    • Asthma    • Atrial flutter    • Diarrhea    • Disturbance of skin sensation    • GERD (gastroesophageal reflux disease)    • Gout    • History of MRI of spine 12/16/2014    Done at [facility]    • Hx of exercise stress test 2014    \"IT WAS NORMAL\"     • Hyperlipidemia    • Hypertension    • Kidney disease     stage 2   • Lymphadenitis    • Malaise " and fatigue    • Osteoarthrosis    • Rash    • Renal disorder    • Sleep apnea     NO CPAP   • Wears glasses     TO READ       Allergies   Allergen Reactions   • Erythromycin Rash       Past Surgical History:   Procedure Laterality Date   • BRAVO PROCEDURE N/A 8/4/2017    Procedure: ESOPHAGOGASTRODUODENOSCOPY AND FULLER;  Surgeon: Efra Suero MD;  Location: [facility] OR;  Service:    • COLONOSCOPY  2014   • COLONOSCOPY      UNSURE OF DATE   • COLONOSCOPY N/A 5/3/2018    Procedure: COLONOSCOPY WITH COLD BIOPSIES AND COLD POLYPECTOMIES;  Surgeon: Bang Segundo MD;  Location: [facility] ENDOSCOPY;  Service: Gastroenterology   • NISSEN FUNDOPLICATION     • NISSEN FUNDOPLICATION N/A 10/11/2017    Procedure: NISSEN FUNDOPLICATION LAPAROSCOPIC;  Surgeon: Efra Suero MD;  Location: [facility] OR;  Service:    • IL LAP,ESOPHAGOGAST FUNDOPLASTY N/A 10/11/2017    Procedure: HIATAL HERNIA REPAIR LAPAROSCOPIC;  Surgeon: Efra Suero MD;  Location: [facility] OR;  Service: General   • VASECTOMY         Family History   Problem Relation Age of Onset   • Asthma Other    • Cancer Other    • COPD Other    • Diabetes Other    • Stroke Other    • Asthma Mother    • COPD Mother    • Asthma Father    • Depression Father    • Drug abuse Father    • Arthritis Maternal Grandmother    • Cancer Maternal Grandmother    • Diabetes Maternal Grandmother    • Heart disease Maternal Grandmother    • Hyperlipidemia Maternal Grandmother    • Stroke Paternal Grandfather    • Colon cancer Maternal Grandfather         mid 40s       Social History     Social History   • Marital status:      Spouse name: Radha      Occupational History   • [employer]      Social History Main Topics   • Smoking status: Never Smoker   • Smokeless tobacco: Never Used   • Alcohol use Yes      Comment: social   • Drug use: No   • Sexual activity: Defer     Other Topics Concern   • Not on file           Objective   Physical Exam   Constitutional: He is oriented to person,  place, and time. He appears well-developed and well-nourished.   HENT:   Head: Normocephalic and atraumatic.   Eyes: EOM are normal. Pupils are equal, round, and reactive to light.   Neck: Normal range of motion. Neck supple.   Cardiovascular: Normal rate, regular rhythm and normal heart sounds.    Pulmonary/Chest: Effort normal and breath sounds normal.   Abdominal: Soft. Bowel sounds are normal.   Musculoskeletal: Normal range of motion.        Thoracic back: He exhibits tenderness and pain.   Neurological: He is alert and oriented to person, place, and time.   Skin: Skin is warm and dry.   Psychiatric: He has a normal mood and affect. His behavior is normal. Judgment and thought content normal.   Nursing note and vitals reviewed.      Procedures           ED Course                  MDM  Number of Diagnoses or Management Options     Amount and/or Complexity of Data Reviewed  Clinical lab tests: ordered and reviewed  Tests in the radiology section of CPT®: ordered and reviewed  Tests in the medicine section of CPT®: reviewed and ordered  Independent visualization of images, tracings, or specimens: yes    Patient Progress  Patient progress: stable        Final diagnoses:   Acute midline thoracic back pain            DOMITILA Hensley  06/09/18 1037

## 2018-06-11 ENCOUNTER — OFFICE VISIT (OUTPATIENT)
Dept: FAMILY MEDICINE CLINIC | Facility: CLINIC | Age: 33
End: 2018-06-11

## 2018-06-11 VITALS
BODY MASS INDEX: 35.28 KG/M2 | SYSTOLIC BLOOD PRESSURE: 120 MMHG | DIASTOLIC BLOOD PRESSURE: 76 MMHG | OXYGEN SATURATION: 96 % | TEMPERATURE: 97.7 F | HEART RATE: 79 BPM | WEIGHT: 252 LBS | HEIGHT: 71 IN

## 2018-06-11 DIAGNOSIS — G89.29 EXACERBATION OF CHRONIC BACK PAIN: ICD-10-CM

## 2018-06-11 DIAGNOSIS — Z09 ENCOUNTER FOR EXAMINATION FOLLOWING TREATMENT AT HOSPITAL: Primary | ICD-10-CM

## 2018-06-11 DIAGNOSIS — M54.9 EXACERBATION OF CHRONIC BACK PAIN: ICD-10-CM

## 2018-06-11 DIAGNOSIS — M15.9 PRIMARY OSTEOARTHRITIS INVOLVING MULTIPLE JOINTS: ICD-10-CM

## 2018-06-11 PROCEDURE — 99214 OFFICE O/P EST MOD 30 MIN: CPT | Performed by: NURSE PRACTITIONER

## 2018-06-11 PROCEDURE — 96372 THER/PROPH/DIAG INJ SC/IM: CPT | Performed by: NURSE PRACTITIONER

## 2018-06-11 RX ORDER — HYDROCODONE BITARTRATE AND ACETAMINOPHEN 7.5; 325 MG/1; MG/1
1 TABLET ORAL EVERY 6 HOURS PRN
Qty: 60 TABLET | Refills: 0 | Status: SHIPPED | OUTPATIENT
Start: 2018-06-11 | End: 2018-07-11 | Stop reason: SDUPTHER

## 2018-06-11 RX ORDER — KETOROLAC TROMETHAMINE 30 MG/ML
60 INJECTION, SOLUTION INTRAMUSCULAR; INTRAVENOUS ONCE
Status: COMPLETED | OUTPATIENT
Start: 2018-06-11 | End: 2018-06-11

## 2018-06-11 RX ORDER — METHOCARBAMOL 750 MG/1
750 TABLET, FILM COATED ORAL 4 TIMES DAILY PRN
Qty: 120 TABLET | Refills: 5 | Status: SHIPPED | OUTPATIENT
Start: 2018-06-11 | End: 2020-01-30 | Stop reason: SDUPTHER

## 2018-06-11 RX ADMIN — KETOROLAC TROMETHAMINE 60 MG: 30 INJECTION, SOLUTION INTRAMUSCULAR; INTRAVENOUS at 08:33

## 2018-06-11 NOTE — PROGRESS NOTES
Subjective   Nghia Roach is a 32 y.o. male.     Chief Complaint   Patient presents with   • Back Pain       History of Present Illness     ER follow up from 6/9/18.   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 His usual pain medication did not help much. He went to the ER the next day and was treated for muscle strain by Dr. Anthony at [facility]. A CT was performed. He was given three injections which helped mildly. He went home and was able to sleep but awoke in the same pain. Pain is described as a strong ache and sharp in his mid left back. Pain is rated 5 on psr of 0-10 today vs 9 on psr over the weekend.         The following portions of the patient's history were reviewed and updated as appropriate: allergies, current medications, past family history, past medical history, past social history, past surgical history and problem list.    Review of Systems   Constitutional: Negative.    HENT: Positive for congestion and sneezing. Negative for sinus pain and trouble swallowing.    Eyes: Positive for redness and itching.   Respiratory: Negative.  Negative for cough, choking, chest tightness, shortness of breath and wheezing.    Cardiovascular: Negative.    Gastrointestinal: Positive for constipation and diarrhea. Negative for abdominal distention, abdominal pain, anal bleeding, blood in stool, nausea and vomiting.   Endocrine: Negative.    Genitourinary: Negative.    Musculoskeletal: Positive for arthralgias, back pain, myalgias and neck pain. Negative for neck stiffness.   Skin: Negative.    Allergic/Immunologic: Positive for environmental allergies.   Neurological: Positive for headaches. Negative for syncope, speech difficulty and weakness.   Hematological: Negative.    Psychiatric/Behavioral: Negative for dysphoric mood, self-injury and suicidal ideas. The patient is not nervous/anxious.   "      Objective     /76   Pulse 79   Temp 97.7 °F (36.5 °C) (Oral)   Ht 180 cm (70.87\")   Wt 114 kg (252 lb)   SpO2 96%   BMI 35.28 kg/m²   Lab on 05/22/2018   Component Date Value Ref Range Status   • Glucose 05/22/2018 78  70 - 110 mg/dL Final   • BUN 05/22/2018 15  7 - 21 mg/dL Final   • Creatinine 05/22/2018 1.24  0.43 - 1.29 mg/dL Final   • Sodium 05/22/2018 138  135 - 153 mmol/L Final   • Potassium 05/22/2018 3.7  3.5 - 5.3 mmol/L Final   • Chloride 05/22/2018 102  99 - 112 mmol/L Final   • CO2 05/22/2018 30.0  24.3 - 31.9 mmol/L Final   • Calcium 05/22/2018 9.3  7.7 - 10.0 mg/dL Final   • Total Protein 05/22/2018 6.9  6.0 - 8.0 g/dL Final   • Albumin 05/22/2018 4.40  3.50 - 5.00 g/dL Final   • ALT (SGPT) 05/22/2018 33  10 - 44 U/L Final   • AST (SGOT) 05/22/2018 28  10 - 34 U/L Final   • Alkaline Phosphatase 05/22/2018 58  40 - 129 U/L Final   • Total Bilirubin 05/22/2018 1.7  0.2 - 1.8 mg/dL Final   • eGFR Non African Amer 05/22/2018 67  >60 mL/min/1.73 Final   • Globulin 05/22/2018 2.5  gm/dL Final   • A/G Ratio 05/22/2018 1.8  1.5 - 2.5 g/dL Final   • BUN/Creatinine Ratio 05/22/2018 12.1  7.0 - 25.0 Final   • Anion Gap 05/22/2018 6.0  3.6 - 11.2 mmol/L Final   • Total Bilirubin 05/22/2018 1.7  0.2 - 1.8 mg/dL Final   • Bilirubin, Direct 05/22/2018 0.4* 0.0 - 0.2 mg/dL Final   • Bilirubin, Indirect 05/22/2018 1.3  mg/dL Final   • Osmolality Calc 05/22/2018 275.4  273.0 - 305.0 mOsm/kg Final       Physical Exam   Constitutional: He is oriented to person, place, and time. He appears well-developed and well-nourished.   HENT:   Head: Normocephalic.   Right Ear: External ear normal.   Left Ear: External ear normal.   Nose: Nose normal.   Mouth/Throat: Oropharynx is clear and moist.   Eyes: Pupils are equal, round, and reactive to light.   Neck: Normal range of motion. Neck supple. No tracheal deviation present. No thyromegaly present.   Cardiovascular: Normal rate, regular rhythm and normal heart " sounds.  Exam reveals no gallop and no friction rub.    No murmur heard.  Pulmonary/Chest: Effort normal and breath sounds normal. No respiratory distress. He has no wheezes. He has no rales. He exhibits no tenderness.   Abdominal: Soft. Bowel sounds are normal. He exhibits no distension and no mass. There is no tenderness. There is no rebound and no guarding.   Musculoskeletal:        Cervical back: He exhibits decreased range of motion, tenderness and spasm.        Thoracic back: He exhibits tenderness and spasm.        Lumbar back: He exhibits tenderness.        Arms:  Neurological: He is alert and oriented to person, place, and time. He has normal reflexes.   CN 2-12 grossly intact    Skin: Skin is warm and dry. No rash noted. No erythema.   Psychiatric: He has a normal mood and affect. His behavior is normal. Judgment and thought content normal.       Assessment/Plan     Problem List Items Addressed This Visit        Nervous and Auditory    Exacerbation of chronic back pain    Relevant Medications    diclofenac (FLECTOR) 1.3 % patch patch    ketorolac (TORADOL) injection 60 mg (Completed)    methocarbamol (ROBAXIN) 750 MG tablet       Musculoskeletal and Integument    Osteoarthrosis    Relevant Medications    HYDROcodone-acetaminophen (NORCO) 7.5-325 MG per tablet       Other    Encounter for examination following treatment at hospital - Primary        Current outpatient and discharge medications have been reconciled for the patient.  Reviewed by: CORDELIA Alexandre  Off work next two days. May return Wednesday 6/13/18 with restrictions of no lifting over 20 pounds.   Proper body mechanics has been reviewed and discussed today.  ER records and test results reviewed with pt.  Increase Norco 7.5 mg to every six hours as needed # 60 no refills.  Jurgen has been reviewed as consistent.  Uds is on file.   Controlled agreement is on file and reviewed today.   Add Flector patch.  Add Robaxin in place of  Flexeril on days he works.   Keep routine follow up 6/18/18, sooner if needed.                 This document has been electronically signed by:  CORDELIA Flores, NP-C

## 2018-06-12 ENCOUNTER — DOCUMENTATION (OUTPATIENT)
Dept: UROLOGY | Facility: CLINIC | Age: 33
End: 2018-06-12

## 2018-06-18 ENCOUNTER — OFFICE VISIT (OUTPATIENT)
Dept: FAMILY MEDICINE CLINIC | Facility: CLINIC | Age: 33
End: 2018-06-18

## 2018-06-18 VITALS
SYSTOLIC BLOOD PRESSURE: 140 MMHG | TEMPERATURE: 98 F | OXYGEN SATURATION: 96 % | DIASTOLIC BLOOD PRESSURE: 80 MMHG | WEIGHT: 251 LBS | HEART RATE: 78 BPM | BODY MASS INDEX: 35.14 KG/M2 | HEIGHT: 71 IN

## 2018-06-18 DIAGNOSIS — M54.41 CHRONIC RIGHT-SIDED LOW BACK PAIN WITH RIGHT-SIDED SCIATICA: Primary | ICD-10-CM

## 2018-06-18 DIAGNOSIS — G89.29 CHRONIC RIGHT-SIDED LOW BACK PAIN WITH RIGHT-SIDED SCIATICA: Primary | ICD-10-CM

## 2018-06-18 PROCEDURE — 99214 OFFICE O/P EST MOD 30 MIN: CPT | Performed by: NURSE PRACTITIONER

## 2018-06-18 NOTE — PROGRESS NOTES
Subjective   Nghia Roach is a 32 y.o. male.     Chief Complaint   Patient presents with   • Osteoarthritis     fmla paper work    • Pain       History of Present Illness     Low back pain with recent exacerbation.  Patient is having low back pain with radiating pain into the right but AND leg.  This pain is exacerbated and is more often than usual.  He has a numbness radiating down his right leg.  He does have radiology on file.  Has been treated by neurology in the past.  He works full-time in a factory.  Has not been to physical therapy recently but is agreeable to go.  He currently receives pain medication but does not need a refill today.    Patient was off 06/09/2018 through 06/13/2018 and needs a completed FMLA packet today.      The following portions of the patient's history were reviewed and updated as appropriate: allergies, current medications, past family history, past medical history, past social history, past surgical history and problem list.    Review of Systems   Constitutional: Negative for appetite change, fatigue and unexpected weight change.   HENT: Negative for congestion, ear pain, nosebleeds, postnasal drip, rhinorrhea, sinus pain, sinus pressure, sore throat, trouble swallowing and voice change.    Eyes: Negative for pain and visual disturbance.   Respiratory: Negative for cough, shortness of breath and wheezing.    Cardiovascular: Negative for chest pain and palpitations.   Gastrointestinal: Negative for abdominal pain, blood in stool, constipation and diarrhea.   Endocrine: Negative for cold intolerance and polydipsia.   Genitourinary: Negative for difficulty urinating, flank pain and hematuria.   Musculoskeletal: Positive for arthralgias and back pain. Negative for gait problem, joint swelling and myalgias.   Skin: Negative for color change and rash.   Allergic/Immunologic: Positive for environmental allergies.   Neurological: Negative for syncope, numbness and headaches.  "  Hematological: Negative.    Psychiatric/Behavioral: Negative for dysphoric mood, sleep disturbance and suicidal ideas. The patient is not nervous/anxious.    All other systems reviewed and are negative.      Objective     /80   Pulse 78   Temp 98 °F (36.7 °C) (Tympanic)   Ht 180 cm (70.87\")   Wt 114 kg (251 lb)   SpO2 96%   BMI 35.14 kg/m²       Physical Exam   Constitutional: He is oriented to person, place, and time. He appears well-developed and well-nourished.   HENT:   Head: Normocephalic and atraumatic.   Right Ear: External ear normal.   Left Ear: External ear normal.   Nose: Nose normal.   Mouth/Throat: Oropharynx is clear and moist.   Eyes: EOM are normal. Pupils are equal, round, and reactive to light.   Neck: Normal range of motion. Neck supple.   Cardiovascular: Normal rate, regular rhythm, normal heart sounds and intact distal pulses.    No murmur heard.  Pulmonary/Chest: Effort normal and breath sounds normal. No respiratory distress. He has no wheezes. He has no rales. He exhibits no tenderness.   Abdominal: Soft. Bowel sounds are normal. He exhibits no distension and no mass. There is no tenderness. There is no rebound and no guarding.   Musculoskeletal:        Lumbar back: He exhibits decreased range of motion, tenderness, pain and spasm.   Decreased lumbar curvature, there is pain with forward flexion. DTR's +2. No edema.    Neurological: He is alert and oriented to person, place, and time. He has normal reflexes.   Skin: Skin is warm and dry. No rash noted. No erythema. No pallor.   Psychiatric: He has a normal mood and affect. His behavior is normal. Judgment and thought content normal. His affect is not inappropriate. Cognition and memory are normal.   Denies thoughts of hurting self or others.   Nursing note and vitals reviewed.      Assessment/Plan     Problem List Items Addressed This Visit        Nervous and Auditory    Chronic low back pain with right-sided sciatica - Primary "    Relevant Orders    Ambulatory Referral to Physical Therapy Evaluate and treat; Stretching (traction ), ROM, Strengthening (Completed)             Refer to physical therapy.  [facility] paperwork completed for dates 06/09/2018 through 06/13/2018.  Proper body mechanics has been reviewed and discussed today.      Emotional support and active listening provided.   I have discussed diagnosis in detail today allowing time for questions and answers. Pt is aware of reasons to seek urgent or emergent medical care as well as reasons to return to the clinic for evaluation. Possible side effects, interactions and progression of symptoms discussed as well. Pt / family states understanding.     Follow up in 3-4 weeks, sooner if needed.              This document has been electronically signed by:  CORDELIA Flores, NP-C

## 2018-06-22 ENCOUNTER — TELEPHONE (OUTPATIENT)
Dept: GASTROENTEROLOGY | Facility: CLINIC | Age: 33
End: 2018-06-22

## 2018-06-22 ENCOUNTER — TELEPHONE (OUTPATIENT)
Dept: FAMILY MEDICINE CLINIC | Facility: CLINIC | Age: 33
End: 2018-06-22

## 2018-06-22 NOTE — TELEPHONE ENCOUNTER
Pt called and was wondering If he could use the PT next door. I let pt know that I would figure it out for him. I tried to call him back to see if Estela had gave him a order and to tell him that that was fine for him to just go next door and make an appointment.

## 2018-07-10 DIAGNOSIS — R79.89 LOW TESTOSTERONE: ICD-10-CM

## 2018-07-10 RX ORDER — TESTOSTERONE CYPIONATE 200 MG/ML
INJECTION, SOLUTION INTRAMUSCULAR
Qty: 18 ML | Refills: 0 | OUTPATIENT
Start: 2018-07-10

## 2018-07-11 ENCOUNTER — OFFICE VISIT (OUTPATIENT)
Dept: FAMILY MEDICINE CLINIC | Facility: CLINIC | Age: 33
End: 2018-07-11

## 2018-07-11 VITALS
HEIGHT: 71 IN | WEIGHT: 253 LBS | DIASTOLIC BLOOD PRESSURE: 80 MMHG | HEART RATE: 70 BPM | BODY MASS INDEX: 35.42 KG/M2 | OXYGEN SATURATION: 97 % | SYSTOLIC BLOOD PRESSURE: 140 MMHG | TEMPERATURE: 97.4 F

## 2018-07-11 DIAGNOSIS — I10 ESSENTIAL HYPERTENSION: Primary | ICD-10-CM

## 2018-07-11 DIAGNOSIS — M54.41 CHRONIC RIGHT-SIDED LOW BACK PAIN WITH RIGHT-SIDED SCIATICA: ICD-10-CM

## 2018-07-11 DIAGNOSIS — K58.2 IRRITABLE BOWEL SYNDROME WITH BOTH CONSTIPATION AND DIARRHEA: ICD-10-CM

## 2018-07-11 DIAGNOSIS — G89.29 CHRONIC RIGHT-SIDED LOW BACK PAIN WITH RIGHT-SIDED SCIATICA: ICD-10-CM

## 2018-07-11 DIAGNOSIS — E78.2 MIXED HYPERLIPIDEMIA: ICD-10-CM

## 2018-07-11 DIAGNOSIS — M15.9 PRIMARY OSTEOARTHRITIS INVOLVING MULTIPLE JOINTS: ICD-10-CM

## 2018-07-11 DIAGNOSIS — K21.00 GASTROESOPHAGEAL REFLUX DISEASE WITH ESOPHAGITIS: ICD-10-CM

## 2018-07-11 DIAGNOSIS — J45.40 MODERATE PERSISTENT ASTHMA WITHOUT COMPLICATION: ICD-10-CM

## 2018-07-11 PROCEDURE — 99214 OFFICE O/P EST MOD 30 MIN: CPT | Performed by: NURSE PRACTITIONER

## 2018-07-11 RX ORDER — HYDROCODONE BITARTRATE AND ACETAMINOPHEN 7.5; 325 MG/1; MG/1
1 TABLET ORAL EVERY 6 HOURS PRN
Qty: 60 TABLET | Refills: 0 | Status: SHIPPED | OUTPATIENT
Start: 2018-07-11 | End: 2018-08-09 | Stop reason: SDUPTHER

## 2018-07-11 NOTE — PROGRESS NOTES
Subjective   Nghia Roach is a 32 y.o. male.     Chief Complaint   Patient presents with   • Hyperlipidemia   • Hypertension   • Back Pain       History of Present Illness     Hypertension-stable with Diovan , depressed and Norvasc    Hyperlipidemia-stable    Moderate persistent asthma without complication-no recent exacerbation    IBS with diarrhea and constipation-under the care of [facility] tachycardia.  He does have a orders from a [facility] provider for some celiac disease testing.    Chronic back pain with right side sciatic pain. Rates pain as 7 on psr of 0-10. Increased pain in his right lower back with radiation into the right lower extremity.  This pain is sharp and tingling.  Worse with activity or prolonged standing.  He has been seen by neurology and orthopedic consult.  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  Pain interferes with ability to sleep.  He does feel that being in chronic pain increases his depressive symptoms.  He denies any thoughts of hurting self or others.  He denies any substance abuse or addiction.  He does report that taking Norco to relieve his pain helps him be more active around the house and decreases the pain after a long workday allowing him to sleep.  Radiology is on file.  He has attempted physical therapy with no improvement.     Chronic allergic rhinitis - not completely controlled       The following portions of the patient's history were reviewed and updated as appropriate: allergies, current medications, past family history, past medical history, past social history, past surgical history and problem list.    Review of Systems   Constitutional: Negative for appetite change, chills, fatigue, fever and unexpected weight change.   HENT: Positive for congestion and sinus pressure. Negative for ear pain, nosebleeds, postnasal drip, rhinorrhea, sinus pain,  "sneezing, sore throat, trouble swallowing and voice change.    Eyes: Negative for pain and visual disturbance.   Respiratory: Negative for cough, shortness of breath and wheezing.    Cardiovascular: Negative for chest pain, palpitations and leg swelling.   Gastrointestinal: Positive for constipation and diarrhea. Negative for abdominal distention, abdominal pain, blood in stool and rectal pain.   Endocrine: Negative for cold intolerance and polydipsia.   Genitourinary: Negative for difficulty urinating, dysuria, flank pain and hematuria.   Musculoskeletal: Positive for back pain. Negative for arthralgias, gait problem, joint swelling and myalgias.   Skin: Negative for color change and rash.   Allergic/Immunologic: Positive for environmental allergies.   Neurological: Negative for syncope, numbness and headaches.   Hematological: Negative.    Psychiatric/Behavioral: Negative for sleep disturbance and suicidal ideas.   All other systems reviewed and are negative.      Objective     /80   Pulse 70   Temp 97.4 °F (36.3 °C) (Tympanic)   Ht 180 cm (70.87\")   Wt 115 kg (253 lb)   SpO2 97%   BMI 35.42 kg/m²   Lab on 05/22/2018   Component Date Value Ref Range Status   • Glucose 05/22/2018 78  70 - 110 mg/dL Final   • BUN 05/22/2018 15  7 - 21 mg/dL Final   • Creatinine 05/22/2018 1.24  0.43 - 1.29 mg/dL Final   • Sodium 05/22/2018 138  135 - 153 mmol/L Final   • Potassium 05/22/2018 3.7  3.5 - 5.3 mmol/L Final   • Chloride 05/22/2018 102  99 - 112 mmol/L Final   • CO2 05/22/2018 30.0  24.3 - 31.9 mmol/L Final   • Calcium 05/22/2018 9.3  7.7 - 10.0 mg/dL Final   • Total Protein 05/22/2018 6.9  6.0 - 8.0 g/dL Final   • Albumin 05/22/2018 4.40  3.50 - 5.00 g/dL Final   • ALT (SGPT) 05/22/2018 33  10 - 44 U/L Final   • AST (SGOT) 05/22/2018 28  10 - 34 U/L Final   • Alkaline Phosphatase 05/22/2018 58  40 - 129 U/L Final   • Total Bilirubin 05/22/2018 1.7  0.2 - 1.8 mg/dL Final   • eGFR Non African Amer 05/22/2018 67 "  >60 mL/min/1.73 Final   • Globulin 05/22/2018 2.5  gm/dL Final   • A/G Ratio 05/22/2018 1.8  1.5 - 2.5 g/dL Final   • BUN/Creatinine Ratio 05/22/2018 12.1  7.0 - 25.0 Final   • Anion Gap 05/22/2018 6.0  3.6 - 11.2 mmol/L Final   • Total Bilirubin 05/22/2018 1.7  0.2 - 1.8 mg/dL Final   • Bilirubin, Direct 05/22/2018 0.4* 0.0 - 0.2 mg/dL Final   • Bilirubin, Indirect 05/22/2018 1.3  mg/dL Final   • Osmolality Calc 05/22/2018 275.4  273.0 - 305.0 mOsm/kg Final       Physical Exam   Constitutional: He is oriented to person, place, and time. Vital signs are normal. He appears well-developed and well-nourished.   Talkative and friendly    HENT:   Head: Normocephalic and atraumatic.   Right Ear: External ear normal.   Left Ear: External ear normal.   Nose: Nose normal.   Mouth/Throat: Oropharynx is clear and moist. No oropharyngeal exudate.   Eyes: EOM are normal. Pupils are equal, round, and reactive to light. Right eye exhibits no discharge. Left eye exhibits no discharge.   Neck: Normal range of motion. Neck supple. No tracheal deviation present. No thyromegaly present.   Cardiovascular: Normal rate, regular rhythm, normal heart sounds and intact distal pulses.  Exam reveals no gallop and no friction rub.    No murmur heard.  Pulmonary/Chest: Effort normal and breath sounds normal. No respiratory distress. He has no wheezes. He has no rales. He exhibits no tenderness.   Abdominal: Soft. Bowel sounds are normal. He exhibits no distension and no mass. There is no tenderness. There is no rebound and no guarding.   Musculoskeletal: Normal range of motion.        Lumbar back: He exhibits tenderness, pain and spasm.   Decreased lumbar curvature, there is pain with forward flexion. DTR's +2. No edema.    Neurological: He is alert and oriented to person, place, and time. He has normal strength and normal reflexes.   Reflex Scores:       Brachioradialis reflexes are 2+ on the right side and 2+ on the left side.  CN 2-12  grossly intact    Skin: Skin is warm and dry. No rash noted. No erythema. No pallor.   Psychiatric: He has a normal mood and affect. His behavior is normal. Judgment and thought content normal. His affect is not inappropriate. Cognition and memory are normal.   Denies thoughts of hurting self or others.   Nursing note and vitals reviewed.      Assessment/Plan     Problem List Items Addressed This Visit        Cardiovascular and Mediastinum    Mixed hyperlipidemia    Relevant Orders    CBC & Differential    Comprehensive Metabolic Panel    TSH    Vitamin D 25 Hydroxy    Lipid Panel    Vitamin B12    Essential hypertension - Primary    Relevant Orders    CBC & Differential    Comprehensive Metabolic Panel    TSH    Vitamin D 25 Hydroxy    Lipid Panel    Vitamin B12       Respiratory    Moderate persistent asthma without complication    Relevant Orders    CBC & Differential    Comprehensive Metabolic Panel    TSH    Vitamin D 25 Hydroxy    Lipid Panel    Vitamin B12       Digestive    Gastroesophageal reflux disease with esophagitis    Overview     Added automatically from request for surgery [id number]         Relevant Orders    CBC & Differential    Comprehensive Metabolic Panel    TSH    Vitamin D 25 Hydroxy    Lipid Panel    Vitamin B12    Irritable bowel syndrome with both constipation and diarrhea    Relevant Orders    CBC & Differential    Comprehensive Metabolic Panel    TSH    Vitamin D 25 Hydroxy    Lipid Panel    Vitamin B12       Nervous and Auditory    Chronic low back pain with right-sided sciatica    Relevant Orders    CBC & Differential    Comprehensive Metabolic Panel    TSH    Vitamin D 25 Hydroxy    Lipid Panel    Vitamin B12       Musculoskeletal and Integument    Osteoarthrosis    Relevant Medications    HYDROcodone-acetaminophen (NORCO) 7.5-325 MG per tablet        Patient has been awaiting a physical therapy appointment and has not received call from this office with that appointment.  I have  provided him with a copy of his physical therapy order and patient states he will go to [place] [facility] therapy today to schedule consult.    Refill Norco 7.5-325 mg 1 by mouth every 6 hours when necessary for pain #60 with no refill.  Schedule routine labs in the next 1-2 weeks.  Patient will come in for his gastric testing as well.  Proper body mechanics has been reviewed and discussed today.      Jurgen has been reviewed as consistent.  Uds is on file.   Goal: Improved quality of life and reduction in pain as evidenced by pt report.   Controlled substance agreement has been reviewed today.          Patient's Body mass index is 35.42 kg/m². BMI is above normal parameters. Recommendations include: exercise counseling and nutrition counseling.      I have discussed diagnosis in detail today allowing time for questions and answers. Pt is aware of reasons to seek urgent or emergent medical care as well as reasons to return to the clinic for evaluation. Possible side effects, interactions and progression of symptoms discussed as well. Pt / family states understanding.   Emotional support and active listening provided.     Follow up in one month, sooner if needed. Routine labs every 3-6 months.       Errors in dictation may reflect use of voice recognition software and not all errors in transcription may have been detected prior to signing.       This document has been electronically signed by:  CORDELIA Flores, NP-C

## 2018-07-16 DIAGNOSIS — R79.89 LOW TESTOSTERONE: ICD-10-CM

## 2018-07-16 RX ORDER — AMITRIPTYLINE HYDROCHLORIDE 25 MG/1
25 TABLET, FILM COATED ORAL NIGHTLY PRN
Qty: 30 TABLET | Refills: 0 | Status: SHIPPED | OUTPATIENT
Start: 2018-07-16 | End: 2018-09-28 | Stop reason: SDUPTHER

## 2018-07-16 RX ORDER — ALLOPURINOL 100 MG/1
TABLET ORAL
Qty: 60 TABLET | Refills: 0 | Status: SHIPPED | OUTPATIENT
Start: 2018-07-16 | End: 2018-09-28 | Stop reason: SDUPTHER

## 2018-07-16 RX ORDER — AMITRIPTYLINE HYDROCHLORIDE 10 MG/1
TABLET, FILM COATED ORAL
Qty: 30 TABLET | Refills: 0 | Status: SHIPPED | OUTPATIENT
Start: 2018-07-16 | End: 2018-08-09

## 2018-07-16 RX ORDER — TESTOSTERONE CYPIONATE 200 MG/ML
INJECTION, SOLUTION INTRAMUSCULAR
Qty: 18 ML | Refills: 0 | OUTPATIENT
Start: 2018-07-16

## 2018-07-20 ENCOUNTER — TELEPHONE (OUTPATIENT)
Dept: GASTROENTEROLOGY | Facility: CLINIC | Age: 33
End: 2018-07-20

## 2018-07-31 ENCOUNTER — TELEPHONE (OUTPATIENT)
Dept: GASTROENTEROLOGY | Facility: CLINIC | Age: 33
End: 2018-07-31

## 2018-08-06 ENCOUNTER — TELEPHONE (OUTPATIENT)
Dept: UROLOGY | Facility: CLINIC | Age: 33
End: 2018-08-06

## 2018-08-06 ENCOUNTER — LAB (OUTPATIENT)
Dept: FAMILY MEDICINE CLINIC | Facility: CLINIC | Age: 33
End: 2018-08-06

## 2018-08-06 DIAGNOSIS — E78.2 MIXED HYPERLIPIDEMIA: ICD-10-CM

## 2018-08-06 DIAGNOSIS — M54.41 CHRONIC RIGHT-SIDED LOW BACK PAIN WITH RIGHT-SIDED SCIATICA: ICD-10-CM

## 2018-08-06 DIAGNOSIS — K58.2 IRRITABLE BOWEL SYNDROME WITH BOTH CONSTIPATION AND DIARRHEA: ICD-10-CM

## 2018-08-06 DIAGNOSIS — K21.00 GASTROESOPHAGEAL REFLUX DISEASE WITH ESOPHAGITIS: ICD-10-CM

## 2018-08-06 DIAGNOSIS — G89.29 CHRONIC RIGHT-SIDED LOW BACK PAIN WITH RIGHT-SIDED SCIATICA: ICD-10-CM

## 2018-08-06 DIAGNOSIS — J45.40 MODERATE PERSISTENT ASTHMA WITHOUT COMPLICATION: ICD-10-CM

## 2018-08-06 DIAGNOSIS — I10 ESSENTIAL HYPERTENSION: ICD-10-CM

## 2018-08-06 LAB
25(OH)D3 SERPL-MCNC: 27 NG/ML
ALBUMIN SERPL-MCNC: 4.5 G/DL (ref 3.5–5)
ALBUMIN/GLOB SERPL: 1.6 G/DL (ref 1.5–2.5)
ALP SERPL-CCNC: 63 U/L (ref 40–129)
ALT SERPL W P-5'-P-CCNC: 27 U/L (ref 10–44)
ANION GAP SERPL CALCULATED.3IONS-SCNC: 5.7 MMOL/L (ref 3.6–11.2)
AST SERPL-CCNC: 23 U/L (ref 10–34)
BASOPHILS # BLD AUTO: 0.01 10*3/MM3 (ref 0–0.3)
BASOPHILS NFR BLD AUTO: 0.2 % (ref 0–2)
BILIRUB SERPL-MCNC: 2.3 MG/DL (ref 0.2–1.8)
BUN BLD-MCNC: 18 MG/DL (ref 7–21)
BUN/CREAT SERPL: 14 (ref 7–25)
CALCIUM SPEC-SCNC: 9.4 MG/DL (ref 7.7–10)
CHLORIDE SERPL-SCNC: 105 MMOL/L (ref 99–112)
CHOLEST SERPL-MCNC: 147 MG/DL (ref 0–200)
CO2 SERPL-SCNC: 29.3 MMOL/L (ref 24.3–31.9)
CREAT BLD-MCNC: 1.29 MG/DL (ref 0.43–1.29)
DEPRECATED RDW RBC AUTO: 37.7 FL (ref 37–54)
EOSINOPHIL # BLD AUTO: 0.39 10*3/MM3 (ref 0–0.7)
EOSINOPHIL NFR BLD AUTO: 7.8 % (ref 0–5)
ERYTHROCYTE [DISTWIDTH] IN BLOOD BY AUTOMATED COUNT: 12.5 % (ref 11.5–14.5)
GFR SERPL CREATININE-BSD FRML MDRD: 64 ML/MIN/1.73
GLOBULIN UR ELPH-MCNC: 2.9 GM/DL
GLUCOSE BLD-MCNC: 84 MG/DL (ref 70–110)
HCT VFR BLD AUTO: 49.8 % (ref 42–52)
HDLC SERPL-MCNC: 34 MG/DL (ref 60–100)
HGB BLD-MCNC: 17.8 G/DL (ref 14–18)
IMM GRANULOCYTES # BLD: 0.01 10*3/MM3 (ref 0–0.03)
IMM GRANULOCYTES NFR BLD: 0.2 % (ref 0–0.5)
LDLC SERPL CALC-MCNC: 74 MG/DL (ref 0–100)
LDLC/HDLC SERPL: 2.19 {RATIO}
LYMPHOCYTES # BLD AUTO: 1.31 10*3/MM3 (ref 1–3)
LYMPHOCYTES NFR BLD AUTO: 26.4 % (ref 21–51)
MCH RBC QN AUTO: 29.8 PG (ref 27–33)
MCHC RBC AUTO-ENTMCNC: 35.7 G/DL (ref 33–37)
MCV RBC AUTO: 83.4 FL (ref 80–94)
MONOCYTES # BLD AUTO: 0.44 10*3/MM3 (ref 0.1–0.9)
MONOCYTES NFR BLD AUTO: 8.9 % (ref 0–10)
NEUTROPHILS # BLD AUTO: 2.81 10*3/MM3 (ref 1.4–6.5)
NEUTROPHILS NFR BLD AUTO: 56.5 % (ref 30–70)
OSMOLALITY SERPL CALC.SUM OF ELEC: 280.5 MOSM/KG (ref 273–305)
PLATELET # BLD AUTO: 220 10*3/MM3 (ref 130–400)
PMV BLD AUTO: 12.3 FL (ref 6–10)
POTASSIUM BLD-SCNC: 4 MMOL/L (ref 3.5–5.3)
PROT SERPL-MCNC: 7.4 G/DL (ref 6–8)
RBC # BLD AUTO: 5.97 10*6/MM3 (ref 4.7–6.1)
SODIUM BLD-SCNC: 140 MMOL/L (ref 135–153)
TRIGL SERPL-MCNC: 193 MG/DL (ref 0–150)
TSH SERPL DL<=0.05 MIU/L-ACNC: 2.4 MIU/ML (ref 0.55–4.78)
VIT B12 BLD-MCNC: 658 PG/ML (ref 211–911)
VLDLC SERPL-MCNC: 38.6 MG/DL
WBC NRBC COR # BLD: 4.97 10*3/MM3 (ref 4.5–12.5)

## 2018-08-06 PROCEDURE — 82306 VITAMIN D 25 HYDROXY: CPT | Performed by: NURSE PRACTITIONER

## 2018-08-06 PROCEDURE — 80061 LIPID PANEL: CPT | Performed by: NURSE PRACTITIONER

## 2018-08-06 PROCEDURE — 36415 COLL VENOUS BLD VENIPUNCTURE: CPT

## 2018-08-06 PROCEDURE — 82607 VITAMIN B-12: CPT | Performed by: NURSE PRACTITIONER

## 2018-08-06 PROCEDURE — 84443 ASSAY THYROID STIM HORMONE: CPT | Performed by: NURSE PRACTITIONER

## 2018-08-06 PROCEDURE — 85025 COMPLETE CBC W/AUTO DIFF WBC: CPT | Performed by: NURSE PRACTITIONER

## 2018-08-06 PROCEDURE — 80053 COMPREHEN METABOLIC PANEL: CPT | Performed by: NURSE PRACTITIONER

## 2018-08-06 NOTE — TELEPHONE ENCOUNTER
Patient called stating that he was out of testosterone refills but is scheduled to see Dr. Pa on 8/13. Per Dr. Pa, testosterone cypionate 200 mg/mL #10 mL 1/2 cc on Monday & Thursday SQ with no refills was called into [facility] in [place].

## 2018-08-09 ENCOUNTER — OFFICE VISIT (OUTPATIENT)
Dept: FAMILY MEDICINE CLINIC | Facility: CLINIC | Age: 33
End: 2018-08-09

## 2018-08-09 VITALS
TEMPERATURE: 98.8 F | SYSTOLIC BLOOD PRESSURE: 130 MMHG | HEART RATE: 78 BPM | OXYGEN SATURATION: 97 % | WEIGHT: 239 LBS | BODY MASS INDEX: 33.46 KG/M2 | HEIGHT: 71 IN | DIASTOLIC BLOOD PRESSURE: 90 MMHG

## 2018-08-09 DIAGNOSIS — K21.00 GASTROESOPHAGEAL REFLUX DISEASE WITH ESOPHAGITIS: ICD-10-CM

## 2018-08-09 DIAGNOSIS — J45.40 MODERATE PERSISTENT ASTHMA WITHOUT COMPLICATION: ICD-10-CM

## 2018-08-09 DIAGNOSIS — R19.7 DIARRHEA, UNSPECIFIED TYPE: ICD-10-CM

## 2018-08-09 DIAGNOSIS — I10 ESSENTIAL HYPERTENSION: ICD-10-CM

## 2018-08-09 DIAGNOSIS — E78.2 MIXED HYPERLIPIDEMIA: ICD-10-CM

## 2018-08-09 DIAGNOSIS — M15.9 PRIMARY OSTEOARTHRITIS INVOLVING MULTIPLE JOINTS: ICD-10-CM

## 2018-08-09 DIAGNOSIS — M54.41 CHRONIC RIGHT-SIDED LOW BACK PAIN WITH RIGHT-SIDED SCIATICA: Primary | ICD-10-CM

## 2018-08-09 DIAGNOSIS — G89.29 CHRONIC RIGHT-SIDED LOW BACK PAIN WITH RIGHT-SIDED SCIATICA: Primary | ICD-10-CM

## 2018-08-09 PROCEDURE — 36415 COLL VENOUS BLD VENIPUNCTURE: CPT | Performed by: NURSE PRACTITIONER

## 2018-08-09 PROCEDURE — 82784 ASSAY IGA/IGD/IGG/IGM EACH: CPT | Performed by: INTERNAL MEDICINE

## 2018-08-09 PROCEDURE — 99214 OFFICE O/P EST MOD 30 MIN: CPT | Performed by: NURSE PRACTITIONER

## 2018-08-09 PROCEDURE — 83516 IMMUNOASSAY NONANTIBODY: CPT | Performed by: INTERNAL MEDICINE

## 2018-08-09 RX ORDER — HYDROCODONE BITARTRATE AND ACETAMINOPHEN 7.5; 325 MG/1; MG/1
1 TABLET ORAL EVERY 6 HOURS PRN
Qty: 60 TABLET | Refills: 0 | Status: SHIPPED | OUTPATIENT
Start: 2018-08-09 | End: 2018-09-06 | Stop reason: SDUPTHER

## 2018-08-09 NOTE — PROGRESS NOTES
Subjective   Nghia Roach is a 32 y.o. male.     Chief Complaint   Patient presents with   • Hyperlipidemia   • Hypertension   • Asthma       History of Present Illness     Hyperlipidemia-patient has shown good weight loss this month.  His work on lifestyle changes.  Lipid panel to review today.    Hypertension-stable on Diovan HCT.  Patient states that he has not been notified that his prescription is on recall.  He also receives Norvasc.  Does check blood pressure randomly.    Asthma-stable    IBS with diarrhea and constipation-under the care of GI enrichments tachycardia.  He does have a orders from a [facility] provider for some celiac disease testing.  Celiac testing was not performed with his routine labs.  There is an order in from his provider that he would like to have performed today.     Chronic back pain with right side sciatic pain. Rates pain as 6 on psr of 0-10. Increased pain in his right lower back with radiation into the right lower extremity.  This pain is sharp and tingling.  Worse with activity or prolonged standing.  He has been seen by neurology and orthopedic consult.  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  Pain interferes with ability to sleep.  He does feel that being in chronic pain increases his depressive symptoms.  He denies any thoughts of hurting self or others.  He denies any substance abuse or addiction.  He does report that taking Norco to relieve his pain helps him be more active around the house and decreases the pain after a long workday allowing him to sleep.  Radiology is on file.  He has attempted physical therapy with no improvement.   Patient reports that the addition of over-the-counter hemp all cc helping with his joint pain mildly.      The following portions of the patient's history were reviewed and updated as appropriate: allergies, current medications, past  "family history, past medical history, past social history, past surgical history and problem list.    Review of Systems   Constitutional: Negative for appetite change, fatigue and unexpected weight change.   HENT: Negative for congestion, ear pain, nosebleeds, postnasal drip, rhinorrhea, sinus pain, sinus pressure, sore throat, trouble swallowing and voice change.    Eyes: Negative for pain and visual disturbance.   Respiratory: Negative for cough, shortness of breath and wheezing.    Cardiovascular: Negative for chest pain and palpitations.   Gastrointestinal: Negative for abdominal pain, blood in stool, constipation and diarrhea.   Endocrine: Negative for cold intolerance and polydipsia.   Genitourinary: Negative for difficulty urinating, flank pain and hematuria.   Musculoskeletal: Positive for back pain. Negative for arthralgias, gait problem, joint swelling and myalgias.   Skin: Negative for color change and rash.   Allergic/Immunologic: Negative.    Neurological: Negative for syncope, numbness and headaches.   Hematological: Negative.    Psychiatric/Behavioral: Negative for sleep disturbance and suicidal ideas.   All other systems reviewed and are negative.      Objective     /90   Pulse 78   Temp 98.8 °F (37.1 °C) (Tympanic)   Ht 180 cm (70.87\")   Wt 108 kg (239 lb)   SpO2 97%   BMI 33.46 kg/m²   Lab on 08/06/2018   Component Date Value Ref Range Status   • Glucose 08/06/2018 84  70 - 110 mg/dL Final   • BUN 08/06/2018 18  7 - 21 mg/dL Final   • Creatinine 08/06/2018 1.29  0.43 - 1.29 mg/dL Final   • Sodium 08/06/2018 140  135 - 153 mmol/L Final   • Potassium 08/06/2018 4.0  3.5 - 5.3 mmol/L Final   • Chloride 08/06/2018 105  99 - 112 mmol/L Final   • CO2 08/06/2018 29.3  24.3 - 31.9 mmol/L Final   • Calcium 08/06/2018 9.4  7.7 - 10.0 mg/dL Final   • Total Protein 08/06/2018 7.4  6.0 - 8.0 g/dL Final   • Albumin 08/06/2018 4.50  3.50 - 5.00 g/dL Final   • ALT (SGPT) 08/06/2018 27  10 - 44 U/L " Final   • AST (SGOT) 08/06/2018 23  10 - 34 U/L Final   • Alkaline Phosphatase 08/06/2018 63  40 - 129 U/L Final   • Total Bilirubin 08/06/2018 2.3* 0.2 - 1.8 mg/dL Final   • eGFR Non  Amer 08/06/2018 64  >60 mL/min/1.73 Final   • Globulin 08/06/2018 2.9  gm/dL Final   • A/G Ratio 08/06/2018 1.6  1.5 - 2.5 g/dL Final   • BUN/Creatinine Ratio 08/06/2018 14.0  7.0 - 25.0 Final   • Anion Gap 08/06/2018 5.7  3.6 - 11.2 mmol/L Final   • TSH 08/06/2018 2.405  0.550 - 4.780 mIU/mL Final   • 25 Hydroxy, Vitamin D 08/06/2018 27.0  ng/ml Final   • Total Cholesterol 08/06/2018 147  0 - 200 mg/dL Final   • Triglycerides 08/06/2018 193* 0 - 150 mg/dL Final   • HDL Cholesterol 08/06/2018 34* 60 - 100 mg/dL Final   • LDL Cholesterol  08/06/2018 74  0 - 100 mg/dL Final   • VLDL Cholesterol 08/06/2018 38.6  mg/dL Final   • LDL/HDL Ratio 08/06/2018 2.19   Final   • Vitamin B-12 08/06/2018 658  211 - 911 pg/mL Final   • WBC 08/06/2018 4.97  4.50 - 12.50 10*3/mm3 Final   • RBC 08/06/2018 5.97  4.70 - 6.10 10*6/mm3 Final   • Hemoglobin 08/06/2018 17.8  14.0 - 18.0 g/dL Final   • Hematocrit 08/06/2018 49.8  42.0 - 52.0 % Final   • MCV 08/06/2018 83.4  80.0 - 94.0 fL Final   • MCH 08/06/2018 29.8  27.0 - 33.0 pg Final   • MCHC 08/06/2018 35.7  33.0 - 37.0 g/dL Final   • RDW 08/06/2018 12.5  11.5 - 14.5 % Final   • RDW-SD 08/06/2018 37.7  37.0 - 54.0 fl Final   • MPV 08/06/2018 12.3* 6.0 - 10.0 fL Final   • Platelets 08/06/2018 220  130 - 400 10*3/mm3 Final   • Neutrophil % 08/06/2018 56.5  30.0 - 70.0 % Final   • Lymphocyte % 08/06/2018 26.4  21.0 - 51.0 % Final   • Monocyte % 08/06/2018 8.9  0.0 - 10.0 % Final   • Eosinophil % 08/06/2018 7.8* 0.0 - 5.0 % Final   • Basophil % 08/06/2018 0.2  0.0 - 2.0 % Final   • Immature Grans % 08/06/2018 0.2  0.0 - 0.5 % Final   • Neutrophils, Absolute 08/06/2018 2.81  1.40 - 6.50 10*3/mm3 Final   • Lymphocytes, Absolute 08/06/2018 1.31  1.00 - 3.00 10*3/mm3 Final   • Monocytes, Absolute  08/06/2018 0.44  0.10 - 0.90 10*3/mm3 Final   • Eosinophils, Absolute 08/06/2018 0.39  0.00 - 0.70 10*3/mm3 Final   • Basophils, Absolute 08/06/2018 0.01  0.00 - 0.30 10*3/mm3 Final   • Immature Grans, Absolute 08/06/2018 0.01  0.00 - 0.03 10*3/mm3 Final   • Osmolality Calc 08/06/2018 280.5  273.0 - 305.0 mOsm/kg Final       Physical Exam   Constitutional: He is oriented to person, place, and time. Vital signs are normal. He appears well-developed and well-nourished. No distress.   Talkative and friendly    HENT:   Head: Normocephalic and atraumatic.   Right Ear: External ear normal.   Left Ear: External ear normal.   Nose: Nose normal.   Mouth/Throat: Oropharynx is clear and moist. No oropharyngeal exudate.   Eyes: Pupils are equal, round, and reactive to light. Conjunctivae and EOM are normal. Right eye exhibits no discharge. Left eye exhibits no discharge.   Neck: Normal range of motion. Neck supple. No tracheal deviation present. No thyromegaly present.   Cardiovascular: Normal rate, regular rhythm, normal heart sounds and intact distal pulses.  Exam reveals no gallop and no friction rub.    No murmur heard.  Pulmonary/Chest: Effort normal and breath sounds normal. No respiratory distress. He has no wheezes. He has no rales. He exhibits no tenderness.   Abdominal: Soft. Bowel sounds are normal. He exhibits no distension and no mass. There is no tenderness. There is no rebound and no guarding.   Musculoskeletal: Normal range of motion.        Lumbar back: He exhibits tenderness, pain and spasm.   Decreased lumbar curvature, there is pain with forward flexion. DTR's +2. No edema.    Neurological: He is alert and oriented to person, place, and time. He has normal strength and normal reflexes.   Reflex Scores:       Brachioradialis reflexes are 2+ on the right side and 2+ on the left side.  CN 2-12 grossly intact    Skin: Skin is warm and dry. Capillary refill takes less than 2 seconds. No rash noted. He is not  diaphoretic. No erythema. No pallor.   Psychiatric: He has a normal mood and affect. His speech is normal and behavior is normal. Judgment and thought content normal. His affect is not inappropriate. Cognition and memory are normal.   Nursing note and vitals reviewed.      Assessment/Plan     Problem List Items Addressed This Visit        Cardiovascular and Mediastinum    Mixed hyperlipidemia    Essential hypertension       Respiratory    Moderate persistent asthma without complication       Digestive    Gastroesophageal reflux disease with esophagitis    Overview     Added automatically from request for surgery [id number]         Diarrhea    Overview     Added automatically from request for surgery [id number]            Nervous and Auditory    Chronic low back pain with right-sided sciatica - Primary    Relevant Orders    Urine Drug Screen - Urine, Clean Catch       Musculoskeletal and Integument    Osteoarthrosis    Relevant Medications    HYDROcodone-acetaminophen (NORCO) 7.5-325 MG per tablet            Continue taking Hemp oil otc as it is helping with fatigue.   Jurgen has been reviewed as consistent.  Uds is on file.   .Proper body mechanics has been reviewed and discussed today.  Goal: Improved quality of life and reduction in pain as evidenced by pt report.     Ruth 7.5-325 one by mouth every 6 hours when necessary #60 with no refill.     Patient's Body mass index is 33.46 kg/m². BMI is above normal parameters. Recommendations include: exercise counseling and nutrition counseling.    I have discussed diagnosis in detail today allowing time for questions and answers. Pt is aware of reasons to seek urgent or emergent medical care as well as reasons to return to the clinic for evaluation. Possible side effects, interactions and progression of symptoms discussed as well. Pt / family states understanding.   Emotional support and active listening provided.     Review of recent labs.  He will continue to work on diet  and lifestyle changes to decrease his cholesterol.  He will remain under care of GI and nephrology.    Follow up in one month, sooner if needed. Routine labs every 3-6 months.     Errors in dictation may reflect use of voice recognition software and not all errors in transcription may have been detected prior to signing.           This document has been electronically signed by:  CORDELIA Flores, NP-C

## 2018-08-10 LAB
IGA SERPL-MCNC: 178 MG/DL (ref 90–386)
TTG IGA SER-ACNC: <2 U/ML (ref 0–3)

## 2018-08-11 DIAGNOSIS — F32.0 MILD SINGLE CURRENT EPISODE OF MAJOR DEPRESSIVE DISORDER (HCC): ICD-10-CM

## 2018-08-13 ENCOUNTER — OFFICE VISIT (OUTPATIENT)
Dept: UROLOGY | Facility: CLINIC | Age: 33
End: 2018-08-13

## 2018-08-13 VITALS — BODY MASS INDEX: 33.46 KG/M2 | HEIGHT: 71 IN | WEIGHT: 239 LBS

## 2018-08-13 DIAGNOSIS — N52.02 CORPORO-VENOUS OCCLUSIVE ERECTILE DYSFUNCTION: ICD-10-CM

## 2018-08-13 DIAGNOSIS — N48.1 BALANITIS: Primary | ICD-10-CM

## 2018-08-13 PROCEDURE — 99214 OFFICE O/P EST MOD 30 MIN: CPT | Performed by: UROLOGY

## 2018-08-13 RX ORDER — CLOTRIMAZOLE AND BETAMETHASONE DIPROPIONATE 10; .64 MG/G; MG/G
CREAM TOPICAL EVERY 12 HOURS SCHEDULED
Qty: 45 G | Refills: 2 | Status: SHIPPED | OUTPATIENT
Start: 2018-08-13

## 2018-08-13 RX ORDER — FLUOXETINE 10 MG/1
10 CAPSULE ORAL DAILY
Qty: 30 CAPSULE | Refills: 5 | Status: SHIPPED | OUTPATIENT
Start: 2018-08-13 | End: 2018-10-17

## 2018-08-13 NOTE — PROGRESS NOTES
Chief Complaint:          Chief Complaint   Patient presents with   • Groin Pain     F/U       HPI:   32 y.o. male.  32-year-old white male who has some foreskin irritation is likely secondary to intercourse and the use of devices.   I'm undergoing start him on topical Lotrisone he also we discussed circumcision which do not recommend he is also having substantial problems maintaining an erection and I will set him up with penile injections. Penile injection--the patient is interested in an attempt at a pharmacological penile injection for severe organic erectile dysfunction having failed the traditional oral therapy and having been offered a vacuum erection device.  After an appropriate informed consent including the significant risk of priapism, pain,, and lack of efficacy of the penis was prepped and draped.  Appropriate anatomy was demonstrated including the dorsal nerve complex at 12:00 on the dorsum of the penis and the urethra at 6:00 we recommend injections between 9 and 10:00 on the right side and to 3:00 on the left side up and down the shaft into the corporal body we utilized a 27-gauge needle and began our dose of 0.1 cc of Trymex compound.  Observed for 30 minutes before its results.  We discussed the grading on a 10 scale indicating a 5 would be tumescence with poor axial rigidity.  I indicated that when the patient goes home though be about a 30-40% improvement.  It was emphasized that the erection should last no more than 2 hours and anything more than that should prompt an immediate call to the office.  We also talked about pharmacological manipulation if the erection lasts for prolonged period including oral Sudafed or terbutaline.  We talked about pharmacological reduction of the erection using phenylephrine.  The patient wishes to proceed.    Past Medical History:        Past Medical History:   Diagnosis Date   • Allergic rhinitis    • Asthma    • Atrial flutter (CMS/HCC)    • Diarrhea    •  "Disturbance of skin sensation    • GERD (gastroesophageal reflux disease)    • Gout    • History of MRI of spine 12/16/2014    Done at [facility]    • Hx of exercise stress test 2014    \"IT WAS NORMAL\"     • Hyperlipidemia    • Hypertension    • Kidney disease     stage 2   • Lymphadenitis    • Malaise and fatigue    • Osteoarthrosis    • Rash    • Renal disorder    • Sleep apnea     NO CPAP   • Wears glasses     TO READ         Current Meds:     Current Outpatient Prescriptions   Medication Sig Dispense Refill   • albuterol (PROVENTIL HFA;VENTOLIN HFA) 108 (90 Base) MCG/ACT inhaler Inhale 2 puffs Every 4 (Four) Hours As Needed for Shortness of Air. 1 inhaler 5   • allopurinol (ZYLOPRIM) 100 MG tablet TAKE 1 TABLET BY MOUTH TWICE DAILY 60 tablet 0   • amitriptyline (ELAVIL) 25 MG tablet TAKE 1 TABLET BY MOUTH AT NIGHT AS NEEDED FOR SLEEP 30 tablet 0   • amLODIPine (NORVASC) 10 MG tablet Take 1 tablet by mouth Daily. 30 tablet 5   • budesonide-formoterol (SYMBICORT) 160-4.5 MCG/ACT inhaler Inhale 2 puffs 2 (Two) Times a Day. (Patient taking differently: Inhale 2 puffs 2 (Two) Times a Day As Needed.) 1 inhaler 5   • CIALIS 5 MG tablet Take 1 tablet by mouth Every Night. 30 tablet 5   • cyclobenzaprine (FLEXERIL) 10 MG tablet Take 1 tablet by mouth 3 (Three) Times a Day As Needed for Muscle Spasms. 20 tablet 0   • dicyclomine (BENTYL) 20 MG tablet Take 2 tablets by mouth 4 (Four) Times a Day. (Patient taking differently: Take 20 mg by mouth Every Night.) 120 tablet 5   • FLUoxetine (PROzac) 10 MG capsule TAKE 1 CAPSULE BY MOUTH DAILY 30 capsule 5   • fluticasone (FLONASE) 50 MCG/ACT nasal spray 2 sprays into each nostril Daily. Administer 2 sprays in each nostril for each dose. (Patient taking differently: 2 sprays into each nostril Daily As Needed. Administer 2 sprays in each nostril for each dose. ) 1 bottle 2   • HYDROcodone-acetaminophen (NORCO) 7.5-325 MG per tablet Take 1 tablet by mouth Every 6 (Six) Hours As " Needed for Moderate Pain . 60 tablet 0   • ipratropium-albuterol (COMBIVENT RESPIMAT)  MCG/ACT inhaler Inhale 1 puff 4 (Four) Times a Day As Needed for Wheezing. 1-2 PUFFS EVERY 4 HOURS AS NEEDED 4 g 11   • ipratropium-albuterol (DUO-NEB) 0.5-2.5 mg/mL nebulizer INHALE THE CONTENTS OF 1 VIAL VIA NEBULIZER Q 6 H PRN  5   • loratadine (CLARITIN) 10 MG tablet Take 1 tablet by mouth Every Night. 30 tablet 5   • methocarbamol (ROBAXIN) 750 MG tablet Take 1 tablet by mouth 4 (Four) Times a Day As Needed for Muscle Spasms. Take on days he works in place of flexeril 120 tablet 5   • montelukast (SINGULAIR) 10 MG tablet Take 1 tablet by mouth Every Night. 30 tablet 5   • sodium chloride (OCEAN NASAL SPRAY) 0.65 % nasal spray 1 spray into each nostril As Needed for Congestion. 1 each 3   • Syringe, Disposable, 3 ML misc Use 3 ml syringe with a 25 gauge  5/8 inch needle 24 each 6   • Testosterone Cypionate (DEPO-TESTOSTERONE) 200 MG/ML injection He is to use 1/2 cc every Monday and Thursday SQ (Patient taking differently: Inject 100 mg under the skin 2 (Two) Times a Week. He is to use 1/2 cc every Monday and Thursday SQ) 10 mL 2   • valsartan-hydrochlorothiazide (DIOVAN HCT) 160-12.5 MG per tablet Take 1 tablet by mouth Daily. 30 tablet 5     No current facility-administered medications for this visit.         Allergies:      Allergies   Allergen Reactions   • Erythromycin Rash        Past Surgical History:     Past Surgical History:   Procedure Laterality Date   • BRAVO PROCEDURE N/A 8/4/2017    Procedure: ESOPHAGOGASTRODUODENOSCOPY AND FULLER;  Surgeon: Efra Suero MD;  Location: [facility] OR;  Service:    • COLONOSCOPY  2014   • COLONOSCOPY      UNSURE OF DATE   • COLONOSCOPY N/A 5/3/2018    Procedure: COLONOSCOPY WITH COLD BIOPSIES AND COLD POLYPECTOMIES;  Surgeon: Bang Segundo MD;  Location: [facility] ENDOSCOPY;  Service: Gastroenterology   • NISSEN FUNDOPLICATION     • NISSEN FUNDOPLICATION N/A 10/11/2017     Procedure: NISSEN FUNDOPLICATION LAPAROSCOPIC;  Surgeon: Efra Suero MD;  Location:  [facility] OR;  Service:    • MO LAP,ESOPHAGOGAST FUNDOPLASTY N/A 10/11/2017    Procedure: HIATAL HERNIA REPAIR LAPAROSCOPIC;  Surgeon: Efra Suero MD;  Location:  [facility] OR;  Service: General   • VASECTOMY           Social History:     Social History     Social History   • Marital status:      Spouse name: Radha    • Number of children: N/A   • Years of education: N/A     Occupational History   • [employer]      Social History Main Topics   • Smoking status: Never Smoker   • Smokeless tobacco: Never Used   • Alcohol use Yes      Comment: social   • Drug use: No   • Sexual activity: Defer     Other Topics Concern   • Not on file     Social History Narrative   • No narrative on file       Family History:     Family History   Problem Relation Age of Onset   • Asthma Other    • Cancer Other    • COPD Other    • Diabetes Other    • Stroke Other    • Asthma Mother    • COPD Mother    • Asthma Father    • Depression Father    • Drug abuse Father    • Arthritis Maternal Grandmother    • Cancer Maternal Grandmother    • Diabetes Maternal Grandmother    • Heart disease Maternal Grandmother    • Hyperlipidemia Maternal Grandmother    • Stroke Paternal Grandfather    • Colon cancer Maternal Grandfather         mid 40s       Review of Systems:     Review of Systems   Constitutional: Negative.    HENT: Negative.    Eyes: Negative.    Respiratory: Negative.    Cardiovascular: Negative.    Gastrointestinal: Negative.    Endocrine: Negative.    Genitourinary: Positive for penile pain.   Musculoskeletal: Negative.    Allergic/Immunologic: Negative.    Neurological: Negative.    Hematological: Negative.    Psychiatric/Behavioral: Negative.        Physical Exam:     Physical Exam   Constitutional: He is oriented to person, place, and time. He appears well-developed and well-nourished.   HENT:   Head: Normocephalic and atraumatic.   Eyes:  Pupils are equal, round, and reactive to light. Conjunctivae and EOM are normal.   Neck: Normal range of motion.   Cardiovascular: Normal rate, regular rhythm, normal heart sounds and intact distal pulses.    Pulmonary/Chest: Effort normal and breath sounds normal.   Abdominal: Soft. Bowel sounds are normal.   Genitourinary:   Genitourinary Comments: Recurrent balanitis with no phimosis   Musculoskeletal: Normal range of motion.   Neurological: He is alert and oriented to person, place, and time. He has normal reflexes.   Skin: Skin is warm and dry.   Psychiatric: He has a normal mood and affect. His behavior is normal. Judgment and thought content normal.   Nursing note and vitals reviewed.      I have reviewed the following portions of the patient's history: allergies, current medications, past family history, past medical history, past social history, past surgical history, problem list and ROS and confirm it's accurate.      Procedure:       Assessment/Plan:   Balanitis-start topical Lotrisone  Erectile dysfunction-we discussed the anatomy and physiology of the penis and the endothelium.  We discussed the various forms of erectile dysfunction including peripheral vascular occlusive disease, postoperative, secondary to radiation treatments of the prostate, and arterial inflow.  We discussed the various treatment options available including oral medication and its various forms.  We discussed the use of both generic and non-generic Viagra.  We discussed Cialis and a longer half-life of 17 hours as well as the other 2 medications.  We discussed cost involved with this including the fact that the generic is much cheaper but is taken has multiple pills because they are 20 mg dosages.  We did discuss the other alternatives including Penile injections, vacuum erection devices and surgical intervention reserved for only the most severe cases.  We discussed the need for testosterone in about 20% of cases of erectile  dysfunction.  Medications are low longer effective and I'm going to set him up for penile injections.     Patient's Body mass index is 33.46 kg/m². BMI is above normal parameters. Recommendations include: educational material.          This document has been electronically signed by STEPHEN BANGURA MD August 13, 2018 2:02 PM

## 2018-08-14 PROBLEM — N48.1 BALANITIS: Status: ACTIVE | Noted: 2018-08-14

## 2018-09-06 DIAGNOSIS — M15.9 PRIMARY OSTEOARTHRITIS INVOLVING MULTIPLE JOINTS: ICD-10-CM

## 2018-09-06 RX ORDER — HYDROCODONE BITARTRATE AND ACETAMINOPHEN 7.5; 325 MG/1; MG/1
1 TABLET ORAL EVERY 6 HOURS PRN
Qty: 30 TABLET | Refills: 0 | Status: SHIPPED | OUTPATIENT
Start: 2018-09-06 | End: 2018-09-19 | Stop reason: SDUPTHER

## 2018-09-06 NOTE — PROGRESS NOTES
Office closing due to fumes from [facility]lex[facility]. Will provide two week supply of controlled medication and reschedule.

## 2018-09-10 RX ORDER — VALSARTAN AND HYDROCHLOROTHIAZIDE 160; 12.5 MG/1; MG/1
1 TABLET, FILM COATED ORAL DAILY
Qty: 30 TABLET | Refills: 5 | Status: SHIPPED | OUTPATIENT
Start: 2018-09-10 | End: 2019-06-12 | Stop reason: DRUGHIGH

## 2018-09-10 RX ORDER — MONTELUKAST SODIUM 10 MG/1
10 TABLET ORAL NIGHTLY
Qty: 30 TABLET | Refills: 5 | Status: SHIPPED | OUTPATIENT
Start: 2018-09-10 | End: 2019-04-03 | Stop reason: SDUPTHER

## 2018-09-19 ENCOUNTER — OFFICE VISIT (OUTPATIENT)
Dept: FAMILY MEDICINE CLINIC | Facility: CLINIC | Age: 33
End: 2018-09-19

## 2018-09-19 VITALS
OXYGEN SATURATION: 98 % | SYSTOLIC BLOOD PRESSURE: 140 MMHG | TEMPERATURE: 97.4 F | BODY MASS INDEX: 33.46 KG/M2 | DIASTOLIC BLOOD PRESSURE: 80 MMHG | HEIGHT: 71 IN | WEIGHT: 239 LBS | HEART RATE: 72 BPM

## 2018-09-19 DIAGNOSIS — M15.9 PRIMARY OSTEOARTHRITIS INVOLVING MULTIPLE JOINTS: ICD-10-CM

## 2018-09-19 PROCEDURE — 99214 OFFICE O/P EST MOD 30 MIN: CPT | Performed by: NURSE PRACTITIONER

## 2018-09-19 RX ORDER — HYDROCODONE BITARTRATE AND ACETAMINOPHEN 7.5; 325 MG/1; MG/1
1 TABLET ORAL EVERY 6 HOURS PRN
Qty: 75 TABLET | Refills: 0 | Status: SHIPPED | OUTPATIENT
Start: 2018-09-19 | End: 2018-10-17 | Stop reason: SDUPTHER

## 2018-09-19 RX ORDER — HYDROCODONE BITARTRATE AND ACETAMINOPHEN 7.5; 325 MG/1; MG/1
1 TABLET ORAL EVERY 6 HOURS PRN
Qty: 60 TABLET | Refills: 0 | Status: SHIPPED | OUTPATIENT
Start: 2018-09-19 | End: 2018-09-19 | Stop reason: SDUPTHER

## 2018-09-19 NOTE — PROGRESS NOTES
Subjective   Nghia Roach is a 33 y.o. male.     Chief Complaint   Patient presents with   • Back Pain   • Osteoarthritis       History of Present Illness     Chronic low back pain with right-sided sciatica-patient reports an acute exacerbation.  He rates his pain as 8 on a pain scale rating of 0-10.  Patient reports that at night he is having difficulty sleeping due to increased pain.  He has been trying to use his pain medication sparingly and no more often than twice daily so his pain medication will last throughout the entire 30 day span.  Patient reports he is having difficulty sleeping and that pain is interfering with his routine daily activities.  Pain is described as sharp, throbbing with radiation down into the right leg.  Right leg does feel tingly and heavy at times.  Rest is mildly helpful.  Pain is worse with activity.  Patient denies any substance abuse or addiction.  Radiology is on file.  Patient has attended physical therapy in the past.    The following portions of the patient's history were reviewed and updated as appropriate: allergies, current medications, past family history, past medical history, past social history, past surgical history and problem list.    Review of Systems   Constitutional: Negative for appetite change, fatigue and unexpected weight change.   HENT: Negative for congestion, ear pain, nosebleeds, postnasal drip, rhinorrhea, sore throat, trouble swallowing and voice change.    Eyes: Negative for pain and visual disturbance.   Respiratory: Negative for cough, shortness of breath and wheezing.    Cardiovascular: Negative for chest pain and palpitations.   Gastrointestinal: Negative for abdominal pain, blood in stool, constipation and diarrhea.   Endocrine: Negative for cold intolerance and polydipsia.   Genitourinary: Negative for difficulty urinating, flank pain and hematuria.   Musculoskeletal: Positive for back pain. Negative for arthralgias, gait problem, joint  "swelling and myalgias.   Skin: Negative for color change and rash.   Allergic/Immunologic: Positive for environmental allergies.   Neurological: Negative for syncope, numbness and headaches.   Hematological: Negative.    Psychiatric/Behavioral: Negative for sleep disturbance and suicidal ideas.   All other systems reviewed and are negative.      Objective     /80   Pulse 72   Temp 97.4 °F (36.3 °C) (Tympanic)   Ht 180 cm (70.87\")   Wt 108 kg (239 lb)   SpO2 98%   BMI 33.46 kg/m²   Office Visit on 08/09/2018   Component Date Value Ref Range Status   • Tissue Transglutaminase IgA 08/09/2018 <2  0 - 3 U/mL Final   • IgA 08/09/2018 178  90 - 386 mg/dL Final       Physical Exam   Constitutional: He is oriented to person, place, and time. Vital signs are normal. He appears well-developed and well-nourished. No distress.   Very pleasant 33-year-old white male.   HENT:   Head: Normocephalic.   Right Ear: External ear normal.   Left Ear: External ear normal.   Nose: Nose normal.   Mouth/Throat: Oropharynx is clear and moist.   Eyes: Pupils are equal, round, and reactive to light. Conjunctivae and EOM are normal. Right eye exhibits no discharge. Left eye exhibits no discharge.   Neck: Normal range of motion. Neck supple. No tracheal deviation present. No thyromegaly present.   Cardiovascular: Normal rate, regular rhythm, normal heart sounds and intact distal pulses.  Exam reveals no gallop and no friction rub.    No murmur heard.  Pulmonary/Chest: Effort normal and breath sounds normal. No respiratory distress. He has no wheezes. He has no rales. He exhibits no tenderness.   Abdominal: Soft. Bowel sounds are normal. He exhibits no distension and no mass. There is no tenderness. There is no rebound and no guarding.   Musculoskeletal: Normal range of motion.        Lumbar back: He exhibits tenderness and spasm.        Back:    Neurological: He is alert and oriented to person, place, and time. He has normal reflexes. "   Reflex Scores:       Patellar reflexes are 2+ on the right side and 2+ on the left side.  CN 2-12 grossly intact    Skin: Skin is warm and dry. No rash noted. He is not diaphoretic. No erythema.   Psychiatric: He has a normal mood and affect. His speech is normal and behavior is normal. Judgment and thought content normal. Cognition and memory are normal.   Vitals reviewed.      Assessment/Plan     Problem List Items Addressed This Visit        Musculoskeletal and Integument    Osteoarthrosis    Relevant Medications    HYDROcodone-acetaminophen (NORCO) 7.5-325 MG per tablet        Jurgen has been reviewed as consistent.  Uds is on file.   Goal: Improved quality of life and reduction in pain as evidenced by pt report.   Proper body mechanics has been reviewed and discussed today.    Adjustment of high risk medication made today.  I will increase his Norco 7.5-325 mg one by mouth every 6 hours as needed for pain from  #60 per month up to #75 per month with no refill.  Controlled substance agreement has been reviewed today.      Patient's Body mass index is 33.46 kg/m². BMI is above normal parameters. Recommendations include: exercise counseling and nutrition counseling.      I have discussed diagnosis in detail today allowing time for questions and answers. Pt is aware of reasons to seek urgent or emergent medical care as well as reasons to return to the clinic for evaluation. Possible side effects, interactions and progression of symptoms discussed as well. Pt / family states understanding.   Emotional support and active listening provided.       Follow up in one month, sooner if needed. Routine labs every 3-6 months.     Errors in dictation may reflect use of voice recognition software and not all errors in transcription may have been detected prior to signing.           This document has been electronically signed by:  CORDELIA Flores, NP-C

## 2018-09-21 DIAGNOSIS — R79.89 LOW TESTOSTERONE: ICD-10-CM

## 2018-09-21 RX ORDER — TESTOSTERONE CYPIONATE 200 MG/ML
INJECTION, SOLUTION INTRAMUSCULAR
Qty: 10 ML | Refills: 0 | OUTPATIENT
Start: 2018-09-21

## 2018-09-28 RX ORDER — ALLOPURINOL 100 MG/1
TABLET ORAL
Qty: 60 TABLET | Refills: 0 | Status: SHIPPED | OUTPATIENT
Start: 2018-09-28 | End: 2019-05-11 | Stop reason: SDUPTHER

## 2018-09-28 RX ORDER — AMITRIPTYLINE HYDROCHLORIDE 25 MG/1
25 TABLET, FILM COATED ORAL NIGHTLY PRN
Qty: 30 TABLET | Refills: 0 | Status: SHIPPED | OUTPATIENT
Start: 2018-09-28 | End: 2019-07-10

## 2018-10-11 ENCOUNTER — OFFICE VISIT (OUTPATIENT)
Dept: UROLOGY | Facility: CLINIC | Age: 33
End: 2018-10-11

## 2018-10-11 VITALS — BODY MASS INDEX: 33.46 KG/M2 | WEIGHT: 239 LBS | HEIGHT: 71 IN

## 2018-10-11 DIAGNOSIS — N48.1 BALANITIS: ICD-10-CM

## 2018-10-11 DIAGNOSIS — N52.01 ERECTILE DYSFUNCTION DUE TO ARTERIAL INSUFFICIENCY: Primary | ICD-10-CM

## 2018-10-11 DIAGNOSIS — R79.89 LOW TESTOSTERONE: ICD-10-CM

## 2018-10-11 DIAGNOSIS — N52.02 CORPORO-VENOUS OCCLUSIVE ERECTILE DYSFUNCTION: ICD-10-CM

## 2018-10-11 DIAGNOSIS — R53.83 OTHER FATIGUE: ICD-10-CM

## 2018-10-11 PROCEDURE — 99214 OFFICE O/P EST MOD 30 MIN: CPT | Performed by: UROLOGY

## 2018-10-11 RX ORDER — CALCIUM CARB/VITAMIN D3/VIT K1 500-100-40
TABLET,CHEWABLE ORAL
Qty: 30 EACH | Refills: 5 | Status: SHIPPED | OUTPATIENT
Start: 2018-10-11 | End: 2019-12-03

## 2018-10-11 RX ORDER — TADALAFIL 5 MG
5 TABLET ORAL NIGHTLY
Qty: 30 TABLET | Refills: 5 | Status: SHIPPED | OUTPATIENT
Start: 2018-10-11 | End: 2018-12-14 | Stop reason: SDUPTHER

## 2018-10-11 RX ORDER — TESTOSTERONE CYPIONATE 200 MG/ML
100 INJECTION, SOLUTION INTRAMUSCULAR 2 TIMES WEEKLY
Qty: 10 ML | Refills: 2 | Status: SHIPPED | OUTPATIENT
Start: 2018-10-11 | End: 2019-12-19 | Stop reason: SDUPTHER

## 2018-10-11 NOTE — PROGRESS NOTES
"Chief Complaint:          Chief Complaint   Patient presents with   • Balanitis       HPI:   33 y.o. male.  33-year-old white male on testosterone is doing fantastic he status post a recent Nissen fundoplication which apparently went well but he's lost a substantial amount await and feels great he's his penile injections for erections.  He continues on testosterone.  His balanitis has resolved. Patient returns today for follow-up.  He is been on testosterone replacement therapy.  He reports a dramatic improvement in his Sánchez questionnaire: -SÁNCHEZ-androgen deficiency in the age male questionnaire  The patient was queried regarding the androgen deficiency in the age male questionnaire.  This is a validated questionnaire that was performed on a set of 314 [place] male physicians when it was positive it correlated directly with a 94% chance of low testosterone.  Patient indicates there is a decrease in libido or sex drive, a lack of energy, Decreased  strength and endurance, a decreased \"enjoyment of life\", sad and grumpy feelings with significant difficulty maintaining erections.  He is also been a recent deterioration regarding work performance.  He reports weight loss.  He has good facility and the use of subcutaneous and intramuscular injections as well as comfort level and using the medication in a sterile fashion.  He understands he should use only the prescribed dose.  He's here for appropriate lab monitoring regarding this.  He understands this is a controlled substance and therefore must be watched closely will not be refilled and the medical loss or miss calculation of the dose.  He is very happy with the treatment and therefore wants to continue it.    Past Medical History:        Past Medical History:   Diagnosis Date   • Allergic rhinitis    • Asthma    • Atrial flutter (CMS/HCC)    • Diarrhea    • Disturbance of skin sensation    • GERD (gastroesophageal reflux disease)    • Gout    • History of MRI of " "spine 12/16/2014    Done at [facility]    • Hx of exercise stress test 2014    \"IT WAS NORMAL\"     • Hyperlipidemia    • Hypertension    • Kidney disease     stage 2   • Lymphadenitis    • Malaise and fatigue    • Osteoarthrosis    • Rash    • Renal disorder    • Sleep apnea     NO CPAP   • Wears glasses     TO READ         Current Meds:     Current Outpatient Prescriptions   Medication Sig Dispense Refill   • albuterol (PROVENTIL HFA;VENTOLIN HFA) 108 (90 Base) MCG/ACT inhaler Inhale 2 puffs Every 4 (Four) Hours As Needed for Shortness of Air. 1 inhaler 5   • allopurinol (ZYLOPRIM) 100 MG tablet TAKE 1 TABLET BY MOUTH TWICE DAILY 60 tablet 0   • amitriptyline (ELAVIL) 25 MG tablet TAKE 1 TABLET BY MOUTH AT NIGHT AS NEEDED FOR SLEEP 30 tablet 0   • amLODIPine (NORVASC) 10 MG tablet Take 1 tablet by mouth Daily. 30 tablet 5   • budesonide-formoterol (SYMBICORT) 160-4.5 MCG/ACT inhaler Inhale 2 puffs 2 (Two) Times a Day. (Patient taking differently: Inhale 2 puffs 2 (Two) Times a Day As Needed.) 1 inhaler 5   • CIALIS 5 MG tablet Take 1 tablet by mouth Every Night. 30 tablet 5   • clotrimazole-betamethasone (LOTRISONE) 1-0.05 % cream Apply  topically to the appropriate area as directed Every 12 (Twelve) Hours. 45 g 2   • cyclobenzaprine (FLEXERIL) 10 MG tablet Take 1 tablet by mouth 3 (Three) Times a Day As Needed for Muscle Spasms. 20 tablet 0   • dicyclomine (BENTYL) 20 MG tablet Take 2 tablets by mouth 4 (Four) Times a Day. (Patient taking differently: Take 20 mg by mouth Every Night.) 120 tablet 5   • FLUoxetine (PROzac) 10 MG capsule TAKE 1 CAPSULE BY MOUTH DAILY 30 capsule 5   • fluticasone (FLONASE) 50 MCG/ACT nasal spray 2 sprays into each nostril Daily. Administer 2 sprays in each nostril for each dose. (Patient taking differently: 2 sprays into each nostril Daily As Needed. Administer 2 sprays in each nostril for each dose. ) 1 bottle 2   • HYDROcodone-acetaminophen (NORCO) 7.5-325 MG per tablet Take 1 " tablet by mouth Every 6 (Six) Hours As Needed for Moderate Pain . 75 tablet 0   • ipratropium-albuterol (COMBIVENT RESPIMAT)  MCG/ACT inhaler Inhale 1 puff 4 (Four) Times a Day As Needed for Wheezing. 1-2 PUFFS EVERY 4 HOURS AS NEEDED 4 g 11   • ipratropium-albuterol (DUO-NEB) 0.5-2.5 mg/mL nebulizer INHALE THE CONTENTS OF 1 VIAL VIA NEBULIZER Q 6 H PRN  5   • loratadine (CLARITIN) 10 MG tablet Take 1 tablet by mouth Every Night. 30 tablet 5   • methocarbamol (ROBAXIN) 750 MG tablet Take 1 tablet by mouth 4 (Four) Times a Day As Needed for Muscle Spasms. Take on days he works in place of flexeril 120 tablet 5   • montelukast (SINGULAIR) 10 MG tablet TAKE 1 TABLET BY MOUTH EVERY NIGHT 30 tablet 5   • sodium chloride (OCEAN NASAL SPRAY) 0.65 % nasal spray 1 spray into each nostril As Needed for Congestion. 1 each 3   • Syringe, Disposable, 3 ML misc Use 3 ml syringe with a 25 gauge  5/8 inch needle 24 each 6   • Testosterone Cypionate (DEPO-TESTOSTERONE) 200 MG/ML injection He is to use 1/2 cc every Monday and Thursday SQ (Patient taking differently: Inject 100 mg under the skin 2 (Two) Times a Week. He is to use 1/2 cc every Monday and Thursday SQ) 10 mL 2   • valsartan-hydrochlorothiazide (DIOVAN-HCT) 160-12.5 MG per tablet TAKE 1 TABLET BY MOUTH DAILY 30 tablet 5     No current facility-administered medications for this visit.         Allergies:      Allergies   Allergen Reactions   • Erythromycin Rash        Past Surgical History:     Past Surgical History:   Procedure Laterality Date   • BRAVO PROCEDURE N/A 8/4/2017    Procedure: ESOPHAGOGASTRODUODENOSCOPY AND FULLER;  Surgeon: Efra Suero MD;  Location: [facility] OR;  Service:    • COLONOSCOPY  2014   • COLONOSCOPY      UNSURE OF DATE   • COLONOSCOPY N/A 5/3/2018    Procedure: COLONOSCOPY WITH COLD BIOPSIES AND COLD POLYPECTOMIES;  Surgeon: Bang Segundo MD;  Location: [facility] ENDOSCOPY;  Service: Gastroenterology   • NISSEN FUNDOPLICATION     • NISSEN  FUNDOPLICATION N/A 10/11/2017    Procedure: NISSEN FUNDOPLICATION LAPAROSCOPIC;  Surgeon: Efra Suero MD;  Location:  [facility] OR;  Service:    • NM LAP,ESOPHAGOGAST FUNDOPLASTY N/A 10/11/2017    Procedure: HIATAL HERNIA REPAIR LAPAROSCOPIC;  Surgeon: Efra Suero MD;  Location:  [facility] OR;  Service: General   • VASECTOMY           Social History:     Social History     Social History   • Marital status:      Spouse name: Radha    • Number of children: N/A   • Years of education: N/A     Occupational History   • [employer]      Social History Main Topics   • Smoking status: Never Smoker   • Smokeless tobacco: Never Used   • Alcohol use Yes      Comment: social   • Drug use: No   • Sexual activity: Defer     Other Topics Concern   • Not on file     Social History Narrative   • No narrative on file       Family History:     Family History   Problem Relation Age of Onset   • Asthma Other    • Cancer Other    • COPD Other    • Diabetes Other    • Stroke Other    • Asthma Mother    • COPD Mother    • Asthma Father    • Depression Father    • Drug abuse Father    • Arthritis Maternal Grandmother    • Cancer Maternal Grandmother    • Diabetes Maternal Grandmother    • Heart disease Maternal Grandmother    • Hyperlipidemia Maternal Grandmother    • Stroke Paternal Grandfather    • Colon cancer Maternal Grandfather         mid 40s       Review of Systems:     Review of Systems   Constitutional: Negative.    HENT: Negative.    Eyes: Negative.    Respiratory: Negative.    Cardiovascular: Negative.    Gastrointestinal: Negative.    Endocrine: Negative.    Musculoskeletal: Negative.    Allergic/Immunologic: Negative.    Neurological: Negative.    Hematological: Negative.    Psychiatric/Behavioral: Negative.        Physical Exam:     Physical Exam   Constitutional: He is oriented to person, place, and time. He appears well-developed and well-nourished.   HENT:   Head: Normocephalic and atraumatic.   Eyes: Pupils are  equal, round, and reactive to light. Conjunctivae and EOM are normal.   Neck: Normal range of motion.   Cardiovascular: Normal rate, regular rhythm, normal heart sounds and intact distal pulses.    Pulmonary/Chest: Effort normal and breath sounds normal.   Abdominal: Soft. Bowel sounds are normal.   Musculoskeletal: Normal range of motion.   Neurological: He is alert and oriented to person, place, and time. He has normal reflexes.   Skin: Skin is warm and dry.   Psychiatric: He has a normal mood and affect. His behavior is normal. Judgment and thought content normal.   Nursing note and vitals reviewed.      I have reviewed the following portions of the patient's history: allergies, current medications, past family history, past medical history, past social history, past surgical history, problem list and ROS and confirm it's accurate.      Procedure:       Assessment/Plan:   -Low testosterone: patient is here for follow-up.  Since beginning the medication he's been very pleased.  He reports a dramatic improvement in his erections, ability to achieve and maintain an erection, improvement in libido, increase in frequency of morning erections, a noticeable weight loss consistent with the treatment.  No development of breast problems or abnormalities.  He's going to have appropriate safety laboratory parameters checked.   He understands that the new data implicates testosterone with the development of prostate cancer and this is all but been disproven and the medical literature as well as the risks of cardiovascular disease which is actually also been disproven.  He understands that while he is a candidate for topical therapy if he is in contact with children this is not an option because it's been shown to accentuate genitalia development at an early age that this frequently irreversible.  He also understands this is a controlled substance and as such will not be prescribed without appropriate follow-up and appropriate  laboratory investigation.  He understands effects on spermatogenesis including the fact that this is not always completely reversible and not always completely limited his ability to father a child.  He has demonstrated facility in the technique of both intramuscular and subcutaneous injection.  And has been taught sterility one drawing up the medication.  Erectile dysfunction-we discussed the anatomy and physiology of the penis and the endothelium.  We discussed the various forms of erectile dysfunction including peripheral vascular occlusive disease, postoperative, secondary to radiation treatments of the prostate, and arterial inflow.  We discussed the various treatment options available including oral medication and its various forms.  We discussed the use of both generic and non-generic Viagra.  We discussed Cialis and a longer half-life of 17 hours as well as the other 2 medications.  We discussed cost involved with this including the fact that the generic is much cheaper but is taken has multiple pills because they are 20 mg dosages.  We did discuss the other alternatives including Penile injections, vacuum erection devices and surgical intervention reserved for only the most severe cases.  We discussed the need for testosterone in about 20% of cases of erectile dysfunction.  He uses penile injections and is very pleased with the results .  Balanitis-resolved    Patient's Body mass index is 33.46 kg/m². BMI is above normal parameters. Recommendations include: educational material.          This document has been electronically signed by STEPHEN BANGURA MD October 11, 2018 1:53 PM

## 2018-10-17 ENCOUNTER — OFFICE VISIT (OUTPATIENT)
Dept: FAMILY MEDICINE CLINIC | Facility: CLINIC | Age: 33
End: 2018-10-17

## 2018-10-17 VITALS
WEIGHT: 241 LBS | TEMPERATURE: 97 F | DIASTOLIC BLOOD PRESSURE: 84 MMHG | HEART RATE: 66 BPM | SYSTOLIC BLOOD PRESSURE: 140 MMHG | OXYGEN SATURATION: 98 % | HEIGHT: 71 IN | BODY MASS INDEX: 33.74 KG/M2

## 2018-10-17 DIAGNOSIS — Z23 ENCOUNTER FOR VACCINATION: ICD-10-CM

## 2018-10-17 DIAGNOSIS — M15.9 PRIMARY OSTEOARTHRITIS INVOLVING MULTIPLE JOINTS: ICD-10-CM

## 2018-10-17 DIAGNOSIS — Z23 ENCOUNTER FOR IMMUNIZATION: Primary | ICD-10-CM

## 2018-10-17 PROCEDURE — 99214 OFFICE O/P EST MOD 30 MIN: CPT | Performed by: NURSE PRACTITIONER

## 2018-10-17 PROCEDURE — 90472 IMMUNIZATION ADMIN EACH ADD: CPT | Performed by: NURSE PRACTITIONER

## 2018-10-17 PROCEDURE — 90632 HEPA VACCINE ADULT IM: CPT | Performed by: NURSE PRACTITIONER

## 2018-10-17 PROCEDURE — 90686 IIV4 VACC NO PRSV 0.5 ML IM: CPT | Performed by: NURSE PRACTITIONER

## 2018-10-17 PROCEDURE — 90471 IMMUNIZATION ADMIN: CPT | Performed by: NURSE PRACTITIONER

## 2018-10-17 RX ORDER — HYDROCODONE BITARTRATE AND ACETAMINOPHEN 7.5; 325 MG/1; MG/1
1 TABLET ORAL EVERY 6 HOURS PRN
Qty: 75 TABLET | Refills: 0 | Status: SHIPPED | OUTPATIENT
Start: 2018-10-17 | End: 2018-11-07 | Stop reason: SDUPTHER

## 2018-10-17 NOTE — PROGRESS NOTES
Subjective   Nghia Roach is a 33 y.o. male.     Chief Complaint   Patient presents with   • Hypertension   • Back Pain   • Osteoarthritis       History of Present Illness     Hypertension-stable with current medication regimen.  Patient does often check his blood pressure has been well controlled.    Preventative measure-vaccination-patient is requesting influenza vaccine.  He is also open to discuss the hepatitis A vaccination.    Chronic back pain with right side sciatic pain. Rates pain as 6 on psr of 0-10. Continues to have pain in his right lower back with radiation into the right lower extremity.  This pain is sharp and tingling.  Worse with activity or prolonged standing.  He has been seen by neurology and orthopedic consult.  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  Pain interferes with ability to sleep.  He does feel that being in chronic pain increases his depressive symptoms.  He denies any thoughts of hurting self or others.  He denies any substance abuse or addiction.  He does report that taking Norco to relieve his pain helps him be more active around the house and decreases the pain after a long workday allowing him to sleep.  Radiology is on file.  He has attempted physical therapy with no improvement.    The following portions of the patient's history were reviewed and updated as appropriate: allergies, current medications, past family history, past medical history, past social history, past surgical history and problem list.    Review of Systems   Constitutional: Negative for appetite change, fatigue and unexpected weight change.   HENT: Negative for congestion, ear pain, nosebleeds, postnasal drip, rhinorrhea, sinus pain, sinus pressure, sore throat, trouble swallowing and voice change.    Eyes: Negative for pain and visual disturbance.   Respiratory: Negative for cough, shortness of  "breath and wheezing.    Cardiovascular: Negative for chest pain and palpitations.   Gastrointestinal: Positive for diarrhea (not of new onset ). Negative for abdominal pain, blood in stool and constipation.   Endocrine: Negative for cold intolerance and polydipsia.   Genitourinary: Negative for difficulty urinating, flank pain and hematuria.   Musculoskeletal: Positive for arthralgias and back pain. Negative for gait problem, joint swelling and myalgias.   Skin: Negative for color change and rash.   Allergic/Immunologic: Positive for environmental allergies.   Neurological: Negative for syncope, numbness and headaches.   Hematological: Negative.    Psychiatric/Behavioral: Negative for sleep disturbance and suicidal ideas.   All other systems reviewed and are negative.      Objective     /84   Pulse 66   Temp 97 °F (36.1 °C) (Tympanic)   Ht 180 cm (70.87\")   Wt 109 kg (241 lb)   SpO2 98%   BMI 33.74 kg/m²   Office Visit on 08/09/2018   Component Date Value Ref Range Status   • Tissue Transglutaminase IgA 08/09/2018 <2  0 - 3 U/mL Final   • IgA 08/09/2018 178  90 - 386 mg/dL Final       Physical Exam   Constitutional: He is oriented to person, place, and time. Vital signs are normal. He appears well-developed and well-nourished. No distress.   Very pleasant 33-year-old white male.   HENT:   Head: Normocephalic.   Right Ear: External ear normal.   Left Ear: External ear normal.   Nose: Nose normal.   Mouth/Throat: Oropharynx is clear and moist.   Eyes: Pupils are equal, round, and reactive to light. Conjunctivae and EOM are normal. Right eye exhibits no discharge. Left eye exhibits no discharge.   Neck: Normal range of motion. Neck supple. No tracheal deviation present. No thyromegaly present.   Cardiovascular: Normal rate, regular rhythm, normal heart sounds and intact distal pulses.  Exam reveals no gallop and no friction rub.    No murmur heard.  Pulmonary/Chest: Effort normal and breath sounds normal. " No respiratory distress. He has no wheezes. He has no rales. He exhibits no tenderness.   Abdominal: Soft. Bowel sounds are normal. He exhibits no distension and no mass. There is no tenderness. There is no rebound and no guarding.   Musculoskeletal: Normal range of motion.        Lumbar back: He exhibits tenderness and spasm.        Back:    Neurological: He is alert and oriented to person, place, and time. He has normal reflexes.   Reflex Scores:       Patellar reflexes are 2+ on the right side and 2+ on the left side.  CN 2-12 grossly intact    Skin: Skin is warm and dry. Capillary refill takes less than 2 seconds. No rash noted. He is not diaphoretic. No erythema.   Psychiatric: He has a normal mood and affect. His speech is normal and behavior is normal. Judgment and thought content normal. Cognition and memory are normal.   Vitals reviewed.      Assessment/Plan     Problem List Items Addressed This Visit        Musculoskeletal and Integument    Osteoarthrosis    Relevant Medications    HYDROcodone-acetaminophen (NORCO) 7.5-325 MG per tablet       Other    Encounter for vaccination      Other Visit Diagnoses     Encounter for immunization    -  Primary    Relevant Orders    Fluarix/Fluzone/Afluria Quad/FluLaval Quad (Completed)    Hepatitis A Vaccine Adult (HAVRIX) - Today (Completed)    Hepatitis A Vaccine Adult  (HAVRIX) - Dose 2            Jurgen has been reviewed as consistent.  Uds is on file.   Goal: Improved quality of life and reduction in pain as evidenced by pt report.   Proper body mechanics has been reviewed and discussed today.  Controlled substance agreement has been reviewed today.   After obtaining informed consent, the immunization is given by staff.     Patient's Body mass index is 33.74 kg/m². BMI is above normal parameters. Recommendations include: exercise counseling and nutrition counseling.      I have discussed diagnosis in detail today allowing time for questions and answers. Pt is  aware of reasons to seek urgent or emergent medical care as well as reasons to return to the clinic for evaluation. Possible side effects, interactions and progression of symptoms discussed as well. Pt / family states understanding.   Emotional support and active listening provided.       Controlled substance agreement has been reviewed today.       Errors in dictation may reflect use of voice recognition software and not all errors in transcription may have been detected prior to signing.           This document has been electronically signed by:  CORDELIA Flores, NP-C

## 2018-10-25 ENCOUNTER — HOSPITAL ENCOUNTER (EMERGENCY)
Facility: HOSPITAL | Age: 33
Discharge: HOME | End: 2018-10-25
Attending: FAMILY MEDICINE | Admitting: FAMILY MEDICINE

## 2018-10-25 VITALS
HEART RATE: 92 BPM | SYSTOLIC BLOOD PRESSURE: 131 MMHG | BODY MASS INDEX: 33.6 KG/M2 | HEIGHT: 71 IN | RESPIRATION RATE: 18 BRPM | OXYGEN SATURATION: 98 % | TEMPERATURE: 97 F | WEIGHT: 240 LBS | DIASTOLIC BLOOD PRESSURE: 95 MMHG

## 2018-10-25 DIAGNOSIS — N48.33 PRIAPISM, DRUG-INDUCED: Primary | ICD-10-CM

## 2018-10-25 PROCEDURE — 99283 EMERGENCY DEPT VISIT LOW MDM: CPT

## 2018-10-25 RX ORDER — PHENYLEPHRINE HCL IN 0.9% NACL 0.5 MG/5ML
2 SYRINGE (ML) INTRAVENOUS
Status: DISCONTINUED | OUTPATIENT
Start: 2018-10-25 | End: 2018-10-25 | Stop reason: HOSPADM

## 2018-10-25 RX ADMIN — Medication 200 MCG: at 06:05

## 2018-10-25 NOTE — ED PROVIDER NOTES
"Subjective   Patient is a 33-year-old male presents emergency department for priapism since 9 PM this evening.  Patient takes Trimex for erectile dysfunction and uses medications evening.  He is also prescribed phenylephrine for cessation of erection after Trimex use.  Unfortunately, the patient had run out of his phenylephrine injections.  Patient usually requires phenylephrine injections to avoid priapism.  He states that without treatment he has had very little cessation of erection, and was instructed by urology to reports the emergency department if his erection lasted greater than one hour.  He denies any discoloration of the penis, numbness or tingling.            Review of Systems   Constitutional: Negative.    HENT: Negative.    Eyes: Negative.    Respiratory: Negative.    Cardiovascular: Negative.    Gastrointestinal: Negative.    Endocrine: Negative.    Genitourinary: Positive for penile pain and penile swelling. Negative for discharge, scrotal swelling and testicular pain.   Musculoskeletal: Negative.    Skin: Negative.    Neurological: Negative.    Psychiatric/Behavioral: Negative.        Past Medical History:   Diagnosis Date   • Allergic rhinitis    • Asthma    • Atrial flutter (CMS/HCC)    • Diarrhea    • Disturbance of skin sensation    • GERD (gastroesophageal reflux disease)    • Gout    • History of MRI of spine 12/16/2014    Done at [facility]    • Hx of exercise stress test 2014    \"IT WAS NORMAL\"     • Hyperlipidemia    • Hypertension    • Kidney disease     stage 2   • Lymphadenitis    • Malaise and fatigue    • Osteoarthrosis    • Rash    • Renal disorder    • Sleep apnea     NO CPAP   • Wears glasses     TO READ       Allergies   Allergen Reactions   • Erythromycin Rash       Past Surgical History:   Procedure Laterality Date   • BRAVO PROCEDURE N/A 8/4/2017    Procedure: ESOPHAGOGASTRODUODENOSCOPY AND FULLER;  Surgeon: Efra Suero MD;  Location: [facility];  Service:    • COLONOSCOPY  " 2014   • COLONOSCOPY      UNSURE OF DATE   • COLONOSCOPY N/A 5/3/2018    Procedure: COLONOSCOPY WITH COLD BIOPSIES AND COLD POLYPECTOMIES;  Surgeon: Bang Segundo MD;  Location: [facility] ENDOSCOPY;  Service: Gastroenterology   • NISSEN FUNDOPLICATION     • NISSEN FUNDOPLICATION N/A 10/11/2017    Procedure: NISSEN FUNDOPLICATION LAPAROSCOPIC;  Surgeon: Efra Suero MD;  Location:  [facility] OR;  Service:    • [place] LAP,ESOPHAGOGAST FUNDOPLASTY N/A 10/11/2017    Procedure: HIATAL HERNIA REPAIR LAPAROSCOPIC;  Surgeon: Efra Suero MD;  Location:  [facility] OR;  Service: General   • VASECTOMY         Family History   Problem Relation Age of Onset   • Asthma Other    • Cancer Other    • COPD Other    • Diabetes Other    • Stroke Other    • Asthma Mother    • COPD Mother    • Asthma Father    • Depression Father    • Drug abuse Father    • Arthritis Maternal Grandmother    • Cancer Maternal Grandmother    • Diabetes Maternal Grandmother    • Heart disease Maternal Grandmother    • Hyperlipidemia Maternal Grandmother    • Stroke Paternal Grandfather    • Colon cancer Maternal Grandfather         mid 40s       Social History     Social History   • Marital status:      Spouse name: Radha      Occupational History   • [employer]      Social History Main Topics   • Smoking status: Never Smoker   • Smokeless tobacco: Never Used   • Alcohol use Yes      Comment: social   • Drug use: No   • Sexual activity: Defer     Other Topics Concern   • Not on file           Objective   Physical Exam   Constitutional: He is oriented to person, place, and time. He appears well-developed and well-nourished. He appears distressed.   HENT:   Head: Normocephalic and atraumatic.   Right Ear: External ear normal.   Left Ear: External ear normal.   Mouth/Throat: Oropharynx is clear and moist. No oropharyngeal exudate.   Eyes: Pupils are equal, round, and reactive to light. Conjunctivae and EOM are normal. Right eye exhibits no discharge. Left  eye exhibits no discharge. No scleral icterus.   Neck: Normal range of motion. Neck supple. No JVD present. No tracheal deviation present. No thyromegaly present.   Cardiovascular: Normal rate, regular rhythm, normal heart sounds and intact distal pulses.  Exam reveals no gallop and no friction rub.    No murmur heard.  Pulmonary/Chest: Effort normal and breath sounds normal. No stridor. No respiratory distress. He has no wheezes. He has no rales.   Abdominal: Soft. Bowel sounds are normal. He exhibits no distension and no mass. There is no tenderness.   Genitourinary: Uncircumcised.   Genitourinary Comments: Penis is erect   Musculoskeletal: Normal range of motion.   Neurological: He is alert and oriented to person, place, and time.   Skin: Skin is warm and dry. Capillary refill takes less than 2 seconds. He is not diaphoretic.   Nursing note and vitals reviewed.      Procedures           ED Course  ED Course as of Oct 25 0619   Thu Oct 25, 2018   0618 Patient opted to provide himself with intercavernous injection.  Under my direct supervision and he provided 1 mL of phenylephrine to the dorsum of his penis.  Patient was retracted approximately 5 minutes after injection was administered and patient had complete resolution of his priapism.   [EG]      ED Course User Index  [EG] Randee Callahan DO                  MDM  Number of Diagnoses or Management Options  Priapism, drug-induced: new and requires workup  Risk of Complications, Morbidity, and/or Mortality  Presenting problems: moderate  Diagnostic procedures: moderate  Management options: moderate  General comments: Patient is a 33-year-old male presents emergency department for priapism.  Priapism is directly related to ED medication prescribed by urologist.  Patient using a medication called Trimex in conjunction with phenylephrine for resolution of erection.  Patient had run out of the phenylephrine and thus had a persistent priapism.  Phenylephrine was  administered intercavernously to dorsum of the penis by the patient under physician supervision.  Patient had resolution of priapism approximately 5 minutes after injection.  He was then discharged home in improved condition.    Critical Care  Total time providing critical care: < 30 minutes    Patient Progress  Patient progress: improved        Final diagnoses:   Priapism, drug-induced            Randee Callahan DO  10/25/18 0621

## 2018-11-05 ENCOUNTER — HOSPITAL ENCOUNTER (EMERGENCY)
Facility: HOSPITAL | Age: 33
Discharge: HOME | End: 2018-11-05
Attending: FAMILY MEDICINE | Admitting: FAMILY MEDICINE

## 2018-11-05 ENCOUNTER — TELEPHONE (OUTPATIENT)
Dept: UROLOGY | Facility: CLINIC | Age: 33
End: 2018-11-05

## 2018-11-05 VITALS
WEIGHT: 235 LBS | HEART RATE: 83 BPM | RESPIRATION RATE: 16 BRPM | DIASTOLIC BLOOD PRESSURE: 74 MMHG | HEIGHT: 71 IN | BODY MASS INDEX: 32.9 KG/M2 | TEMPERATURE: 98.3 F | OXYGEN SATURATION: 99 % | SYSTOLIC BLOOD PRESSURE: 130 MMHG

## 2018-11-05 DIAGNOSIS — N48.30 PRIAPISM: Primary | ICD-10-CM

## 2018-11-05 PROCEDURE — 99283 EMERGENCY DEPT VISIT LOW MDM: CPT

## 2018-11-05 RX ORDER — PHENYLEPHRINE HCL IN 0.9% NACL 0.5 MG/5ML
2 SYRINGE (ML) INTRAVENOUS
Status: COMPLETED | OUTPATIENT
Start: 2018-11-05 | End: 2018-11-05

## 2018-11-05 RX ADMIN — Medication 200 MCG: at 22:12

## 2018-11-05 RX ADMIN — Medication 200 MCG: at 23:06

## 2018-11-05 RX ADMIN — Medication 200 MCG: at 23:07

## 2018-11-05 NOTE — TELEPHONE ENCOUNTER
Pat from [facility] called and said the patient had injected Triple P without having phenylephrine kit to counter act the Triple P and landed up in the ER.  The patient called them at [facility] because that is his PCP. I talked to Dr. Pa and Pat and I faxed over the kit he needed.

## 2018-11-06 NOTE — ED PROVIDER NOTES
Subjective   Patient is a 33-year-old male presents to the emergency department for a priapism that has lasted approximately 6 hours.  The patient is prescribed injectable Trymex.  ED, as well as phenylephrine injectable for cessation of erection.  The patient was out of phenylephrine, but took his Trymex anyway.  The patient states that without phenylephrine his erection continues to last and nothing he does will make it go away.  He states that his erection is becoming painful.  Patient was here approximately 2 weeks ago with the same complaints.  I had a long discussion with the patient about the importance of proper use of Trymex and that he needs to make sure that phenylephrine is available each time he uses his ED medication.            Review of Systems   Constitutional: Negative for activity change, appetite change and chills.   HENT: Negative for congestion, postnasal drip, rhinorrhea, sinus pain, sinus pressure, sneezing and sore throat.    Eyes: Negative for discharge and itching.   Respiratory: Negative for apnea, chest tightness, shortness of breath and stridor.    Cardiovascular: Negative for chest pain and leg swelling.   Gastrointestinal: Negative for abdominal pain, blood in stool, diarrhea, nausea and rectal pain.   Endocrine: Negative for cold intolerance and heat intolerance.   Genitourinary: Positive for penile pain. Negative for discharge, dysuria, frequency, hematuria, scrotal swelling and testicular pain.   Musculoskeletal: Negative for arthralgias and back pain.   Skin: Negative for color change and pallor.   Allergic/Immunologic: Negative for environmental allergies and food allergies.   Neurological: Negative for dizziness and facial asymmetry.   Psychiatric/Behavioral: Negative for agitation and behavioral problems.       Past Medical History:   Diagnosis Date   • Allergic rhinitis    • Asthma    • Atrial flutter (CMS/HCC)    • Diarrhea    • Disturbance of skin sensation    • GERD  "(gastroesophageal reflux disease)    • Gout    • History of MRI of spine 12/16/2014    Done at [facility]    • Hx of exercise stress test 2014    \"IT WAS NORMAL\"     • Hyperlipidemia    • Hypertension    • Kidney disease     stage 2   • Lymphadenitis    • Malaise and fatigue    • Osteoarthrosis    • Rash    • Renal disorder    • Sleep apnea     NO CPAP   • Wears glasses     TO READ       Allergies   Allergen Reactions   • Erythromycin Rash       Past Surgical History:   Procedure Laterality Date   • BRAVO PROCEDURE N/A 8/4/2017    Procedure: ESOPHAGOGASTRODUODENOSCOPY AND FULLER;  Surgeon: Efra Suero MD;  Location: [facility] OR;  Service:    • COLONOSCOPY  2014   • COLONOSCOPY      UNSURE OF DATE   • COLONOSCOPY N/A 5/3/2018    Procedure: COLONOSCOPY WITH COLD BIOPSIES AND COLD POLYPECTOMIES;  Surgeon: Bang Segundo MD;  Location: [facility] ENDOSCOPY;  Service: Gastroenterology   • NISSEN FUNDOPLICATION     • NISSEN FUNDOPLICATION N/A 10/11/2017    Procedure: NISSEN FUNDOPLICATION LAPAROSCOPIC;  Surgeon: Efra Suero MD;  Location: [facility] OR;  Service:    • [place] LAP,ESOPHAGOGAST FUNDOPLASTY N/A 10/11/2017    Procedure: HIATAL HERNIA REPAIR LAPAROSCOPIC;  Surgeon: Efra Suero MD;  Location: [facility] OR;  Service: General   • VASECTOMY         Family History   Problem Relation Age of Onset   • Asthma Other    • Cancer Other    • COPD Other    • Diabetes Other    • Stroke Other    • Asthma Mother    • COPD Mother    • Asthma Father    • Depression Father    • Drug abuse Father    • Arthritis Maternal Grandmother    • Cancer Maternal Grandmother    • Diabetes Maternal Grandmother    • Heart disease Maternal Grandmother    • Hyperlipidemia Maternal Grandmother    • Stroke Paternal Grandfather    • Colon cancer Maternal Grandfather         mid 40s       Social History     Social History   • Marital status:      Spouse name: Radha      Occupational History   • [employer]      Social History Main Topics   • " Smoking status: Never Smoker   • Smokeless tobacco: Never Used   • Alcohol use Yes      Comment: social   • Drug use: No   • Sexual activity: Defer     Other Topics Concern   • Not on file           Objective   Physical Exam   Constitutional: He is oriented to person, place, and time. He appears well-developed and well-nourished. No distress.   HENT:   Head: Normocephalic and atraumatic.   Right Ear: External ear normal.   Left Ear: External ear normal.   Mouth/Throat: Oropharynx is clear and moist. No oropharyngeal exudate.   Eyes: Pupils are equal, round, and reactive to light. Conjunctivae and EOM are normal. Right eye exhibits no discharge. Left eye exhibits no discharge. No scleral icterus.   Neck: Normal range of motion. Neck supple. No JVD present. No tracheal deviation present. No thyromegaly present.   Cardiovascular: Normal rate, regular rhythm, normal heart sounds and intact distal pulses.  Exam reveals no gallop and no friction rub.    No murmur heard.  Pulmonary/Chest: Effort normal and breath sounds normal. No stridor. No respiratory distress. He has no wheezes. He has no rales.   Abdominal: Soft. Bowel sounds are normal. He exhibits no distension. There is no tenderness.   Genitourinary:   Genitourinary Comments: Penis is fully erect   Musculoskeletal: Normal range of motion. He exhibits no edema, tenderness or deformity.   Neurological: He is alert and oriented to person, place, and time.   Skin: Skin is warm. Capillary refill takes less than 2 seconds. He is not diaphoretic. No erythema.   Psychiatric: He has a normal mood and affect. His behavior is normal.   Nursing note and vitals reviewed.      Procedures           ED Course  ED Course as of Nov 06 0114   Tue Nov 06, 2018   0113 After about 10 minutes from administration of phenylephrine patient still has priapism so second dose of phenylephrine was administered.  [EG]   0114 5 minutes after second is a phenylephrine patient had complete  resolution of priapism.  [EG]      ED Course User Index  [EG] Randee Callahan DO                  MDM  Number of Diagnoses or Management Options  Priapism: established and worsening  Risk of Complications, Morbidity, and/or Mortality  Presenting problems: high  Diagnostic procedures: high  Management options: high  General comments: Patient is a 33-year-old male presents emergency department for priapism.  Patient has history of prior FDC due to medication taken for EGD.  The patient was given injectable phenylephrine for resolving priapism in the emergency department 2 doses were administered here with complete resolution of priapism.  Patient was counseled at length the importance of being sure that he has phenylephrine available before administering his ED medications.    Critical Care  Total time providing critical care: < 30 minutes    Patient Progress  Patient progress: improved        Final diagnoses:   Priapism            Randee Callahan DO  11/06/18 0113       Randee Callahan DO  11/06/18 0114

## 2018-11-07 ENCOUNTER — OFFICE VISIT (OUTPATIENT)
Dept: FAMILY MEDICINE CLINIC | Facility: CLINIC | Age: 33
End: 2018-11-07

## 2018-11-07 VITALS
SYSTOLIC BLOOD PRESSURE: 142 MMHG | DIASTOLIC BLOOD PRESSURE: 90 MMHG | OXYGEN SATURATION: 97 % | HEIGHT: 71 IN | HEART RATE: 75 BPM | TEMPERATURE: 97.8 F | BODY MASS INDEX: 34.02 KG/M2 | WEIGHT: 243 LBS

## 2018-11-07 DIAGNOSIS — M15.9 PRIMARY OSTEOARTHRITIS INVOLVING MULTIPLE JOINTS: ICD-10-CM

## 2018-11-07 DIAGNOSIS — Z09 ENCOUNTER FOR EXAMINATION FOLLOWING TREATMENT AT HOSPITAL: ICD-10-CM

## 2018-11-07 DIAGNOSIS — N52.02 CORPORO-VENOUS OCCLUSIVE ERECTILE DYSFUNCTION: Primary | ICD-10-CM

## 2018-11-07 PROCEDURE — 99214 OFFICE O/P EST MOD 30 MIN: CPT | Performed by: NURSE PRACTITIONER

## 2018-11-07 RX ORDER — HYDROCODONE BITARTRATE AND ACETAMINOPHEN 7.5; 325 MG/1; MG/1
1 TABLET ORAL EVERY 6 HOURS PRN
Qty: 75 TABLET | Refills: 0 | Status: SHIPPED | OUTPATIENT
Start: 2018-11-07 | End: 2018-11-29

## 2018-11-07 NOTE — PROGRESS NOTES
Subjective   Nghia Roach is a 33 y.o. male.     Chief Complaint   Patient presents with   • Osteoarthritis       History of Present Illness     ER follow-up 11/05/2018 and 10/25/18 at [facility] emergency department for Priapism.  He is under the care of urology.  Patient is taking multiple medications for erectile dysfunction.  Patient is requesting a sooner follow-up with his urologist.    Chronic low back pain with right-sided sciatica-patient reports an acute exacerbation.  He rates his pain as 8 on a pain scale rating of 0-10.  Patient reports that at night he is having less difficulty sleeping when he takes pain medication prior to going to sleep.     Pain is described as sharp, throbbing with radiation down into the right leg.  Right leg does feel tingly and heavy at times.  Rest is mildly helpful.  Pain is worse with activity.  Patient denies any substance abuse or addiction.  Radiology is on file.  Patient has attended physical therapy in the past.  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  We try to avoid anti-inflammatory medication due to chronic gastritis and stomach irritation.  Over-the-counter pain relief medications have not been helpful.  He does report improved quality of life with current medication regimen      The following portions of the patient's history were reviewed and updated as appropriate: allergies, current medications, past family history, past medical history, past social history, past surgical history and problem list.    Review of Systems   Constitutional: Positive for activity change.   HENT: Positive for rhinorrhea and sinus pressure.    Eyes: Positive for pain.   Respiratory: Negative.    Cardiovascular: Positive for chest pain.   Gastrointestinal: Negative.    Endocrine: Negative.    Genitourinary: Positive for penile pain. Negative for testicular pain.   Musculoskeletal: Positive for joint swelling.   Skin: Negative.   "  Allergic/Immunologic: Positive for environmental allergies.   Neurological: Positive for headaches.   Psychiatric/Behavioral: Negative for self-injury and suicidal ideas. The patient is not nervous/anxious.        Objective     /90   Pulse 75   Temp 97.8 °F (36.6 °C) (Oral)   Ht 180.3 cm (70.98\")   Wt 110 kg (243 lb)   SpO2 97%   BMI 33.91 kg/m²   Office Visit on 08/09/2018   Component Date Value Ref Range Status   • Tissue Transglutaminase IgA 08/09/2018 <2  0 - 3 U/mL Final   • IgA 08/09/2018 178  90 - 386 mg/dL Final       Physical Exam   Constitutional: He is oriented to person, place, and time. Vital signs are normal. He appears well-developed and well-nourished. No distress.   Very pleasant 33-year-old white male.   HENT:   Head: Normocephalic.   Right Ear: External ear normal.   Left Ear: External ear normal.   Nose: Nose normal.   Mouth/Throat: Oropharynx is clear and moist.   Eyes: Pupils are equal, round, and reactive to light. Conjunctivae and EOM are normal. Right eye exhibits no discharge. Left eye exhibits no discharge.   Neck: Normal range of motion. Neck supple. No tracheal deviation present. No thyromegaly present.   Cardiovascular: Normal rate, regular rhythm, normal heart sounds and intact distal pulses.  Exam reveals no gallop and no friction rub.    No murmur heard.  Pulmonary/Chest: Effort normal and breath sounds normal. No respiratory distress. He has no wheezes. He has no rales. He exhibits no tenderness.   Abdominal: Soft. Bowel sounds are normal. He exhibits no distension and no mass. There is no tenderness. There is no rebound and no guarding.   Genitourinary:   Genitourinary Comments: Erection has now resolved   Musculoskeletal:        Right shoulder: He exhibits decreased range of motion and spasm.        Lumbar back: He exhibits tenderness and spasm.        Back:    Neurological: He is alert and oriented to person, place, and time. He has normal reflexes.   Reflex " Scores:       Patellar reflexes are 2+ on the right side and 2+ on the left side.  CN 2-12 grossly intact    Skin: Skin is warm and dry. Capillary refill takes less than 2 seconds. No rash noted. He is not diaphoretic. No erythema.   Psychiatric: He has a normal mood and affect. His speech is normal and behavior is normal. Judgment and thought content normal. Cognition and memory are normal.   Vitals reviewed.      Assessment/Plan     Problem List Items Addressed This Visit        Musculoskeletal and Integument    Osteoarthrosis    Relevant Medications    HYDROcodone-acetaminophen (NORCO) 7.5-325 MG per tablet    Other Relevant Orders    Urine Drug Screen - Urine, Clean Catch       Genitourinary    Corporo-venous occlusive erectile dysfunction - Primary       Other    Encounter for examination following treatment at hospital          Current outpatient and discharge medications have been reconciled for the patient.  Reviewed by: CORDELIA Alexandre has been reviewed as consistent.  Uds is on file.   Goal: Improved quality of life and reduction in pain as evidenced by pt report.   Proper body mechanics has been reviewed and discussed today.    Norco 7.5-325 one by mouth every 6 hours when necessary #75 with no refill.    Controlled substance agreement has been reviewed today.     [facility] emergency department notes and results have been reviewed and discussed with patient.  I have asked staff to call his urologist and schedule a sooner appointment for follow-up.         Patient's Body mass index is 33.91 kg/m². BMI is above normal parameters. Recommendations include: exercise counseling and nutrition counseling.    I have discussed diagnosis in detail today allowing time for questions and answers. Pt is aware of reasons to seek urgent or emergent medical care as well as reasons to return to the clinic for evaluation. Possible side effects, interactions and progression of symptoms discussed as well.  Pt / family states understanding.   Emotional support and active listening provided.       Follow up in one month, sooner if needed. Routine labs every 3-6 months.     Errors in dictation may reflect use of voice recognition software and not all errors in transcription may have been detected prior to signing.           This document has been electronically signed by:  CORDELIA Flores, NP-C

## 2018-11-19 ENCOUNTER — CLINICAL SUPPORT (OUTPATIENT)
Dept: FAMILY MEDICINE CLINIC | Facility: CLINIC | Age: 33
End: 2018-11-19

## 2018-11-19 DIAGNOSIS — J06.9 URI, ACUTE: Primary | ICD-10-CM

## 2018-11-19 PROCEDURE — 96372 THER/PROPH/DIAG INJ SC/IM: CPT | Performed by: NURSE PRACTITIONER

## 2018-11-19 RX ORDER — METHYLPREDNISOLONE ACETATE 80 MG/ML
80 INJECTION, SUSPENSION INTRA-ARTICULAR; INTRALESIONAL; INTRAMUSCULAR; SOFT TISSUE ONCE
Status: COMPLETED | OUTPATIENT
Start: 2018-11-19 | End: 2018-11-19

## 2018-11-19 RX ORDER — CEFTRIAXONE 1 G/1
1 INJECTION, POWDER, FOR SOLUTION INTRAMUSCULAR; INTRAVENOUS ONCE
Status: COMPLETED | OUTPATIENT
Start: 2018-11-19 | End: 2018-11-19

## 2018-11-19 RX ADMIN — METHYLPREDNISOLONE ACETATE 80 MG: 80 INJECTION, SUSPENSION INTRA-ARTICULAR; INTRALESIONAL; INTRAMUSCULAR; SOFT TISSUE at 15:59

## 2018-11-19 RX ADMIN — CEFTRIAXONE 1 G: 1 INJECTION, POWDER, FOR SOLUTION INTRAMUSCULAR; INTRAVENOUS at 15:58

## 2018-11-29 ENCOUNTER — OFFICE VISIT (OUTPATIENT)
Dept: FAMILY MEDICINE CLINIC | Facility: CLINIC | Age: 33
End: 2018-11-29

## 2018-11-29 VITALS
WEIGHT: 239 LBS | OXYGEN SATURATION: 99 % | SYSTOLIC BLOOD PRESSURE: 130 MMHG | DIASTOLIC BLOOD PRESSURE: 80 MMHG | TEMPERATURE: 97.8 F | HEART RATE: 71 BPM | BODY MASS INDEX: 33.46 KG/M2 | HEIGHT: 71 IN

## 2018-11-29 DIAGNOSIS — M25.50 CHRONIC JOINT PAIN: ICD-10-CM

## 2018-11-29 DIAGNOSIS — G89.29 CHRONIC JOINT PAIN: ICD-10-CM

## 2018-11-29 DIAGNOSIS — J45.41 MODERATE PERSISTENT ASTHMA WITH ACUTE EXACERBATION: ICD-10-CM

## 2018-11-29 DIAGNOSIS — J40 BRONCHITIS: Primary | ICD-10-CM

## 2018-11-29 DIAGNOSIS — G89.29 CHRONIC MIDLINE LOW BACK PAIN WITHOUT SCIATICA: ICD-10-CM

## 2018-11-29 DIAGNOSIS — M54.50 CHRONIC MIDLINE LOW BACK PAIN WITHOUT SCIATICA: ICD-10-CM

## 2018-11-29 LAB
CHROMATIN AB SERPL-ACNC: 28 IU/ML (ref 0–14)
ERYTHROCYTE [SEDIMENTATION RATE] IN BLOOD: 7 MM/HR (ref 0–15)
EXPIRATION DATE: NORMAL
FLUAV AG NPH QL: NORMAL
FLUBV AG NPH QL: NORMAL
INTERNAL CONTROL: NORMAL
Lab: NORMAL

## 2018-11-29 PROCEDURE — 85598 HEXAGNAL PHOSPH PLTLT NEUTRL: CPT | Performed by: NURSE PRACTITIONER

## 2018-11-29 PROCEDURE — 86431 RHEUMATOID FACTOR QUANT: CPT | Performed by: NURSE PRACTITIONER

## 2018-11-29 PROCEDURE — 87804 INFLUENZA ASSAY W/OPTIC: CPT | Performed by: NURSE PRACTITIONER

## 2018-11-29 PROCEDURE — 85613 RUSSELL VIPER VENOM DILUTED: CPT | Performed by: NURSE PRACTITIONER

## 2018-11-29 PROCEDURE — 85597 PHOSPHOLIPID PLTLT NEUTRALIZ: CPT | Performed by: NURSE PRACTITIONER

## 2018-11-29 PROCEDURE — 85730 THROMBOPLASTIN TIME PARTIAL: CPT | Performed by: NURSE PRACTITIONER

## 2018-11-29 PROCEDURE — 86618 LYME DISEASE ANTIBODY: CPT | Performed by: NURSE PRACTITIONER

## 2018-11-29 PROCEDURE — 99214 OFFICE O/P EST MOD 30 MIN: CPT | Performed by: NURSE PRACTITIONER

## 2018-11-29 PROCEDURE — 85610 PROTHROMBIN TIME: CPT | Performed by: NURSE PRACTITIONER

## 2018-11-29 PROCEDURE — 86146 BETA-2 GLYCOPROTEIN ANTIBODY: CPT | Performed by: NURSE PRACTITIONER

## 2018-11-29 PROCEDURE — 85670 THROMBIN TIME PLASMA: CPT | Performed by: NURSE PRACTITIONER

## 2018-11-29 PROCEDURE — 86147 CARDIOLIPIN ANTIBODY EA IG: CPT | Performed by: NURSE PRACTITIONER

## 2018-11-29 PROCEDURE — 85652 RBC SED RATE AUTOMATED: CPT | Performed by: NURSE PRACTITIONER

## 2018-11-29 PROCEDURE — 85732 THROMBOPLASTIN TIME PARTIAL: CPT | Performed by: NURSE PRACTITIONER

## 2018-11-29 RX ORDER — GUAIFENESIN/DEXTROMETHORPHAN 100-10MG/5
5 SYRUP ORAL 3 TIMES DAILY PRN
Qty: 236 ML | Refills: 0 | Status: SHIPPED | OUTPATIENT
Start: 2018-11-29 | End: 2018-12-06

## 2018-11-29 RX ORDER — PREDNISONE 20 MG/1
20 TABLET ORAL 2 TIMES DAILY
Qty: 10 TABLET | Refills: 0 | Status: SHIPPED | OUTPATIENT
Start: 2018-11-29 | End: 2019-01-22

## 2018-11-29 RX ORDER — HYDROCODONE BITARTRATE AND ACETAMINOPHEN 10; 325 MG/1; MG/1
1 TABLET ORAL EVERY 6 HOURS PRN
Qty: 30 TABLET | Refills: 0 | Status: SHIPPED | OUTPATIENT
Start: 2018-11-29 | End: 2018-12-06 | Stop reason: SDUPTHER

## 2018-11-29 RX ORDER — LEVOFLOXACIN 500 MG/1
500 TABLET, FILM COATED ORAL DAILY
Qty: 10 TABLET | Refills: 0 | Status: SHIPPED | OUTPATIENT
Start: 2018-11-29 | End: 2018-12-09

## 2018-11-29 NOTE — PROGRESS NOTES
Subjective   Nghia Roach is a 33 y.o. male.     Chief Complaint   Patient presents with   • Back Pain       History of Present Illness:    Exacerbation of back pain with current URI/bronchitis symptoms.    Patient reports that his back pain is rated 9 on pain scale rating of 0-10.  His having pain in his upper back, mid back and low back.  Patient reports that he only has 3 of his [place] left as he was having such exacerbation of pain.  Patient does have radiology on file.  He is does not have a history of substance abuse or addiction.  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  Chronic coughing with current episode of likely bronchitis and asthma exacerbation have a greatly exacerbated his pain.  Patient is coughing throughout the day and night.  He has been on antibiotics.  Patient just finished up a round of Bactrim and had a Rocephin shot.  He also is currently using his inhalers which include Symbicort twice daily, albuterol and Combivent.  He has been seen by pulmonology.  Patient does have the diagnosis of asthma.  He currently receives Singulair.    Patient reports increase in all over body pain and joint pain.  He did have a flu shot this year.  Patient reports that he has been tested for autoimmune disorders the past with borderline results.      The following portions of the patient's history were reviewed and updated as appropriate:  Allergies, current medications, past family history, past medical history, past social history, past surgical history and problem list.    Review of Systems   Constitutional: Positive for activity change, appetite change, chills and fever. Negative for fatigue and unexpected weight change.   HENT: Negative for congestion, ear pain, nosebleeds, postnasal drip, rhinorrhea, sore throat, trouble swallowing and voice change.    Eyes: Negative for pain and visual disturbance.   Respiratory: Positive for cough, chest tightness and shortness of breath.  "Negative for wheezing.    Cardiovascular: Negative for chest pain and palpitations.   Gastrointestinal: Negative for abdominal pain, blood in stool, constipation and diarrhea.   Endocrine: Negative for cold intolerance and polydipsia.   Genitourinary: Negative for difficulty urinating, flank pain and hematuria.   Musculoskeletal: Positive for arthralgias, back pain, joint swelling, myalgias and neck pain. Negative for gait problem and neck stiffness.   Skin: Negative for color change and rash.   Allergic/Immunologic: Positive for environmental allergies.   Neurological: Negative for syncope, numbness and headaches.   Hematological: Negative.    Psychiatric/Behavioral: Negative for sleep disturbance and suicidal ideas.   All other systems reviewed and are negative.      Objective     /80   Pulse 71   Temp 97.8 °F (36.6 °C) (Tympanic)   Ht 180.3 cm (70.98\")   Wt 108 kg (239 lb)   SpO2 99%   BMI 33.35 kg/m²   Office Visit on 08/09/2018   Component Date Value Ref Range Status   • Tissue Transglutaminase IgA 08/09/2018 <2  0 - 3 U/mL Final   • IgA 08/09/2018 178  90 - 386 mg/dL Final       Physical Exam   Constitutional: He is oriented to person, place, and time. Vital signs are normal. He appears well-developed and well-nourished. He appears ill. No distress.   33-year-old white male appearing acutely ill today.   HENT:   Head: Normocephalic and atraumatic.   Right Ear: Ear canal normal. Tympanic membrane is injected.   Left Ear: Ear canal normal. Tympanic membrane is injected.   Nose: Mucosal edema and rhinorrhea present. Right sinus exhibits no maxillary sinus tenderness and no frontal sinus tenderness. Left sinus exhibits no maxillary sinus tenderness and no frontal sinus tenderness.   Mouth/Throat: Oropharyngeal exudate (thin, clear PND noted) and posterior oropharyngeal erythema (injected) present.   Eyes: Conjunctivae and EOM are normal. Pupils are equal, round, and reactive to light.   Neck: Normal " range of motion. No thyromegaly present.   Cardiovascular: Normal rate, regular rhythm and normal heart sounds.   Pulmonary/Chest: Effort normal. No stridor. No respiratory distress. He has decreased breath sounds in the right lower field and the left lower field. He has wheezes in the right upper field. He exhibits tenderness (chest wall tenderness due to cough).   Abdominal: Soft. Bowel sounds are normal. There is no tenderness.   Musculoskeletal:        Cervical back: He exhibits spasm.        Lumbar back: He exhibits tenderness and spasm.   Lymphadenopathy:     He has cervical adenopathy (firm, mobile nodes).        Right cervical: Superficial cervical adenopathy present.        Left cervical: Superficial cervical adenopathy present.   Neurological: He is alert and oriented to person, place, and time.   Skin: Skin is warm and dry.   Psychiatric: He has a normal mood and affect. His speech is normal and behavior is normal. Judgment and thought content normal. Cognition and memory are normal.   Vitals reviewed.      Assessment/Plan     Problem List Items Addressed This Visit        Respiratory    Moderate persistent asthma with acute exacerbation    Overview     Added automatically from request for surgery [id number]         Bronchitis - Primary    Relevant Medications    guaifenesin-dextromethorphan (ROBITUSSIN DM) 100-10 MG/5ML syrup    Other Relevant Orders    XR Chest PA & Lateral    POCT Influenza A/B       Nervous and Auditory    Back ache      Other Visit Diagnoses     Chronic joint pain        Relevant Orders    Rheumatoid Factor    Lupus Anticoagulant Panel    Sedimentation Rate    Lyme, Total Antibody Test / Reflex          Start Levaquin 500 mg daily ×10.  I'll go ahead and treat him with quick burst of steroids.  I have discussed possible side effects and interactions between Levaquin and steroids as well as the individual risk/side effects of each medication.  Patient states understanding and desired to  try this regimen.  Avoid sudden changes in temperature.  Flu swab today.  I will go ahead and prescribe him Norco 10 one by mouth every 6 hours when necessary #30 with no refill.  Jurgen and drug screen are on file as consistent.  Patient is at moderate risk for substance abuse due to chronic pain.           Patient's Body mass index is 33.35 kg/m². BMI is above normal parameters. Recommendations include: exercise counseling and nutrition counseling.        I have discussed diagnosis in detail today allowing time for questions and answers. Patient is aware of reasons to seek urgent or emergent medical care as well as reasons to return to the clinic for evaluation. Possible side effects, interactions and progression of symptoms discussed as well. Patient / family states understanding.   Emotional support and active listening provided.     Patient has been ordered a chest x-ray and 3 way cough syrup.  I would like to see him back in 2-3 days if not improved, sooner if needed.  Patient has been educated on reasons to seek immediate medical attention through an urgent or emergent Medical Center.    Follow-up 12/06/2018 as scheduled or sooner if needed.        Current Outpatient Medications:   •  albuterol (PROVENTIL HFA;VENTOLIN HFA) 108 (90 Base) MCG/ACT inhaler, Inhale 2 puffs Every 4 (Four) Hours As Needed for Shortness of Air., Disp: 1 inhaler, Rfl: 5  •  allopurinol (ZYLOPRIM) 100 MG tablet, TAKE 1 TABLET BY MOUTH TWICE DAILY, Disp: 60 tablet, Rfl: 0  •  amitriptyline (ELAVIL) 25 MG tablet, TAKE 1 TABLET BY MOUTH AT NIGHT AS NEEDED FOR SLEEP, Disp: 30 tablet, Rfl: 0  •  amLODIPine (NORVASC) 10 MG tablet, Take 1 tablet by mouth Daily., Disp: 30 tablet, Rfl: 5  •  budesonide-formoterol (SYMBICORT) 160-4.5 MCG/ACT inhaler, Inhale 2 puffs 2 (Two) Times a Day. (Patient taking differently: Inhale 2 puffs 2 (Two) Times a Day As Needed.), Disp: 1 inhaler, Rfl: 5  •  CIALIS 5 MG tablet, Take 1 tablet by mouth Every Night.,  "Disp: 30 tablet, Rfl: 5  •  clotrimazole-betamethasone (LOTRISONE) 1-0.05 % cream, Apply  topically to the appropriate area as directed Every 12 (Twelve) Hours., Disp: 45 g, Rfl: 2  •  cyclobenzaprine (FLEXERIL) 10 MG tablet, Take 1 tablet by mouth 3 (Three) Times a Day As Needed for Muscle Spasms., Disp: 20 tablet, Rfl: 0  •  dicyclomine (BENTYL) 20 MG tablet, Take 2 tablets by mouth 4 (Four) Times a Day. (Patient taking differently: Take 20 mg by mouth Every Night.), Disp: 120 tablet, Rfl: 5  •  fluticasone (FLONASE) 50 MCG/ACT nasal spray, 2 sprays into each nostril Daily. Administer 2 sprays in each nostril for each dose. (Patient taking differently: 2 sprays into each nostril Daily As Needed. Administer 2 sprays in each nostril for each dose. ), Disp: 1 bottle, Rfl: 2  •  Insulin Syringe 31G X 5/16\" 1 ML misc, Inject into penis, Disp: 30 each, Rfl: 5  •  ipratropium-albuterol (COMBIVENT RESPIMAT)  MCG/ACT inhaler, Inhale 1 puff 4 (Four) Times a Day As Needed for Wheezing. 1-2 PUFFS EVERY 4 HOURS AS NEEDED, Disp: 4 g, Rfl: 11  •  ipratropium-albuterol (DUO-NEB) 0.5-2.5 mg/mL nebulizer, INHALE THE CONTENTS OF 1 VIAL VIA NEBULIZER Q 6 H PRN, Disp: , Rfl: 5  •  loratadine (CLARITIN) 10 MG tablet, Take 1 tablet by mouth Every Night., Disp: 30 tablet, Rfl: 5  •  methocarbamol (ROBAXIN) 750 MG tablet, Take 1 tablet by mouth 4 (Four) Times a Day As Needed for Muscle Spasms. Take on days he works in place of flexeril, Disp: 120 tablet, Rfl: 5  •  montelukast (SINGULAIR) 10 MG tablet, TAKE 1 TABLET BY MOUTH EVERY NIGHT, Disp: 30 tablet, Rfl: 5  •  sodium chloride (OCEAN NASAL SPRAY) 0.65 % nasal spray, 1 spray into each nostril As Needed for Congestion., Disp: 1 each, Rfl: 3  •  Syringe, Disposable, 3 ML misc, Use 3 ml syringe with a 25 gauge  5/8 inch needle, Disp: 24 each, Rfl: 6  •  Testosterone Cypionate (DEPO-TESTOSTERONE) 200 MG/ML injection, Inject 0.5 mL into the appropriate muscle as directed by prescriber " 2 (Two) Times a Week. He is to use 1/2 cc every Monday and Thursday SQ, Disp: 10 mL, Rfl: 2  •  valsartan-hydrochlorothiazide (DIOVAN-HCT) 160-12.5 MG per tablet, TAKE 1 TABLET BY MOUTH DAILY, Disp: 30 tablet, Rfl: 5  •  guaifenesin-dextromethorphan (ROBITUSSIN DM) 100-10 MG/5ML syrup, Take 5 mL by mouth 3 (Three) Times a Day As Needed for Cough or Congestion., Disp: 236 mL, Rfl: 0  •  HYDROcodone-acetaminophen (NORCO)  MG per tablet, Take 1 tablet by mouth Every 6 (Six) Hours As Needed for Moderate Pain ., Disp: 30 tablet, Rfl: 0  •  levoFLOXacin (LEVAQUIN) 500 MG tablet, Take 1 tablet by mouth Daily for 10 days., Disp: 10 tablet, Rfl: 0  •  predniSONE (DELTASONE) 20 MG tablet, Take 1 tablet by mouth 2 (Two) Times a Day., Disp: 10 tablet, Rfl: 0    Dictated utilizing Dragon dictation        This document has been electronically signed by:  CORDELIA Flores, NP-C

## 2018-11-30 ENCOUNTER — TELEPHONE (OUTPATIENT)
Dept: FAMILY MEDICINE CLINIC | Facility: CLINIC | Age: 33
End: 2018-11-30

## 2018-11-30 LAB — B BURGDOR IGG+IGM SER-ACNC: <0.91 ISR (ref 0–0.9)

## 2018-11-30 NOTE — TELEPHONE ENCOUNTER
----- Message from CORDELIA Alexandre sent at 11/29/2018  4:00 PM EST -----  Please let patient know that his rheumatoid factor is elevated at 28.  I would like for him to be set up with rheumatology through [facility].    Spoke with karl and he is going to think about who he wants to see and call us back

## 2018-12-02 ENCOUNTER — HOSPITAL ENCOUNTER (OUTPATIENT)
Dept: GENERAL RADIOLOGY | Facility: HOSPITAL | Age: 33
Discharge: HOME | End: 2018-12-02
Admitting: NURSE PRACTITIONER

## 2018-12-02 DIAGNOSIS — J40 BRONCHITIS: ICD-10-CM

## 2018-12-02 PROCEDURE — 71046 X-RAY EXAM CHEST 2 VIEWS: CPT

## 2018-12-02 PROCEDURE — 71046 X-RAY EXAM CHEST 2 VIEWS: CPT | Performed by: RADIOLOGY

## 2018-12-04 LAB
APTT HEX PL PPP: 10 SEC
APTT IMM NP PPP: ABNORMAL SEC
APTT PPP 1:1 SALINE: ABNORMAL SEC
APTT PPP: 32.6 SEC
B2 GLYCOPROT1 IGA SER-ACNC: <10 SAU
B2 GLYCOPROT1 IGG SER-ACNC: <10 SGU
B2 GLYCOPROT1 IGM SER-ACNC: <10 SMU
CARDIOLIPIN IGG SER IA-ACNC: <10 GPL
CARDIOLIPIN IGM SER IA-ACNC: 16 MPL
CONFIRM DRVVT: ABNORMAL SEC
INR PPP: 1 RATIO
LAC INTERPRETATION: ABNORMAL
PLATELET NEUTRALIZATION: 1.4 SEC
PROTHROMBIN TIME: 11 SEC
SCREEN DRVVT/NORMAL: ABNORMAL RATIO
SCREEN DRVVT: 41.2 SEC
THROMBIN TIME: 18.9 SEC

## 2018-12-06 ENCOUNTER — OFFICE VISIT (OUTPATIENT)
Dept: FAMILY MEDICINE CLINIC | Facility: CLINIC | Age: 33
End: 2018-12-06

## 2018-12-06 VITALS
TEMPERATURE: 96.8 F | DIASTOLIC BLOOD PRESSURE: 68 MMHG | WEIGHT: 242 LBS | BODY MASS INDEX: 33.88 KG/M2 | OXYGEN SATURATION: 96 % | HEIGHT: 71 IN | HEART RATE: 76 BPM | SYSTOLIC BLOOD PRESSURE: 124 MMHG

## 2018-12-06 DIAGNOSIS — K21.00 GASTROESOPHAGEAL REFLUX DISEASE WITH ESOPHAGITIS: ICD-10-CM

## 2018-12-06 DIAGNOSIS — J45.41 MODERATE PERSISTENT ASTHMA WITH ACUTE EXACERBATION: ICD-10-CM

## 2018-12-06 DIAGNOSIS — M15.9 PRIMARY OSTEOARTHRITIS INVOLVING MULTIPLE JOINTS: ICD-10-CM

## 2018-12-06 DIAGNOSIS — M05.79 RHEUMATOID ARTHRITIS INVOLVING MULTIPLE SITES WITH POSITIVE RHEUMATOID FACTOR (HCC): Primary | ICD-10-CM

## 2018-12-06 DIAGNOSIS — J45.42 MODERATE PERSISTENT ASTHMA WITH STATUS ASTHMATICUS: ICD-10-CM

## 2018-12-06 PROBLEM — Z09 ENCOUNTER FOR EXAMINATION FOLLOWING TREATMENT AT HOSPITAL: Status: RESOLVED | Noted: 2018-03-22 | Resolved: 2018-12-06

## 2018-12-06 PROBLEM — J40 BRONCHITIS: Status: RESOLVED | Noted: 2018-11-29 | Resolved: 2018-12-06

## 2018-12-06 PROCEDURE — 99214 OFFICE O/P EST MOD 30 MIN: CPT | Performed by: NURSE PRACTITIONER

## 2018-12-06 RX ORDER — HYDROCODONE BITARTRATE AND ACETAMINOPHEN 10; 325 MG/1; MG/1
1 TABLET ORAL EVERY 6 HOURS PRN
Qty: 90 TABLET | Refills: 0 | Status: SHIPPED | OUTPATIENT
Start: 2018-12-06 | End: 2019-01-08 | Stop reason: SDUPTHER

## 2018-12-06 NOTE — PROGRESS NOTES
Subjective   Nghia Roach is a 33 y.o. male.     Chief Complaint   Patient presents with   • Follow-up       History of Present Illness:    Pt rates pain as 7  on pain scale today of 0-10. States that the pain is constant. Pain in located in the low back, mid back , hips and knees described as aching , sharp at times, worse with activity such as sitting or standing for long periods of time. Pt states that rest helps some though it can increase stiffness. Pt reports that the current medication regimen is helpful and decreases the pain by at least one half adding comfort and quality to life. Pt denies any history of substance abuse or addiction.  Radiology is on file.     Patient has recent labs reviewed including a rheumatology/lupus panel.  Patient is unsure if he has any family history of report arthritis or autoimmune disorder in his family.  He has periods of exacerbation of joint pain, stiffness and aching.    History of GERD with history of lap Nissen. Must avoid anti-inflammatory use as much as possible.      Recent bronchitis with asthma exacerbation-patient reports that he is feeling some better.  He is able to breathe better and is coughing up clear phlegm.  He does have a chest x-ray to review today.    The following portions of the patient's history were reviewed and updated as appropriate:  Allergies, current medications, past family history, past medical history, past social history, past surgical history and problem list.    Review of Systems   Constitutional: Positive for activity change and appetite change. Negative for fatigue and unexpected weight change.   HENT: Positive for congestion. Negative for ear pain, nosebleeds, postnasal drip, rhinorrhea, sore throat, trouble swallowing and voice change.    Eyes: Negative for pain and visual disturbance.   Respiratory: Positive for cough, chest tightness and shortness of breath. Negative for wheezing.    Cardiovascular: Negative for chest pain,  "palpitations and leg swelling.   Gastrointestinal: Negative for abdominal pain, blood in stool, constipation and diarrhea.   Endocrine: Negative for cold intolerance and polydipsia.   Genitourinary: Negative for difficulty urinating, flank pain and hematuria.   Musculoskeletal: Positive for arthralgias, back pain, joint swelling and myalgias. Negative for gait problem and neck stiffness.   Skin: Negative for color change and rash.   Allergic/Immunologic: Positive for environmental allergies.   Neurological: Negative for syncope, numbness and headaches.   Hematological: Negative.    Psychiatric/Behavioral: Positive for sleep disturbance. Negative for dysphoric mood, self-injury and suicidal ideas.   All other systems reviewed and are negative.      Objective     /68 (BP Location: Right arm, Patient Position: Sitting, Cuff Size: Adult)   Pulse 76   Temp 96.8 °F (36 °C) (Temporal)   Ht 180.3 cm (71\")   Wt 110 kg (242 lb)   SpO2 96%   BMI 33.75 kg/m²   Office Visit on 11/29/2018   Component Date Value Ref Range Status   • Rheumatoid Factor Quantitative 11/29/2018 28.0* 0.0 - 14.0 IU/mL Final   • Protime 11/29/2018 11.0  sec Final   • INR 11/29/2018 1.0  ratio Final   • aPTT 11/29/2018 32.6* sec Final   • APTT 1:1 NP 11/29/2018   sec Final   • APTT 1:1 Saline 11/29/2018   sec Final   • Thrombin Time 11/29/2018 18.9  sec Final   • DRVVT Screen Seconds 11/29/2018 41.2  sec Final   • DRVVT Confirm Seconds 11/29/2018   sec Final   • DRVVT/Confirm Ratio 11/29/2018   ratio Final   • Hex Phosph Neut Test 11/29/2018 10  sec Final   • Platelet Neutralization 11/29/2018 1.4  sec Final   • Anticardiolipin IgG 11/29/2018 <10  GPL Final   • Anticardiolipin IgM 11/29/2018 16  MPL Final   • Beta-2 Glyco 1 IgG 11/29/2018 <10  SGU Final   • Beta-2 Glyco 1 IgM 11/29/2018 <10  SMU Final   • Beta-2 Glyco 1 IgA 11/29/2018 <10  DARCIE Final   • LAC Interpretation 11/29/2018 Comment   Final   • Sed Rate 11/29/2018 7  0 - 15 mm/hr " Final   • Lyme Ab IgG/IgM 11/29/2018 <0.91  0.00 - 0.90 ISR Final       Physical Exam   Constitutional: He is oriented to person, place, and time. Vital signs are normal. He appears well-developed and well-nourished. No distress.   Talkative and friendly    HENT:   Head: Normocephalic and atraumatic.   Right Ear: External ear normal.   Left Ear: External ear normal.   Nose: Nose normal.   Mouth/Throat: Oropharynx is clear and moist. No oropharyngeal exudate.   Eyes: Conjunctivae and EOM are normal. Pupils are equal, round, and reactive to light. Right eye exhibits no discharge. Left eye exhibits no discharge.   Neck: Normal range of motion. Neck supple. No tracheal deviation present. No thyromegaly present.   Cardiovascular: Normal rate, regular rhythm, normal heart sounds and intact distal pulses. Exam reveals no gallop and no friction rub.   No murmur heard.  Pulmonary/Chest: Effort normal and breath sounds normal. No respiratory distress. He has no wheezes. He has no rales. He exhibits no tenderness.   Abdominal: Soft. Bowel sounds are normal. He exhibits no distension and no mass. There is no tenderness. There is no rebound and no guarding.   Musculoskeletal: Normal range of motion.        Lumbar back: He exhibits tenderness, pain and spasm.   Decreased lumbar curvature, there is pain with forward flexion. DTR's +2. No edema.    Neurological: He is alert and oriented to person, place, and time. He has normal strength and normal reflexes.   Reflex Scores:       Brachioradialis reflexes are 2+ on the right side and 2+ on the left side.  CN 2-12 grossly intact    Skin: Skin is warm and dry. Capillary refill takes less than 2 seconds. No rash noted. He is not diaphoretic. No erythema. No pallor.   Psychiatric: He has a normal mood and affect. His speech is normal and behavior is normal. Judgment and thought content normal. His affect is not inappropriate. Cognition and memory are normal.   Nursing note and vitals  reviewed.      Assessment/Plan     Problem List Items Addressed This Visit        Respiratory    Moderate persistent asthma with status asthmaticus    Moderate persistent asthma with acute exacerbation    Overview     Added automatically from request for surgery [id number]            Digestive    Gastroesophageal reflux disease with esophagitis    Overview     Added automatically from request for surgery [id number]            Musculoskeletal and Integument    Osteoarthrosis    Rheumatoid arthritis involving multiple sites with positive rheumatoid factor (CMS/[facility]) - Primary    Relevant Orders    Ambulatory Referral to Rheumatology      New diagnosis today of rheumatoid arthritis with positive rheumatoid factor.  Will refer patient to rheumatology for further evaluation.  I discussed some immunologic options.  We will await that rheumatology consult for further medication changes.  Proper body mechanics has been reviewed and discussed today.      As part of this patient's treatment plan they are being prescribed controlled substance/substances with potential for abuse. This patient has been made aware of the appropriate use of such medications, including potential risk of somnolence, limited ability to drive and / or work safely, and potential for overdose. It has also been made clear that these medications are for use by this patient only, without concomitant use of alcohol or other substances unless prescribed/advised by medical provider. Patient has completed controlled substance agreement detailing terms of continued prescribing of controlled substances including monitoring Jurgen reports, drug screens and pill counts. The patient was asked and states they are not receiving narcotic medication from any there provider or any provider that this office has not been made aware of. History and physical exam exhibit continued safe and appropriate use of controlled substances.   Goal: Improved quality of life and reduction in  pain as evidenced by pt report.   Jurgen  has been reviewed as consistent.  UDS is on file.   Continue Norco 10-3 25 one by mouth every 6 hours when necessary #90 with no refill.  Patient is at moderate risk for substance abuse due to diagnosis of chronic pain.  Patient has been instructed and counseled regarding opioid misuse and risk of addiction.  We have discussed proper storage and disposal of controlled medication.       Patient's Body mass index is 33.75 kg/m². BMI is above normal parameters. Recommendations include: exercise counseling and nutrition counseling.      I have discussed diagnosis in detail today allowing time for questions and answers. Patient is aware of reasons to seek urgent or emergent medical care as well as reasons to return to the clinic for evaluation. Possible side effects, interactions and progression of symptoms discussed as well. Patient / family states understanding.   Emotional support and active listening provided.     Asthma precautions reviewed and discussed.  November 2018 lab results reviewed and discussed.  Recent chest x-ray results reviewed and discussed.  Follow up in one month, sooner if needed. Routine labs every 3-6 months.     Dictated utilizing Dragon dictation        This document has been electronically signed by:  CORDELIA Flores, NP-C

## 2018-12-14 DIAGNOSIS — R53.83 OTHER FATIGUE: ICD-10-CM

## 2018-12-14 RX ORDER — TADALAFIL 5 MG
TABLET ORAL
Qty: 30 TABLET | Refills: 5 | Status: SHIPPED | OUTPATIENT
Start: 2018-12-14 | End: 2019-04-16 | Stop reason: SDUPTHER

## 2018-12-18 ENCOUNTER — TELEPHONE (OUTPATIENT)
Dept: SURGERY | Facility: CLINIC | Age: 33
End: 2018-12-18

## 2018-12-18 ENCOUNTER — OFFICE VISIT (OUTPATIENT)
Dept: SURGERY | Facility: CLINIC | Age: 33
End: 2018-12-18

## 2018-12-18 VITALS
WEIGHT: 241 LBS | BODY MASS INDEX: 33.74 KG/M2 | RESPIRATION RATE: 18 BRPM | HEIGHT: 71 IN | SYSTOLIC BLOOD PRESSURE: 145 MMHG | DIASTOLIC BLOOD PRESSURE: 84 MMHG | HEART RATE: 67 BPM | OXYGEN SATURATION: 99 % | TEMPERATURE: 98.4 F

## 2018-12-18 DIAGNOSIS — R13.10 DYSPHAGIA, UNSPECIFIED TYPE: Primary | ICD-10-CM

## 2018-12-18 PROCEDURE — 99213 OFFICE O/P EST LOW 20 MIN: CPT | Performed by: SURGERY

## 2018-12-18 NOTE — PROGRESS NOTES
12/18/2018    Patient Information  Nghia Roach  [address]  [facility] [address]  1985  [phone] (home)     Chief Complaint   Patient presents with   • Difficulty Swallowing     Choking on Food       HPI  Patient's a 33-year-old white male who is 14 months status post laparoscopic Nissen fundoplication.  He is done very well until last 3 months.  He's had instances where food gets caught in his esophagus.  He reports is happening for about 5 days.  His reflux is gone    Review of Systems:    General: weight loss 25lbs  Integumentary: rash  Eyes: eyesight problems, eyes itch  ENT: recurrent sore throat and hoarseness  Respiratory: wheezing  Gastrointestinal: diarrhea, constipation, change in stool and abdominal pain  Cardiovascular: negative  Neurological: numbness and headaches  Psychiatric: anxiety  Hematologic/Lymphatic: swollen glands and easy bleeding  Genitourinary: negative  Musculoskeletal: painful joints, sore muscles, back pain and joint stiffness  Endocrine: negative  Breasts: negative      Patient Active Problem List   Diagnosis   • Osteoarthrosis   • Fibromyalgia   • Tension headache, chronic   • Mixed hyperlipidemia   • Moderate persistent asthma with status asthmaticus   • Acid reflux   • Hoarseness   • Difficulty swallowing   • Corporo-venous occlusive erectile dysfunction   • Gastroesophageal reflux disease with esophagitis   • Dysphagia   • Hernia, hiatal   • Back ache   • Chronic gout of multiple sites   • Essential hypertension   • Moderate persistent asthma with acute exacerbation   • Low testosterone   • Post-operative state   • Irritable bowel syndrome with both constipation and diarrhea   • Diarrhea   • Hematochezia   • Family history of colon cancer   • Periumbilical pain   • Colon cancer screening   • Laceration of left thumb without foreign body without damage to nail   • Inflamed skin tag   • Depression   • Exacerbation of chronic back pain   • Chronic low back pain  "with right-sided sciatica   • Balanitis   • Rheumatoid arthritis involving multiple sites with positive rheumatoid factor (CMS/HCC)         Past Medical History:   Diagnosis Date   • Allergic rhinitis    • Asthma    • Atrial flutter (CMS/HCC)    • Diarrhea    • Disturbance of skin sensation    • GERD (gastroesophageal reflux disease)    • Gout    • History of MRI of spine 12/16/2014    Done at [facility]    • Hx of exercise stress test 2014    \"IT WAS NORMAL\"     • Hyperlipidemia    • Hypertension    • Kidney disease     stage 2   • Lymphadenitis    • Malaise and fatigue    • Osteoarthrosis    • Rash    • Renal disorder    • Sleep apnea     NO CPAP   • Wears glasses     TO READ         Past Surgical History:   Procedure Laterality Date   • COLONOSCOPY  2014   • COLONOSCOPY      UNSURE OF DATE   • NISSEN FUNDOPLICATION     • VASECTOMY           Family History   Problem Relation Age of Onset   • Asthma Other    • Cancer Other    • COPD Other    • Diabetes Other    • Stroke Other    • Asthma Mother    • COPD Mother    • Asthma Father    • Depression Father    • Drug abuse Father    • Arthritis Maternal Grandmother    • Cancer Maternal Grandmother    • Diabetes Maternal Grandmother    • Heart disease Maternal Grandmother    • Hyperlipidemia Maternal Grandmother    • Stroke Paternal Grandfather    • Colon cancer Maternal Grandfather         mid 40s         Social History     Tobacco Use   • Smoking status: Never Smoker   • Smokeless tobacco: Never Used   Substance Use Topics   • Alcohol use: Yes     Comment: social   • Drug use: No       Current Outpatient Medications   Medication Sig Dispense Refill   • albuterol (PROVENTIL HFA;VENTOLIN HFA) 108 (90 Base) MCG/ACT inhaler Inhale 2 puffs Every 4 (Four) Hours As Needed for Shortness of Air. 1 inhaler 5   • allopurinol (ZYLOPRIM) 100 MG tablet TAKE 1 TABLET BY MOUTH TWICE DAILY 60 tablet 0   • amitriptyline (ELAVIL) 25 MG tablet TAKE 1 TABLET BY MOUTH AT NIGHT AS NEEDED " "FOR SLEEP 30 tablet 0   • amLODIPine (NORVASC) 10 MG tablet Take 1 tablet by mouth Daily. 30 tablet 5   • budesonide-formoterol (SYMBICORT) 160-4.5 MCG/ACT inhaler Inhale 2 puffs 2 (Two) Times a Day. (Patient taking differently: Inhale 2 puffs 2 (Two) Times a Day As Needed.) 1 inhaler 5   • CIALIS 5 MG tablet TAKE ONE TABLET BY MOUTH EVERY DAY 30 tablet 5   • clotrimazole-betamethasone (LOTRISONE) 1-0.05 % cream Apply  topically to the appropriate area as directed Every 12 (Twelve) Hours. 45 g 2   • cyclobenzaprine (FLEXERIL) 10 MG tablet Take 1 tablet by mouth 3 (Three) Times a Day As Needed for Muscle Spasms. 20 tablet 0   • dicyclomine (BENTYL) 20 MG tablet Take 2 tablets by mouth 4 (Four) Times a Day. (Patient taking differently: Take 20 mg by mouth Every Night.) 120 tablet 5   • fluticasone (FLONASE) 50 MCG/ACT nasal spray 2 sprays into each nostril Daily. Administer 2 sprays in each nostril for each dose. (Patient taking differently: 2 sprays into each nostril Daily As Needed. Administer 2 sprays in each nostril for each dose. ) 1 bottle 2   • HYDROcodone-acetaminophen (NORCO)  MG per tablet Take 1 tablet by mouth Every 6 (Six) Hours As Needed for Moderate Pain . 90 tablet 0   • Insulin Syringe 31G X 5/16\" 1 ML misc Inject into penis 30 each 5   • ipratropium-albuterol (COMBIVENT RESPIMAT)  MCG/ACT inhaler Inhale 1 puff 4 (Four) Times a Day As Needed for Wheezing. 1-2 PUFFS EVERY 4 HOURS AS NEEDED 4 g 11   • ipratropium-albuterol (DUO-NEB) 0.5-2.5 mg/mL nebulizer INHALE THE CONTENTS OF 1 VIAL VIA NEBULIZER Q 6 H PRN  5   • loratadine (CLARITIN) 10 MG tablet Take 1 tablet by mouth Every Night. 30 tablet 5   • methocarbamol (ROBAXIN) 750 MG tablet Take 1 tablet by mouth 4 (Four) Times a Day As Needed for Muscle Spasms. Take on days he works in place of flexeril 120 tablet 5   • montelukast (SINGULAIR) 10 MG tablet TAKE 1 TABLET BY MOUTH EVERY NIGHT 30 tablet 5   • predniSONE (DELTASONE) 20 MG tablet " "Take 1 tablet by mouth 2 (Two) Times a Day. 10 tablet 0   • sodium chloride (OCEAN NASAL SPRAY) 0.65 % nasal spray 1 spray into each nostril As Needed for Congestion. 1 each 3   • Syringe, Disposable, 3 ML misc Use 3 ml syringe with a 25 gauge  5/8 inch needle 24 each 6   • Testosterone Cypionate (DEPO-TESTOSTERONE) 200 MG/ML injection Inject 0.5 mL into the appropriate muscle as directed by prescriber 2 (Two) Times a Week. He is to use 1/2 cc every Monday and Thursday SQ 10 mL 2   • valsartan-hydrochlorothiazide (DIOVAN-HCT) 160-12.5 MG per tablet TAKE 1 TABLET BY MOUTH DAILY 30 tablet 5     No current facility-administered medications for this visit.          Allergies  Erythromycin    /84   Pulse 67   Temp 98.4 °F (36.9 °C)   Resp 18   Ht 180.3 cm (71\")   Wt 109 kg (241 lb)   SpO2 99%   BMI 33.61 kg/m²      Physical Exam   Constitutional: He is oriented to person, place, and time. He appears well-developed and well-nourished. No distress.   HENT:   Head: Normocephalic.   Right Ear: External ear normal.   Left Ear: External ear normal.   Nose: Nose normal.   Mouth/Throat: Oropharynx is clear and moist.   Eyes: Conjunctivae and EOM are normal. Right eye exhibits no discharge. Left eye exhibits no discharge.   Neck: Normal range of motion. No JVD present. No tracheal deviation present. No thyromegaly present.   Cardiovascular: Normal rate, regular rhythm, normal heart sounds and intact distal pulses. Exam reveals no gallop and no friction rub.   No murmur heard.  Pulmonary/Chest: Effort normal and breath sounds normal. No stridor. No respiratory distress. He has no wheezes. He has no rales. He exhibits no tenderness.   Abdominal: Soft. Bowel sounds are normal. He exhibits no distension and no mass. There is no tenderness. There is no rebound and no guarding. No hernia.   Genitourinary: Rectal exam shows guaiac negative stool.   Musculoskeletal: Normal range of motion. He exhibits no edema, tenderness or " deformity.   Lymphadenopathy:     He has no cervical adenopathy.   Neurological: He is alert and oriented to person, place, and time. He has normal reflexes. He displays normal reflexes. No cranial nerve deficit. He exhibits normal muscle tone. Coordination normal.   Skin: Skin is warm and dry. No rash noted. He is not diaphoretic. No erythema. No pallor.   Psychiatric: He has a normal mood and affect. His behavior is normal. Judgment and thought content normal.                 ASSESSMENT  Dysphagia  Status post Nissen fundoplication        PLAN    EGD with repeat pH study    Patient's Body mass index is 33.61 kg/m². BMI is above normal parameters. Recommendations include: educational material.           This document signed by Efra Suero MD December 18, 2018 11:10 AM

## 2018-12-18 NOTE — H&P (VIEW-ONLY)
12/18/2018    Patient Information  Nghia Roach  [address]  [facility] [address]  1985  [phone] (home)     Chief Complaint   Patient presents with   • Difficulty Swallowing     Choking on Food       HPI  Patient's a 33-year-old white male who is 14 months status post laparoscopic Nissen fundoplication.  He is done very well until last 3 months.  He's had instances where food gets caught in his esophagus.  He reports is happening for about 5 days.  His reflux is gone    Review of Systems:    General: weight loss 25lbs  Integumentary: rash  Eyes: eyesight problems, eyes itch  ENT: recurrent sore throat and hoarseness  Respiratory: wheezing  Gastrointestinal: diarrhea, constipation, change in stool and abdominal pain  Cardiovascular: negative  Neurological: numbness and headaches  Psychiatric: anxiety  Hematologic/Lymphatic: swollen glands and easy bleeding  Genitourinary: negative  Musculoskeletal: painful joints, sore muscles, back pain and joint stiffness  Endocrine: negative  Breasts: negative      Patient Active Problem List   Diagnosis   • Osteoarthrosis   • Fibromyalgia   • Tension headache, chronic   • Mixed hyperlipidemia   • Moderate persistent asthma with status asthmaticus   • Acid reflux   • Hoarseness   • Difficulty swallowing   • Corporo-venous occlusive erectile dysfunction   • Gastroesophageal reflux disease with esophagitis   • Dysphagia   • Hernia, hiatal   • Back ache   • Chronic gout of multiple sites   • Essential hypertension   • Moderate persistent asthma with acute exacerbation   • Low testosterone   • Post-operative state   • Irritable bowel syndrome with both constipation and diarrhea   • Diarrhea   • Hematochezia   • Family history of colon cancer   • Periumbilical pain   • Colon cancer screening   • Laceration of left thumb without foreign body without damage to nail   • Inflamed skin tag   • Depression   • Exacerbation of chronic back pain   • Chronic low back pain  "with right-sided sciatica   • Balanitis   • Rheumatoid arthritis involving multiple sites with positive rheumatoid factor (CMS/HCC)         Past Medical History:   Diagnosis Date   • Allergic rhinitis    • Asthma    • Atrial flutter (CMS/HCC)    • Diarrhea    • Disturbance of skin sensation    • GERD (gastroesophageal reflux disease)    • Gout    • History of MRI of spine 12/16/2014    Done at [facility]    • Hx of exercise stress test 2014    \"IT WAS NORMAL\"     • Hyperlipidemia    • Hypertension    • Kidney disease     stage 2   • Lymphadenitis    • Malaise and fatigue    • Osteoarthrosis    • Rash    • Renal disorder    • Sleep apnea     NO CPAP   • Wears glasses     TO READ         Past Surgical History:   Procedure Laterality Date   • COLONOSCOPY  2014   • COLONOSCOPY      UNSURE OF DATE   • NISSEN FUNDOPLICATION     • VASECTOMY           Family History   Problem Relation Age of Onset   • Asthma Other    • Cancer Other    • COPD Other    • Diabetes Other    • Stroke Other    • Asthma Mother    • COPD Mother    • Asthma Father    • Depression Father    • Drug abuse Father    • Arthritis Maternal Grandmother    • Cancer Maternal Grandmother    • Diabetes Maternal Grandmother    • Heart disease Maternal Grandmother    • Hyperlipidemia Maternal Grandmother    • Stroke Paternal Grandfather    • Colon cancer Maternal Grandfather         mid 40s         Social History     Tobacco Use   • Smoking status: Never Smoker   • Smokeless tobacco: Never Used   Substance Use Topics   • Alcohol use: Yes     Comment: social   • Drug use: No       Current Outpatient Medications   Medication Sig Dispense Refill   • albuterol (PROVENTIL HFA;VENTOLIN HFA) 108 (90 Base) MCG/ACT inhaler Inhale 2 puffs Every 4 (Four) Hours As Needed for Shortness of Air. 1 inhaler 5   • allopurinol (ZYLOPRIM) 100 MG tablet TAKE 1 TABLET BY MOUTH TWICE DAILY 60 tablet 0   • amitriptyline (ELAVIL) 25 MG tablet TAKE 1 TABLET BY MOUTH AT NIGHT AS NEEDED " "FOR SLEEP 30 tablet 0   • amLODIPine (NORVASC) 10 MG tablet Take 1 tablet by mouth Daily. 30 tablet 5   • budesonide-formoterol (SYMBICORT) 160-4.5 MCG/ACT inhaler Inhale 2 puffs 2 (Two) Times a Day. (Patient taking differently: Inhale 2 puffs 2 (Two) Times a Day As Needed.) 1 inhaler 5   • CIALIS 5 MG tablet TAKE ONE TABLET BY MOUTH EVERY DAY 30 tablet 5   • clotrimazole-betamethasone (LOTRISONE) 1-0.05 % cream Apply  topically to the appropriate area as directed Every 12 (Twelve) Hours. 45 g 2   • cyclobenzaprine (FLEXERIL) 10 MG tablet Take 1 tablet by mouth 3 (Three) Times a Day As Needed for Muscle Spasms. 20 tablet 0   • dicyclomine (BENTYL) 20 MG tablet Take 2 tablets by mouth 4 (Four) Times a Day. (Patient taking differently: Take 20 mg by mouth Every Night.) 120 tablet 5   • fluticasone (FLONASE) 50 MCG/ACT nasal spray 2 sprays into each nostril Daily. Administer 2 sprays in each nostril for each dose. (Patient taking differently: 2 sprays into each nostril Daily As Needed. Administer 2 sprays in each nostril for each dose. ) 1 bottle 2   • HYDROcodone-acetaminophen (NORCO)  MG per tablet Take 1 tablet by mouth Every 6 (Six) Hours As Needed for Moderate Pain . 90 tablet 0   • Insulin Syringe 31G X 5/16\" 1 ML misc Inject into penis 30 each 5   • ipratropium-albuterol (COMBIVENT RESPIMAT)  MCG/ACT inhaler Inhale 1 puff 4 (Four) Times a Day As Needed for Wheezing. 1-2 PUFFS EVERY 4 HOURS AS NEEDED 4 g 11   • ipratropium-albuterol (DUO-NEB) 0.5-2.5 mg/mL nebulizer INHALE THE CONTENTS OF 1 VIAL VIA NEBULIZER Q 6 H PRN  5   • loratadine (CLARITIN) 10 MG tablet Take 1 tablet by mouth Every Night. 30 tablet 5   • methocarbamol (ROBAXIN) 750 MG tablet Take 1 tablet by mouth 4 (Four) Times a Day As Needed for Muscle Spasms. Take on days he works in place of flexeril 120 tablet 5   • montelukast (SINGULAIR) 10 MG tablet TAKE 1 TABLET BY MOUTH EVERY NIGHT 30 tablet 5   • predniSONE (DELTASONE) 20 MG tablet " "Take 1 tablet by mouth 2 (Two) Times a Day. 10 tablet 0   • sodium chloride (OCEAN NASAL SPRAY) 0.65 % nasal spray 1 spray into each nostril As Needed for Congestion. 1 each 3   • Syringe, Disposable, 3 ML misc Use 3 ml syringe with a 25 gauge  5/8 inch needle 24 each 6   • Testosterone Cypionate (DEPO-TESTOSTERONE) 200 MG/ML injection Inject 0.5 mL into the appropriate muscle as directed by prescriber 2 (Two) Times a Week. He is to use 1/2 cc every Monday and Thursday SQ 10 mL 2   • valsartan-hydrochlorothiazide (DIOVAN-HCT) 160-12.5 MG per tablet TAKE 1 TABLET BY MOUTH DAILY 30 tablet 5     No current facility-administered medications for this visit.          Allergies  Erythromycin    /84   Pulse 67   Temp 98.4 °F (36.9 °C)   Resp 18   Ht 180.3 cm (71\")   Wt 109 kg (241 lb)   SpO2 99%   BMI 33.61 kg/m²      Physical Exam   Constitutional: He is oriented to person, place, and time. He appears well-developed and well-nourished. No distress.   HENT:   Head: Normocephalic.   Right Ear: External ear normal.   Left Ear: External ear normal.   Nose: Nose normal.   Mouth/Throat: Oropharynx is clear and moist.   Eyes: Conjunctivae and EOM are normal. Right eye exhibits no discharge. Left eye exhibits no discharge.   Neck: Normal range of motion. No JVD present. No tracheal deviation present. No thyromegaly present.   Cardiovascular: Normal rate, regular rhythm, normal heart sounds and intact distal pulses. Exam reveals no gallop and no friction rub.   No murmur heard.  Pulmonary/Chest: Effort normal and breath sounds normal. No stridor. No respiratory distress. He has no wheezes. He has no rales. He exhibits no tenderness.   Abdominal: Soft. Bowel sounds are normal. He exhibits no distension and no mass. There is no tenderness. There is no rebound and no guarding. No hernia.   Genitourinary: Rectal exam shows guaiac negative stool.   Musculoskeletal: Normal range of motion. He exhibits no edema, tenderness or " deformity.   Lymphadenopathy:     He has no cervical adenopathy.   Neurological: He is alert and oriented to person, place, and time. He has normal reflexes. He displays normal reflexes. No cranial nerve deficit. He exhibits normal muscle tone. Coordination normal.   Skin: Skin is warm and dry. No rash noted. He is not diaphoretic. No erythema. No pallor.   Psychiatric: He has a normal mood and affect. His behavior is normal. Judgment and thought content normal.                 ASSESSMENT  Dysphagia  Status post Nissen fundoplication        PLAN    EGD with repeat pH study    Patient's Body mass index is 33.61 kg/m². BMI is above normal parameters. Recommendations include: educational material.           This document signed by Efra Suero MD December 18, 2018 11:10 AM

## 2018-12-18 NOTE — TELEPHONE ENCOUNTER
I SPOKE TO THE PATIENTS WIFE AND LET HER KNOW DELROY NEEDS TO BE AT HIS PROCEDURE TOMORROW AT 7:30 INSTEAD OF 8

## 2018-12-19 ENCOUNTER — ANESTHESIA (OUTPATIENT)
Dept: PERIOP | Facility: HOSPITAL | Age: 33
End: 2018-12-19

## 2018-12-19 ENCOUNTER — ANESTHESIA EVENT (OUTPATIENT)
Dept: PERIOP | Facility: HOSPITAL | Age: 33
End: 2018-12-19

## 2018-12-19 ENCOUNTER — HOSPITAL ENCOUNTER (OUTPATIENT)
Facility: HOSPITAL | Age: 33
Setting detail: HOSPITAL OUTPATIENT SURGERY
Discharge: HOME | End: 2018-12-19
Attending: SURGERY | Admitting: SURGERY

## 2018-12-19 VITALS
RESPIRATION RATE: 20 BRPM | TEMPERATURE: 98.1 F | HEART RATE: 69 BPM | SYSTOLIC BLOOD PRESSURE: 127 MMHG | OXYGEN SATURATION: 97 % | DIASTOLIC BLOOD PRESSURE: 83 MMHG

## 2018-12-19 DIAGNOSIS — R13.10 DYSPHAGIA, UNSPECIFIED TYPE: ICD-10-CM

## 2018-12-19 PROCEDURE — 25010000002 PROPOFOL 1000 MG/ML EMULSION: Performed by: NURSE ANESTHETIST, CERTIFIED REGISTERED

## 2018-12-19 PROCEDURE — 25010000002 PROPOFOL 10 MG/ML EMULSION: Performed by: NURSE ANESTHETIST, CERTIFIED REGISTERED

## 2018-12-19 PROCEDURE — 25010000002 MIDAZOLAM PER 1 MG: Performed by: NURSE ANESTHETIST, CERTIFIED REGISTERED

## 2018-12-19 PROCEDURE — 43239 EGD BIOPSY SINGLE/MULTIPLE: CPT | Performed by: SURGERY

## 2018-12-19 PROCEDURE — 25010000002 FENTANYL CITRATE (PF) 100 MCG/2ML SOLUTION: Performed by: NURSE ANESTHETIST, CERTIFIED REGISTERED

## 2018-12-19 RX ORDER — FENTANYL CITRATE 50 UG/ML
50 INJECTION, SOLUTION INTRAMUSCULAR; INTRAVENOUS
Status: DISCONTINUED | OUTPATIENT
Start: 2018-12-19 | End: 2018-12-19 | Stop reason: HOSPADM

## 2018-12-19 RX ORDER — FENTANYL CITRATE 50 UG/ML
INJECTION, SOLUTION INTRAMUSCULAR; INTRAVENOUS AS NEEDED
Status: DISCONTINUED | OUTPATIENT
Start: 2018-12-19 | End: 2018-12-19 | Stop reason: SURG

## 2018-12-19 RX ORDER — IPRATROPIUM BROMIDE AND ALBUTEROL SULFATE 2.5; .5 MG/3ML; MG/3ML
3 SOLUTION RESPIRATORY (INHALATION) ONCE AS NEEDED
Status: DISCONTINUED | OUTPATIENT
Start: 2018-12-19 | End: 2018-12-19 | Stop reason: HOSPADM

## 2018-12-19 RX ORDER — MIDAZOLAM HYDROCHLORIDE 1 MG/ML
INJECTION INTRAMUSCULAR; INTRAVENOUS AS NEEDED
Status: DISCONTINUED | OUTPATIENT
Start: 2018-12-19 | End: 2018-12-19 | Stop reason: SURG

## 2018-12-19 RX ORDER — ONDANSETRON 2 MG/ML
4 INJECTION INTRAMUSCULAR; INTRAVENOUS ONCE AS NEEDED
Status: DISCONTINUED | OUTPATIENT
Start: 2018-12-19 | End: 2018-12-19 | Stop reason: HOSPADM

## 2018-12-19 RX ORDER — SODIUM CHLORIDE 0.9 % (FLUSH) 0.9 %
3 SYRINGE (ML) INJECTION EVERY 12 HOURS SCHEDULED
Status: DISCONTINUED | OUTPATIENT
Start: 2018-12-19 | End: 2018-12-19 | Stop reason: HOSPADM

## 2018-12-19 RX ORDER — PROPOFOL 10 MG/ML
VIAL (ML) INTRAVENOUS AS NEEDED
Status: DISCONTINUED | OUTPATIENT
Start: 2018-12-19 | End: 2018-12-19 | Stop reason: SURG

## 2018-12-19 RX ORDER — SODIUM CHLORIDE 0.9 % (FLUSH) 0.9 %
3-10 SYRINGE (ML) INJECTION AS NEEDED
Status: DISCONTINUED | OUTPATIENT
Start: 2018-12-19 | End: 2018-12-19 | Stop reason: HOSPADM

## 2018-12-19 RX ORDER — MEPERIDINE HYDROCHLORIDE 25 MG/ML
12.5 INJECTION INTRAMUSCULAR; INTRAVENOUS; SUBCUTANEOUS
Status: DISCONTINUED | OUTPATIENT
Start: 2018-12-19 | End: 2018-12-19 | Stop reason: HOSPADM

## 2018-12-19 RX ORDER — LIDOCAINE HYDROCHLORIDE 20 MG/ML
INJECTION, SOLUTION INFILTRATION; PERINEURAL AS NEEDED
Status: DISCONTINUED | OUTPATIENT
Start: 2018-12-19 | End: 2018-12-19 | Stop reason: SURG

## 2018-12-19 RX ORDER — SODIUM CHLORIDE, SODIUM LACTATE, POTASSIUM CHLORIDE, CALCIUM CHLORIDE 600; 310; 30; 20 MG/100ML; MG/100ML; MG/100ML; MG/100ML
125 INJECTION, SOLUTION INTRAVENOUS CONTINUOUS
Status: DISCONTINUED | OUTPATIENT
Start: 2018-12-19 | End: 2018-12-19 | Stop reason: HOSPADM

## 2018-12-19 RX ADMIN — MIDAZOLAM HYDROCHLORIDE 2 MG: 1 INJECTION, SOLUTION INTRAMUSCULAR; INTRAVENOUS at 08:20

## 2018-12-19 RX ADMIN — FENTANYL CITRATE 100 MCG: 50 INJECTION INTRAMUSCULAR; INTRAVENOUS at 08:20

## 2018-12-19 RX ADMIN — SODIUM CHLORIDE, POTASSIUM CHLORIDE, SODIUM LACTATE AND CALCIUM CHLORIDE: 600; 310; 30; 20 INJECTION, SOLUTION INTRAVENOUS at 08:20

## 2018-12-19 RX ADMIN — LIDOCAINE HYDROCHLORIDE 60 MG: 20 INJECTION, SOLUTION INFILTRATION; PERINEURAL at 08:21

## 2018-12-19 RX ADMIN — PROPOFOL 160 MCG/KG/MIN: 10 INJECTION, EMULSION INTRAVENOUS at 08:21

## 2018-12-19 RX ADMIN — PROPOFOL 50 MG: 10 INJECTION, EMULSION INTRAVENOUS at 08:21

## 2018-12-19 NOTE — OP NOTE
Nghia Roach  12/19/2018      Operative Progress Note:    Surgeon and Assistant: Dr. Suero    Pre-Operative Diagnosis: dysphagia, history of Nissen fundoplication    Post-Operative Diagnosis: dysphagia, history of Nissen fundoplication    Procedure(s):  EGD with biopsies and placement of pH probe    Type of Anesthesia Administered: IV general    Estimated Blood Loss: Minimal    Blood Products: None    Specimen Obtained/Removed: antral, esophageal biopsies    Complication(s):  None    Graft/Implant/Prosthetics/Implanted Device/Transplants: pH probe at 30 cm     Indication: patient is a 33-year-old white male underwent a Nissen fundoplication by myself approximately a year ago.  He is having some problems with dysphagia which is new.    Findings: duodenum normal, stomach normal except for findings suggestive of a Nissen fundoplication.  Wrap appears to be in good position.  Esophagus normal.  Gastroesophageal junction 38 cm    Operative Report:  Patient was taken operating room and placed in a left lateral decubitus position.  IV sedation was given per anesthesia.  Gastroscope was introduced and passed into the duodenum.  The scope was slowly withdrawn.  I examined the duodenum, stomach and esophagus thoroughly.  Biopsies were taken as indicated above.  Findings are listed as above.  PH probe was introduced and deployed at 32 cm.  Scope confirmed its placement.    Patient will be discharged home at recovery and will be seen back in the office in one week.       Electronically Signed by: Efra Suero MD        Dictated Utilizing Dragon Dictation

## 2018-12-19 NOTE — ANESTHESIA PREPROCEDURE EVALUATION
Anesthesia Evaluation     Patient summary reviewed and Nursing notes reviewed   no history of anesthetic complications:  NPO Solid Status: > 8 hours  NPO Liquid Status: > 8 hours           Airway   Mallampati: I  TM distance: >3 FB  Neck ROM: full  no difficulty expected  Dental - normal exam   (+) poor dentition    Pulmonary - normal exam   (+) asthma, sleep apnea,   (-) not a smoker (secondhand smoke exposure for over 21 years)  Cardiovascular - normal exam  Exercise tolerance: good (4-7 METS)    NYHA Classification: II  Rhythm: regular  Rate: normal    (+) hypertension, dysrhythmias Atrial Flutter, hyperlipidemia,   (-) past MI, angina, CHF      Neuro/Psych  (+) headaches, numbness, psychiatric history Anxiety and Depression,     (-) seizures, CVA  GI/Hepatic/Renal/Endo    (+) obesity,  hiatal hernia, GERD well controlled,  renal disease CRI,   (-) diabetes, hypothyroidism    Musculoskeletal     (+) back pain, chronic pain, myalgias, radiculopathy Right lower extremity      ROS comment: Fibromyalgia  Abdominal  - normal exam    Abdomen: soft.  Bowel sounds: normal.   Substance History - negative use     OB/GYN negative ob/gyn ROS         Other   (+) arthritis                    Anesthesia Evaluation     Patient summary reviewed and Nursing notes reviewed   no history of anesthetic complications:  NPO Solid Status: > 8 hours  NPO Liquid Status: > 8 hours     Airway   Mallampati: II  TM distance: >3 FB  Neck ROM: full  no difficulty expected  Dental - normal exam         Pulmonary - normal exam   (+) asthma,   (-) not a smoker  Cardiovascular - normal exam  Exercise tolerance: good (4-7 METS)    NYHA Classification: II    (+) hypertension, dysrhythmias Atrial Flutter, hyperlipidemia  (-) past MI, angina      Neuro/Psych  (+) headaches,    (-) seizures, CVA  GI/Hepatic/Renal/Endo    (+) obesity,  hiatal hernia, GERD, renal disease,   (-) diabetes, hypothyroidism    Musculoskeletal     (+) back pain, myalgias,   (-)  radiculopathy  Abdominal  - normal exam    Bowel sounds: normal.   Substance History - negative use     OB/GYN negative ob/gyn ROS         Other   (+) arthritis         Phys Exam Other: Lower partial                                Anesthesia Plan    ASA 3     general     intravenous induction   Anesthetic plan and risks discussed with patient.  Use of blood products discussed with patient  Consented to blood products.       Anesthesia Plan    ASA 3     general   (Risks and benefits discussed including risk of aspiration, recall and dental damage. All patient questions answered.)  intravenous induction   Anesthetic plan, all risks, benefits, and alternatives have been provided, discussed and informed consent has been obtained with: patient and spouse/significant other.  Use of blood products discussed with patient and spouse/significant other  Consented to blood products.

## 2018-12-19 NOTE — ANESTHESIA POSTPROCEDURE EVALUATION
Patient: Nghia Roach    Procedure Summary     Date:  12/19/18 Room / Location:  [facility] OR  /  [facility] OR    Anesthesia Start:  0820 Anesthesia Stop:  0831    Procedure:  ESOPHAGOGASTRODUODENOSCOPY AND BRAVO (N/A Esophagus) Diagnosis:       Dysphagia, unspecified type      (Dysphagia, unspecified type [R13.10])    Surgeon:  Efra Suero MD Provider:  Reyes Ramos MD    Anesthesia Type:  general ASA Status:  3          Anesthesia Type: general  Last vitals  BP   123/80 (12/19/18 0838)   Temp   98 °F (36.7 °C) (12/19/18 0833)   Pulse   80 (12/19/18 0838)   Resp   20 (12/19/18 0838)     SpO2   98 % (12/19/18 0838)     Post Anesthesia Care and Evaluation    Patient location during evaluation: PHASE II  Patient participation: complete - patient participated  Level of consciousness: awake and alert  Pain score: 1  Pain management: adequate  Airway patency: patent  Anesthetic complications: No anesthetic complications  PONV Status: controlled  Cardiovascular status: acceptable  Respiratory status: acceptable  Hydration status: acceptable

## 2018-12-21 LAB
LAB AP CASE REPORT: NORMAL
PATH REPORT.FINAL DX SPEC: NORMAL

## 2018-12-24 DIAGNOSIS — J06.9 ACUTE URI: ICD-10-CM

## 2018-12-27 ENCOUNTER — OFFICE VISIT (OUTPATIENT)
Dept: UROLOGY | Facility: CLINIC | Age: 33
End: 2018-12-27

## 2018-12-27 VITALS — BODY MASS INDEX: 33.74 KG/M2 | HEIGHT: 71 IN | WEIGHT: 241 LBS

## 2018-12-27 DIAGNOSIS — N52.02 CORPORO-VENOUS OCCLUSIVE ERECTILE DYSFUNCTION: Primary | ICD-10-CM

## 2018-12-27 PROCEDURE — 54235 NJX CORPORA CAVERNOSA RX AGT: CPT | Performed by: UROLOGY

## 2018-12-27 PROCEDURE — 99213 OFFICE O/P EST LOW 20 MIN: CPT | Performed by: UROLOGY

## 2018-12-27 RX ORDER — ALBUTEROL SULFATE 90 UG/1
AEROSOL, METERED RESPIRATORY (INHALATION)
Qty: 18 G | Refills: 11 | Status: SHIPPED | OUTPATIENT
Start: 2018-12-27 | End: 2019-05-11 | Stop reason: SDUPTHER

## 2019-01-03 ENCOUNTER — OFFICE VISIT (OUTPATIENT)
Dept: SURGERY | Facility: CLINIC | Age: 34
End: 2019-01-03

## 2019-01-03 VITALS
HEIGHT: 71 IN | BODY MASS INDEX: 33.74 KG/M2 | WEIGHT: 241 LBS | OXYGEN SATURATION: 99 % | RESPIRATION RATE: 17 BRPM | SYSTOLIC BLOOD PRESSURE: 128 MMHG | HEART RATE: 70 BPM | DIASTOLIC BLOOD PRESSURE: 81 MMHG | TEMPERATURE: 97.8 F

## 2019-01-03 DIAGNOSIS — K20.0 EOSINOPHILIC ESOPHAGITIS: Primary | ICD-10-CM

## 2019-01-03 PROCEDURE — 99214 OFFICE O/P EST MOD 30 MIN: CPT | Performed by: SURGERY

## 2019-01-03 RX ORDER — FLUTICASONE PROPIONATE 50 MCG
1 SPRAY, SUSPENSION (ML) NASAL DAILY
Qty: 1 BOTTLE | Refills: 5 | Status: SHIPPED | OUTPATIENT
Start: 2019-01-03 | End: 2019-01-22

## 2019-01-03 NOTE — PROGRESS NOTES
1/3/2019    Patient Information  Nghia Roach  [address]  [facility] [address]  1985  [phone] (home)     Chief Complaint   Patient presents with   • Follow-up     FU EGD/BRAVO       HPI  Patient is a 33-year-old white male here for follow-up on EGD and pH study.  EGD was significant findings of a Nissen fundoplication which I performed approximately a year and half ago.  This took care of the patient's reflux problems.  He is now having dysphagia.  Biopsies of the distal esophagus show evidence of eosinophilic esophagitis.  I have reviewed all his old records in approximately 3 years ago Dr. Guido performed an EGD which showed findings suggestive of eosinophilic esophagitis.  EGD I did prior to his Nissen showed evidence of ulcerative esophagitis without evidence of eosinophilic esophagitis.  His DeMeester score preop was in the 30s.  His DeMeester score is now normal.  His EMS preoperatively showed nonspecific motility disorder which I thought was related to reflux.  Patient reports he is seeing a rheumatologist suspected rheumatoid arthritis.    Review of Systems:    The Past medical history, family history, social history, medication list, allergies, and Review of Systems has been reviewed and confirmed.      General: weight loss 25lbs  Integumentary: rash  Eyes: eyesight problems, eyes itch  ENT: recurrent sore throat and hoarseness  Respiratory: wheezing  Gastrointestinal: diarrhea, constipation, change in stool and abdominal pain  Cardiovascular: negative  Neurological: numbness and headaches  Psychiatric: anxiety  Hematologic/Lymphatic: swollen glands and easy bleeding  Genitourinary: negative  Musculoskeletal: painful joints, sore muscles, back pain and joint stiffness  Endocrine: negative  Breasts: negative      Patient Active Problem List   Diagnosis   • Osteoarthrosis   • Fibromyalgia   • Tension headache, chronic   • Mixed hyperlipidemia   • Moderate persistent asthma with status  "asthmaticus   • Acid reflux   • Hoarseness   • Difficulty swallowing   • Corporo-venous occlusive erectile dysfunction   • Gastroesophageal reflux disease with esophagitis   • Dysphagia   • Hernia, hiatal   • Back ache   • Chronic gout of multiple sites   • Essential hypertension   • Moderate persistent asthma with acute exacerbation   • Low testosterone   • Post-operative state   • Irritable bowel syndrome with both constipation and diarrhea   • Diarrhea   • Hematochezia   • Family history of colon cancer   • Periumbilical pain   • Colon cancer screening   • Laceration of left thumb without foreign body without damage to nail   • Inflamed skin tag   • Depression   • Exacerbation of chronic back pain   • Chronic low back pain with right-sided sciatica   • Balanitis   • Rheumatoid arthritis involving multiple sites with positive rheumatoid factor (CMS/HCC)         Past Medical History:   Diagnosis Date   • Allergic rhinitis    • Asthma    • Atrial flutter (CMS/HCC)    • Diarrhea    • Disturbance of skin sensation    • Elevated cholesterol    • GERD (gastroesophageal reflux disease)    • Gout    • History of MRI of spine 12/16/2014    Done at [facility]    • Hx of exercise stress test 2014    \"IT WAS NORMAL\"     • Hyperlipidemia    • Hypertension    • Kidney disease     stage 2   • Lymphadenitis    • Malaise and fatigue    • Osteoarthrosis    • Rash    • Renal disorder    • Sleep apnea     NO CPAP   • Wears glasses     TO READ         Past Surgical History:   Procedure Laterality Date   • BRAVO PROCEDURE N/A 8/4/2017    Procedure: ESOPHAGOGASTRODUODENOSCOPY AND FULLER;  Surgeon: Efra Suero MD;  Location: [facility];  Service:    • BRAVO PROCEDURE N/A 12/19/2018    Procedure: ESOPHAGOGASTRODUODENOSCOPY AND FULLER;  Surgeon: Efra Suero MD;  Location: [facility];  Service: Gastroenterology   • COLONOSCOPY  2014   • COLONOSCOPY      UNSURE OF DATE   • COLONOSCOPY N/A 5/3/2018    Procedure: COLONOSCOPY WITH COLD " BIOPSIES AND COLD POLYPECTOMIES;  Surgeon: Bang Segundo MD;  Location: [facility];  Service: Gastroenterology   • NISSEN FUNDOPLICATION     • NISSEN FUNDOPLICATION N/A 10/11/2017    Procedure: NISSEN FUNDOPLICATION LAPAROSCOPIC;  Surgeon: Efra Suero MD;  Location:  [facility];  Service:    • MN LAP,ESOPHAGOGAST FUNDOPLASTY N/A 10/11/2017    Procedure: HIATAL HERNIA REPAIR LAPAROSCOPIC;  Surgeon: Efra Suero MD;  Location:  [facility];  Service: General   • VASECTOMY           Family History   Problem Relation Age of Onset   • Asthma Other    • Cancer Other    • COPD Other    • Diabetes Other    • Stroke Other    • Asthma Mother    • COPD Mother    • Asthma Father    • Depression Father    • Drug abuse Father    • Arthritis Maternal Grandmother    • Cancer Maternal Grandmother    • Diabetes Maternal Grandmother    • Heart disease Maternal Grandmother    • Hyperlipidemia Maternal Grandmother    • Stroke Paternal Grandfather    • Colon cancer Maternal Grandfather         mid 40s         Social History     Tobacco Use   • Smoking status: Never Smoker   • Smokeless tobacco: Never Used   Substance Use Topics   • Alcohol use: Yes     Comment: social   • Drug use: No       Current Outpatient Medications   Medication Sig Dispense Refill   • allopurinol (ZYLOPRIM) 100 MG tablet TAKE 1 TABLET BY MOUTH TWICE DAILY 60 tablet 0   • amitriptyline (ELAVIL) 25 MG tablet TAKE 1 TABLET BY MOUTH AT NIGHT AS NEEDED FOR SLEEP 30 tablet 0   • budesonide-formoterol (SYMBICORT) 160-4.5 MCG/ACT inhaler Inhale 2 puffs 2 (Two) Times a Day. (Patient taking differently: Inhale 2 puffs 2 (Two) Times a Day As Needed.) 1 inhaler 5   • CIALIS 5 MG tablet TAKE ONE TABLET BY MOUTH EVERY DAY 30 tablet 5   • clotrimazole-betamethasone (LOTRISONE) 1-0.05 % cream Apply  topically to the appropriate area as directed Every 12 (Twelve) Hours. 45 g 2   • cyclobenzaprine (FLEXERIL) 10 MG tablet Take 1 tablet by mouth 3 (Three) Times a Day As  "Needed for Muscle Spasms. 20 tablet 0   • dicyclomine (BENTYL) 20 MG tablet Take 2 tablets by mouth 4 (Four) Times a Day. (Patient taking differently: Take 20 mg by mouth Every Night.) 120 tablet 5   • fluticasone (FLONASE) 50 MCG/ACT nasal spray 2 sprays into each nostril Daily. Administer 2 sprays in each nostril for each dose. (Patient taking differently: 2 sprays into each nostril Daily As Needed. Administer 2 sprays in each nostril for each dose. ) 1 bottle 2   • HYDROcodone-acetaminophen (NORCO)  MG per tablet Take 1 tablet by mouth Every 6 (Six) Hours As Needed for Moderate Pain . 90 tablet 0   • Insulin Syringe 31G X 5/16\" 1 ML misc Inject into penis 30 each 5   • loratadine (CLARITIN) 10 MG tablet Take 1 tablet by mouth Every Night. 30 tablet 5   • methocarbamol (ROBAXIN) 750 MG tablet Take 1 tablet by mouth 4 (Four) Times a Day As Needed for Muscle Spasms. Take on days he works in place of flexeril 120 tablet 5   • montelukast (SINGULAIR) 10 MG tablet TAKE 1 TABLET BY MOUTH EVERY NIGHT 30 tablet 5   • predniSONE (DELTASONE) 20 MG tablet Take 1 tablet by mouth 2 (Two) Times a Day. 10 tablet 0   • sodium chloride (OCEAN NASAL SPRAY) 0.65 % nasal spray 1 spray into each nostril As Needed for Congestion. 1 each 3   • Syringe, Disposable, 3 ML misc Use 3 ml syringe with a 25 gauge  5/8 inch needle 24 each 6   • Testosterone Cypionate (DEPO-TESTOSTERONE) 200 MG/ML injection Inject 0.5 mL into the appropriate muscle as directed by prescriber 2 (Two) Times a Week. He is to use 1/2 cc every Monday and Thursday SQ 10 mL 2   • valsartan-hydrochlorothiazide (DIOVAN-HCT) 160-12.5 MG per tablet TAKE 1 TABLET BY MOUTH DAILY 30 tablet 5   • VENTOLIN  (90 Base) MCG/ACT inhaler INHALE 2 PUFFS BY MOUTH EVERY 4 HOURS AS NEEDED FOR SHORTNESS OF BREATH 18 g 11     No current facility-administered medications for this visit.          Allergies  Erythromycin    /81   Pulse 70   Temp 97.8 °F (36.6 °C) (Oral)  " " Resp 17   Ht 180.3 cm (70.98\")   Wt 109 kg (241 lb)   SpO2 99%   BMI 33.63 kg/m²      Physical Exam  Gen. 33-year-old white male who acute distress  Abdomen soft            ASSESSMENT  Eosinophilic esophagitis  History of Nissen fundoplication resolved gastroesophageal reflux as indicated by DeMeester score        PLAN    Oral steroid.  Return 2 weeks.    Patient's Body mass index is 33.63 kg/m². BMI is above normal parameters. Recommendations include: educational material.           This document signed by Efra Suero MD January 3, 2019 9:42 AM   "

## 2019-01-08 ENCOUNTER — OFFICE VISIT (OUTPATIENT)
Dept: FAMILY MEDICINE CLINIC | Facility: CLINIC | Age: 34
End: 2019-01-08

## 2019-01-08 VITALS
WEIGHT: 238 LBS | BODY MASS INDEX: 33.32 KG/M2 | TEMPERATURE: 97.9 F | SYSTOLIC BLOOD PRESSURE: 120 MMHG | DIASTOLIC BLOOD PRESSURE: 84 MMHG | HEART RATE: 78 BPM | HEIGHT: 71 IN | OXYGEN SATURATION: 99 %

## 2019-01-08 DIAGNOSIS — M05.79 RHEUMATOID ARTHRITIS INVOLVING MULTIPLE SITES WITH POSITIVE RHEUMATOID FACTOR (HCC): ICD-10-CM

## 2019-01-08 DIAGNOSIS — K20.90 ESOPHAGITIS WITH GASTRITIS: ICD-10-CM

## 2019-01-08 DIAGNOSIS — J45.42 MODERATE PERSISTENT ASTHMA WITH STATUS ASTHMATICUS: Primary | ICD-10-CM

## 2019-01-08 DIAGNOSIS — K29.70 ESOPHAGITIS WITH GASTRITIS: ICD-10-CM

## 2019-01-08 DIAGNOSIS — M15.9 PRIMARY OSTEOARTHRITIS INVOLVING MULTIPLE JOINTS: ICD-10-CM

## 2019-01-08 DIAGNOSIS — K21.00 GASTROESOPHAGEAL REFLUX DISEASE WITH ESOPHAGITIS: ICD-10-CM

## 2019-01-08 DIAGNOSIS — M1A.09X0 IDIOPATHIC CHRONIC GOUT OF MULTIPLE SITES WITHOUT TOPHUS: ICD-10-CM

## 2019-01-08 DIAGNOSIS — M79.7 FIBROMYALGIA: ICD-10-CM

## 2019-01-08 DIAGNOSIS — I10 ESSENTIAL HYPERTENSION: ICD-10-CM

## 2019-01-08 DIAGNOSIS — R13.10 DYSPHAGIA, UNSPECIFIED TYPE: ICD-10-CM

## 2019-01-08 PROCEDURE — 99214 OFFICE O/P EST MOD 30 MIN: CPT | Performed by: NURSE PRACTITIONER

## 2019-01-08 RX ORDER — HYDROCODONE BITARTRATE AND ACETAMINOPHEN 10; 325 MG/1; MG/1
1 TABLET ORAL EVERY 6 HOURS PRN
Qty: 90 TABLET | Refills: 0 | Status: SHIPPED | OUTPATIENT
Start: 2019-01-08 | End: 2019-02-05 | Stop reason: SDUPTHER

## 2019-01-08 NOTE — PROGRESS NOTES
Subjective   Nghia Roach is a 33 y.o. male.     Chief Complaint   Patient presents with   • Hyperlipidemia   • Hypertension   • Osteoarthritis       History of Present Illness:    Moderate persistent asthma with status asthmaticus-patient did have one recent exacerbation.  He is much improved today during this reevaluation.  He is under the care of pulmonology.    Essential hypertension-stable with current medication.  He does have some exacerbations of blood pressure during acute episodes pain.    Chronic out multiple sites-frequent exacerbation.  Taking allopurinol and watching diet at this time.    GERD disease with esophagitis - under the care of Dr. Suero.  Recent exacerbation with scope.  Patient was seen at ER for acute episode and scoped the next day with this, eosinophilic esophagitis.     Rheumatoid arthritis-patient scheduled with rheumatology.  Positive Rh factor.  Positive for lupus panel.  History of elevated sedimentation rate.  Chronic joint pain and inflammation.  Does not tolerate antiplatelet medication well due to chronic gastritis.      Pt rates pain as 8  on pain scale today of 0-10. States that the pain is constant.  Current exacerbation as he is one day past 30 days for this appointment due to scheduling error. Pain in located in the low back, mid back , hips and knees described as aching , sharp at times, worse with activity such as sitting or standing for long periods of time. Pt states that rest helps some though it can increase stiffness. Pt reports that the current medication regimen is helpful and decreases the pain by at least one half adding comfort and quality to life. Pt denies any history of substance abuse or addiction.  Radiology is on file.         The following portions of the patient's history were reviewed and updated as appropriate:  Allergies, current medications, past family history, past medical history, past social history, past surgical history and problem  "list.    Review of Systems   Constitutional: Negative for appetite change, fatigue and unexpected weight change.   HENT: Positive for trouble swallowing (at times ). Negative for congestion, ear pain, nosebleeds, postnasal drip, rhinorrhea, sore throat and voice change.    Eyes: Negative for pain and visual disturbance.   Respiratory: Negative for cough, choking, shortness of breath and wheezing.    Cardiovascular: Negative for chest pain and palpitations.   Gastrointestinal: Negative for abdominal pain, blood in stool, constipation and diarrhea.   Endocrine: Negative for cold intolerance and polydipsia.   Genitourinary: Negative for difficulty urinating, flank pain and hematuria.   Musculoskeletal: Positive for back pain. Negative for arthralgias, gait problem, joint swelling and myalgias.   Skin: Negative for color change and rash.   Allergic/Immunologic: Positive for environmental allergies.   Neurological: Negative for syncope, numbness and headaches.   Hematological: Negative.    Psychiatric/Behavioral: Negative for sleep disturbance and suicidal ideas.   All other systems reviewed and are negative.      Objective     /84   Pulse 78   Temp 97.9 °F (36.6 °C) (Tympanic)   Ht 180.3 cm (70.98\")   Wt 108 kg (238 lb)   SpO2 99%   BMI 33.21 kg/m²   Admission on 12/19/2018, Discharged on 12/19/2018   Component Date Value Ref Range Status   • Case Report 12/19/2018    Final                    Value:Surgical Pathology Report                         Case: [id number]                                  Authorizing Provider:  Efra Suero MD        Collected:           12/19/2018 08:25 AM          Ordering Location:     [facility]      Received:            12/19/2018 10:11 AM                                 OPERATING ROOM DEPARTMENT                                                    Pathologist:           Marilee Macias MD                                                       Specimens:   1) - " Gastric, Antrum, antrum bx                                                                     2) - Esophagus, ESOPHAGUS BX                                                              • Final Diagnosis 12/19/2018    Final                    Value:This result contains rich text formatting which cannot be displayed here.       Physical Exam   Constitutional: He is oriented to person, place, and time. He appears well-developed and well-nourished.   HENT:   Head: Normocephalic and atraumatic.   Right Ear: External ear normal.   Left Ear: External ear normal.   Nose: Nose normal.   Mouth/Throat: Oropharynx is clear and moist. No oropharyngeal exudate.   Eyes: Conjunctivae, EOM and lids are normal. Pupils are equal, round, and reactive to light. Right eye exhibits no discharge. Left eye exhibits no discharge.   Neck: Normal range of motion. Neck supple. No tracheal deviation present. No thyromegaly present.   Cardiovascular: Normal rate, regular rhythm, normal heart sounds and intact distal pulses. Exam reveals no gallop and no friction rub.   No murmur heard.  Pulmonary/Chest: Effort normal and breath sounds normal. No respiratory distress. He has no wheezes. He has no rales. He exhibits no tenderness.   Abdominal: Soft. Bowel sounds are normal. He exhibits no distension and no mass. There is no tenderness. There is no rebound and no guarding.   Musculoskeletal:        Lumbar back: He exhibits decreased range of motion, tenderness, pain and spasm.   Decreased lumbar curvature, there is pain with forward flexion. DTR's +2. No edema.    Neurological: He is alert and oriented to person, place, and time. He has normal reflexes.   CN 2-12 grossly intact    Skin: Skin is warm and dry. No rash noted. No erythema. No pallor.   Psychiatric: He has a normal mood and affect. His speech is normal and behavior is normal. Judgment and thought content normal. His affect is not inappropriate. Cognition and memory are normal.   Nursing  note and vitals reviewed.      Assessment/Plan     Problem List Items Addressed This Visit        Cardiovascular and Mediastinum    Essential hypertension       Respiratory    Moderate persistent asthma with status asthmaticus - Primary    Overview     Added automatically from request for surgery [id number]            Digestive    Difficulty swallowing    Gastroesophageal reflux disease with esophagitis    Overview     Added automatically from request for surgery [id number]         Current Assessment & Plan     eosinophilic esophagitis          Esophagitis with gastritis       Nervous and Auditory    Fibromyalgia       Musculoskeletal and Integument    Osteoarthrosis    Chronic gout of multiple sites    Rheumatoid arthritis involving multiple sites with positive rheumatoid factor (CMS/HCC)            Proper body mechanics has been reviewed and discussed today.      As part of this patient's treatment plan they are being prescribed controlled substance/substances with potential for abuse. This patient has been made aware of the appropriate use of such medications, including potential risk of somnolence, limited ability to drive and / or work safely, and potential for overdose. It has also been made clear that these medications are for use by this patient only, without concomitant use of alcohol or other substances unless prescribed/advised by medical provider. Patient has completed controlled substance agreement detailing terms of continued prescribing of controlled substances including monitoring Jurgen reports, drug screens and pill counts. The patient was asked and states they are not receiving narcotic medication from any there provider or any provider that this office has not been made aware of. History and physical exam exhibit continued safe and appropriate use of controlled substances.   Goal: Improved quality of life and reduction in pain as evidenced by pt report.   Jurgen # [id number] has been reviewed as  consistent.  UDS is on file.   Norco 10-3 25 one by mouth every 6 hours when necessary #90 with no refill.    Moderate risk for substance abuse due to chronic pain.  We'll continue to monitor each visit and randomly for compliance as well as need to continue controlled medication.  Encouraged patient to remain under the care of GI, pulmonology and rheumatology.  Patient has been instructed and counseled regarding opioid misuse and risk of addiction.  We have discussed proper storage and disposal of controlled medication.  Recent ER and scope her records reviewed.         Patient's Body mass index is 33.21 kg/m². BMI is above normal parameters. Recommendations include: exercise counseling and nutrition counseling.          I have discussed diagnosis in detail today allowing time for questions and answers. Patient is aware of reasons to seek urgent or emergent medical care as well as reasons to return to the clinic for evaluation. Possible side effects, interactions and progression of symptoms discussed as well. Patient / family states understanding.   Emotional support and active listening provided.       Follow up in one month, sooner if needed. Routine labs every 3-6 months.     Dictated utilizing Dragon dictation        This document has been electronically signed by:  CORDELIA Flores, NP-C

## 2019-01-17 ENCOUNTER — OFFICE VISIT (OUTPATIENT)
Dept: SURGERY | Facility: CLINIC | Age: 34
End: 2019-01-17

## 2019-01-17 VITALS
RESPIRATION RATE: 17 BRPM | OXYGEN SATURATION: 99 % | DIASTOLIC BLOOD PRESSURE: 88 MMHG | TEMPERATURE: 98.3 F | HEART RATE: 80 BPM | BODY MASS INDEX: 33.32 KG/M2 | WEIGHT: 238 LBS | SYSTOLIC BLOOD PRESSURE: 124 MMHG | HEIGHT: 71 IN

## 2019-01-17 DIAGNOSIS — Z98.890 HISTORY OF NISSEN FUNDOPLICATION: ICD-10-CM

## 2019-01-17 DIAGNOSIS — K20.0 EOSINOPHILIC ESOPHAGITIS: Primary | ICD-10-CM

## 2019-01-17 PROCEDURE — 99212 OFFICE O/P EST SF 10 MIN: CPT | Performed by: SURGERY

## 2019-01-17 NOTE — PROGRESS NOTES
1/17/2019    Patient Information  Nghia Roach  [address]  [place] [address]  1985  [phone] (home)     Chief Complaint   Patient presents with   • Follow-up     2 WK F/U       HPI  Patient is here for follow-up on his eosinophilic esophagitis.  He says he is doing much better.  He says he is much better actually since I did scoping HIM.    Review of Systems:    The Past medical history, family history, social history, medication list, allergies, and Review of Systems has been reviewed and confirmed.      General: weight loss 25lbs  Integumentary: rash  Eyes: eyesight problems, eyes itch  ENT: recurrent sore throat and hoarseness  Respiratory: wheezing  Gastrointestinal: diarrhea, constipation, change in stool and abdominal pain  Cardiovascular: negative  Neurological: numbness and headaches  Psychiatric: anxiety  Hematologic/Lymphatic: swollen glands and easy bleeding  Genitourinary: negative  Musculoskeletal: painful joints, sore muscles, joint swelling, back pain and joint stiffness  Endocrine: negative  Breasts: negative      Patient Active Problem List   Diagnosis   • Osteoarthrosis   • Fibromyalgia   • Tension headache, chronic   • Mixed hyperlipidemia   • Moderate persistent asthma with status asthmaticus   • Acid reflux   • Hoarseness   • Difficulty swallowing   • Corporo-venous occlusive erectile dysfunction   • Gastroesophageal reflux disease with esophagitis   • Dysphagia   • Hernia, hiatal   • Back ache   • Chronic gout of multiple sites   • Essential hypertension   • Moderate persistent asthma with status asthmaticus   • Low testosterone   • Post-operative state   • Irritable bowel syndrome with both constipation and diarrhea   • Diarrhea   • Hematochezia   • Family history of colon cancer   • Periumbilical pain   • Colon cancer screening   • Laceration of left thumb without foreign body without damage to nail   • Inflamed skin tag   • Depression   • Exacerbation of chronic back  "pain   • Chronic low back pain with right-sided sciatica   • Balanitis   • Rheumatoid arthritis involving multiple sites with positive rheumatoid factor (CMS/HCC)   • Esophagitis with gastritis         Past Medical History:   Diagnosis Date   • Allergic rhinitis    • Asthma    • Atrial flutter (CMS/HCC)    • Diarrhea    • Disturbance of skin sensation    • Elevated cholesterol    • GERD (gastroesophageal reflux disease)    • Gout    • History of MRI of spine 12/16/2014    Done at [facility]    • Hx of exercise stress test 2014    \"IT WAS NORMAL\"     • Hyperlipidemia    • Hypertension    • Kidney disease     stage 2   • Lymphadenitis    • Malaise and fatigue    • Osteoarthrosis    • Rash    • Renal disorder    • Sleep apnea     NO CPAP   • Wears glasses     TO READ         Past Surgical History:   Procedure Laterality Date   • BRAVO PROCEDURE N/A 8/4/2017    Procedure: ESOPHAGOGASTRODUODENOSCOPY AND FULLER;  Surgeon: Efra Suero MD;  Location: [facility] OR;  Service:    • BRAVO PROCEDURE N/A 12/19/2018    Procedure: ESOPHAGOGASTRODUODENOSCOPY AND FULLER;  Surgeon: Efra Suero MD;  Location: [facility] OR;  Service: Gastroenterology   • COLONOSCOPY  2014   • COLONOSCOPY      UNSURE OF DATE   • COLONOSCOPY N/A 5/3/2018    Procedure: COLONOSCOPY WITH COLD BIOPSIES AND COLD POLYPECTOMIES;  Surgeon: Bang Segundo MD;  Location: [facility] ENDOSCOPY;  Service: Gastroenterology   • NISSEN FUNDOPLICATION     • NISSEN FUNDOPLICATION N/A 10/11/2017    Procedure: NISSEN FUNDOPLICATION LAPAROSCOPIC;  Surgeon: Efra Suero MD;  Location: [facility] OR;  Service:    • NC LAP,ESOPHAGOGAST FUNDOPLASTY N/A 10/11/2017    Procedure: HIATAL HERNIA REPAIR LAPAROSCOPIC;  Surgeon: Efra Suero MD;  Location: [facility] OR;  Service: General   • VASECTOMY           Family History   Problem Relation Age of Onset   • Asthma Other    • Cancer Other    • COPD Other    • Diabetes Other    • Stroke Other    • Asthma Mother    • COPD Mother    • " Asthma Father    • Depression Father    • Drug abuse Father    • Arthritis Maternal Grandmother    • Cancer Maternal Grandmother    • Diabetes Maternal Grandmother    • Heart disease Maternal Grandmother    • Hyperlipidemia Maternal Grandmother    • Stroke Paternal Grandfather    • Colon cancer Maternal Grandfather         mid 40s         Social History     Tobacco Use   • Smoking status: Never Smoker   • Smokeless tobacco: Never Used   Substance Use Topics   • Alcohol use: Yes     Comment: social   • Drug use: No       Current Outpatient Medications   Medication Sig Dispense Refill   • allopurinol (ZYLOPRIM) 100 MG tablet TAKE 1 TABLET BY MOUTH TWICE DAILY 60 tablet 0   • amitriptyline (ELAVIL) 25 MG tablet TAKE 1 TABLET BY MOUTH AT NIGHT AS NEEDED FOR SLEEP 30 tablet 0   • budesonide-formoterol (SYMBICORT) 160-4.5 MCG/ACT inhaler Inhale 2 puffs 2 (Two) Times a Day. (Patient taking differently: Inhale 2 puffs 2 (Two) Times a Day As Needed.) 1 inhaler 5   • CIALIS 5 MG tablet TAKE ONE TABLET BY MOUTH EVERY DAY 30 tablet 5   • clotrimazole-betamethasone (LOTRISONE) 1-0.05 % cream Apply  topically to the appropriate area as directed Every 12 (Twelve) Hours. 45 g 2   • cyclobenzaprine (FLEXERIL) 10 MG tablet Take 1 tablet by mouth 3 (Three) Times a Day As Needed for Muscle Spasms. 20 tablet 0   • dicyclomine (BENTYL) 20 MG tablet Take 2 tablets by mouth 4 (Four) Times a Day. (Patient taking differently: Take 20 mg by mouth Every Night.) 120 tablet 5   • fluticasone (FLONASE) 50 MCG/ACT nasal spray 2 sprays into each nostril Daily. Administer 2 sprays in each nostril for each dose. (Patient taking differently: 2 sprays into each nostril Daily As Needed. Administer 2 sprays in each nostril for each dose. ) 1 bottle 2   • fluticasone (FLONASE) 50 MCG/ACT nasal spray 1 spray into the nostril(s) as directed by provider Daily. 1 bottle 5   • HYDROcodone-acetaminophen (NORCO)  MG per tablet Take 1 tablet by mouth Every  "6 (Six) Hours As Needed for Moderate Pain . 90 tablet 0   • Insulin Syringe 31G X 5/16\" 1 ML misc Inject into penis 30 each 5   • loratadine (CLARITIN) 10 MG tablet Take 1 tablet by mouth Every Night. 30 tablet 5   • methocarbamol (ROBAXIN) 750 MG tablet Take 1 tablet by mouth 4 (Four) Times a Day As Needed for Muscle Spasms. Take on days he works in place of flexeril 120 tablet 5   • montelukast (SINGULAIR) 10 MG tablet TAKE 1 TABLET BY MOUTH EVERY NIGHT 30 tablet 5   • predniSONE (DELTASONE) 20 MG tablet Take 1 tablet by mouth 2 (Two) Times a Day. 10 tablet 0   • sodium chloride (OCEAN NASAL SPRAY) 0.65 % nasal spray 1 spray into each nostril As Needed for Congestion. 1 each 3   • Syringe, Disposable, 3 ML misc Use 3 ml syringe with a 25 gauge  5/8 inch needle 24 each 6   • Testosterone Cypionate (DEPO-TESTOSTERONE) 200 MG/ML injection Inject 0.5 mL into the appropriate muscle as directed by prescriber 2 (Two) Times a Week. He is to use 1/2 cc every Monday and Thursday SQ 10 mL 2   • valsartan-hydrochlorothiazide (DIOVAN-HCT) 160-12.5 MG per tablet TAKE 1 TABLET BY MOUTH DAILY 30 tablet 5   • VENTOLIN  (90 Base) MCG/ACT inhaler INHALE 2 PUFFS BY MOUTH EVERY 4 HOURS AS NEEDED FOR SHORTNESS OF BREATH 18 g 11     No current facility-administered medications for this visit.          Allergies  Erythromycin    There were no vitals taken for this visit.     Physical Exam  Gen. 33-year-old white male stress  Abdomen soft            ASSESSMENT  Eosinophilic esophagitis  History of Nissen fundoplication        PLAN    Return when necessary    Patient's There is no height or weight on file to calculate BMI. BMI is above normal parameters. Recommendations include: educational material.           This document signed by Efra Suero MD January 17, 2019 11:37 AM   "

## 2019-01-22 ENCOUNTER — OFFICE VISIT (OUTPATIENT)
Dept: FAMILY MEDICINE CLINIC | Facility: CLINIC | Age: 34
End: 2019-01-22

## 2019-01-22 VITALS
WEIGHT: 244 LBS | BODY MASS INDEX: 34.16 KG/M2 | OXYGEN SATURATION: 97 % | HEART RATE: 69 BPM | SYSTOLIC BLOOD PRESSURE: 128 MMHG | HEIGHT: 71 IN | DIASTOLIC BLOOD PRESSURE: 80 MMHG | TEMPERATURE: 98.4 F

## 2019-01-22 DIAGNOSIS — J02.9 PHARYNGITIS, UNSPECIFIED ETIOLOGY: Primary | ICD-10-CM

## 2019-01-22 PROCEDURE — 99213 OFFICE O/P EST LOW 20 MIN: CPT | Performed by: PHYSICIAN ASSISTANT

## 2019-01-22 RX ORDER — AMOXICILLIN AND CLAVULANATE POTASSIUM 875; 125 MG/1; MG/1
1 TABLET, FILM COATED ORAL 2 TIMES DAILY
Qty: 20 TABLET | Refills: 0 | Status: SHIPPED | OUTPATIENT
Start: 2019-01-22 | End: 2019-02-05

## 2019-01-22 NOTE — PROGRESS NOTES
"Subjective   Nghia Roach is a 33 y.o. male.       Chief Complaint -  sore throat    History of Present Illness -       Sore throat-  He complains of sore throat, runny nose, dry cough, and green nasal discharge for the past week.  He reports minimal relief with an old partial prescription of Augmentin which he started yesterday.    The following portions of the patient's history were reviewed and updated as appropriate: allergies, current medications, past family history, past medical history, past social history, past surgical history and problem list.    Review of Systems   Constitutional: Negative for fatigue and fever.   HENT: Positive for rhinorrhea and sore throat. Negative for sinus pressure.         Nasal discharge   Eyes: Negative for redness and visual disturbance.   Respiratory: Positive for cough. Negative for chest tightness and wheezing.    Cardiovascular: Negative for chest pain and leg swelling.   Gastrointestinal: Negative for constipation, diarrhea, nausea and vomiting.       /80   Pulse 69   Temp 98.4 °F (36.9 °C) (Oral)   Ht 180.3 cm (70.98\")   Wt 111 kg (244 lb)   SpO2 97%   BMI 34.05 kg/m²     Physical Exam   Constitutional: He is oriented to person, place, and time. He appears well-developed and well-nourished. No distress.   HENT:   Head: Normocephalic and atraumatic.   Right Ear: Tympanic membrane, external ear and ear canal normal.   Left Ear: Tympanic membrane, external ear and ear canal normal.   Oropharynx erythematous without exudates.  Bilateral nasal mucosa boggy   Neck: Normal range of motion. Neck supple. No thyromegaly present.   Cardiovascular: Normal rate and regular rhythm.   No murmur heard.  Pulmonary/Chest: Effort normal and breath sounds normal. He has no wheezes. He has no rales.   Neurological: He is alert and oriented to person, place, and time.   Skin: Skin is warm and dry.   Psychiatric: He has a normal mood and affect. His behavior is normal. "       Assessment/Plan     Diagnoses and all orders for this visit:    Pharyngitis, unspecified etiology  -     amoxicillin-clavulanate (AUGMENTIN) 875-125 MG per tablet; Take 1 tablet by mouth 2 (Two) Times a Day.                 This document has been electronically signed by:  Charis Miles PA-C

## 2019-02-05 ENCOUNTER — OFFICE VISIT (OUTPATIENT)
Dept: FAMILY MEDICINE CLINIC | Facility: CLINIC | Age: 34
End: 2019-02-05

## 2019-02-05 VITALS
WEIGHT: 240 LBS | OXYGEN SATURATION: 97 % | TEMPERATURE: 98.7 F | SYSTOLIC BLOOD PRESSURE: 130 MMHG | BODY MASS INDEX: 33.6 KG/M2 | HEART RATE: 80 BPM | DIASTOLIC BLOOD PRESSURE: 84 MMHG | HEIGHT: 71 IN

## 2019-02-05 DIAGNOSIS — K21.00 GASTROESOPHAGEAL REFLUX DISEASE WITH ESOPHAGITIS: ICD-10-CM

## 2019-02-05 DIAGNOSIS — G89.29 CHRONIC RIGHT-SIDED LOW BACK PAIN WITH RIGHT-SIDED SCIATICA: ICD-10-CM

## 2019-02-05 DIAGNOSIS — M54.41 CHRONIC RIGHT-SIDED LOW BACK PAIN WITH RIGHT-SIDED SCIATICA: ICD-10-CM

## 2019-02-05 DIAGNOSIS — J45.42 MODERATE PERSISTENT ASTHMA WITH STATUS ASTHMATICUS: Primary | ICD-10-CM

## 2019-02-05 PROCEDURE — 99214 OFFICE O/P EST MOD 30 MIN: CPT | Performed by: NURSE PRACTITIONER

## 2019-02-05 RX ORDER — HYDROCODONE BITARTRATE AND ACETAMINOPHEN 10; 325 MG/1; MG/1
1 TABLET ORAL EVERY 6 HOURS PRN
Qty: 90 TABLET | Refills: 0 | Status: SHIPPED | OUTPATIENT
Start: 2019-02-05 | End: 2019-03-07 | Stop reason: SDUPTHER

## 2019-02-05 NOTE — PROGRESS NOTES
Subjective   Nghia Roach is a 33 y.o. male.     Chief Complaint   Patient presents with   • Hyperlipidemia   • Hypertension   • Osteoarthritis       History of Present Illness:    rheumatology notes reviewed and discussed. Has follow up scheduled.    Moderate persistent asthma-stable at this time.  Under the care of pulmonology.    Gastroesophageal reflux with esophagitis Eosinophilic  - frequent exacerbation      Essential hypertension-stable with current medication.  He does have some exacerbations of blood pressure during acute episodes pain.     Chronic out multiple sites-frequent exacerbation.  Taking allopurinol and watching diet at this time.     Chronic low back pain  Pt rates pain as 7  on pain scale today of 0-10. States that the pain is constant.  Current exacerbation as he is one day past 30 days for this appointment due to scheduling error. Pain in located in the low back, mid back , hips and knees described as aching , sharp at times, worse with activity such as sitting or standing for long periods of time. Pt states that rest helps some though it can increase stiffness. Pt reports that the current medication regimen is helpful and decreases the pain by at least one half adding comfort and quality to life. Pt denies any history of substance abuse or addiction.  Radiology is on file.                The following portions of the patient's history were reviewed and updated as appropriate:  Allergies, current medications, past family history, past medical history, past social history, past surgical history and problem list.    Review of Systems   Constitutional: Negative for activity change, appetite change, fatigue and unexpected weight change.   HENT: Negative for congestion, ear pain, nosebleeds, postnasal drip, rhinorrhea, sore throat, trouble swallowing and voice change.    Eyes: Negative for pain and visual disturbance.   Respiratory: Negative for cough, shortness of breath and wheezing.   "  Cardiovascular: Negative for chest pain and palpitations.   Gastrointestinal: Negative for abdominal pain, blood in stool, constipation and diarrhea.   Endocrine: Negative for cold intolerance and polydipsia.   Genitourinary: Negative for difficulty urinating, flank pain and hematuria.   Musculoskeletal: Positive for arthralgias and back pain. Negative for gait problem, joint swelling and myalgias.   Skin: Negative for color change and rash.   Allergic/Immunologic: Positive for environmental allergies.   Neurological: Negative for syncope, numbness and headaches.   Hematological: Negative.    Psychiatric/Behavioral: Negative for dysphoric mood, self-injury, sleep disturbance and suicidal ideas. The patient is not nervous/anxious.    All other systems reviewed and are negative.      Objective     /84   Pulse 80   Temp 98.7 °F (37.1 °C) (Tympanic)   Ht 180.3 cm (70.98\")   Wt 109 kg (240 lb)   SpO2 97%   BMI 33.49 kg/m²   Admission on 12/19/2018, Discharged on 12/19/2018   Component Date Value Ref Range Status   • Case Report 12/19/2018    Final                    Value:Surgical Pathology Report                         Case: [id number]                                  Authorizing Provider:  Efra Suero MD        Collected:           12/19/2018 08:25 AM          Ordering Location:     [facility]      Received:            12/19/2018 10:11 AM                                 OPERATING ROOM DEPARTMENT                                                    Pathologist:           Marilee Macias MD                                                       Specimens:   1) - Gastric, Antrum, antrum bx                                                                     2) - Esophagus, ESOPHAGUS BX                                                              • Final Diagnosis 12/19/2018    Final                    Value:This result contains rich text formatting which cannot be displayed here. "       Physical Exam   Constitutional: He is oriented to person, place, and time. He appears well-developed and well-nourished. No distress.   HENT:   Head: Normocephalic and atraumatic.   Right Ear: External ear normal.   Left Ear: External ear normal.   Nose: Nose normal.   Mouth/Throat: Oropharynx is clear and moist. No oropharyngeal exudate.   Eyes: Conjunctivae, EOM and lids are normal. Pupils are equal, round, and reactive to light. Right eye exhibits no discharge. Left eye exhibits no discharge.   Neck: Normal range of motion. Neck supple. No tracheal deviation present. No thyromegaly present.   Cardiovascular: Normal rate, regular rhythm, normal heart sounds and intact distal pulses. Exam reveals no gallop and no friction rub.   No murmur heard.  Pulmonary/Chest: Effort normal and breath sounds normal. No respiratory distress. He has no wheezes. He has no rales. He exhibits no tenderness.   Abdominal: Soft. Bowel sounds are normal. He exhibits no distension and no mass. There is no tenderness. There is no rebound and no guarding.   Musculoskeletal:        Lumbar back: He exhibits decreased range of motion, tenderness, pain and spasm.   Decreased lumbar curvature, there is pain with forward flexion. DTR's +2. No edema.    Neurological: He is alert and oriented to person, place, and time. He has normal reflexes.   CN 2-12 grossly intact    Skin: Skin is warm and dry. Capillary refill takes less than 2 seconds. No rash noted. He is not diaphoretic. No erythema. No pallor.   Psychiatric: He has a normal mood and affect. His speech is normal and behavior is normal. Judgment and thought content normal. His affect is not inappropriate. Cognition and memory are normal.   Nursing note and vitals reviewed.      Assessment/Plan     Problem List Items Addressed This Visit        Respiratory    Moderate persistent asthma with status asthmaticus - Primary    Overview     Added automatically from request for surgery  [id number]            Digestive    Gastroesophageal reflux disease with esophagitis    Overview     Added automatically from request for surgery [id number]            Nervous and Auditory    Back ache    Relevant Orders    Urine Drug Screen - Urine, Clean Catch          Proper body mechanics has been reviewed and discussed today.      As part of this patient's treatment plan they are being prescribed controlled substance/substances with potential for abuse. This patient has been made aware of the appropriate use of such medications, including potential risk of somnolence, limited ability to drive and / or work safely, and potential for overdose. It has also been made clear that these medications are for use by this patient only, without concomitant use of alcohol or other substances unless prescribed/advised by medical provider. Patient has completed controlled substance agreement detailing terms of continued prescribing of controlled substances including monitoring Jurgen reports, drug screens and pill counts. The patient was asked and states they are not receiving narcotic medication from any there provider or any provider that this office has not been made aware of. History and physical exam exhibit continued safe and appropriate use of controlled substances.   Goal: Improved quality of life and reduction in pain as evidenced by pt report.   Jurgen # [id number] has been reviewed as consistent.  UDS is on file.   Low-moderate risk of substance abuse or addiction.  Plan of care includes frequent monitoring and evaluation for the need to continue current medication regimen.  Patient has been instructed and counseled regarding opioid misuse and risk of addiction.  We have discussed proper storage and disposal of controlled medication.     Norco prescription provided.       Patient's Body mass index is 33.49 kg/m². BMI is above normal parameters. Recommendations include: exercise counseling and nutrition counseling.        I  have discussed diagnosis in detail today allowing time for questions and answers. Patient is aware of reasons to seek urgent or emergent medical care as well as reasons to return to the clinic for evaluation. Possible side effects, interactions and progression of symptoms discussed as well. Patient / family states understanding.   Emotional support and active listening provided.     Follow up in one month, sooner if needed. Routine labs every 3-6 months.     Dictated utilizing Dragon dictation    Current Outpatient Medications:   •  allopurinol (ZYLOPRIM) 100 MG tablet, TAKE 1 TABLET BY MOUTH TWICE DAILY, Disp: 60 tablet, Rfl: 0  •  amitriptyline (ELAVIL) 25 MG tablet, TAKE 1 TABLET BY MOUTH AT NIGHT AS NEEDED FOR SLEEP, Disp: 30 tablet, Rfl: 0  •  budesonide-formoterol (SYMBICORT) 160-4.5 MCG/ACT inhaler, Inhale 2 puffs 2 (Two) Times a Day. (Patient taking differently: Inhale 2 puffs 2 (Two) Times a Day As Needed.), Disp: 1 inhaler, Rfl: 5  •  CIALIS 5 MG tablet, TAKE ONE TABLET BY MOUTH EVERY DAY, Disp: 30 tablet, Rfl: 5  •  clotrimazole-betamethasone (LOTRISONE) 1-0.05 % cream, Apply  topically to the appropriate area as directed Every 12 (Twelve) Hours., Disp: 45 g, Rfl: 2  •  cyclobenzaprine (FLEXERIL) 10 MG tablet, Take 1 tablet by mouth 3 (Three) Times a Day As Needed for Muscle Spasms., Disp: 20 tablet, Rfl: 0  •  dicyclomine (BENTYL) 20 MG tablet, Take 2 tablets by mouth 4 (Four) Times a Day. (Patient taking differently: Take 20 mg by mouth Every Night.), Disp: 120 tablet, Rfl: 5  •  fluticasone (FLONASE) 50 MCG/ACT nasal spray, 2 sprays into each nostril Daily. Administer 2 sprays in each nostril for each dose. (Patient taking differently: 2 sprays into each nostril Daily As Needed. Administer 2 sprays in each nostril for each dose. ), Disp: 1 bottle, Rfl: 2  •  HYDROcodone-acetaminophen (NORCO)  MG per tablet, Take 1 tablet by mouth Every 6 (Six) Hours As Needed for Moderate Pain ., Disp: 90 tablet,  "Rfl: 0  •  Insulin Syringe 31G X 5/16\" 1 ML misc, Inject into penis, Disp: 30 each, Rfl: 5  •  loratadine (CLARITIN) 10 MG tablet, Take 1 tablet by mouth Every Night., Disp: 30 tablet, Rfl: 5  •  methocarbamol (ROBAXIN) 750 MG tablet, Take 1 tablet by mouth 4 (Four) Times a Day As Needed for Muscle Spasms. Take on days he works in place of flexeril, Disp: 120 tablet, Rfl: 5  •  montelukast (SINGULAIR) 10 MG tablet, TAKE 1 TABLET BY MOUTH EVERY NIGHT, Disp: 30 tablet, Rfl: 5  •  sodium chloride (OCEAN NASAL SPRAY) 0.65 % nasal spray, 1 spray into each nostril As Needed for Congestion., Disp: 1 each, Rfl: 3  •  Syringe, Disposable, 3 ML misc, Use 3 ml syringe with a 25 gauge  5/8 inch needle, Disp: 24 each, Rfl: 6  •  Testosterone Cypionate (DEPO-TESTOSTERONE) 200 MG/ML injection, Inject 0.5 mL into the appropriate muscle as directed by prescriber 2 (Two) Times a Week. He is to use 1/2 cc every Monday and Thursday SQ, Disp: 10 mL, Rfl: 2  •  valsartan-hydrochlorothiazide (DIOVAN-HCT) 160-12.5 MG per tablet, TAKE 1 TABLET BY MOUTH DAILY, Disp: 30 tablet, Rfl: 5  •  VENTOLIN  (90 Base) MCG/ACT inhaler, INHALE 2 PUFFS BY MOUTH EVERY 4 HOURS AS NEEDED FOR SHORTNESS OF BREATH, Disp: 18 g, Rfl: 11          This document has been electronically signed by:  CORDELIA Flores, NP-C  "

## 2019-02-19 ENCOUNTER — OFFICE VISIT (OUTPATIENT)
Dept: FAMILY MEDICINE CLINIC | Facility: CLINIC | Age: 34
End: 2019-02-19

## 2019-02-19 VITALS
OXYGEN SATURATION: 98 % | BODY MASS INDEX: 34.86 KG/M2 | HEART RATE: 68 BPM | DIASTOLIC BLOOD PRESSURE: 90 MMHG | HEIGHT: 71 IN | WEIGHT: 249 LBS | SYSTOLIC BLOOD PRESSURE: 160 MMHG | TEMPERATURE: 98.1 F

## 2019-02-19 DIAGNOSIS — J01.10 ACUTE NON-RECURRENT FRONTAL SINUSITIS: Primary | ICD-10-CM

## 2019-02-19 DIAGNOSIS — J45.42 MODERATE PERSISTENT ASTHMA WITH STATUS ASTHMATICUS: ICD-10-CM

## 2019-02-19 PROCEDURE — 96372 THER/PROPH/DIAG INJ SC/IM: CPT | Performed by: NURSE PRACTITIONER

## 2019-02-19 PROCEDURE — 99214 OFFICE O/P EST MOD 30 MIN: CPT | Performed by: NURSE PRACTITIONER

## 2019-02-19 RX ORDER — METHYLPREDNISOLONE ACETATE 80 MG/ML
80 INJECTION, SUSPENSION INTRA-ARTICULAR; INTRALESIONAL; INTRAMUSCULAR; SOFT TISSUE ONCE
Status: DISCONTINUED | OUTPATIENT
Start: 2019-02-19 | End: 2019-05-28

## 2019-02-19 RX ORDER — CEPHALEXIN 500 MG/1
500 CAPSULE ORAL 3 TIMES DAILY
Qty: 30 CAPSULE | Refills: 0 | Status: SHIPPED | OUTPATIENT
Start: 2019-02-19 | End: 2019-03-01

## 2019-02-19 RX ORDER — CEFTRIAXONE 1 G/1
1 INJECTION, POWDER, FOR SOLUTION INTRAMUSCULAR; INTRAVENOUS ONCE
Status: COMPLETED | OUTPATIENT
Start: 2019-02-19 | End: 2019-02-19

## 2019-02-19 RX ORDER — CEFTRIAXONE 1 G/1
1 INJECTION, POWDER, FOR SOLUTION INTRAMUSCULAR; INTRAVENOUS ONCE
Status: DISCONTINUED | OUTPATIENT
Start: 2019-02-19 | End: 2019-05-28

## 2019-02-19 RX ORDER — METHYLPREDNISOLONE ACETATE 80 MG/ML
80 INJECTION, SUSPENSION INTRA-ARTICULAR; INTRALESIONAL; INTRAMUSCULAR; SOFT TISSUE ONCE
Status: COMPLETED | OUTPATIENT
Start: 2019-02-19 | End: 2019-02-19

## 2019-02-19 RX ORDER — GUAIFENESIN/DEXTROMETHORPHAN 100-10MG/5
5 SYRUP ORAL 3 TIMES DAILY PRN
Qty: 236 ML | Refills: 0 | Status: SHIPPED | OUTPATIENT
Start: 2019-02-19 | End: 2019-03-07

## 2019-02-19 RX ADMIN — METHYLPREDNISOLONE ACETATE 80 MG: 80 INJECTION, SUSPENSION INTRA-ARTICULAR; INTRALESIONAL; INTRAMUSCULAR; SOFT TISSUE at 16:16

## 2019-02-19 RX ADMIN — CEFTRIAXONE 1 G: 1 INJECTION, POWDER, FOR SOLUTION INTRAMUSCULAR; INTRAVENOUS at 16:15

## 2019-02-19 NOTE — PROGRESS NOTES
"Subjective   Nghia Roach is a 33 y.o. male.     Chief Complaint   Patient presents with   • URI       History of Present Illness:    Patient presents today with flulike symptoms  And sinus pressure for the past 24-48 hours.  Did have a flu vaccine this year.  Fever, body aches, chills, decreased appetite, fatigue and cough present.    Moderate persistent asthma with current exacerbation.       The following portions of the patient's history were reviewed and updated as appropriate:  Allergies, current medications, past family history, past medical history, past social history, past surgical history and problem list.    Review of Systems   Constitutional: Positive for activity change, appetite change, chills and fever. Negative for fatigue and unexpected weight change.   HENT: Positive for sinus pressure and sinus pain. Negative for congestion, ear pain, nosebleeds, postnasal drip, rhinorrhea, sore throat, trouble swallowing and voice change.    Eyes: Negative for pain and visual disturbance.   Respiratory: Positive for cough. Negative for shortness of breath and wheezing.    Cardiovascular: Negative for chest pain and palpitations.   Gastrointestinal: Negative for abdominal pain, blood in stool, constipation and diarrhea.   Endocrine: Negative for cold intolerance and polydipsia.   Genitourinary: Negative for difficulty urinating, flank pain and hematuria.   Musculoskeletal: Negative for arthralgias, back pain, gait problem, joint swelling and myalgias.   Skin: Negative for color change and rash.   Allergic/Immunologic: Negative.    Neurological: Positive for headaches. Negative for syncope and numbness.   Hematological: Negative.    Psychiatric/Behavioral: Negative for sleep disturbance and suicidal ideas.   All other systems reviewed and are negative.      Objective     /90   Pulse 68   Temp 98.1 °F (36.7 °C) (Tympanic)   Ht 180.3 cm (70.98\")   Wt 113 kg (249 lb)   SpO2 98%   BMI 34.75 kg/m² "   Admission on 12/19/2018, Discharged on 12/19/2018   Component Date Value Ref Range Status   • Case Report 12/19/2018    Final                    Value:Surgical Pathology Report                         Case: [id number]                                  Authorizing Provider:  Efra Suero MD        Collected:           12/19/2018 08:25 AM          Ordering Location:     [facility]      Received:            12/19/2018 10:11 AM                                 OPERATING ROOM DEPARTMENT                                                    Pathologist:           Marilee Macias MD                                                       Specimens:   1) - Gastric, Antrum, antrum bx                                                                     2) - Esophagus, ESOPHAGUS BX                                                              • Final Diagnosis 12/19/2018    Final                    Value:This result contains rich text formatting which cannot be displayed here.       Physical Exam   Constitutional: He is oriented to person, place, and time. He appears well-developed and well-nourished. He has a sickly appearance. No distress.   Appears acutely ill.    HENT:   Head: Normocephalic.   Right Ear: Hearing normal. No lacerations. No drainage or swelling. No foreign bodies. No mastoid tenderness. A middle ear effusion is present.   Left Ear: Hearing normal. No lacerations. No drainage or swelling. No foreign bodies. No mastoid tenderness. A middle ear effusion is present.   Nose: Mucosal edema and rhinorrhea present. No nose lacerations, sinus tenderness or nasal deformity. No epistaxis.  No foreign bodies. Right sinus exhibits frontal sinus tenderness. Right sinus exhibits no maxillary sinus tenderness. Left sinus exhibits frontal sinus tenderness. Left sinus exhibits no maxillary sinus tenderness.   Mouth/Throat: Uvula is midline. Oropharyngeal exudate and posterior oropharyngeal erythema present. No  tonsillar abscesses.   TM's are cloudy bilateral   Eyes: Conjunctivae and EOM are normal. Pupils are equal, round, and reactive to light. Right eye exhibits no discharge. Left eye exhibits no discharge.   Neck: Normal range of motion. Neck supple. No thyromegaly present.   Cardiovascular: Normal rate, regular rhythm, normal heart sounds and intact distal pulses.   Pulmonary/Chest: Effort normal and breath sounds normal. No respiratory distress.   Cough noted    Abdominal: Soft. Bowel sounds are normal. He exhibits no distension. There is no tenderness. There is no guarding.   Lymphadenopathy:     He has cervical adenopathy.        Right cervical: Superficial cervical adenopathy present. No deep cervical adenopathy present.       Left cervical: Superficial cervical adenopathy present. No deep cervical adenopathy present.   Neurological: He is alert and oriented to person, place, and time. He has normal reflexes.   Skin: Skin is warm and dry. Capillary refill takes less than 2 seconds. No rash noted. He is not diaphoretic.   Psychiatric: He has a normal mood and affect. His behavior is normal. Judgment and thought content normal.   Vitals reviewed.      Assessment/Plan     Problem List Items Addressed This Visit        Respiratory    Moderate persistent asthma with status asthmaticus    Overview     Added automatically from request for surgery [id number]           Other Visit Diagnoses     Acute non-recurrent frontal sinusitis    -  Primary    Relevant Medications    cefTRIAXone (ROCEPHIN) injection 1 g (Completed)    methylPREDNISolone acetate (DEPO-medrol) injection 80 mg (Completed)    cefTRIAXone (ROCEPHIN) injection 1 g    methylPREDNISolone acetate (DEPO-medrol) injection 80 mg        Asthma precautions reviewed    Fluids, rest, symptomatic treatment advised. Steam therapy. Dispose of tooth bush after 24 hours of starting antibiotics if ordered. Complete antibiotics as ordered.  Discussed possible side  effects/interactions of medication. Discussed symptoms to report as well as reasons to seek urgent or emergent medical attention. Understanding stated.   Recommend follow up in 2-3 days if not improved, sooner if needed.   Off work for the next 2-3 days , longer if needed.        Patient's Body mass index is 34.75 kg/m². BMI is above normal parameters. Recommendations include: exercise counseling and nutrition counseling.        I have discussed diagnosis in detail today allowing time for questions and answers. Patient is aware of reasons to seek urgent or emergent medical care as well as reasons to return to the clinic for evaluation. Possible side effects, interactions and progression of symptoms discussed as well. Patient / family states understanding.   Emotional support and active listening provided.     RTC 2-5 days if not improved, sooner if condition worsens/changes. Symptomatic care advised as well as reasons for urgent or emergent care. Pt / family state understanding.       Dictated utilizing Dragon dictation        This document has been electronically signed by:  CORDELIA Flores, NP-C

## 2019-03-07 ENCOUNTER — OFFICE VISIT (OUTPATIENT)
Dept: FAMILY MEDICINE CLINIC | Facility: CLINIC | Age: 34
End: 2019-03-07

## 2019-03-07 VITALS
OXYGEN SATURATION: 97 % | SYSTOLIC BLOOD PRESSURE: 150 MMHG | HEART RATE: 87 BPM | DIASTOLIC BLOOD PRESSURE: 80 MMHG | WEIGHT: 241 LBS | TEMPERATURE: 98.2 F | BODY MASS INDEX: 33.74 KG/M2 | HEIGHT: 71 IN

## 2019-03-07 DIAGNOSIS — G89.29 CHRONIC RIGHT-SIDED LOW BACK PAIN WITH RIGHT-SIDED SCIATICA: ICD-10-CM

## 2019-03-07 DIAGNOSIS — M15.9 PRIMARY OSTEOARTHRITIS INVOLVING MULTIPLE JOINTS: ICD-10-CM

## 2019-03-07 DIAGNOSIS — F32.A ANXIETY AND DEPRESSION: Primary | ICD-10-CM

## 2019-03-07 DIAGNOSIS — M54.41 CHRONIC RIGHT-SIDED LOW BACK PAIN WITH RIGHT-SIDED SCIATICA: ICD-10-CM

## 2019-03-07 DIAGNOSIS — F41.9 ANXIETY AND DEPRESSION: Primary | ICD-10-CM

## 2019-03-07 PROCEDURE — 99214 OFFICE O/P EST MOD 30 MIN: CPT | Performed by: NURSE PRACTITIONER

## 2019-03-07 RX ORDER — CITALOPRAM 10 MG/1
10 TABLET ORAL DAILY
Qty: 30 TABLET | Refills: 3 | Status: SHIPPED | OUTPATIENT
Start: 2019-03-07 | End: 2019-06-12

## 2019-03-07 RX ORDER — HYDROCODONE BITARTRATE AND ACETAMINOPHEN 10; 325 MG/1; MG/1
1 TABLET ORAL EVERY 6 HOURS PRN
Qty: 90 TABLET | Refills: 0 | Status: SHIPPED | OUTPATIENT
Start: 2019-03-07 | End: 2019-03-26 | Stop reason: SDUPTHER

## 2019-03-07 NOTE — PROGRESS NOTES
Subjective   Nghia Roach is a 33 y.o. male.     Chief Complaint   Patient presents with   • Arthritis   • Depression       History of Present Illness:    Chronic low back pain  Pt rates pain as 7  on pain scale today of 0-10. States that the pain is constant. Pain in located in the low back, mid back , hips and knees described as aching , sharp at times, worse with activity such as sitting or standing for long periods of time. Pt states that rest helps some though it can increase stiffness. Pt reports that the current medication regimen is helpful and decreases the pain by at least one half adding comfort and quality to life. Pt denies any history of substance abuse or addiction.  Radiology is on file.      Depression-with anxiety.  Patient has failed Prozac, Cymbalta and Lexapro in the past.  He reports that at times that he just has crazy thoughts about wondering if his wife's cheating.  He states he knows that she is not and has no reason to feel that way.  He has had some traumatic events in his life.  His father is addiction problems, his brother is an alcoholic, many of his friends have been killed in traffic accidents and he finds himself focusing on these issues.  He has not been to counseling.  He does agree to see our licensed  certified .  Denies any thoughts of hurting self or others.        The following portions of the patient's history were reviewed and updated as appropriate:  Allergies, current medications, past family history, past medical history, past social history, past surgical history and problem list.    Review of Systems   Constitutional: Negative for activity change, appetite change, chills, fatigue and fever.   HENT: Negative for ear pain, facial swelling, hearing loss, sinus pressure, sore throat, trouble swallowing and voice change.    Eyes: Negative for pain, discharge and visual disturbance.   Respiratory: Negative for apnea, cough, chest tightness, shortness of  "breath and wheezing.    Cardiovascular: Negative for chest pain, palpitations and leg swelling.   Gastrointestinal: Negative for abdominal pain, blood in stool, constipation, diarrhea, nausea and vomiting.   Genitourinary: Negative for dysuria.   Musculoskeletal: Positive for arthralgias and back pain. Negative for neck stiffness.   Skin: Negative for color change.   Neurological: Negative for headaches.   Psychiatric/Behavioral: Positive for sleep disturbance. Negative for behavioral problems, confusion, dysphoric mood, self-injury and suicidal ideas. The patient is nervous/anxious.        Objective     /80   Pulse 87   Temp 98.2 °F (36.8 °C) (Tympanic)   Ht 180.3 cm (70.98\")   Wt 109 kg (241 lb)   SpO2 97%   BMI 33.63 kg/m²   Admission on 12/19/2018, Discharged on 12/19/2018   Component Date Value Ref Range Status   • Case Report 12/19/2018    Final                    Value:Surgical Pathology Report                         Case: [id number]                                  Authorizing Provider:  Efra Suero MD        Collected:           12/19/2018 08:25 AM          Ordering Location:     [facility]      Received:            12/19/2018 10:11 AM                                 OPERATING ROOM DEPARTMENT                                                    Pathologist:           Marilee Macias MD                                                       Specimens:   1) - Gastric, Antrum, antrum bx                                                                     2) - Esophagus, ESOPHAGUS BX                                                              • Final Diagnosis 12/19/2018    Final                    Value:This result contains rich text formatting which cannot be displayed here.       Physical Exam   Constitutional: He is oriented to person, place, and time. Vital signs are normal. He appears well-developed and well-nourished. No distress.   HENT:   Head: Normocephalic and " atraumatic.   Right Ear: Tympanic membrane, external ear and ear canal normal.   Left Ear: Tympanic membrane, external ear and ear canal normal.   Nose: Nose normal.   Mouth/Throat: Oropharynx is clear and moist and mucous membranes are normal. No oropharyngeal exudate.   Eyes: Conjunctivae, EOM and lids are normal. Pupils are equal, round, and reactive to light. Right eye exhibits no discharge. Left eye exhibits no discharge.   Neck: Normal range of motion. Neck supple. No tracheal deviation present. No thyromegaly present.   Cardiovascular: Normal rate, regular rhythm, normal heart sounds and intact distal pulses. Exam reveals no gallop and no friction rub.   No murmur heard.  Pulmonary/Chest: Effort normal and breath sounds normal. No respiratory distress. He has no wheezes. He has no rales. He exhibits no tenderness.   Abdominal: Soft. Normal appearance and bowel sounds are normal. He exhibits no distension and no mass. There is no tenderness. There is no rebound and no guarding.   Musculoskeletal:        Lumbar back: He exhibits decreased range of motion, tenderness, pain and spasm.   Decreased lumbar curvature, there is pain with forward flexion. DTR's +2. No edema.    Lymphadenopathy:     He has no cervical adenopathy.   Neurological: He is alert and oriented to person, place, and time. He has normal reflexes.   CN 2-12 grossly intact    Skin: Skin is warm and dry. Capillary refill takes less than 2 seconds. No rash noted. He is not diaphoretic. No erythema. No pallor.   Psychiatric: He has a normal mood and affect. His speech is normal and behavior is normal. Judgment and thought content normal. His affect is not inappropriate. Cognition and memory are normal.   Nursing note and vitals reviewed.      Assessment/Plan     Problem List Items Addressed This Visit        Nervous and Auditory    Chronic low back pain with right-sided sciatica    Relevant Medications    HYDROcodone-acetaminophen (NORCO)  MG  per tablet       Musculoskeletal and Integument    Osteoarthrosis    Relevant Medications    HYDROcodone-acetaminophen (NORCO)  MG per tablet       Other    Anxiety and depression - Primary    Relevant Medications    citalopram (CELEXA) 10 MG tablet    Other Relevant Orders    Ambulatory Referral to Behavioral Health           Proper body mechanics has been reviewed and discussed today.      As part of this patient's treatment plan they are being prescribed controlled substance/substances with potential for abuse. This patient has been made aware of the appropriate use of such medications, including potential risk of somnolence, limited ability to drive and / or work safely, and potential for overdose. It has also been made clear that these medications are for use by this patient only, without concomitant use of alcohol or other substances unless prescribed/advised by medical provider. Patient has completed controlled substance agreement detailing terms of continued prescribing of controlled substances including monitoring Jurgen reports, drug screens and pill counts. The patient was asked and states they are not receiving narcotic medication from any there provider or any provider that this office has not been made aware of. History and physical exam exhibit continued safe and appropriate use of controlled substances.   Goal: Improved quality of life and reduction in pain as evidenced by pt report.   Jurgen #[id number] has been reviewed as consistent.  UDS is on file.   Norco prescription provided  Patient has been instructed and counseled regarding opioid misuse and risk of addiction.  We have discussed proper storage and disposal of controlled medication.  Pt is at low risk for substance abuse or addiction. Pt will be monitored closely for compliance and the need to continue plan of care.         Patient's Body mass index is 33.63 kg/m². BMI is above normal parameters. Recommendations include: exercise  counseling and nutrition counseling.      I have discussed diagnosis in detail today allowing time for questions and answers. Patient is aware of reasons to seek urgent or emergent medical care as well as reasons to return to the clinic for evaluation. Possible side effects, interactions and progression of symptoms discussed as well. Patient / family states understanding.   Emotional support and active listening provided.     2-3-week follow-up is recommended, sooner if needed.  Dictated utilizing Dragon dictation        This document has been electronically signed by:  CORDELIA Flores, NP-C

## 2019-03-26 ENCOUNTER — OFFICE VISIT (OUTPATIENT)
Dept: FAMILY MEDICINE CLINIC | Facility: CLINIC | Age: 34
End: 2019-03-26

## 2019-03-26 VITALS
TEMPERATURE: 97.7 F | SYSTOLIC BLOOD PRESSURE: 140 MMHG | WEIGHT: 244 LBS | OXYGEN SATURATION: 96 % | HEIGHT: 71 IN | DIASTOLIC BLOOD PRESSURE: 90 MMHG | HEART RATE: 64 BPM | BODY MASS INDEX: 34.16 KG/M2

## 2019-03-26 DIAGNOSIS — G89.29 CHRONIC RIGHT-SIDED LOW BACK PAIN WITH RIGHT-SIDED SCIATICA: ICD-10-CM

## 2019-03-26 DIAGNOSIS — M15.9 PRIMARY OSTEOARTHRITIS INVOLVING MULTIPLE JOINTS: ICD-10-CM

## 2019-03-26 DIAGNOSIS — M79.7 FIBROMYALGIA: ICD-10-CM

## 2019-03-26 DIAGNOSIS — M54.41 CHRONIC RIGHT-SIDED LOW BACK PAIN WITH RIGHT-SIDED SCIATICA: ICD-10-CM

## 2019-03-26 DIAGNOSIS — F32.0 CURRENT MILD EPISODE OF MAJOR DEPRESSIVE DISORDER WITHOUT PRIOR EPISODE (HCC): Primary | ICD-10-CM

## 2019-03-26 DIAGNOSIS — I10 ESSENTIAL HYPERTENSION: ICD-10-CM

## 2019-03-26 PROCEDURE — 99214 OFFICE O/P EST MOD 30 MIN: CPT | Performed by: NURSE PRACTITIONER

## 2019-03-26 RX ORDER — HYDROCODONE BITARTRATE AND ACETAMINOPHEN 10; 325 MG/1; MG/1
1 TABLET ORAL EVERY 6 HOURS PRN
Qty: 90 TABLET | Refills: 0 | Status: SHIPPED | OUTPATIENT
Start: 2019-03-26 | End: 2019-04-30 | Stop reason: SDUPTHER

## 2019-03-26 NOTE — ASSESSMENT & PLAN NOTE
Hypertension is improving with treatment.  Continue current treatment regimen.  Dietary sodium restriction.  Blood pressure will be reassessed at the next regular appointment.

## 2019-03-26 NOTE — PROGRESS NOTES
Subjective   Nghia Roach is a 33 y.o. male.     Chief Complaint   Patient presents with   • Hyperlipidemia   • Hypertension       History of Present Illness:    Hyperlipidemia-working on diet and lifestyle changes.    Hypertension-stable with current medication regimen.  Does monitor his blood pressure randomly with readings within acceptable ranges.  Currently receives Diovan HCT.    Depression -patient reports a great improvement in his depressive symptoms with the addition of Celexa.  The thoughts of hurting self or others.    Chronic low back pain  Pt rates pain as 7  on pain scale today of 0-10. States that the pain is constant. Pain in located in the low back, mid back , hips and knees described as aching , sharp at times, worse with activity such as sitting or standing for long periods of time. Pt states that rest helps some though it can increase stiffness. Pt reports that the current medication regimen is helpful and decreases the pain by at least one half adding comfort and quality to life. Pt denies any history of substance abuse or addiction.  Radiology is on file.        The following portions of the patient's history were reviewed and updated as appropriate:  Allergies, current medications, past family history, past medical history, past social history, past surgical history and problem list.    Review of Systems   Constitutional: Negative for appetite change, fatigue and unexpected weight change.   HENT: Negative for congestion, ear pain, nosebleeds, postnasal drip, rhinorrhea, sore throat, trouble swallowing and voice change.    Eyes: Negative for pain and visual disturbance.   Respiratory: Negative for cough, shortness of breath and wheezing.    Cardiovascular: Negative for chest pain and palpitations.   Gastrointestinal: Negative for abdominal pain, blood in stool, constipation and diarrhea.   Endocrine: Negative for cold intolerance and polydipsia.   Genitourinary: Negative for difficulty  "urinating, flank pain and hematuria.   Musculoskeletal: Positive for arthralgias and back pain. Negative for gait problem, joint swelling and myalgias.   Skin: Negative for color change and rash.   Allergic/Immunologic: Positive for environmental allergies.   Neurological: Negative for syncope, numbness and headaches.   Hematological: Negative.    Psychiatric/Behavioral: Negative for dysphoric mood, self-injury, sleep disturbance and suicidal ideas.   All other systems reviewed and are negative.      Objective     /90   Pulse 64   Temp 97.7 °F (36.5 °C) (Tympanic)   Ht 180.3 cm (70.98\")   Wt 111 kg (244 lb)   SpO2 96%   BMI 34.05 kg/m²   Admission on 12/19/2018, Discharged on 12/19/2018   Component Date Value Ref Range Status   • Case Report 12/19/2018    Final                    Value:Surgical Pathology Report                         Case: [id number]                                  Authorizing Provider:  Efra Suero MD        Collected:           12/19/2018 08:25 AM          Ordering Location:     [facility]      Received:            12/19/2018 10:11 AM                                 OPERATING ROOM DEPARTMENT                                                    Pathologist:           Marilee Macias MD                                                       Specimens:   1) - Gastric, Antrum, antrum bx                                                                     2) - Esophagus, ESOPHAGUS BX                                                              • Final Diagnosis 12/19/2018    Final                    Value:This result contains rich text formatting which cannot be displayed here.       Physical Exam   Constitutional: He is oriented to person, place, and time. Vital signs are normal. He appears well-developed and well-nourished. No distress.   HENT:   Head: Normocephalic and atraumatic.   Right Ear: Tympanic membrane, external ear and ear canal normal.   Left Ear: Tympanic " membrane, external ear and ear canal normal.   Nose: Nose normal.   Mouth/Throat: Oropharynx is clear and moist and mucous membranes are normal. No oropharyngeal exudate.   Eyes: Conjunctivae, EOM and lids are normal. Pupils are equal, round, and reactive to light. Right eye exhibits no discharge. Left eye exhibits no discharge.   Neck: Normal range of motion. Neck supple. No tracheal deviation present. No thyromegaly present.   Cardiovascular: Normal rate, regular rhythm, normal heart sounds and intact distal pulses. Exam reveals no gallop and no friction rub.   No murmur heard.  Pulmonary/Chest: Effort normal and breath sounds normal. No respiratory distress. He has no wheezes. He has no rales. He exhibits no tenderness.   Abdominal: Soft. Normal appearance and bowel sounds are normal. He exhibits no distension and no mass. There is no tenderness. There is no rebound and no guarding.   Musculoskeletal:        Lumbar back: He exhibits decreased range of motion, tenderness, pain and spasm.   Decreased lumbar curvature, there is pain with forward flexion. DTR's +2. No edema.    Lymphadenopathy:     He has no cervical adenopathy.   Neurological: He is alert and oriented to person, place, and time. He has normal reflexes.   CN 2-12 grossly intact    Skin: Skin is warm and dry. Capillary refill takes less than 2 seconds. No rash noted. He is not diaphoretic. No erythema. No pallor.   Psychiatric: He has a normal mood and affect. His speech is normal and behavior is normal. Judgment and thought content normal. His affect is not inappropriate. Cognition and memory are normal.   Nursing note and vitals reviewed.      Assessment/Plan     Problem List Items Addressed This Visit        Cardiovascular and Mediastinum    Essential hypertension    Current Assessment & Plan     Hypertension is improving with treatment.  Continue current treatment regimen.  Dietary sodium restriction.  Blood pressure will be reassessed at the next  regular appointment.            Nervous and Auditory    Fibromyalgia    Current Assessment & Plan     Continue plan of care          Back ache    Current Assessment & Plan     Weight loss recommended         Relevant Medications    HYDROcodone-acetaminophen (NORCO)  MG per tablet       Musculoskeletal and Integument    Osteoarthrosis    Current Assessment & Plan     Proper body mechanics has been reviewed and discussed today.      As part of this patient's treatment plan they are being prescribed controlled substance/substances with potential for abuse. This patient has been made aware of the appropriate use of such medications, including potential risk of somnolence, limited ability to drive and / or work safely, and potential for overdose. It has also been made clear that these medications are for use by this patient only, without concomitant use of alcohol or other substances unless prescribed/advised by medical provider. Patient has completed controlled substance agreement detailing terms of continued prescribing of controlled substances including monitoring Jurgen reports, drug screens and pill counts. The patient was asked and states they are not receiving narcotic medication from any there provider or any provider that this office has not been made aware of. History and physical exam exhibit continued safe and appropriate use of controlled substances.   Goal: Improved quality of life and reduction in pain as evidenced by pt report.   Jurgen #[id number] has been reviewed as consistent.  UDS is on file.     Patient has been instructed and counseled regarding opioid misuse and risk of addiction.  We have discussed proper storage and disposal of controlled medication.  Pt is at low risk for substance abuse or addiction. Pt will be monitored closely for compliance and the need to continue plan of care.          Relevant Medications    HYDROcodone-acetaminophen (NORCO)  MG per tablet       Other     Depression - Primary    Current Assessment & Plan     Psychological condition is improving with treatment.  Continue current treatment regimen.  Psychological condition  will be reassessed at the next regular appointment.               Norco prescription provided with note written on prescription instructing pharmacy not to fill early.  Awake early this month due to provider medication.  We will adjust his appointment for 6 weeks out so that medicine will be due.         Patient's Body mass index is 34.05 kg/m². BMI is above normal parameters. Recommendations include: exercise counseling and nutrition counseling.      I have discussed diagnosis in detail today allowing time for questions and answers. Patient is aware of reasons to seek urgent or emergent medical care as well as reasons to return to the clinic for evaluation. Possible side effects, interactions and progression of symptoms discussed as well. Patient / family states understanding.   Emotional support and active listening provided.       Follow up in one month, sooner if needed. Routine labs every 3-6 months.     Dictated utilizing Dragon dictation        This document has been electronically signed by:  CORDELIA Flores, NP-C

## 2019-03-26 NOTE — ASSESSMENT & PLAN NOTE
Proper body mechanics has been reviewed and discussed today.      As part of this patient's treatment plan they are being prescribed controlled substance/substances with potential for abuse. This patient has been made aware of the appropriate use of such medications, including potential risk of somnolence, limited ability to drive and / or work safely, and potential for overdose. It has also been made clear that these medications are for use by this patient only, without concomitant use of alcohol or other substances unless prescribed/advised by medical provider. Patient has completed controlled substance agreement detailing terms of continued prescribing of controlled substances including monitoring Jurgen reports, drug screens and pill counts. The patient was asked and states they are not receiving narcotic medication from any there provider or any provider that this office has not been made aware of. History and physical exam exhibit continued safe and appropriate use of controlled substances.   Goal: Improved quality of life and reduction in pain as evidenced by pt report.   Jurgen #[id number] has been reviewed as consistent.  UDS is on file.     Patient has been instructed and counseled regarding opioid misuse and risk of addiction.  We have discussed proper storage and disposal of controlled medication.  Pt is at low risk for substance abuse or addiction. Pt will be monitored closely for compliance and the need to continue plan of care.

## 2019-04-03 RX ORDER — MONTELUKAST SODIUM 10 MG/1
10 TABLET ORAL NIGHTLY
Qty: 30 TABLET | Refills: 0 | Status: SHIPPED | OUTPATIENT
Start: 2019-04-03 | End: 2019-05-28 | Stop reason: SDUPTHER

## 2019-04-16 DIAGNOSIS — R53.83 OTHER FATIGUE: ICD-10-CM

## 2019-04-17 RX ORDER — TADALAFIL 5 MG/1
5 TABLET ORAL DAILY
Qty: 30 TABLET | Refills: 5 | Status: SHIPPED | OUTPATIENT
Start: 2019-04-17 | End: 2019-09-06 | Stop reason: SDUPTHER

## 2019-04-30 ENCOUNTER — OFFICE VISIT (OUTPATIENT)
Dept: FAMILY MEDICINE CLINIC | Facility: CLINIC | Age: 34
End: 2019-04-30

## 2019-04-30 VITALS
DIASTOLIC BLOOD PRESSURE: 90 MMHG | HEART RATE: 74 BPM | HEIGHT: 71 IN | WEIGHT: 246 LBS | BODY MASS INDEX: 34.44 KG/M2 | SYSTOLIC BLOOD PRESSURE: 150 MMHG | TEMPERATURE: 98.2 F | OXYGEN SATURATION: 98 %

## 2019-04-30 DIAGNOSIS — M54.41 CHRONIC RIGHT-SIDED LOW BACK PAIN WITH RIGHT-SIDED SCIATICA: ICD-10-CM

## 2019-04-30 DIAGNOSIS — M54.9 EXACERBATION OF CHRONIC BACK PAIN: Primary | ICD-10-CM

## 2019-04-30 DIAGNOSIS — G89.29 EXACERBATION OF CHRONIC BACK PAIN: Primary | ICD-10-CM

## 2019-04-30 DIAGNOSIS — G89.29 CHRONIC RIGHT-SIDED LOW BACK PAIN WITH RIGHT-SIDED SCIATICA: ICD-10-CM

## 2019-04-30 DIAGNOSIS — M15.9 PRIMARY OSTEOARTHRITIS INVOLVING MULTIPLE JOINTS: ICD-10-CM

## 2019-04-30 PROCEDURE — 99214 OFFICE O/P EST MOD 30 MIN: CPT | Performed by: NURSE PRACTITIONER

## 2019-04-30 PROCEDURE — 96372 THER/PROPH/DIAG INJ SC/IM: CPT | Performed by: NURSE PRACTITIONER

## 2019-04-30 RX ORDER — KETOROLAC TROMETHAMINE 30 MG/ML
60 INJECTION, SOLUTION INTRAMUSCULAR; INTRAVENOUS ONCE
Status: COMPLETED | OUTPATIENT
Start: 2019-04-30 | End: 2019-04-30

## 2019-04-30 RX ORDER — HYDROCODONE BITARTRATE AND ACETAMINOPHEN 10; 325 MG/1; MG/1
1 TABLET ORAL EVERY 6 HOURS PRN
Qty: 90 TABLET | Refills: 0 | Status: SHIPPED | OUTPATIENT
Start: 2019-04-30 | End: 2019-05-28 | Stop reason: SDUPTHER

## 2019-04-30 RX ADMIN — KETOROLAC TROMETHAMINE 60 MG: 30 INJECTION, SOLUTION INTRAMUSCULAR; INTRAVENOUS at 15:35

## 2019-04-30 NOTE — PROGRESS NOTES
Subjective   Nghia Roach is a 33 y.o. male.     Chief Complaint   Patient presents with   • Hyperlipidemia   • Hypertension       History of Present Illness:    Hyperlipidemia-working on diet and heart healthy lifestyle.    Hypertension- patient states his blood pressure is usually up when he is hurting.  He has had an exacerbation of pain today.  He is compliant with his blood pressure medication.  He states that he does check his blood pressure often.    Low back pain with recent exacerbation.  Rates pain as 7 on pain scale rating of 0-10.  Pain is described as sharp and aching.  Patient also has pain in both hands described as a throbbing. He has radiology on file.  He has consulted orthopedic as well as neurology in the past.  Denies substance abuse or addiction.  Patient states that his current medicine helps reduce his pain.    Patient currently receives Norco for chronic pain which helps him remain active and working.      The following portions of the patient's history were reviewed and updated as appropriate:  Allergies, current medications, past family history, past medical history, past social history, past surgical history and problem list.    Review of Systems   Constitutional: Negative for appetite change, fatigue and unexpected weight change.   HENT: Negative for congestion, ear pain, nosebleeds, postnasal drip, rhinorrhea, sore throat, trouble swallowing and voice change.    Eyes: Negative for pain and visual disturbance.   Respiratory: Negative for cough, shortness of breath and wheezing.    Cardiovascular: Negative for chest pain and palpitations.   Gastrointestinal: Negative for abdominal pain, blood in stool, constipation and diarrhea.   Endocrine: Negative for cold intolerance and polydipsia.   Genitourinary: Negative for difficulty urinating, flank pain and hematuria.   Musculoskeletal: Positive for arthralgias, back pain and myalgias. Negative for gait problem, joint swelling and neck  "stiffness.   Skin: Negative for color change and rash.   Allergic/Immunologic: Positive for environmental allergies.   Neurological: Negative for syncope, numbness and headaches.   Hematological: Negative.    Psychiatric/Behavioral: Negative for dysphoric mood, self-injury, sleep disturbance and suicidal ideas.   All other systems reviewed and are negative.      Objective     /90   Pulse 74   Temp 98.2 °F (36.8 °C) (Tympanic)   Ht 180.3 cm (70.98\")   Wt 112 kg (246 lb)   SpO2 98%   BMI 34.33 kg/m²   Admission on 12/19/2018, Discharged on 12/19/2018   Component Date Value Ref Range Status   • Case Report 12/19/2018    Final                    Value:Surgical Pathology Report                         Case: [id number]                                  Authorizing Provider:  Efra Suero MD        Collected:           12/19/2018 08:25 AM          Ordering Location:     [facility]      Received:            12/19/2018 10:11 AM                                 OPERATING ROOM DEPARTMENT                                                    Pathologist:           Marilee Macias MD                                                       Specimens:   1) - Gastric, Antrum, antrum bx                                                                     2) - Esophagus, ESOPHAGUS BX                                                              • Final Diagnosis 12/19/2018    Final                    Value:This result contains rich text formatting which cannot be displayed here.       Physical Exam   Constitutional: He is oriented to person, place, and time. Vital signs are normal. He appears well-developed and well-nourished. No distress.   HENT:   Head: Normocephalic and atraumatic.   Right Ear: Tympanic membrane, external ear and ear canal normal.   Left Ear: Tympanic membrane, external ear and ear canal normal.   Nose: Nose normal.   Mouth/Throat: Oropharynx is clear and moist and mucous membranes are " normal. No oropharyngeal exudate.   Eyes: Conjunctivae, EOM and lids are normal. Pupils are equal, round, and reactive to light. Right eye exhibits no discharge. Left eye exhibits no discharge.   Neck: Normal range of motion. Neck supple. No tracheal deviation present. No thyromegaly present.   Cardiovascular: Normal rate, regular rhythm, normal heart sounds and intact distal pulses. Exam reveals no gallop and no friction rub.   No murmur heard.  Pulmonary/Chest: Effort normal and breath sounds normal. No respiratory distress. He has no wheezes. He has no rales. He exhibits no tenderness.   Abdominal: Soft. Normal appearance and bowel sounds are normal. He exhibits no distension and no mass. There is no tenderness. There is no rebound and no guarding.   Musculoskeletal:        Lumbar back: He exhibits decreased range of motion, tenderness, pain and spasm.        Right hand: He exhibits tenderness. Normal sensation noted. Decreased strength noted.        Left hand: He exhibits tenderness. Normal sensation noted. Decreased strength noted.   Decreased lumbar curvature, there is pain with forward flexion. DTR's +2. No edema.    Lymphadenopathy:     He has no cervical adenopathy.   Neurological: He is alert and oriented to person, place, and time. He has normal reflexes.   CN 2-12 grossly intact    Skin: Skin is warm and dry. Capillary refill takes less than 2 seconds. No rash noted. He is not diaphoretic. No erythema. No pallor.   Psychiatric: He has a normal mood and affect. His speech is normal and behavior is normal. Judgment and thought content normal. His affect is not inappropriate. Cognition and memory are normal.   Nursing note and vitals reviewed.      Assessment/Plan     Problem List Items Addressed This Visit        Nervous and Auditory    Back ache    Relevant Medications    HYDROcodone-acetaminophen (NORCO)  MG per tablet    Exacerbation of chronic back pain - Primary    Relevant Medications     ketorolac (TORADOL) injection 60 mg       Musculoskeletal and Integument    Osteoarthrosis    Relevant Medications    HYDROcodone-acetaminophen (NORCO)  MG per tablet            Proper body mechanics has been reviewed and discussed today.      As part of this patient's treatment plan they are being prescribed controlled substance/substances with potential for abuse. This patient has been made aware of the appropriate use of such medications, including potential risk of somnolence, limited ability to drive and / or work safely, and potential for overdose. It has also been made clear that these medications are for use by this patient only, without concomitant use of alcohol or other substances unless prescribed/advised by medical provider. Patient has completed controlled substance agreement detailing terms of continued prescribing of controlled substances including monitoring Jurgen reports, drug screens and pill counts. The patient was asked and states they are not receiving narcotic medication from any there provider or any provider that this office has not been made aware of. History and physical exam exhibit continued safe and appropriate use of controlled substances.   Goal: Improved quality of life and reduction in pain as evidenced by pt report.   Jurgen  has been reviewed as consistent.  UDS is on file.     Patient has been instructed and counseled regarding opioid misuse and risk of addiction.  We have discussed proper storage and disposal of controlled medication.  Pt is at low risk for substance abuse or addiction. Pt will be monitored closely for compliance and the need to continue plan of care.     Patient's Body mass index is 34.33 kg/m². BMI is above normal parameters. Recommendations include: exercise counseling and nutrition counseling.      I have discussed diagnosis in detail today allowing time for questions and answers. Patient is aware of reasons to seek urgent or emergent medical care as  well as reasons to return to the clinic for evaluation. Possible side effects, interactions and progression of symptoms discussed as well. Patient / family states understanding.   Emotional support and active listening provided.     Follow up in one month, sooner if needed. Routine labs every 3-6 months.         This document has been electronically signed by:  CORDELIA Flores, NP-C

## 2019-05-11 DIAGNOSIS — R53.83 OTHER FATIGUE: ICD-10-CM

## 2019-05-11 DIAGNOSIS — J06.9 ACUTE URI: ICD-10-CM

## 2019-05-11 DIAGNOSIS — R79.89 LOW TESTOSTERONE: ICD-10-CM

## 2019-05-13 ENCOUNTER — TELEPHONE (OUTPATIENT)
Dept: UROLOGY | Facility: CLINIC | Age: 34
End: 2019-05-13

## 2019-05-13 RX ORDER — TESTOSTERONE CYPIONATE 200 MG/ML
100 INJECTION, SOLUTION INTRAMUSCULAR 2 TIMES WEEKLY
Qty: 10 ML | Refills: 2 | OUTPATIENT
Start: 2019-05-13

## 2019-05-13 RX ORDER — ALBUTEROL SULFATE 90 UG/1
AEROSOL, METERED RESPIRATORY (INHALATION)
Qty: 8.5 G | Refills: 5 | Status: SHIPPED | OUTPATIENT
Start: 2019-05-13 | End: 2019-05-28 | Stop reason: SDUPTHER

## 2019-05-13 RX ORDER — ALLOPURINOL 100 MG/1
TABLET ORAL
Qty: 60 TABLET | Refills: 5 | Status: SHIPPED | OUTPATIENT
Start: 2019-05-13 | End: 2019-07-10 | Stop reason: SDUPTHER

## 2019-05-13 NOTE — TELEPHONE ENCOUNTER
I called in one months worth of testosterone into the patients pharmacy. And called the patient to let him know.

## 2019-05-17 ENCOUNTER — PATIENT MESSAGE (OUTPATIENT)
Dept: UROLOGY | Facility: CLINIC | Age: 34
End: 2019-05-17

## 2019-05-24 ENCOUNTER — PRIOR AUTHORIZATION (OUTPATIENT)
Dept: UROLOGY | Facility: CLINIC | Age: 34
End: 2019-05-24

## 2019-05-24 RX ORDER — MONTELUKAST SODIUM 10 MG/1
10 TABLET ORAL NIGHTLY
Qty: 30 TABLET | Refills: 0 | Status: CANCELLED | OUTPATIENT
Start: 2019-05-24

## 2019-05-28 ENCOUNTER — OFFICE VISIT (OUTPATIENT)
Dept: FAMILY MEDICINE CLINIC | Facility: CLINIC | Age: 34
End: 2019-05-28

## 2019-05-28 VITALS
HEIGHT: 71 IN | WEIGHT: 250 LBS | OXYGEN SATURATION: 98 % | HEART RATE: 84 BPM | BODY MASS INDEX: 35 KG/M2 | TEMPERATURE: 98.1 F | DIASTOLIC BLOOD PRESSURE: 80 MMHG | SYSTOLIC BLOOD PRESSURE: 170 MMHG

## 2019-05-28 DIAGNOSIS — G89.29 CHRONIC RIGHT-SIDED LOW BACK PAIN WITH RIGHT-SIDED SCIATICA: ICD-10-CM

## 2019-05-28 DIAGNOSIS — M15.9 PRIMARY OSTEOARTHRITIS INVOLVING MULTIPLE JOINTS: ICD-10-CM

## 2019-05-28 DIAGNOSIS — M54.41 CHRONIC RIGHT-SIDED LOW BACK PAIN WITH RIGHT-SIDED SCIATICA: ICD-10-CM

## 2019-05-28 DIAGNOSIS — J45.42 MODERATE PERSISTENT ASTHMA WITH STATUS ASTHMATICUS: ICD-10-CM

## 2019-05-28 DIAGNOSIS — M79.7 FIBROMYALGIA: Primary | ICD-10-CM

## 2019-05-28 DIAGNOSIS — I10 ESSENTIAL HYPERTENSION: ICD-10-CM

## 2019-05-28 PROCEDURE — 96372 THER/PROPH/DIAG INJ SC/IM: CPT | Performed by: NURSE PRACTITIONER

## 2019-05-28 PROCEDURE — 99214 OFFICE O/P EST MOD 30 MIN: CPT | Performed by: NURSE PRACTITIONER

## 2019-05-28 RX ORDER — HYDROCODONE BITARTRATE AND ACETAMINOPHEN 10; 325 MG/1; MG/1
1 TABLET ORAL EVERY 6 HOURS PRN
Qty: 75 TABLET | Refills: 0 | Status: SHIPPED | OUTPATIENT
Start: 2019-05-28 | End: 2019-07-10

## 2019-05-28 RX ORDER — MONTELUKAST SODIUM 10 MG/1
10 TABLET ORAL NIGHTLY
Qty: 30 TABLET | Refills: 5 | Status: SHIPPED | OUTPATIENT
Start: 2019-05-28 | End: 2019-12-05 | Stop reason: SDUPTHER

## 2019-05-28 RX ORDER — FEXOFENADINE HCL 180 MG/1
180 TABLET ORAL DAILY
Qty: 30 TABLET | Refills: 5 | Status: SHIPPED | OUTPATIENT
Start: 2019-05-28 | End: 2019-10-23

## 2019-05-28 RX ORDER — METHYLPREDNISOLONE ACETATE 80 MG/ML
80 INJECTION, SUSPENSION INTRA-ARTICULAR; INTRALESIONAL; INTRAMUSCULAR; SOFT TISSUE ONCE
Status: COMPLETED | OUTPATIENT
Start: 2019-05-28 | End: 2019-05-28

## 2019-05-28 RX ORDER — CLONIDINE HYDROCHLORIDE 0.1 MG/1
0.1 TABLET ORAL ONCE
Status: DISCONTINUED | OUTPATIENT
Start: 2019-05-28 | End: 2020-03-06

## 2019-05-28 RX ORDER — ALBUTEROL SULFATE 90 UG/1
2 AEROSOL, METERED RESPIRATORY (INHALATION) EVERY 4 HOURS PRN
Qty: 2 INHALER | Refills: 5 | Status: SHIPPED | OUTPATIENT
Start: 2019-05-28 | End: 2019-07-10 | Stop reason: SDUPTHER

## 2019-05-28 RX ADMIN — METHYLPREDNISOLONE ACETATE 80 MG: 80 INJECTION, SUSPENSION INTRA-ARTICULAR; INTRALESIONAL; INTRAMUSCULAR; SOFT TISSUE at 09:48

## 2019-05-28 NOTE — PROGRESS NOTES
Subjective   Nghia Roach is a 33 y.o. male.     Chief Complaint   Patient presents with   • Hypertension   • Hyperlipidemia       History of Present Illness:    Hypertension-mildly elevated up on initial evaluation today.  Patient is having an asthma attack.    Asthma-current exacerbation.  Patient has been off Singulair for approximately 1 week.  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  He is using his rescue inhaler every 4 hours.  Feels like getting a deep breath is difficult.  O2 sat is 98% on room air.  Patient has been taking over-the-counter Zyrtec because his Claritin was not helping.  He receives Symbicort, Singulair, Flonase.    Chronic pain/fibromyalgia/back pain-patient rates his pain as 8 on pain scale rating of 0-10.  He describes his pain as sharp and aching located in his mid and low back with radiation into his lower extremities at times.  Patient also has pain in both hands which he describes as a throbbing.  He works as a  at a local factory.  Patient has been seen by orthopedic and neurology in the past.  Pain does interfere with his ability to perform his daily work activities as well as disrupts his sleep and social life with his children.  Patient denies any substance abuse or addiction.  He does have radiology on file.  He has failed improvement with physical therapy.  In the past he has failed Ultram due to to decreased renal function and effects on IBS/gastritis.  He does report that Norco decreases his pain and helps him be more active and have better enjoyment of life.        The following portions of the patient's history were reviewed and updated as appropriate:  Allergies, current medications, past family history, past medical history, past social history, past surgical history and problem list.    Review of Systems   Constitutional: Positive for activity change. Negative for appetite change, chills,  "diaphoresis, fever and unexpected weight change.   HENT: Negative.    Eyes: Negative.    Respiratory: Positive for chest tightness, shortness of breath and wheezing.    Cardiovascular: Negative.    Gastrointestinal: Negative.    Endocrine: Negative.    Musculoskeletal: Positive for arthralgias, back pain and myalgias. Negative for gait problem, joint swelling and neck pain.   Skin: Positive for rash (Rash on lower extremities).   Allergic/Immunologic: Positive for environmental allergies.   Neurological: Negative.    Hematological: Negative.    Psychiatric/Behavioral: Negative for dysphoric mood, self-injury and suicidal ideas.       Objective     /80   Pulse 84   Temp 98.1 °F (36.7 °C) (Tympanic)   Ht 180.3 cm (70.98\")   Wt 113 kg (250 lb)   SpO2 98%   BMI 34.89 kg/m²   Admission on 12/19/2018, Discharged on 12/19/2018   Component Date Value Ref Range Status   • Case Report 12/19/2018    Final                    Value:Surgical Pathology Report                         Case: [id number]                                  Authorizing Provider:  Efra Suero MD        Collected:           12/19/2018 08:25 AM          Ordering Location:     [facility]      Received:            12/19/2018 10:11 AM                                 OPERATING ROOM DEPARTMENT                                                    Pathologist:           Marilee Macias MD                                                       Specimens:   1) - Gastric, Antrum, antrum bx                                                                     2) - Esophagus, ESOPHAGUS BX                                                              • Final Diagnosis 12/19/2018    Final                    Value:This result contains rich text formatting which cannot be displayed here.       Physical Exam   Constitutional: He is oriented to person, place, and time. He appears well-developed and well-nourished. No distress.   Talkative and friendly " with no acute distress.  Blood pressure is mildly elevated.  He has taken his medication last night.   HENT:   Head: Normocephalic and atraumatic.   Right Ear: Hearing, tympanic membrane, external ear and ear canal normal.   Left Ear: Hearing, tympanic membrane, external ear and ear canal normal.   Nose: Nose normal. Right sinus exhibits no maxillary sinus tenderness and no frontal sinus tenderness. Left sinus exhibits no maxillary sinus tenderness and no frontal sinus tenderness.   Mouth/Throat: Uvula is midline, oropharynx is clear and moist and mucous membranes are normal. No oropharyngeal exudate.   Eyes: Conjunctivae, EOM and lids are normal. Pupils are equal, round, and reactive to light. Right eye exhibits no discharge. Left eye exhibits no discharge.   Neck: Normal range of motion. Neck supple. No tracheal deviation present. No thyromegaly present.   Cardiovascular: Normal rate, regular rhythm, normal heart sounds and intact distal pulses. Exam reveals no gallop and no friction rub.   No murmur heard.  Pulmonary/Chest: Effort normal. No accessory muscle usage. No respiratory distress. He has wheezes in the right upper field and the left upper field. He has no rhonchi. He has no rales. He exhibits no tenderness.   Abdominal: Soft. Normal appearance and bowel sounds are normal. He exhibits no distension and no mass. There is no tenderness. There is no rebound and no guarding.   Musculoskeletal:        Lumbar back: He exhibits decreased range of motion, tenderness, pain and spasm.        Right hand: He exhibits tenderness. Normal sensation noted. Decreased strength noted.        Left hand: He exhibits tenderness. Normal sensation noted. Decreased strength noted.   Decreased lumbar curvature, there is pain with forward flexion. DTR's +2. No edema.    Lymphadenopathy:     He has no cervical adenopathy.   Neurological: He is alert and oriented to person, place, and time. He has normal reflexes.   CN 2-12 grossly  intact    Skin: Skin is warm and dry. Capillary refill takes less than 2 seconds. No rash noted. He is not diaphoretic. No erythema. No pallor.   Psychiatric: He has a normal mood and affect. His speech is normal and behavior is normal. Judgment and thought content normal. His affect is not inappropriate. Cognition and memory are normal.   Nursing note and vitals reviewed.      Assessment/Plan     Problem List Items Addressed This Visit        Cardiovascular and Mediastinum    Essential hypertension    Current Assessment & Plan     Elevated today due to an acute asthma attack.  Clonidine 0.1 mg taken orally with good response.  Nghia we will monitor blood pressure a couple times a day and report any rating greater than 150/90.         Relevant Medications    CloNIDine (CATAPRES) tablet 0.1 mg (Start on 5/28/2019  9:38 AM)       Respiratory    Moderate persistent asthma with status asthmaticus    Overview     Added automatically from request for surgery [id number]         Current Assessment & Plan     Acute asthma attack.  Take Symbicort twice daily as ordered.  Continue with rescue inhaler as needed.  Steroid injection today.  Stop Claritin and Zyrtec.  Start Allegra.  Refill Singulair.  Avoid known triggers.  Keep asthma diary.             Relevant Medications    montelukast (SINGULAIR) 10 MG tablet    methylPREDNISolone acetate (DEPO-medrol) injection 80 mg    fexofenadine (ALLEGRA) 180 MG tablet    albuterol sulfate  (90 Base) MCG/ACT inhaler       Nervous and Auditory    Fibromyalgia - Primary    Current Assessment & Plan     Refer to pain management         Relevant Orders    Urine Drug Screen - Urine, Clean Catch    Ambulatory Referral to Pain Management (Completed)    Back ache    Relevant Medications    HYDROcodone-acetaminophen (NORCO)  MG per tablet    Other Relevant Orders    Ambulatory Referral to Pain Management (Completed)       Musculoskeletal and Integument    Osteoarthrosis    Current  Assessment & Plan     Proper body mechanics has been reviewed and discussed today.      As part of this patient's treatment plan they are being prescribed controlled substance/substances with potential for abuse. This patient has been made aware of the appropriate use of such medications, including potential risk of somnolence, limited ability to drive and / or work safely, and potential for overdose. It has also been made clear that these medications are for use by this patient only, without concomitant use of alcohol or other substances unless prescribed/advised by medical provider. Patient has completed controlled substance agreement detailing terms of continued prescribing of controlled substances including monitoring Jurgen reports, drug screens and pill counts. The patient was asked and states they are not receiving narcotic medication from any there provider or any provider that this office has not been made aware of. History and physical exam exhibit continued safe and appropriate use of controlled substances.   Goal: Improved quality of life and reduction in pain as evidenced by pt report.   Jurgen  has been reviewed as consistent.  UDS is on file.   Repeat UDS today.  Patient has been instructed and counseled regarding opioid misuse and risk of addiction.  We have discussed proper storage and disposal of controlled medication.  Pt is at low risk for substance abuse or addiction. Pt will be monitored closely for compliance and the need to continue plan of care.     Patient advised that this provider will become more limited with the prescribing of chronic schedule II opioids after the end of this year.  Reviewed options including tapering off opioid therapy, changing to different treatment other than opiod, or referral to pain management.  Patient will consider and options will be discussed further at upcoming office visit(s).   Prescribing will always be at the discretion of the provider based on  individual patient status and acute needs.      We will go ahead and decrease Norco 10 from 90 each month down to a #75.  Patient has been advised to begin taking less often than every 6 hours as tolerated.  He understands that #75 must last him for entire 30-day.  We have discussed options for managing his chronic pain.  He has failed Ultram in the past.  Cannot take anti-inflammatory medication due to effects on IBS and gastritis.  We will go ahead and place a pain clinic referral today and discuss further next month.          Relevant Medications    HYDROcodone-acetaminophen (NORCO)  MG per tablet    Other Relevant Orders    Ambulatory Referral to Pain Management (Completed)                   Patient's Body mass index is 34.89 kg/m². BMI is above normal parameters. Recommendations include: exercise counseling and nutrition counseling.      I have discussed diagnosis in detail today allowing time for questions and answers. Patient is aware of reasons to seek urgent or emergent medical care as well as reasons to return to the clinic for evaluation. Possible side effects, interactions and progression of symptoms discussed as well. Patient / family states understanding.   Emotional support and active listening provided.     RTC 2-5 days if not improved, sooner if condition worsens/changes. Symptomatic care advised as well as reasons for urgent or emergent care. Pt / family state understanding.       Follow up in one month for routine visit, sooner if needed. Routine labs every 3-6 months.         This document has been electronically signed by:  CORDELIA Flores, NP-C

## 2019-05-28 NOTE — ASSESSMENT & PLAN NOTE
Proper body mechanics has been reviewed and discussed today.      As part of this patient's treatment plan they are being prescribed controlled substance/substances with potential for abuse. This patient has been made aware of the appropriate use of such medications, including potential risk of somnolence, limited ability to drive and / or work safely, and potential for overdose. It has also been made clear that these medications are for use by this patient only, without concomitant use of alcohol or other substances unless prescribed/advised by medical provider. Patient has completed controlled substance agreement detailing terms of continued prescribing of controlled substances including monitoring Jurgen reports, drug screens and pill counts. The patient was asked and states they are not receiving narcotic medication from any there provider or any provider that this office has not been made aware of. History and physical exam exhibit continued safe and appropriate use of controlled substances.   Goal: Improved quality of life and reduction in pain as evidenced by pt report.   Jurgen  has been reviewed as consistent.  UDS is on file.   Repeat UDS today.  Patient has been instructed and counseled regarding opioid misuse and risk of addiction.  We have discussed proper storage and disposal of controlled medication.  Pt is at low risk for substance abuse or addiction. Pt will be monitored closely for compliance and the need to continue plan of care.     Patient advised that this provider will become more limited with the prescribing of chronic schedule II opioids after the end of this year.  Reviewed options including tapering off opioid therapy, changing to different treatment other than opiod, or referral to pain management.  Patient will consider and options will be discussed further at upcoming office visit(s).   Prescribing will always be at the discretion of the provider based on individual patient status and  acute needs.      We will go ahead and decrease Norco 10 from 90 each month down to a #75.  Patient has been advised to begin taking less often than every 6 hours as tolerated.  He understands that #75 must last him for entire 30-day.  We have discussed options for managing his chronic pain.  He has failed Ultram in the past.  Cannot take anti-inflammatory medication due to effects on IBS and gastritis.  We will go ahead and place a pain clinic referral today and discuss further next month.

## 2019-05-28 NOTE — ASSESSMENT & PLAN NOTE
Acute asthma attack.  Take Symbicort twice daily as ordered.  Continue with rescue inhaler as needed.  Steroid injection today.  Stop Claritin and Zyrtec.  Start Allegra.  Refill Singulair.  Avoid known triggers.  Keep asthma diary.

## 2019-05-28 NOTE — ASSESSMENT & PLAN NOTE
Elevated today due to an acute asthma attack.  Clonidine 0.1 mg taken orally with good response.  Nghia we will monitor blood pressure a couple times a day and report any rating greater than 150/90.

## 2019-06-04 ENCOUNTER — TELEPHONE (OUTPATIENT)
Dept: FAMILY MEDICINE CLINIC | Facility: CLINIC | Age: 34
End: 2019-06-04

## 2019-06-04 DIAGNOSIS — G89.29 CHRONIC RIGHT-SIDED LOW BACK PAIN WITH RIGHT-SIDED SCIATICA: Primary | ICD-10-CM

## 2019-06-04 DIAGNOSIS — M54.41 CHRONIC RIGHT-SIDED LOW BACK PAIN WITH RIGHT-SIDED SCIATICA: Primary | ICD-10-CM

## 2019-06-08 ENCOUNTER — APPOINTMENT (OUTPATIENT)
Dept: GENERAL RADIOLOGY | Facility: HOSPITAL | Age: 34
End: 2019-06-08

## 2019-06-08 ENCOUNTER — HOSPITAL ENCOUNTER (EMERGENCY)
Facility: HOSPITAL | Age: 34
Discharge: HOME | End: 2019-06-08
Admitting: EMERGENCY MEDICINE

## 2019-06-08 VITALS
RESPIRATION RATE: 16 BRPM | BODY MASS INDEX: 33.88 KG/M2 | OXYGEN SATURATION: 99 % | HEART RATE: 75 BPM | WEIGHT: 242 LBS | TEMPERATURE: 98.3 F | SYSTOLIC BLOOD PRESSURE: 166 MMHG | HEIGHT: 71 IN | DIASTOLIC BLOOD PRESSURE: 91 MMHG

## 2019-06-08 DIAGNOSIS — M54.6 ACUTE MIDLINE THORACIC BACK PAIN: Primary | ICD-10-CM

## 2019-06-08 PROCEDURE — 25010000002 PROMETHAZINE PER 50 MG: Performed by: NURSE PRACTITIONER

## 2019-06-08 PROCEDURE — 25010000002 METHYLPREDNISOLONE PER 80 MG: Performed by: NURSE PRACTITIONER

## 2019-06-08 PROCEDURE — 99282 EMERGENCY DEPT VISIT SF MDM: CPT

## 2019-06-08 PROCEDURE — 96372 THER/PROPH/DIAG INJ SC/IM: CPT

## 2019-06-08 PROCEDURE — 72072 X-RAY EXAM THORAC SPINE 3VWS: CPT | Performed by: RADIOLOGY

## 2019-06-08 PROCEDURE — 25010000002 HYDROMORPHONE 1 MG/ML SOLUTION: Performed by: NURSE PRACTITIONER

## 2019-06-08 PROCEDURE — 72072 X-RAY EXAM THORAC SPINE 3VWS: CPT

## 2019-06-08 RX ORDER — METHYLPREDNISOLONE ACETATE 80 MG/ML
120 INJECTION, SUSPENSION INTRA-ARTICULAR; INTRALESIONAL; INTRAMUSCULAR; SOFT TISSUE ONCE
Status: COMPLETED | OUTPATIENT
Start: 2019-06-08 | End: 2019-06-08

## 2019-06-08 RX ORDER — PROMETHAZINE HYDROCHLORIDE 25 MG/ML
12.5 INJECTION, SOLUTION INTRAMUSCULAR; INTRAVENOUS ONCE
Status: COMPLETED | OUTPATIENT
Start: 2019-06-08 | End: 2019-06-08

## 2019-06-08 RX ADMIN — PROMETHAZINE HYDROCHLORIDE 12.5 MG: 25 INJECTION INTRAMUSCULAR; INTRAVENOUS at 18:54

## 2019-06-08 RX ADMIN — HYDROMORPHONE HYDROCHLORIDE 1 MG: 1 INJECTION, SOLUTION INTRAMUSCULAR; INTRAVENOUS; SUBCUTANEOUS at 18:54

## 2019-06-08 RX ADMIN — METHYLPREDNISOLONE ACETATE 120 MG: 80 INJECTION, SUSPENSION INTRA-ARTICULAR; INTRALESIONAL; INTRAMUSCULAR; SOFT TISSUE at 18:54

## 2019-06-08 NOTE — ED NOTES
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 Pt verbalized a history of back issues. Pt is ambulating with a stiff gait. Patient agreed to sit in chair. Patient was made aware no beds were available for him to lay down.     Colt Barrios RN  06/08/19 2245

## 2019-06-08 NOTE — ED PROVIDER NOTES
"Subjective     History provided by:  Patient   used: No    Back Pain   Location:  Thoracic spine  Quality:  Shooting  Radiates to:  Does not radiate  Pain severity:  Moderate  Pain is:  Worse during the day  Onset quality:  Sudden  Duration:  2 days  Timing:  Constant  Progression:  Waxing and waning  Chronicity:  New  Context: not emotional stress, not falling, not jumping from heights, not MCA, not MVA, not occupational injury, not physical stress and not recent illness    Relieved by:  Nothing  Worsened by:  Nothing  Ineffective treatments:  None tried  Associated symptoms: no abdominal pain, no abdominal swelling, no bladder incontinence, no bowel incontinence, no chest pain, no leg pain, no numbness, no paresthesias, no perianal numbness, no tingling and no weakness    Risk factors: no hx of cancer, no hx of osteoporosis, no menopause, not obese, no recent surgery and no steroid use        Review of Systems   Constitutional: Negative.    HENT: Negative.    Eyes: Negative.    Respiratory: Negative.    Cardiovascular: Negative.  Negative for chest pain.   Gastrointestinal: Negative.  Negative for abdominal pain and bowel incontinence.   Endocrine: Negative.    Genitourinary: Negative.  Negative for bladder incontinence.   Musculoskeletal: Positive for back pain.   Skin: Negative.    Allergic/Immunologic: Negative.    Neurological: Negative.  Negative for tingling, weakness, numbness and paresthesias.   Hematological: Negative.    Psychiatric/Behavioral: Negative.        Past Medical History:   Diagnosis Date   • Allergic rhinitis    • Asthma    • Atrial flutter (CMS/HCC)    • Diarrhea    • Disturbance of skin sensation    • Elevated cholesterol    • GERD (gastroesophageal reflux disease)    • Gout    • History of MRI of spine 12/16/2014    Done at [facility]    • Hx of exercise stress test 2014    \"IT WAS NORMAL\"     • Hyperlipidemia    • Hypertension    • Kidney disease     stage 2   • " Lymphadenitis    • Malaise and fatigue    • Osteoarthrosis    • Rash    • Renal disorder    • Sleep apnea     NO CPAP   • Wears glasses     TO READ       Allergies   Allergen Reactions   • Erythromycin Rash       Past Surgical History:   Procedure Laterality Date   • BRAVO PROCEDURE N/A 8/4/2017    Procedure: ESOPHAGOGASTRODUODENOSCOPY AND FULLER;  Surgeon: Efra Suero MD;  Location: [facility] OR;  Service:    • BRAVO PROCEDURE N/A 12/19/2018    Procedure: ESOPHAGOGASTRODUODENOSCOPY AND FULLER;  Surgeon: Efra Suero MD;  Location: [facility] OR;  Service: Gastroenterology   • COLONOSCOPY  2014   • COLONOSCOPY      UNSURE OF DATE   • COLONOSCOPY N/A 5/3/2018    Procedure: COLONOSCOPY WITH COLD BIOPSIES AND COLD POLYPECTOMIES;  Surgeon: Bang Segundo MD;  Location: [facility] ENDOSCOPY;  Service: Gastroenterology   • NISSEN FUNDOPLICATION     • NISSEN FUNDOPLICATION N/A 10/11/2017    Procedure: NISSEN FUNDOPLICATION LAPAROSCOPIC;  Surgeon: Efra Suero MD;  Location: [facility] OR;  Service:    • MT LAP,ESOPHAGOGAST FUNDOPLASTY N/A 10/11/2017    Procedure: HIATAL HERNIA REPAIR LAPAROSCOPIC;  Surgeon: Efra Suero MD;  Location:  [facility] OR;  Service: General   • VASECTOMY         Family History   Problem Relation Age of Onset   • Asthma Other    • Cancer Other    • COPD Other    • Diabetes Other    • Stroke Other    • Asthma Mother    • COPD Mother    • Asthma Father    • Depression Father    • Drug abuse Father    • Arthritis Maternal Grandmother    • Cancer Maternal Grandmother    • Diabetes Maternal Grandmother    • Heart disease Maternal Grandmother    • Hyperlipidemia Maternal Grandmother    • Stroke Paternal Grandfather    • Colon cancer Maternal Grandfather         mid 40s       Social History     Socioeconomic History   • Marital status:      Spouse name: Radha    • Number of children: 2   • Years of education: Not on file   • Highest education level: Not on file   Occupational History   • Occupation:  [employer]   Social Needs   • Financial resource strain: Not very hard   • Food insecurity:     Worry: Never true     Inability: Never true   • Transportation needs:     Medical: No     Non-medical: No   Tobacco Use   • Smoking status: Never Smoker   • Smokeless tobacco: Never Used   Substance and Sexual Activity   • Alcohol use: Yes     Comment: social   • Drug use: No   • Sexual activity: Defer   Lifestyle   • Physical activity:     Days per week: 0 days     Minutes per session: 0 min   • Stress: To some extent           Objective   Physical Exam   Constitutional: He is oriented to person, place, and time. He appears well-developed and well-nourished.   HENT:   Head: Normocephalic.   Right Ear: External ear normal.   Left Ear: External ear normal.   Mouth/Throat: Oropharynx is clear and moist.   Eyes: Conjunctivae and EOM are normal. Pupils are equal, round, and reactive to light.   Neck: Normal range of motion. Neck supple.   Cardiovascular: Normal rate, regular rhythm, normal heart sounds and intact distal pulses.   Pulmonary/Chest: Effort normal and breath sounds normal.   Abdominal: Soft. Bowel sounds are normal.   Musculoskeletal:        Thoracic back: He exhibits decreased range of motion and pain.   Neurological: He is alert and oriented to person, place, and time.   Skin: Skin is warm and dry. Capillary refill takes less than 2 seconds.   Psychiatric: He has a normal mood and affect. His behavior is normal. Thought content normal.   Nursing note and vitals reviewed.      Procedures           ED Course                  MDM      Final diagnoses:   Acute midline thoracic back pain            Star Montenegro, APRN  06/17/19 1518

## 2019-06-12 ENCOUNTER — OFFICE VISIT (OUTPATIENT)
Dept: FAMILY MEDICINE CLINIC | Facility: CLINIC | Age: 34
End: 2019-06-12

## 2019-06-12 VITALS
TEMPERATURE: 98 F | DIASTOLIC BLOOD PRESSURE: 120 MMHG | SYSTOLIC BLOOD PRESSURE: 200 MMHG | OXYGEN SATURATION: 99 % | HEART RATE: 73 BPM | HEIGHT: 71 IN | BODY MASS INDEX: 34.58 KG/M2 | WEIGHT: 247 LBS

## 2019-06-12 DIAGNOSIS — M54.41 CHRONIC RIGHT-SIDED LOW BACK PAIN WITH RIGHT-SIDED SCIATICA: ICD-10-CM

## 2019-06-12 DIAGNOSIS — I10 ESSENTIAL HYPERTENSION: Primary | ICD-10-CM

## 2019-06-12 DIAGNOSIS — F41.9 ANXIETY AND DEPRESSION: ICD-10-CM

## 2019-06-12 DIAGNOSIS — F32.A ANXIETY AND DEPRESSION: ICD-10-CM

## 2019-06-12 DIAGNOSIS — G89.29 CHRONIC RIGHT-SIDED LOW BACK PAIN WITH RIGHT-SIDED SCIATICA: ICD-10-CM

## 2019-06-12 PROCEDURE — 99214 OFFICE O/P EST MOD 30 MIN: CPT | Performed by: NURSE PRACTITIONER

## 2019-06-12 RX ORDER — CLONIDINE HYDROCHLORIDE 0.1 MG/1
0.1 TABLET ORAL 2 TIMES DAILY PRN
Qty: 60 TABLET | Refills: 5 | Status: SHIPPED | OUTPATIENT
Start: 2019-06-12 | End: 2020-01-30

## 2019-06-12 RX ORDER — VALSARTAN AND HYDROCHLOROTHIAZIDE 320; 25 MG/1; MG/1
1 TABLET, FILM COATED ORAL DAILY
Qty: 30 TABLET | Refills: 11 | Status: SHIPPED | OUTPATIENT
Start: 2019-06-12 | End: 2020-01-30

## 2019-06-12 RX ORDER — CITALOPRAM 10 MG/1
10 TABLET ORAL DAILY
Qty: 30 TABLET | Refills: 3 | Status: SHIPPED | OUTPATIENT
Start: 2019-06-12 | End: 2019-07-10 | Stop reason: SDUPTHER

## 2019-06-12 NOTE — ASSESSMENT & PLAN NOTE
Hypertension is worsening.  Dietary sodium restriction.  Weight loss.  Medication changes per orders.  Blood pressure will be reassessed 4-6 weeks .  Pt is to check his bp daily and report any reading over 160/90. Increase Diovan-HCT to 320-25 mg daily. Add clonidine bid prn for bp elevation over 160/90.   Responded well to clonidine today with repeat blood pressure reading 20 minutes after clonidine 0.1 mg 160/84.

## 2019-06-12 NOTE — PROGRESS NOTES
Subjective   Nghia Roach is a 33 y.o. male.     Chief Complaint   Patient presents with   • Hypertension       History of Present Illness:      Uncontrolled hypertension-patient reports that he was seen by pain management last week and had an elevated blood pressure at that time.  He was also seen at the ER last weekend where his blood pressure was mildly elevated and he was instructed to follow-up with primary care.  Patient was at the ER for back pain.  Patient does state that he has been compliant with his Diovan 160-12.5 mg dose.  He usually takes in the evening prior to bed or at his 9 AM break at work.      Patient has been under a lot of stress.  He is worried about his pain clinic referral and his chronic low back pain.  He reports he has had an exacerbation in back pain.  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  He does ride his pain is 8 on a pain scale rating of 0-10 today.  Pain is in his low back and neck.  It is sharp and catching.  Worse with activity.  He does have 25 Norco remaining.  He is scheduled to follow-up with pain management next week.  Patient did have a CT performed yesterday.    The following portions of the patient's history were reviewed and updated as appropriate:  Allergies, current medications, past family history, past medical history, past social history, past surgical history and problem list.    Review of Systems   Constitutional: Positive for fatigue. Negative for appetite change and unexpected weight change.   HENT: Negative for congestion, ear pain, nosebleeds, postnasal drip, rhinorrhea, sore throat, trouble swallowing and voice change.    Eyes: Negative for pain and visual disturbance.   Respiratory: Negative for cough, shortness of breath and wheezing.    Cardiovascular: Negative for chest pain and palpitations.   Gastrointestinal: Negative for abdominal pain, blood in stool,  "constipation and diarrhea.   Endocrine: Negative for cold intolerance and polydipsia.   Genitourinary: Negative for difficulty urinating, flank pain and hematuria.   Musculoskeletal: Positive for arthralgias and back pain. Negative for gait problem, joint swelling and myalgias.   Skin: Negative for color change and rash.   Allergic/Immunologic: Positive for environmental allergies.   Neurological: Negative for syncope, numbness and headaches.   Hematological: Negative.    Psychiatric/Behavioral: Negative for sleep disturbance and suicidal ideas.   All other systems reviewed and are negative.      Objective     BP (!) 200/120 Comment: repeated after clonidine with reading 160/84  Pulse 73   Temp 98 °F (36.7 °C) (Tympanic)   Ht 180.3 cm (71\")   Wt 112 kg (247 lb)   SpO2 99%   BMI 34.45 kg/m²   Admission on 12/19/2018, Discharged on 12/19/2018   Component Date Value Ref Range Status   • Case Report 12/19/2018    Final                    Value:Surgical Pathology Report                         Case: [id number]                                  Authorizing Provider:  Efra Suero MD        Collected:           12/19/2018 08:25 AM          Ordering Location:     [facility]      Received:            12/19/2018 10:11 AM                                 OPERATING ROOM DEPARTMENT                                                    Pathologist:           Marilee Macias MD                                                       Specimens:   1) - Gastric, Antrum, antrum bx                                                                     2) - Esophagus, ESOPHAGUS BX                                                              • Final Diagnosis 12/19/2018    Final                    Value:This result contains rich text formatting which cannot be displayed here.       Physical Exam   Constitutional: He is oriented to person, place, and time. He appears well-developed and well-nourished. No distress. "   Talkative and friendly, no acute distress.  He does appear to be in pain with movement.  He declines a Toradol injection today.   HENT:   Head: Normocephalic and atraumatic.   Right Ear: Tympanic membrane, external ear and ear canal normal.   Left Ear: Tympanic membrane, external ear and ear canal normal.   Nose: Nose normal.   Mouth/Throat: Oropharynx is clear and moist and mucous membranes are normal. No oropharyngeal exudate.   Eyes: Conjunctivae, EOM and lids are normal. Pupils are equal, round, and reactive to light. Right eye exhibits no discharge. Left eye exhibits no discharge.   Neck: Normal range of motion. Neck supple. No tracheal deviation present. No thyromegaly present.   Cardiovascular: Normal rate, regular rhythm, normal heart sounds and intact distal pulses. Exam reveals no gallop and no friction rub.   No murmur heard.  Pulmonary/Chest: Effort normal and breath sounds normal. No respiratory distress. He has no wheezes. He has no rales. He exhibits no tenderness.   Abdominal: Soft. Normal appearance and bowel sounds are normal. He exhibits no distension and no mass. There is no tenderness. There is no rebound and no guarding.   Musculoskeletal: Normal range of motion.        Lumbar back: He exhibits tenderness, pain and spasm.   Decreased lumbar curvature, there is pain with forward flexion. DTR's +2. No edema.    Lymphadenopathy:     He has no cervical adenopathy.   Neurological: He is alert and oriented to person, place, and time. He has normal reflexes.   CN 2-12 grossly intact    Skin: Skin is warm and dry. Capillary refill takes less than 2 seconds. No rash noted. He is not diaphoretic. No erythema. No pallor.   Psychiatric: He has a normal mood and affect. His speech is normal and behavior is normal. Judgment and thought content normal. His affect is not inappropriate. Cognition and memory are normal.   Nursing note and vitals reviewed.      Assessment/Plan     Problem List Items Addressed  This Visit        Cardiovascular and Mediastinum    Essential hypertension - Primary    Overview     Not at goal         Current Assessment & Plan     Hypertension is worsening.  Dietary sodium restriction.  Weight loss.  Medication changes per orders.  Blood pressure will be reassessed 4-6 weeks .  Pt is to check his bp daily and report any reading over 160/90. Increase Diovan-HCT to 320-25 mg daily. Add clonidine bid prn for bp elevation over 160/90.   Responded well to clonidine today with repeat blood pressure reading 20 minutes after clonidine 0.1 mg 160/84.         Relevant Medications    valsartan-hydrochlorothiazide (DIOVAN HCT) 320-25 MG per tablet    CloNIDine (CATAPRES) 0.1 MG tablet       Nervous and Auditory    Chronic low back pain with right-sided sciatica    Current Assessment & Plan     Send for pain clinic notes from last week and CT results from yesterday.   Patient has follow-up with pain clinic next week.  He does have remaining hydrocodone to last him until that time.  If pain clinic does not take over pain management we will further discuss medication such as Ultram or Ultracet to better control his chronic pain.  Patient will follow up in a few weeks after his pain clinic follow-up.            Other    Anxiety and depression    Relevant Medications    citalopram (CELEXA) 10 MG tablet                   Patient's Body mass index is 34.45 kg/m². BMI is above normal parameters. Recommendations include: exercise counseling and nutrition counseling.        I have discussed diagnosis in detail today allowing time for questions and answers. Patient is aware of reasons to seek urgent or emergent medical care as well as reasons to return to the clinic for evaluation. Possible side effects, interactions and progression of symptoms discussed as well. Patient / family states understanding.   Emotional support and active listening provided.     Follow up in one month, sooner if needed. Routine labs every  3-6 months.     Dictated utilizing Dragon dictation        This document has been electronically signed by:  CORDELIA Flores, NP-C

## 2019-06-12 NOTE — ASSESSMENT & PLAN NOTE
Send for pain clinic notes from last week and CT results from yesterday.   Patient has follow-up with pain clinic next week.  He does have remaining hydrocodone to last him until that time.  If pain clinic does not take over pain management we will further discuss medication such as Ultram or Ultracet to better control his chronic pain.  Patient will follow up in a few weeks after his pain clinic follow-up.

## 2019-06-13 ENCOUNTER — TELEPHONE (OUTPATIENT)
Dept: FAMILY MEDICINE CLINIC | Facility: CLINIC | Age: 34
End: 2019-06-13

## 2019-06-13 NOTE — TELEPHONE ENCOUNTER
----- Message from CORDELIA Alexandre sent at 6/12/2019  3:18 PM EDT -----  Get CT results from pain clinic he had yesterday and consult notes.     I called them yesterday and they said they will be ready monday

## 2019-06-21 ENCOUNTER — APPOINTMENT (OUTPATIENT)
Dept: MRI IMAGING | Facility: HOSPITAL | Age: 34
End: 2019-06-21

## 2019-06-27 ENCOUNTER — OFFICE VISIT (OUTPATIENT)
Dept: UROLOGY | Facility: CLINIC | Age: 34
End: 2019-06-27

## 2019-06-27 VITALS
SYSTOLIC BLOOD PRESSURE: 132 MMHG | WEIGHT: 242 LBS | HEIGHT: 71 IN | BODY MASS INDEX: 33.88 KG/M2 | DIASTOLIC BLOOD PRESSURE: 80 MMHG

## 2019-06-27 DIAGNOSIS — N42.9 DISORDER OF PROSTATE: ICD-10-CM

## 2019-06-27 DIAGNOSIS — R79.89 LOW TESTOSTERONE: Primary | ICD-10-CM

## 2019-06-27 LAB
DEPRECATED RDW RBC AUTO: 42.8 FL (ref 37–54)
ERYTHROCYTE [DISTWIDTH] IN BLOOD BY AUTOMATED COUNT: 13 % (ref 12.3–15.4)
HCT VFR BLD AUTO: 49.2 % (ref 37.5–51)
HGB BLD-MCNC: 16.6 G/DL (ref 13–17.7)
MCH RBC QN AUTO: 30.5 PG (ref 26.6–33)
MCHC RBC AUTO-ENTMCNC: 33.7 G/DL (ref 31.5–35.7)
MCV RBC AUTO: 90.4 FL (ref 79–97)
PLATELET # BLD AUTO: 235 10*3/MM3 (ref 140–450)
PMV BLD AUTO: 12.2 FL (ref 6–12)
PSA SERPL-MCNC: 0.21 NG/ML (ref 0–4)
RBC # BLD AUTO: 5.44 10*6/MM3 (ref 4.14–5.8)
TESTOST SERPL-MCNC: 837 NG/DL (ref 249–836)
WBC NRBC COR # BLD: 7.36 10*3/MM3 (ref 3.4–10.8)

## 2019-06-27 PROCEDURE — 99213 OFFICE O/P EST LOW 20 MIN: CPT | Performed by: UROLOGY

## 2019-06-27 PROCEDURE — 84153 ASSAY OF PSA TOTAL: CPT | Performed by: UROLOGY

## 2019-06-27 PROCEDURE — 84403 ASSAY OF TOTAL TESTOSTERONE: CPT | Performed by: UROLOGY

## 2019-06-27 PROCEDURE — 85027 COMPLETE CBC AUTOMATED: CPT | Performed by: UROLOGY

## 2019-06-27 RX ORDER — FLUTICASONE PROPIONATE 44 MCG
AEROSOL WITH ADAPTER (GRAM) INHALATION
Refills: 5 | COMMUNITY
Start: 2019-05-11 | End: 2020-01-30 | Stop reason: SDUPTHER

## 2019-06-27 RX ORDER — HYDROCODONE BITARTRATE AND ACETAMINOPHEN 7.5; 325 MG/1; MG/1
1 TABLET ORAL 2 TIMES DAILY
Refills: 0 | COMMUNITY
Start: 2019-06-18 | End: 2019-12-03

## 2019-06-27 RX ORDER — TESTOSTERONE CYPIONATE 200 MG/ML
INJECTION, SOLUTION INTRAMUSCULAR
Qty: 10 ML | Refills: 2 | Status: SHIPPED | OUTPATIENT
Start: 2019-06-27 | End: 2019-11-14

## 2019-06-27 RX ORDER — AZELASTINE 1 MG/ML
SPRAY, METERED NASAL
Refills: 5 | COMMUNITY
Start: 2019-06-12 | End: 2021-03-29 | Stop reason: SDUPTHER

## 2019-06-27 RX ORDER — NEEDLES, FILTER 19GX1 1/2"
NEEDLE, DISPOSABLE MISCELLANEOUS SEE ADMIN INSTRUCTIONS
Refills: 0 | COMMUNITY
Start: 2019-05-13

## 2019-07-10 ENCOUNTER — OFFICE VISIT (OUTPATIENT)
Dept: FAMILY MEDICINE CLINIC | Facility: CLINIC | Age: 34
End: 2019-07-10

## 2019-07-10 VITALS
BODY MASS INDEX: 34.58 KG/M2 | WEIGHT: 247 LBS | SYSTOLIC BLOOD PRESSURE: 140 MMHG | HEIGHT: 71 IN | DIASTOLIC BLOOD PRESSURE: 90 MMHG | TEMPERATURE: 97.9 F | OXYGEN SATURATION: 99 % | HEART RATE: 65 BPM

## 2019-07-10 DIAGNOSIS — M1A.09X0 IDIOPATHIC CHRONIC GOUT OF MULTIPLE SITES WITHOUT TOPHUS: ICD-10-CM

## 2019-07-10 DIAGNOSIS — J45.42 MODERATE PERSISTENT ASTHMA WITH STATUS ASTHMATICUS: ICD-10-CM

## 2019-07-10 DIAGNOSIS — F41.9 ANXIETY AND DEPRESSION: ICD-10-CM

## 2019-07-10 DIAGNOSIS — F32.A ANXIETY AND DEPRESSION: ICD-10-CM

## 2019-07-10 DIAGNOSIS — J06.9 ACUTE URI: ICD-10-CM

## 2019-07-10 DIAGNOSIS — I10 ESSENTIAL HYPERTENSION: Primary | ICD-10-CM

## 2019-07-10 PROCEDURE — 99214 OFFICE O/P EST MOD 30 MIN: CPT | Performed by: NURSE PRACTITIONER

## 2019-07-10 RX ORDER — ALLOPURINOL 100 MG/1
100 TABLET ORAL 2 TIMES DAILY
Qty: 60 TABLET | Refills: 5 | Status: SHIPPED | OUTPATIENT
Start: 2019-07-10 | End: 2020-01-30 | Stop reason: SDUPTHER

## 2019-07-10 RX ORDER — ALBUTEROL SULFATE 90 UG/1
2 AEROSOL, METERED RESPIRATORY (INHALATION) EVERY 4 HOURS PRN
Qty: 2 INHALER | Refills: 5 | Status: SHIPPED | OUTPATIENT
Start: 2019-07-10 | End: 2020-01-30 | Stop reason: SDUPTHER

## 2019-07-10 RX ORDER — BUDESONIDE AND FORMOTEROL FUMARATE DIHYDRATE 160; 4.5 UG/1; UG/1
2 AEROSOL RESPIRATORY (INHALATION)
Qty: 1 INHALER | Refills: 5 | Status: SHIPPED | OUTPATIENT
Start: 2019-07-10 | End: 2020-01-30 | Stop reason: SDUPTHER

## 2019-07-10 RX ORDER — FLUTICASONE PROPIONATE 50 MCG
2 SPRAY, SUSPENSION (ML) NASAL DAILY
Qty: 1 BOTTLE | Refills: 5 | Status: SHIPPED | OUTPATIENT
Start: 2019-07-10 | End: 2020-01-02

## 2019-07-10 RX ORDER — AMOXICILLIN AND CLAVULANATE POTASSIUM 500; 125 MG/1; MG/1
1 TABLET, FILM COATED ORAL 3 TIMES DAILY
Qty: 30 TABLET | Refills: 0 | Status: SHIPPED | OUTPATIENT
Start: 2019-07-10 | End: 2019-10-23

## 2019-07-10 RX ORDER — CITALOPRAM 10 MG/1
10 TABLET ORAL DAILY
Qty: 30 TABLET | Refills: 3 | Status: SHIPPED | OUTPATIENT
Start: 2019-07-10 | End: 2019-12-08 | Stop reason: SDUPTHER

## 2019-07-10 NOTE — ASSESSMENT & PLAN NOTE
Hypertension is improving with treatment.  Continue current treatment regimen.  Blood pressure will be reassessed in 3 months   Continue home readings, report any reading over 150/90.  Continue valsartan-hctz 320-25 daily and clonidine on an as needed basis.

## 2019-07-10 NOTE — PROGRESS NOTES
Subjective   Nghia Roach is a 33 y.o. male.     Chief Complaint   Patient presents with   • Hypertension       History of Present Illness:    Chronic pain - pt is now under the care of a pain clinic.     Hypertension - pt has been checking his blood pressure with much improved reading following his recent medication adjustment.    Asthma - no recent exacerbation     Sinus pressure - present for past few days, does well with augmentin        The following portions of the patient's history were reviewed and updated as appropriate:  Allergies, current medications, past family history, past medical history, past social history, past surgical history and problem list.    Review of Systems   Constitutional: Negative for appetite change, fatigue and unexpected weight change.   HENT: Positive for sinus pressure and sinus pain. Negative for congestion, ear pain, nosebleeds, postnasal drip, rhinorrhea, sore throat, trouble swallowing and voice change.    Eyes: Negative for pain and visual disturbance.   Respiratory: Negative for cough, shortness of breath and wheezing.    Cardiovascular: Negative for chest pain and palpitations.   Gastrointestinal: Negative for abdominal pain, blood in stool, constipation and diarrhea.   Endocrine: Negative for cold intolerance and polydipsia.   Genitourinary: Negative for difficulty urinating, flank pain and hematuria.   Musculoskeletal: Positive for arthralgias and back pain. Negative for gait problem, joint swelling and myalgias.   Skin: Negative for color change and rash.   Allergic/Immunologic: Positive for environmental allergies.   Neurological: Negative for dizziness, syncope, facial asymmetry, light-headedness, numbness and headaches.   Hematological: Negative.    Psychiatric/Behavioral: Negative for sleep disturbance and suicidal ideas.   All other systems reviewed and are negative.      Objective     /90   Pulse 65   Temp 97.9 °F (36.6 °C) (Tympanic)   Ht 180.3 cm  "(71\")   Wt 112 kg (247 lb)   SpO2 99%   BMI 34.45 kg/m²   Office Visit on 06/27/2019   Component Date Value Ref Range Status   • WBC 06/27/2019 7.36  3.40 - 10.80 10*3/mm3 Final   • RBC 06/27/2019 5.44  4.14 - 5.80 10*6/mm3 Final   • Hemoglobin 06/27/2019 16.6  13.0 - 17.7 g/dL Final   • Hematocrit 06/27/2019 49.2  37.5 - 51.0 % Final   • MCV 06/27/2019 90.4  79.0 - 97.0 fL Final   • MCH 06/27/2019 30.5  26.6 - 33.0 pg Final   • MCHC 06/27/2019 33.7  31.5 - 35.7 g/dL Final   • RDW 06/27/2019 13.0  12.3 - 15.4 % Final   • RDW-SD 06/27/2019 42.8  37.0 - 54.0 fl Final   • MPV 06/27/2019 12.2* 6.0 - 12.0 fL Final   • Platelets 06/27/2019 235  140 - 450 10*3/mm3 Final   • PSA 06/27/2019 0.213  0.000 - 4.000 ng/mL Final   • Testosterone, Total 06/27/2019 837.00* 249.00 - 836.00 ng/dL Final       Physical Exam   Constitutional: He is oriented to person, place, and time. Vital signs are normal. He appears well-developed and well-nourished. No distress.   Talkative and friendly    HENT:   Head: Normocephalic and atraumatic.   Right Ear: Tympanic membrane, external ear and ear canal normal.   Left Ear: Tympanic membrane, external ear and ear canal normal.   Nose: Mucosal edema present. Right sinus exhibits maxillary sinus tenderness and frontal sinus tenderness. Left sinus exhibits maxillary sinus tenderness and frontal sinus tenderness.   Mouth/Throat: Oropharynx is clear and moist and mucous membranes are normal.   Eyes: Conjunctivae and EOM are normal. Pupils are equal, round, and reactive to light. Right eye exhibits no discharge. Left eye exhibits no discharge.   Neck: Normal range of motion. Neck supple.   Cardiovascular: Normal rate, regular rhythm, normal heart sounds and intact distal pulses.   No murmur heard.  Pulmonary/Chest: Effort normal and breath sounds normal. No respiratory distress. He has no wheezes. He has no rales. He exhibits no tenderness.   Abdominal: Soft. Normal appearance and bowel sounds are " normal. He exhibits no distension and no mass. There is no tenderness. There is no rebound and no guarding.   Musculoskeletal:        Lumbar back: He exhibits decreased range of motion, tenderness, pain and spasm.   Decreased lumbar curvature, there is pain with forward flexion. DTR's +2. No edema.    Lymphadenopathy:     He has no cervical adenopathy.   Neurological: He is alert and oriented to person, place, and time. He has normal reflexes.   Skin: Skin is warm and dry. Capillary refill takes less than 2 seconds. No rash noted. He is not diaphoretic. No erythema. No pallor.   Psychiatric: He has a normal mood and affect. His speech is normal and behavior is normal. Judgment and thought content normal. His affect is not inappropriate. Cognition and memory are normal.   Nursing note and vitals reviewed.      Assessment/Plan     Problem List Items Addressed This Visit        Cardiovascular and Mediastinum    Essential hypertension - Primary    Overview     Improved          Current Assessment & Plan     Hypertension is improving with treatment.  Continue current treatment regimen.  Blood pressure will be reassessed in 3 months   Continue home readings, report any reading over 150/90.  Continue valsartan-hctz 320-25 daily and clonidine on an as needed basis.               Respiratory    Moderate persistent asthma with status asthmaticus    Overview     Added automatically from request for surgery [id number]         Relevant Medications    fluticasone (FLONASE) 50 MCG/ACT nasal spray    budesonide-formoterol (SYMBICORT) 160-4.5 MCG/ACT inhaler    albuterol sulfate  (90 Base) MCG/ACT inhaler       Musculoskeletal and Integument    Chronic gout of multiple sites    Relevant Medications    allopurinol (ZYLOPRIM) 100 MG tablet       Other    Anxiety and depression    Relevant Medications    citalopram (CELEXA) 10 MG tablet      Other Visit Diagnoses     Acute URI        Relevant Medications    amoxicillin-clavulanate  (AUGMENTIN) 500-125 MG per tablet        Fluids, rest, symptomatic treatment advised. Steam therapy. Dispose of tooth bush after 24 hours of starting antibiotics if ordered. Complete antibiotics as ordered.  Discussed possible side effects/interactions of medication. Discussed symptoms to report as well as reasons to seek urgent or emergent medical attention. Understanding stated.   Recommend follow up in 2-3 days if not improved, sooner if needed.     Proper body mechanics has been reviewed and discussed today.            Patient's Body mass index is 34.45 kg/m². BMI is above normal parameters. Recommendations include: exercise counseling and nutrition counseling.          I have discussed diagnosis in detail today allowing time for questions and answers. Patient is aware of reasons to seek urgent or emergent medical care as well as reasons to return to the clinic for evaluation. Possible side effects, interactions and progression of symptoms discussed as well. Patient / family states understanding.   Emotional support and active listening provided.       Follow up in 3 months. Routine labs fasting one week prior to next office visit. Return sooner if needed.     Dictated utilizing Dragon dictation          This document has been electronically signed by:  CORDELIA Flores, NP-C

## 2019-07-11 ENCOUNTER — PRIOR AUTHORIZATION (OUTPATIENT)
Dept: UROLOGY | Facility: CLINIC | Age: 34
End: 2019-07-11

## 2019-09-06 DIAGNOSIS — R53.83 OTHER FATIGUE: ICD-10-CM

## 2019-09-06 RX ORDER — TADALAFIL 5 MG/1
5 TABLET ORAL DAILY
Qty: 30 TABLET | Refills: 0 | Status: SHIPPED | OUTPATIENT
Start: 2019-09-06 | End: 2019-10-03 | Stop reason: SDUPTHER

## 2019-09-09 ENCOUNTER — OFFICE VISIT (OUTPATIENT)
Dept: UROLOGY | Facility: CLINIC | Age: 34
End: 2019-09-09

## 2019-09-09 VITALS — WEIGHT: 248 LBS | HEIGHT: 71 IN | BODY MASS INDEX: 34.72 KG/M2

## 2019-09-09 DIAGNOSIS — N48.30 PRIAPISM: Primary | ICD-10-CM

## 2019-09-09 DIAGNOSIS — N48.33 PRIAPISM, DRUG-INDUCED: ICD-10-CM

## 2019-09-09 PROCEDURE — 54235 NJX CORPORA CAVERNOSA RX AGT: CPT | Performed by: UROLOGY

## 2019-09-09 PROCEDURE — 99213 OFFICE O/P EST LOW 20 MIN: CPT | Performed by: UROLOGY

## 2019-09-09 NOTE — PROGRESS NOTES
"Chief Complaint:          Chief Complaint   Patient presents with   • Penis Pain       HPI:   34 y.o. male who presents with a priapism.  This is been present after penile injection happens every now that he ran out of phenylephrine rescue at home I am going to reduce it today.  He was given 0.25 cc of phenylephrine solution and is an intracorporeal injection produced a prompt detumescence he tolerated it well I will order him another supply for use at home      Past Medical History:        Past Medical History:   Diagnosis Date   • Allergic rhinitis    • Asthma    • Atrial flutter (CMS/HCC)    • Diarrhea    • Disturbance of skin sensation    • Elevated cholesterol    • GERD (gastroesophageal reflux disease)    • Gout    • History of MRI of spine 12/16/2014    Done at [facility]    • Hx of exercise stress test 2014    \"IT WAS NORMAL\"     • Hyperlipidemia    • Hypertension    • Kidney disease     stage 2   • Lymphadenitis    • Malaise and fatigue    • Osteoarthrosis    • Rash    • Renal disorder    • Sleep apnea     NO CPAP   • Wears glasses     TO READ         Current Meds:     Current Outpatient Medications   Medication Sig Dispense Refill   • albuterol sulfate  (90 Base) MCG/ACT inhaler Inhale 2 puffs Every 4 (Four) Hours As Needed for Shortness of Air. 2 inhaler 5   • allopurinol (ZYLOPRIM) 100 MG tablet Take 1 tablet by mouth 2 (Two) Times a Day. 60 tablet 5   • azelastine (ASTELIN) 0.1 % nasal spray INSTILL 2 SPRAYS INTO EACH NOSTRIL BID  5   • B-D INTEGRA SYRINGE 25G X 5/8\" 3 ML misc See Admin Instructions.  0   • budesonide-formoterol (SYMBICORT) 160-4.5 MCG/ACT inhaler Inhale 2 puffs 2 (Two) Times a Day. 1 inhaler 5   • citalopram (CELEXA) 10 MG tablet Take 1 tablet by mouth Daily. 30 tablet 3   • CloNIDine (CATAPRES) 0.1 MG tablet Take 1 tablet by mouth 2 (Two) Times a Day As Needed for High Blood Pressure (BP higher than 160/90). 60 tablet 5   • clotrimazole-betamethasone (LOTRISONE) 1-0.05 % " "cream Apply  topically to the appropriate area as directed Every 12 (Twelve) Hours. 45 g 2   • diclofenac (VOLTAREN) 1 % gel gel Apply 4 g topically to the appropriate area as directed 4 (Four) Times a Day As Needed (joint pain). 4 tube 5   • dicyclomine (BENTYL) 20 MG tablet Take 2 tablets by mouth 4 (Four) Times a Day. (Patient taking differently: Take 20 mg by mouth Every Night.) 120 tablet 5   • fexofenadine (ALLEGRA) 180 MG tablet Take 1 tablet by mouth Daily. 30 tablet 5   • FLOVENT HFA 44 MCG/ACT inhaler INHALE 1 PUFF D  5   • fluticasone (FLONASE) 50 MCG/ACT nasal spray 2 sprays into the nostril(s) as directed by provider Daily. Administer 2 sprays in each nostril for each dose. 1 bottle 5   • HYDROcodone-acetaminophen (NORCO) 7.5-325 MG per tablet Take 1 tablet by mouth 2 (Two) Times a Day.  0   • Insulin Syringe 31G X 5/16\" 1 ML misc Inject into penis 30 each 5   • methocarbamol (ROBAXIN) 750 MG tablet Take 1 tablet by mouth 4 (Four) Times a Day As Needed for Muscle Spasms. Take on days he works in place of flexeril 120 tablet 5   • montelukast (SINGULAIR) 10 MG tablet Take 1 tablet by mouth Every Night. 30 tablet 5   • sodium chloride (OCEAN NASAL SPRAY) 0.65 % nasal spray 1 spray into each nostril As Needed for Congestion. 1 each 3   • Syringe 25G X 5/8\" 3 ML misc Use as directed 2 x weekly 24 each 3   • tadalafil (CIALIS) 5 MG tablet TAKE 1 TABLET BY MOUTH DAILY 30 tablet 0   • Testosterone Cypionate (DEPO-TESTOSTERONE) 200 MG/ML injection Inject 0.5 mL into the appropriate muscle as directed by prescriber 2 (Two) Times a Week. He is to use 1/2 cc every Monday and Thursday SQ 10 mL 2   • valsartan-hydrochlorothiazide (DIOVAN HCT) 320-25 MG per tablet Take 1 tablet by mouth Daily. 30 tablet 11   • amoxicillin-clavulanate (AUGMENTIN) 500-125 MG per tablet Take 1 tablet by mouth 3 (Three) Times a Day. 30 tablet 0   • Syringe, Disposable, 3 ML misc Use 3 ml syringe with a 25 gauge  5/8 inch needle 24 each 6 "   • Testosterone Cypionate (DEPO-TESTOSTERONE) 200 MG/ML injection Inject 1/2 cc subcutaneously every Monday and Thursday 10 mL 2     Current Facility-Administered Medications   Medication Dose Route Frequency Provider Last Rate Last Dose   • CloNIDine (CATAPRES) tablet 0.1 mg  0.1 mg Oral Once Estela Louis Alma Delia, CORDELIA            Allergies:      Allergies   Allergen Reactions   • Erythromycin Rash        Past Surgical History:     Past Surgical History:   Procedure Laterality Date   • BRAVO PROCEDURE N/A 8/4/2017    Procedure: ESOPHAGOGASTRODUODENOSCOPY AND FULLER;  Surgeon: Efra Suero MD;  Location: [facility] OR;  Service:    • BRAVO PROCEDURE N/A 12/19/2018    Procedure: ESOPHAGOGASTRODUODENOSCOPY AND FULLER;  Surgeon: Efra Suero MD;  Location: [facility] OR;  Service: Gastroenterology   • COLONOSCOPY  2014   • COLONOSCOPY      UNSURE OF DATE   • COLONOSCOPY N/A 5/3/2018    Procedure: COLONOSCOPY WITH COLD BIOPSIES AND COLD POLYPECTOMIES;  Surgeon: Bang Segundo MD;  Location: [facility] ENDOSCOPY;  Service: Gastroenterology   • NISSEN FUNDOPLICATION     • NISSEN FUNDOPLICATION N/A 10/11/2017    Procedure: NISSEN FUNDOPLICATION LAPAROSCOPIC;  Surgeon: Efra Suero MD;  Location: [facility] OR;  Service:    • PA LAP,ESOPHAGOGAST FUNDOPLASTY N/A 10/11/2017    Procedure: HIATAL HERNIA REPAIR LAPAROSCOPIC;  Surgeon: Efra Suero MD;  Location: [facility] OR;  Service: General   • VASECTOMY           Social History:     Social History     Socioeconomic History   • Marital status:      Spouse name: Radha    • Number of children: 2   • Years of education: Not on file   • Highest education level: Not on file   Occupational History   • Occupation: [employer]   Social Needs   • Financial resource strain: Not very hard   • Food insecurity:     Worry: Never true     Inability: Never true   • Transportation needs:     Medical: No     Non-medical: No   Tobacco Use   • Smoking status: Never Smoker   • Smokeless  tobacco: Never Used   Substance and Sexual Activity   • Alcohol use: Yes     Comment: social   • Drug use: No   • Sexual activity: Defer   Lifestyle   • Physical activity:     Days per week: 0 days     Minutes per session: 0 min   • Stress: To some extent       Family History:     Family History   Problem Relation Age of Onset   • Asthma Other    • Cancer Other    • COPD Other    • Diabetes Other    • Stroke Other    • Asthma Mother    • COPD Mother    • Asthma Father    • Depression Father    • Drug abuse Father    • Arthritis Maternal Grandmother    • Cancer Maternal Grandmother    • Diabetes Maternal Grandmother    • Heart disease Maternal Grandmother    • Hyperlipidemia Maternal Grandmother    • Stroke Paternal Grandfather    • Colon cancer Maternal Grandfather         mid 40s       Review of Systems:     Review of Systems   Constitutional: Negative.    HENT: Negative.    Eyes: Negative.    Respiratory: Negative.    Cardiovascular: Negative.    Gastrointestinal: Negative.    Endocrine: Negative.    Genitourinary: Positive for penile pain.   Musculoskeletal: Negative.    Allergic/Immunologic: Negative.    Neurological: Negative.    Hematological: Negative.    Psychiatric/Behavioral: Negative.        Physical Exam:     Physical Exam   Constitutional: He is oriented to person, place, and time. He appears well-developed and well-nourished.   HENT:   Head: Normocephalic and atraumatic.   Eyes: Conjunctivae and EOM are normal. Pupils are equal, round, and reactive to light.   Neck: Normal range of motion.   Cardiovascular: Normal rate, regular rhythm, normal heart sounds and intact distal pulses.   Pulmonary/Chest: Effort normal and breath sounds normal.   Abdominal: Soft. Bowel sounds are normal.   Genitourinary:   Genitourinary Comments: Priaptic uncircumcised phallus   Musculoskeletal: Normal range of motion.   Neurological: He is alert and oriented to person, place, and time. He has normal reflexes.   Skin: Skin  is warm and dry.   Psychiatric: He has a normal mood and affect. His behavior is normal. Judgment and thought content normal.   Nursing note and vitals reviewed.      I have reviewed the following portions of the patient's history: allergies, current medications, past family history, past medical history, past social history, past surgical history, problem list and ROS and confirm it's accurate.      Procedure:   Prepped and draped in sterile fashion 0.25 cc of phenylephrine solution was injected in the right corporal body without complication.  It produced a prompt detumescence    Assessment/Plan:   Priapism-status post reduction            Patient's Body mass index is 34.45 kg/m². BMI is above normal parameters. Recommendations include: educational material.              This document has been electronically signed by STEPHEN BANGURA MD September 9, 2019 3:00 PM

## 2019-09-10 PROBLEM — N48.33 PRIAPISM, DRUG-INDUCED: Status: ACTIVE | Noted: 2019-09-10

## 2019-09-27 ENCOUNTER — HOSPITAL ENCOUNTER (EMERGENCY)
Dept: HOSPITAL 94 - ER | Age: 34
Discharge: HOME | End: 2019-09-27
Payer: MEDICAID

## 2019-09-27 VITALS — HEIGHT: 73 IN | BODY MASS INDEX: 26.56 KG/M2 | WEIGHT: 200.42 LBS

## 2019-09-27 VITALS — DIASTOLIC BLOOD PRESSURE: 79 MMHG | SYSTOLIC BLOOD PRESSURE: 123 MMHG
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
## 2019-09-27 DIAGNOSIS — F17.210: ICD-10-CM
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
## 2019-09-27 DIAGNOSIS — T24.202A: Primary | ICD-10-CM
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
## 2019-09-27 DIAGNOSIS — F15.90: ICD-10-CM
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
## 2019-09-27 DIAGNOSIS — T22.112A: ICD-10-CM

## 2019-09-27 PROCEDURE — 99284 EMERGENCY DEPT VISIT MOD MDM: CPT

## 2019-09-27 PROCEDURE — 16020 DRESS/DEBRID P-THICK BURN S: CPT

## 2019-10-03 DIAGNOSIS — R53.83 OTHER FATIGUE: ICD-10-CM

## 2019-10-03 RX ORDER — TADALAFIL 5 MG/1
5 TABLET ORAL DAILY
Qty: 30 TABLET | Refills: 0 | Status: SHIPPED | OUTPATIENT
Start: 2019-10-03 | End: 2019-10-30 | Stop reason: SDUPTHER

## 2019-10-23 ENCOUNTER — OFFICE VISIT (OUTPATIENT)
Dept: FAMILY MEDICINE CLINIC | Facility: CLINIC | Age: 34
End: 2019-10-23

## 2019-10-23 VITALS
RESPIRATION RATE: 16 BRPM | HEART RATE: 76 BPM | HEIGHT: 71 IN | TEMPERATURE: 98.3 F | WEIGHT: 247 LBS | BODY MASS INDEX: 34.58 KG/M2 | DIASTOLIC BLOOD PRESSURE: 70 MMHG | SYSTOLIC BLOOD PRESSURE: 130 MMHG | OXYGEN SATURATION: 98 %

## 2019-10-23 DIAGNOSIS — R21 RASH: Primary | ICD-10-CM

## 2019-10-23 PROCEDURE — 96372 THER/PROPH/DIAG INJ SC/IM: CPT | Performed by: NURSE PRACTITIONER

## 2019-10-23 PROCEDURE — 99214 OFFICE O/P EST MOD 30 MIN: CPT | Performed by: NURSE PRACTITIONER

## 2019-10-23 RX ORDER — FAMOTIDINE 20 MG/1
20 TABLET, FILM COATED ORAL 2 TIMES DAILY
Qty: 40 TABLET | Refills: 0 | Status: SHIPPED | OUTPATIENT
Start: 2019-10-23 | End: 2019-11-14

## 2019-10-23 RX ORDER — METHYLPREDNISOLONE 4 MG/1
TABLET ORAL
Qty: 21 TABLET | Refills: 0 | Status: SHIPPED | OUTPATIENT
Start: 2019-10-23 | End: 2019-12-03

## 2019-10-23 RX ORDER — CETIRIZINE HYDROCHLORIDE 10 MG/1
10 TABLET ORAL NIGHTLY PRN
Qty: 30 TABLET | Refills: 0 | Status: SHIPPED | OUTPATIENT
Start: 2019-10-23 | End: 2020-01-30 | Stop reason: SDUPTHER

## 2019-10-23 RX ORDER — METHYLPREDNISOLONE ACETATE 80 MG/ML
80 INJECTION, SUSPENSION INTRA-ARTICULAR; INTRALESIONAL; INTRAMUSCULAR; SOFT TISSUE ONCE
Status: COMPLETED | OUTPATIENT
Start: 2019-10-23 | End: 2019-10-23

## 2019-10-23 RX ORDER — HYDROXYZINE HYDROCHLORIDE 25 MG/1
25 TABLET, FILM COATED ORAL NIGHTLY PRN
Qty: 30 TABLET | Refills: 0 | Status: SHIPPED | OUTPATIENT
Start: 2019-10-23 | End: 2019-11-22

## 2019-10-23 RX ADMIN — METHYLPREDNISOLONE ACETATE 80 MG: 80 INJECTION, SUSPENSION INTRA-ARTICULAR; INTRALESIONAL; INTRAMUSCULAR; SOFT TISSUE at 15:55

## 2019-10-23 NOTE — PROGRESS NOTES
"  Nghia Roach is a 34 y.o. male who presents to the clinic today c/o a rash  which started more than a month ago.  Associated  symptoms include erythematous, itchy rash which first started on his right arm but has now spread to left arm, trunk, and neck. He has  tried an antihistamine and otc anti itch cream  without adequate relief. He is unsure of any new irritants that could be the cause.      Rash   This is a new problem. The current episode started more than 1 month ago. The problem has been gradually worsening since onset. The rash is diffuse (First patch right arm). The rash is characterized by redness and itchiness. It is unknown if there was an exposure to a precipitant. Pertinent negatives include no anorexia, congestion, cough, facial edema, fatigue, fever, joint pain, rhinorrhea, shortness of breath, sore throat or vomiting. Past treatments include anti-itch cream and antihistamine. The treatment provided no relief. His past medical history is significant for allergies, asthma and eczema.    Refer to ROS for additional information.    The following portions of the patient's history were reviewed and updated as appropriate: allergies, current medications, past family history, past medical history, past social history, past surgical history and problem list.    Current Outpatient Medications:   •  albuterol sulfate  (90 Base) MCG/ACT inhaler, Inhale 2 puffs Every 4 (Four) Hours As Needed for Shortness of Air., Disp: 2 inhaler, Rfl: 5  •  allopurinol (ZYLOPRIM) 100 MG tablet, Take 1 tablet by mouth 2 (Two) Times a Day., Disp: 60 tablet, Rfl: 5  •  azelastine (ASTELIN) 0.1 % nasal spray, INSTILL 2 SPRAYS INTO EACH NOSTRIL BID, Disp: , Rfl: 5  •  B-D INTEGRA SYRINGE 25G X 5/8\" 3 ML misc, See Admin Instructions., Disp: , Rfl: 0  •  budesonide-formoterol (SYMBICORT) 160-4.5 MCG/ACT inhaler, Inhale 2 puffs 2 (Two) Times a Day., Disp: 1 inhaler, Rfl: 5  •  citalopram (CELEXA) 10 MG tablet, Take 1 " "tablet by mouth Daily., Disp: 30 tablet, Rfl: 3  •  CloNIDine (CATAPRES) 0.1 MG tablet, Take 1 tablet by mouth 2 (Two) Times a Day As Needed for High Blood Pressure (BP higher than 160/90)., Disp: 60 tablet, Rfl: 5  •  clotrimazole-betamethasone (LOTRISONE) 1-0.05 % cream, Apply  topically to the appropriate area as directed Every 12 (Twelve) Hours., Disp: 45 g, Rfl: 2  •  diclofenac (VOLTAREN) 1 % gel gel, Apply 4 g topically to the appropriate area as directed 4 (Four) Times a Day As Needed (joint pain)., Disp: 4 tube, Rfl: 5  •  dicyclomine (BENTYL) 20 MG tablet, Take 2 tablets by mouth 4 (Four) Times a Day. (Patient taking differently: Take 20 mg by mouth Every Night.), Disp: 120 tablet, Rfl: 5  •  FLOVENT HFA 44 MCG/ACT inhaler, INHALE 1 PUFF D, Disp: , Rfl: 5  •  fluticasone (FLONASE) 50 MCG/ACT nasal spray, 2 sprays into the nostril(s) as directed by provider Daily. Administer 2 sprays in each nostril for each dose., Disp: 1 bottle, Rfl: 5  •  Insulin Syringe 31G X 5/16\" 1 ML misc, Inject into penis, Disp: 30 each, Rfl: 5  •  methocarbamol (ROBAXIN) 750 MG tablet, Take 1 tablet by mouth 4 (Four) Times a Day As Needed for Muscle Spasms. Take on days he works in place of flexeril, Disp: 120 tablet, Rfl: 5  •  montelukast (SINGULAIR) 10 MG tablet, Take 1 tablet by mouth Every Night., Disp: 30 tablet, Rfl: 5  •  sodium chloride (OCEAN NASAL SPRAY) 0.65 % nasal spray, 1 spray into each nostril As Needed for Congestion., Disp: 1 each, Rfl: 3  •  Syringe 25G X 5/8\" 3 ML misc, Use as directed 2 x weekly, Disp: 24 each, Rfl: 3  •  Syringe, Disposable, 3 ML misc, Use 3 ml syringe with a 25 gauge  5/8 inch needle, Disp: 24 each, Rfl: 6  •  tadalafil (CIALIS) 5 MG tablet, TAKE 1 TABLET BY MOUTH DAILY, Disp: 30 tablet, Rfl: 0  •  Testosterone Cypionate (DEPO-TESTOSTERONE) 200 MG/ML injection, Inject 0.5 mL into the appropriate muscle as directed by prescriber 2 (Two) Times a Week. He is to use 1/2 cc every Monday and " "Thursday SQ, Disp: 10 mL, Rfl: 2  •  Testosterone Cypionate (DEPO-TESTOSTERONE) 200 MG/ML injection, Inject 1/2 cc subcutaneously every Monday and Thursday, Disp: 10 mL, Rfl: 2  •  valsartan-hydrochlorothiazide (DIOVAN HCT) 320-25 MG per tablet, Take 1 tablet by mouth Daily., Disp: 30 tablet, Rfl: 11  •  cetirizine (zyrTEC) 10 MG tablet, Take 1 tablet by mouth At Night As Needed for Allergies., Disp: 30 tablet, Rfl: 0  •  famotidine (PEPCID) 20 MG tablet, Take 1 tablet by mouth 2 (Two) Times a Day., Disp: 40 tablet, Rfl: 0  •  HYDROcodone-acetaminophen (NORCO) 7.5-325 MG per tablet, Take 1 tablet by mouth 2 (Two) Times a Day., Disp: , Rfl: 0  •  hydrOXYzine (ATARAX) 25 MG tablet, Take 1 tablet by mouth At Night As Needed for Itching for up to 30 days., Disp: 30 tablet, Rfl: 0  •  methylPREDNISolone (MEDROL, JOSEF,) 4 MG tablet, Take as directed on package instructions., Disp: 21 tablet, Rfl: 0    Current Facility-Administered Medications:   •  CloNIDine (CATAPRES) tablet 0.1 mg, 0.1 mg, Oral, Once, Estela Louis APRN    Allergies   Allergen Reactions   • Erythromycin Rash       Review of Systems   Constitutional: Negative for activity change, appetite change, chills, fatigue and fever.   HENT: Negative for congestion, rhinorrhea, sinus pressure and sore throat.    Eyes: Negative for discharge and redness.   Respiratory: Negative for cough, shortness of breath and wheezing.    Cardiovascular: Negative for leg swelling.   Gastrointestinal: Negative for anorexia, nausea and vomiting.   Musculoskeletal: Negative for joint pain.   Skin: Positive for color change and rash.   Neurological: Negative for dizziness, light-headedness and headaches.   Hematological: Negative for adenopathy.   Psychiatric/Behavioral: Positive for sleep disturbance.     Visit Vitals  /70   Pulse 76   Temp 98.3 °F (36.8 °C) (Temporal)   Resp 16   Ht 180.3 cm (71\")   Wt 112 kg (247 lb)   SpO2 98%   BMI 34.45 kg/m²     Physical " Exam   Constitutional: He is oriented to person, place, and time. He appears well-developed and well-nourished. No distress.   HENT:   Head: Normocephalic.   Right Ear: Tympanic membrane and ear canal normal.   Left Ear: Tympanic membrane and ear canal normal.   Nose: Nose normal.   Mouth/Throat: Oropharynx is clear and moist. No oropharyngeal exudate.   Eyes: Conjunctivae and EOM are normal. Pupils are equal, round, and reactive to light. Right eye exhibits no discharge. Left eye exhibits no discharge. No scleral icterus.   Neck: Neck supple. No tracheal tenderness present.   Cardiovascular: Normal rate, regular rhythm and normal heart sounds. Exam reveals no friction rub.   No murmur heard.  Pulmonary/Chest: Effort normal and breath sounds normal. No respiratory distress. He has no wheezes. He has no rales.   Lymphadenopathy:     He has no cervical adenopathy.   Neurological: He is alert and oriented to person, place, and time.   Skin: Skin is warm and dry. Capillary refill takes less than 2 seconds. Rash noted. There is erythema.   Diffused erythematous rash which appears in patches along his arms, abdomen and back. Rubbing the areas worsens the rash appearance   Psychiatric: He has a normal mood and affect. His speech is normal and behavior is normal. Judgment and thought content normal. Cognition and memory are normal.   Vitals reviewed.    Assessment/Plan   Diagnoses and all orders for this visit:    Rash  -     methylPREDNISolone acetate (DEPO-medrol) injection 80 mg  -     methylPREDNISolone (MEDROL, JOSEF,) 4 MG tablet; Take as directed on package instructions.  -     famotidine (PEPCID) 20 MG tablet; Take 1 tablet by mouth 2 (Two) Times a Day.  -     cetirizine (zyrTEC) 10 MG tablet; Take 1 tablet by mouth At Night As Needed for Allergies.  -     hydrOXYzine (ATARAX) 25 MG tablet; Take 1 tablet by mouth At Night As Needed for Itching for up to 30 days.      Findings and recommendations discussed with Nghia.  Treatment options reviewed. Counseled regarding supportive care measures. Encouraged him to seek further medical evaluation if symptoms worsen or do not improve within 48-72 hours.       This document has been electronically signed by CORDELIA Kelley, JAIMIE-BC, HARESH  October 23, 2019 5:10 PM

## 2019-10-23 NOTE — PATIENT INSTRUCTIONS
Rash    A rash is a change in the color of the skin. A rash can also change the way your skin feels. There are many different conditions and factors that can cause a rash.  Follow these instructions at home:  Pay attention to any changes in your symptoms. Follow these instructions to help with your condition:  Medicine  Take or apply over-the-counter and prescription medicines only as told by your doctor. These may include:  · Corticosteroid cream.  · Anti-itch lotions.  · Oral antihistamines.  Skin Care  · Put cool compresses on the affected areas.  · Try taking a bath with:  ? Epsom salts. Follow the instructions on the packaging. You can get these at your local pharmacy or grocery store.  ? Baking soda. Pour a small amount into the bath as told by your doctor.  ? Colloidal oatmeal. Follow the instructions on the packaging. You can get this at your local pharmacy or grocery store.  · Try putting baking soda paste onto your skin. Stir water into baking soda until it gets like a paste.  · Do not scratch or rub your skin.  · Avoid covering the rash. Make sure the rash is exposed to air as much as possible.  General instructions  · Avoid hot showers or baths, which can make itching worse. A cold shower may help.  · Avoid scented soaps, detergents, and perfumes. Use gentle soaps, detergents, perfumes, and other cosmetic products.  · Avoid anything that causes your rash. Keep a journal to help track what causes your rash. Write down:  ? What you eat.  ? What cosmetic products you use.  ? What you drink.  ? What you wear. This includes jewelry.  · Keep all follow-up visits as told by your doctor. This is important.  Contact a doctor if:  · You sweat at night.  · You lose weight.  · You pee (urinate) more than normal.  · You feel weak.  · You throw up (vomit).  · Your skin or the whites of your eyes look yellow (jaundice).  · Your skin:  ? Tingles.  ? Is numb.  · Your rash:  ? Does not go away after a few days.  ? Gets  worse.  · You are:  ? More thirsty than normal.  ? More tired than normal.  · You have:  ? New symptoms.  ? Pain in your belly (abdomen).  ? A fever.  ? Watery poop (diarrhea).  Get help right away if:  · Your rash covers all or most of your body. The rash may or may not be painful.  · You have blisters that:  ? Are on top of the rash.  ? Grow larger.  ? Grow together.  ? Are painful.  ? Are inside your nose or mouth.  · You have a rash that:  ? Looks like purple pinprick-sized spots all over your body.  ? Has a “bull's eye” or looks like a target.  ? Is red and painful, causes your skin to peel, and is not from being in the sun too long.  This information is not intended to replace advice given to you by your health care provider. Make sure you discuss any questions you have with your health care provider.  Document Released: 06/05/2009 Document Revised: 01/11/2019 Document Reviewed: 05/04/2016  Elsevier Interactive Patient Education © 2019 [employer]

## 2019-10-30 DIAGNOSIS — R53.83 OTHER FATIGUE: ICD-10-CM

## 2019-10-30 RX ORDER — TADALAFIL 5 MG/1
5 TABLET ORAL DAILY
Qty: 30 TABLET | Refills: 0 | Status: SHIPPED | OUTPATIENT
Start: 2019-10-30 | End: 2019-11-26 | Stop reason: SDUPTHER

## 2019-11-08 DIAGNOSIS — R79.89 LOW TESTOSTERONE: ICD-10-CM

## 2019-11-12 DIAGNOSIS — R21 RASH: ICD-10-CM

## 2019-11-14 ENCOUNTER — OFFICE VISIT (OUTPATIENT)
Dept: FAMILY MEDICINE CLINIC | Facility: CLINIC | Age: 34
End: 2019-11-14

## 2019-11-14 VITALS
TEMPERATURE: 98.1 F | HEART RATE: 75 BPM | WEIGHT: 244 LBS | DIASTOLIC BLOOD PRESSURE: 80 MMHG | HEIGHT: 71 IN | SYSTOLIC BLOOD PRESSURE: 160 MMHG | OXYGEN SATURATION: 98 % | BODY MASS INDEX: 34.16 KG/M2

## 2019-11-14 DIAGNOSIS — J45.42 MODERATE PERSISTENT ASTHMA WITH STATUS ASTHMATICUS: ICD-10-CM

## 2019-11-14 DIAGNOSIS — K21.00 GASTROESOPHAGEAL REFLUX DISEASE WITH ESOPHAGITIS: ICD-10-CM

## 2019-11-14 DIAGNOSIS — G89.29 CHRONIC RIGHT-SIDED LOW BACK PAIN WITH RIGHT-SIDED SCIATICA: Primary | ICD-10-CM

## 2019-11-14 DIAGNOSIS — M54.41 CHRONIC RIGHT-SIDED LOW BACK PAIN WITH RIGHT-SIDED SCIATICA: Primary | ICD-10-CM

## 2019-11-14 DIAGNOSIS — Z23 ENCOUNTER FOR VACCINATION: ICD-10-CM

## 2019-11-14 PROBLEM — E66.811 CLASS 1 OBESITY WITH SERIOUS COMORBIDITY AND BODY MASS INDEX (BMI) OF 34.0 TO 34.9 IN ADULT: Status: ACTIVE | Noted: 2019-11-14

## 2019-11-14 PROBLEM — E66.9 CLASS 1 OBESITY WITH SERIOUS COMORBIDITY AND BODY MASS INDEX (BMI) OF 34.0 TO 34.9 IN ADULT: Status: ACTIVE | Noted: 2019-11-14

## 2019-11-14 PROCEDURE — 90471 IMMUNIZATION ADMIN: CPT | Performed by: NURSE PRACTITIONER

## 2019-11-14 PROCEDURE — 96372 THER/PROPH/DIAG INJ SC/IM: CPT | Performed by: NURSE PRACTITIONER

## 2019-11-14 PROCEDURE — 99214 OFFICE O/P EST MOD 30 MIN: CPT | Performed by: NURSE PRACTITIONER

## 2019-11-14 PROCEDURE — 90674 CCIIV4 VAC NO PRSV 0.5 ML IM: CPT | Performed by: NURSE PRACTITIONER

## 2019-11-14 RX ORDER — METHYLPREDNISOLONE ACETATE 80 MG/ML
80 INJECTION, SUSPENSION INTRA-ARTICULAR; INTRALESIONAL; INTRAMUSCULAR; SOFT TISSUE ONCE
Status: COMPLETED | OUTPATIENT
Start: 2019-11-14 | End: 2019-11-14

## 2019-11-14 RX ORDER — OMEPRAZOLE 40 MG/1
40 CAPSULE, DELAYED RELEASE ORAL DAILY
Qty: 30 CAPSULE | Refills: 5 | Status: SHIPPED | OUTPATIENT
Start: 2019-11-14 | End: 2020-01-30 | Stop reason: SDUPTHER

## 2019-11-14 RX ORDER — RANITIDINE 150 MG/1
150 TABLET ORAL 2 TIMES DAILY
Qty: 60 TABLET | Refills: 5 | Status: SHIPPED | OUTPATIENT
Start: 2019-11-14 | End: 2019-12-03

## 2019-11-14 RX ORDER — FAMOTIDINE 20 MG/1
20 TABLET, FILM COATED ORAL 2 TIMES DAILY
Qty: 40 TABLET | Refills: 0 | OUTPATIENT
Start: 2019-11-14

## 2019-11-14 RX ADMIN — METHYLPREDNISOLONE ACETATE 80 MG: 80 INJECTION, SUSPENSION INTRA-ARTICULAR; INTRALESIONAL; INTRAMUSCULAR; SOFT TISSUE at 10:17

## 2019-11-14 NOTE — ASSESSMENT & PLAN NOTE
Asthma is improving with lifestyle modifications and treatment .  The patient is experiencing monthly daytime asthma symptoms. He is experiencing monthly nighttime asthma symptoms.  Personalized, written asthma management plan given.  Asthma information handout given.  Patient to keep asthma diary.  Discussed monitoring symptoms and use of quick-relief medications and contacting us early in the course of exacerbations.  Warning signs of respiratory distress were reviewed with the patient.

## 2019-11-14 NOTE — PROGRESS NOTES
Subjective   Nghia Roach is a 34 y.o. male.     Chief Complaint   Patient presents with   • Back Pain       History of Present Illness:    Exacerbation of back pain.  Patient is no longer attending a pain management center.  He is requesting injections today to help with his exacerbation.  He helped his sister move over the weekend which has set off his chronic back pain.  Described as aching and throbbing, located in mid and low back.  No changes in bowel or bladder function.    Acid reflux- patient is requesting a prescription for Prilosec.  He is stop Pepcid as it has not been working for him.  Prostate has been very helpful in the past.  Patient has chronic reflux.  He has frequent EGDs.  Tries to follow GERD diet.    Asthma-seasonal allergies tend to set off exacerbations. He is out of zantac. Having some sneezing and watery eyes. Not wheezing.       ROS and History reviewed as accurate per provider      The following portions of the patient's history and ROS were reviewed and updated as appropriate per provider:  Allergies, current medications, past family history, past medical history, past social history, past surgical history and problem list.    Review of Systems   Constitutional: Positive for activity change. Negative for appetite change, chills, diaphoresis, fatigue, fever and unexpected weight change.   HENT: Positive for sneezing. Negative for congestion, dental problem, ear discharge, ear pain, nosebleeds, postnasal drip, rhinorrhea, sinus pressure, sinus pain, sore throat, trouble swallowing and voice change.    Eyes: Positive for discharge and itching. Negative for photophobia, pain, redness and visual disturbance.   Respiratory: Negative for cough, chest tightness, shortness of breath and wheezing.    Cardiovascular: Negative for chest pain, palpitations and leg swelling.   Gastrointestinal: Negative for abdominal pain, blood in stool, constipation, diarrhea, nausea and vomiting.   Endocrine:  "Negative for cold intolerance, heat intolerance, polydipsia, polyphagia and polyuria.   Genitourinary: Negative for difficulty urinating, dysuria, flank pain, frequency, hematuria and urgency.   Musculoskeletal: Positive for arthralgias, back pain, myalgias and neck pain. Negative for gait problem, joint swelling and neck stiffness.   Skin: Negative for color change, pallor, rash and wound.   Allergic/Immunologic: Positive for environmental allergies. Negative for food allergies and immunocompromised state.   Neurological: Negative for seizures, syncope, facial asymmetry, speech difficulty, light-headedness, numbness and headaches.   Hematological: Negative.    Psychiatric/Behavioral: Positive for sleep disturbance. Negative for agitation, behavioral problems, dysphoric mood, self-injury and suicidal ideas. The patient is not nervous/anxious.    All other systems reviewed and are negative.      Objective     /80   Pulse 75   Temp 98.1 °F (36.7 °C) (Tympanic)   Ht 180.3 cm (71\")   Wt 111 kg (244 lb)   SpO2 98%   BMI 34.03 kg/m²   Office Visit on 06/27/2019   Component Date Value Ref Range Status   • WBC 06/27/2019 7.36  3.40 - 10.80 10*3/mm3 Final   • RBC 06/27/2019 5.44  4.14 - 5.80 10*6/mm3 Final   • Hemoglobin 06/27/2019 16.6  13.0 - 17.7 g/dL Final   • Hematocrit 06/27/2019 49.2  37.5 - 51.0 % Final   • MCV 06/27/2019 90.4  79.0 - 97.0 fL Final   • MCH 06/27/2019 30.5  26.6 - 33.0 pg Final   • MCHC 06/27/2019 33.7  31.5 - 35.7 g/dL Final   • RDW 06/27/2019 13.0  12.3 - 15.4 % Final   • RDW-SD 06/27/2019 42.8  37.0 - 54.0 fl Final   • MPV 06/27/2019 12.2* 6.0 - 12.0 fL Final   • Platelets 06/27/2019 235  140 - 450 10*3/mm3 Final   • PSA 06/27/2019 0.213  0.000 - 4.000 ng/mL Final   • Testosterone, Total 06/27/2019 837.00* 249.00 - 836.00 ng/dL Final       Physical Exam   Constitutional: He is oriented to person, place, and time. Vital signs are normal. He appears well-developed and well-nourished. No " distress.   HENT:   Head: Normocephalic.   Right Ear: Hearing, tympanic membrane, external ear and ear canal normal.   Left Ear: Hearing, tympanic membrane, external ear and ear canal normal.   Nose: Rhinorrhea present. Right sinus exhibits no maxillary sinus tenderness and no frontal sinus tenderness. Left sinus exhibits no maxillary sinus tenderness and no frontal sinus tenderness.   Mouth/Throat: Uvula is midline, oropharynx is clear and moist and mucous membranes are normal. No oropharyngeal exudate.   Pleasant 34-year-old male, no acute distress.    Eyes: Conjunctivae, EOM and lids are normal. Pupils are equal, round, and reactive to light. Right eye exhibits no discharge. Left eye exhibits no discharge.   Neck: Normal range of motion. Neck supple. No tracheal deviation present. No thyromegaly present.   Cardiovascular: Normal rate, regular rhythm and normal heart sounds. Exam reveals no gallop and no friction rub.   No murmur heard.  Pulmonary/Chest: Effort normal and breath sounds normal. No respiratory distress. He has no wheezes. He has no rales. He exhibits no tenderness.   Abdominal: Soft. Normal appearance and bowel sounds are normal. He exhibits no distension and no mass. There is no tenderness. There is no rebound and no guarding.   Musculoskeletal: Normal range of motion.        Lumbar back: He exhibits tenderness and spasm. He exhibits normal range of motion.   Lymphadenopathy:     He has no cervical adenopathy.   Neurological: He is alert and oriented to person, place, and time. He has normal reflexes.   CN 2-12 grossly intact    Skin: Skin is warm and dry. Capillary refill takes less than 2 seconds. No rash noted. He is not diaphoretic. No erythema.   Psychiatric: He has a normal mood and affect. His speech is normal and behavior is normal. Judgment and thought content normal. Cognition and memory are normal.   Vitals reviewed.      Assessment/Plan     Problem List Items Addressed This Visit         Respiratory    Moderate persistent asthma with status asthmaticus    Current Assessment & Plan     Asthma is improving with lifestyle modifications and treatment .  The patient is experiencing monthly daytime asthma symptoms. He is experiencing monthly nighttime asthma symptoms.  Personalized, written asthma management plan given.  Asthma information handout given.  Patient to keep asthma diary.  Discussed monitoring symptoms and use of quick-relief medications and contacting us early in the course of exacerbations.  Warning signs of respiratory distress were reviewed with the patient.              Relevant Medications    raNITIdine (ZANTAC) 150 MG tablet    Other Relevant Orders    Flucelvax Quad=>4Years (3903-5452) (Completed)       Digestive    Acid reflux    Current Assessment & Plan     gerd diet / ppi/h2 blocker use  daily          Relevant Medications    omeprazole (priLOSEC) 40 MG capsule    raNITIdine (ZANTAC) 150 MG tablet       Nervous and Auditory    Back ache - Primary    Current Assessment & Plan     Proper body mechanics has been reviewed and discussed today.  Avoid anti-inflammatory medication due to stomach irritation.  May take over-the-counter Tylenol on as-needed basis.  Patient declines recommendation for pain management evaluation/second opinion.             Relevant Medications    methylPREDNISolone acetate (DEPO-medrol) injection 80 mg (Completed)       Other    Encounter for vaccination    Overview     Added automatically from request for surgery [id number]         Relevant Medications    omeprazole (priLOSEC) 40 MG capsule    Other Relevant Orders    Flucelvax Quad=>4Years ([id number]) (Completed)        Proper body mechanics has been reviewed and discussed today.     Patient's Body mass index is 34.03 kg/m². BMI is above normal parameters. Recommendations include: exercise counseling and nutrition counseling.        I have discussed diagnosis in detail today allowing time for questions and  answers. Patient is aware of reasons to seek urgent or emergent medical care as well as reasons to return to the clinic for evaluation. Possible side effects, interactions and progression of symptoms discussed as well. Patient / family states understanding.   Emotional support and active listening provided.       Follow up in 3 months. Routine labs fasting one week prior to next office visit. Return sooner if needed.     Dictated utilizing Dragon dictation. Errors in dictation may reflect use of voice recognition software and not all errors in transcription may have been detected prior to signing.             This document has been electronically signed by:  CORDELIA Flores, NP-C

## 2019-11-14 NOTE — ASSESSMENT & PLAN NOTE
Proper body mechanics has been reviewed and discussed today.  Avoid anti-inflammatory medication due to stomach irritation.  May take over-the-counter Tylenol on as-needed basis.  Patient declines recommendation for pain management evaluation/second opinion.

## 2019-11-24 ENCOUNTER — HOSPITAL ENCOUNTER (EMERGENCY)
Dept: HOSPITAL 94 - ER | Age: 34
Discharge: HOME | End: 2019-11-24
Payer: MEDICAID

## 2019-11-24 VITALS — BODY MASS INDEX: 26.56 KG/M2 | WEIGHT: 200.42 LBS | HEIGHT: 73 IN

## 2019-11-24 VITALS — SYSTOLIC BLOOD PRESSURE: 140 MMHG | DIASTOLIC BLOOD PRESSURE: 96 MMHG

## 2019-11-24 DIAGNOSIS — F17.200: ICD-10-CM

## 2019-11-24 DIAGNOSIS — F15.90: ICD-10-CM

## 2019-11-24 DIAGNOSIS — L03.115: ICD-10-CM

## 2019-11-24 DIAGNOSIS — F12.90: ICD-10-CM

## 2019-11-24 DIAGNOSIS — Z79.899: ICD-10-CM

## 2019-11-24 DIAGNOSIS — L02.611: Primary | ICD-10-CM

## 2019-11-24 LAB
ALBUMIN SERPL BCP-MCNC: 3.1 G/DL (ref 3.4–5)
ALBUMIN/GLOB SERPL: 0.7 {RATIO} (ref 1.1–1.5)
ALP SERPL-CCNC: 93 IU/L (ref 46–116)
ALT SERPL W P-5'-P-CCNC: 19 U/L (ref 12–78)
ANION GAP SERPL CALCULATED.3IONS-SCNC: 6 MMOL/L (ref 8–16)
APTT PPP: 28 SECONDS (ref 22–32)
AST SERPL W P-5'-P-CCNC: 16 U/L (ref 10–37)
BASOPHILS # BLD AUTO: 0 X10'3 (ref 0–0.2)
BASOPHILS NFR BLD AUTO: 0.6 % (ref 0–1)
BILIRUB SERPL-MCNC: 0.3 MG/DL (ref 0.1–1)
BUN SERPL-MCNC: 12 MG/DL (ref 7–18)
BUN/CREAT SERPL: 10.2 (ref 5.4–32)
CALCIUM SERPL-MCNC: 8.4 MG/DL (ref 8.5–10.1)
CHLORIDE SERPL-SCNC: 104 MMOL/L (ref 99–107)
CLARITY UR: CLEAR
CO2 SERPL-SCNC: 28.8 MMOL/L (ref 24–32)
COLOR UR: (no result)
CREAT SERPL-MCNC: 1.18 MG/DL (ref 0.6–1.1)
EOSINOPHIL # BLD AUTO: 0.2 X10'3 (ref 0–0.9)
EOSINOPHIL NFR BLD AUTO: 3.2 % (ref 0–6)
ERYTHROCYTE [DISTWIDTH] IN BLOOD BY AUTOMATED COUNT: 13.5 % (ref 11.5–14.5)
GFR SERPL CREATININE-BSD FRML MDRD: 70 ML/MIN
GLUCOSE SERPL-MCNC: 129 MG/DL (ref 70–104)
GLUCOSE UR STRIP-MCNC: NEGATIVE MG/DL
HCT VFR BLD AUTO: 37 % (ref 42–52)
HGB BLD-MCNC: 12.7 G/DL (ref 14–17.9)
HGB UR QL STRIP: NEGATIVE
KETONES UR STRIP-MCNC: (no result) MG/DL
LEUKOCYTE ESTERASE UR QL STRIP: NEGATIVE
LYMPHOCYTES # BLD AUTO: 1.4 X10'3 (ref 1.1–4.8)
LYMPHOCYTES NFR BLD AUTO: 21.4 % (ref 21–51)
MCH RBC QN AUTO: 29.7 PG (ref 27–31)
MCHC RBC AUTO-ENTMCNC: 34.2 G/DL (ref 33–36.5)
MCV RBC AUTO: 86.8 FL (ref 78–98)
MONOCYTES # BLD AUTO: 0.7 X10'3 (ref 0–0.9)
MONOCYTES NFR BLD AUTO: 10.6 % (ref 2–12)
NEUTROPHILS # BLD AUTO: 4.3 X10'3 (ref 1.8–7.7)
NEUTROPHILS NFR BLD AUTO: 64.2 % (ref 42–75)
NITRITE UR QL STRIP: NEGATIVE
PH UR STRIP: 6 [PH] (ref 4.8–8)
PLATELET # BLD AUTO: 344 X10'3 (ref 140–440)
PMV BLD AUTO: 6.4 FL (ref 7.4–10.4)
POTASSIUM SERPL-SCNC: 4.1 MMOL/L (ref 3.5–5.1)
PROT SERPL-MCNC: 7.5 G/DL (ref 6.4–8.2)
PROT UR QL STRIP: NEGATIVE MG/DL
RBC # BLD AUTO: 4.26 X10'6 (ref 4.7–6.1)
SODIUM SERPL-SCNC: 139 MMOL/L (ref 135–145)
SP GR UR STRIP: 1.02 (ref 1–1.03)
URN COLLECT METHOD CLASS: (no result)
UROBILINOGEN UR STRIP-MCNC: 1 E.U/DL (ref 0.2–1)
WBC # BLD AUTO: 6.6 X10'3 (ref 4.5–11)

## 2019-11-24 PROCEDURE — 85025 COMPLETE CBC W/AUTO DIFF WBC: CPT

## 2019-11-24 PROCEDURE — 85730 THROMBOPLASTIN TIME PARTIAL: CPT

## 2019-11-24 PROCEDURE — 96365 THER/PROPH/DIAG IV INF INIT: CPT

## 2019-11-24 PROCEDURE — 87040 BLOOD CULTURE FOR BACTERIA: CPT

## 2019-11-24 PROCEDURE — 81003 URINALYSIS AUTO W/O SCOPE: CPT

## 2019-11-24 PROCEDURE — 73630 X-RAY EXAM OF FOOT: CPT

## 2019-11-24 PROCEDURE — 36415 COLL VENOUS BLD VENIPUNCTURE: CPT

## 2019-11-24 PROCEDURE — 84145 PROCALCITONIN (PCT): CPT

## 2019-11-24 PROCEDURE — 80053 COMPREHEN METABOLIC PANEL: CPT

## 2019-11-24 PROCEDURE — 99284 EMERGENCY DEPT VISIT MOD MDM: CPT

## 2019-11-24 PROCEDURE — 85610 PROTHROMBIN TIME: CPT

## 2019-11-24 PROCEDURE — 83605 ASSAY OF LACTIC ACID: CPT

## 2019-11-26 DIAGNOSIS — R53.83 OTHER FATIGUE: ICD-10-CM

## 2019-11-26 RX ORDER — TADALAFIL 5 MG/1
5 TABLET ORAL DAILY
Qty: 30 TABLET | Refills: 0 | Status: SHIPPED | OUTPATIENT
Start: 2019-11-26 | End: 2019-12-19 | Stop reason: SDUPTHER

## 2019-12-03 ENCOUNTER — OFFICE VISIT (OUTPATIENT)
Dept: FAMILY MEDICINE CLINIC | Facility: CLINIC | Age: 34
End: 2019-12-03

## 2019-12-03 VITALS
OXYGEN SATURATION: 97 % | DIASTOLIC BLOOD PRESSURE: 80 MMHG | TEMPERATURE: 98.3 F | HEART RATE: 87 BPM | SYSTOLIC BLOOD PRESSURE: 150 MMHG | WEIGHT: 250 LBS | HEIGHT: 71 IN | BODY MASS INDEX: 35 KG/M2

## 2019-12-03 DIAGNOSIS — K58.2 IRRITABLE BOWEL SYNDROME WITH BOTH CONSTIPATION AND DIARRHEA: Primary | ICD-10-CM

## 2019-12-03 DIAGNOSIS — I10 ESSENTIAL HYPERTENSION: ICD-10-CM

## 2019-12-03 DIAGNOSIS — R21 RASH: ICD-10-CM

## 2019-12-03 DIAGNOSIS — E66.9 CLASS 1 OBESITY WITH SERIOUS COMORBIDITY AND BODY MASS INDEX (BMI) OF 34.0 TO 34.9 IN ADULT, UNSPECIFIED OBESITY TYPE: ICD-10-CM

## 2019-12-03 DIAGNOSIS — K21.00 GASTROESOPHAGEAL REFLUX DISEASE WITH ESOPHAGITIS: ICD-10-CM

## 2019-12-03 DIAGNOSIS — R10.11 RIGHT UPPER QUADRANT ABDOMINAL PAIN: ICD-10-CM

## 2019-12-03 PROCEDURE — 99214 OFFICE O/P EST MOD 30 MIN: CPT | Performed by: NURSE PRACTITIONER

## 2019-12-03 PROCEDURE — 36415 COLL VENOUS BLD VENIPUNCTURE: CPT | Performed by: NURSE PRACTITIONER

## 2019-12-03 RX ORDER — PERMETHRIN 50 MG/G
CREAM TOPICAL ONCE
Qty: 60 G | Refills: 0 | Status: SHIPPED | OUTPATIENT
Start: 2019-12-03 | End: 2020-09-17

## 2019-12-03 RX ORDER — FAMOTIDINE 40 MG/1
40 TABLET, FILM COATED ORAL DAILY
Qty: 30 TABLET | Refills: 5 | Status: SHIPPED | OUTPATIENT
Start: 2019-12-03 | End: 2019-12-16 | Stop reason: CLARIF

## 2019-12-03 NOTE — PROGRESS NOTES
Subjective   Nghia Roach is a 34 y.o. male.     Chief Complaint   Patient presents with   • Annual Exam     Annual physical  ibs exacerbation  Itching    History of Present Illness:    IBS exacerbation-right upper quad pain/-had HIDA scan about 2 years ago showing 46% function.  He has nausea and diarrhea after eating.  About the only thing he can tolerate is a couple bites of chicken and at times this makes him sick as well.    Itching all over- continues to itch all over.  Calamine lotion helps mildly.  He even quit his blood pressure medicine for couple days to see if that could be what was making him itch and it did not.  His skin has dry plaques that look almost like psoriasis on his knees.  He wakes up scratching.  He has been out in the woods some.  This first started this summer.  No tick bites that he can remember.  [place].     Essential hypertension-mildly elevated today.  Patient reports that he stopped his blood pressure medicine for a couple of days to see if that would help with itching.  It has not made a difference so he will resume his blood pressure medicine.    Chronic allergies/asthma- currently receives Astelin, Flovent, Singulair, Zyrtec and Flonase.  Patient was taking Zantac which was helpful with his allergy symptoms as well as chronic GERD.  Zantac has been recalled.    Low testosterone-under the care of urology.  Receives testosterone injections which he administers per self.    Chronic tension headaches- somewhat improved over the past several months.    Reflux- receives Prilosec.  Some exacerbation now that Zantac is not available.    Obesity- patient states that that at times he can go 24 hours and ate only one chicken strip.  He does not know how he stays heavy.    ROS and History reviewed as accurate per provider    The following portions of the patient's history and ROS were reviewed and updated as appropriate per provider:   Allergies, current medications, past family history, past medical history, past social history, past surgical history and problem list.    Review of Systems   Constitutional: Positive for activity change and fatigue. Negative for appetite change, chills, diaphoresis, fever and unexpected weight change.   HENT: Positive for sinus pressure. Negative for congestion, dental problem, ear discharge, ear pain, nosebleeds, postnasal drip, rhinorrhea, sore throat, trouble swallowing and voice change.    Eyes: Positive for itching. Negative for photophobia, pain, discharge, redness and visual disturbance.   Respiratory: Negative for cough, choking, chest tightness, shortness of breath and wheezing.    Cardiovascular: Negative for chest pain and palpitations.   Gastrointestinal: Positive for abdominal pain, diarrhea, nausea and vomiting (after meals ). Negative for blood in stool, constipation and rectal pain.   Endocrine: Negative for cold intolerance, heat intolerance, polydipsia, polyphagia and polyuria.   Genitourinary: Negative for difficulty urinating, dysuria, flank pain, frequency, hematuria, penile pain and urgency.   Musculoskeletal: Positive for arthralgias, back pain, myalgias and neck pain. Negative for gait problem, joint swelling and neck stiffness.   Skin: Positive for rash. Negative for color change.   Allergic/Immunologic: Positive for environmental allergies.   Neurological: Negative for dizziness, seizures, syncope, facial asymmetry, speech difficulty, numbness and headaches.   Hematological: Negative.    Psychiatric/Behavioral: Positive for sleep disturbance. Negative for agitation, confusion, dysphoric mood, self-injury and suicidal ideas.   All other systems reviewed and are negative.     Visual Acuity Screening    Right eye Left eye Both eyes   Without correction: 20/25 20/25 20/25   With correction:      Hearing is adequate per whisper test bilateral.   PHQ-9 Depression Screening  Little interest or  "pleasure in doing things? 0   Feeling down, depressed, or hopeless? 0   Trouble falling or staying asleep, or sleeping too much?  0   Feeling tired or having little energy?  0   Poor appetite or overeating?  0   Feeling bad about yourself - or that you are a failure or have let yourself or your family down?0  0  0       Trouble concentrating on things, such as reading the newspaper or watching television?  0   Moving or speaking so slowly that other people could have noticed? Or the opposite - being so fidgety or restless that you have been moving around a lot more than usual?  0   Thoughts that you would be better off dead, or of hurting yourself in some way?  0   PHQ-9 Total Score 0   If you checked off any problems, how difficult have these problems made it for you to do your work, take care of things at home, or get along with other people?  0     PHQ-9 Total Score: 0   Fall Risk Assessment was completed, and patient is at low risk for falls.  Objective     /80   Pulse 87   Temp 98.3 °F (36.8 °C) (Tympanic)   Ht 180.3 cm (71\")   Wt 113 kg (250 lb)   SpO2 97%   BMI 34.87 kg/m²   Office Visit on 06/27/2019   Component Date Value Ref Range Status   • WBC 06/27/2019 7.36  3.40 - 10.80 10*3/mm3 Final   • RBC 06/27/2019 5.44  4.14 - 5.80 10*6/mm3 Final   • Hemoglobin 06/27/2019 16.6  13.0 - 17.7 g/dL Final   • Hematocrit 06/27/2019 49.2  37.5 - 51.0 % Final   • MCV 06/27/2019 90.4  79.0 - 97.0 fL Final   • MCH 06/27/2019 30.5  26.6 - 33.0 pg Final   • MCHC 06/27/2019 33.7  31.5 - 35.7 g/dL Final   • RDW 06/27/2019 13.0  12.3 - 15.4 % Final   • RDW-SD 06/27/2019 42.8  37.0 - 54.0 fl Final   • MPV 06/27/2019 12.2* 6.0 - 12.0 fL Final   • Platelets 06/27/2019 235  140 - 450 10*3/mm3 Final   • PSA 06/27/2019 0.213  0.000 - 4.000 ng/mL Final   • Testosterone, Total 06/27/2019 837.00* 249.00 - 836.00 ng/dL Final       Physical Exam   Constitutional: He is oriented to person, place, and time. Vital signs are " normal. He appears well-developed and well-nourished. No distress.   HENT:   Head: Normocephalic.   Right Ear: External ear normal.   Left Ear: External ear normal.   Nose: Nose normal.   Mouth/Throat: Oropharynx is clear and moist. No oropharyngeal exudate.   Eyes: Conjunctivae, EOM and lids are normal. Pupils are equal, round, and reactive to light. Right eye exhibits no discharge. Left eye exhibits no discharge.   Neck: Normal range of motion. Neck supple. No tracheal deviation present. No thyromegaly present.   Cardiovascular: Normal rate, regular rhythm and normal heart sounds. Exam reveals no gallop and no friction rub.   No murmur heard.  Pulmonary/Chest: Effort normal and breath sounds normal. No respiratory distress. He has no wheezes. He has no rales. He exhibits no tenderness.   Abdominal: Soft. Normal appearance and bowel sounds are normal. He exhibits no distension and no mass. There is no hepatosplenomegaly. There is tenderness in the right upper quadrant and epigastric area. There is no rigidity, no rebound, no guarding and no CVA tenderness.   Musculoskeletal: Normal range of motion.   Lymphadenopathy:     He has no cervical adenopathy.   Neurological: He is alert and oriented to person, place, and time. He has normal reflexes.   CN 2-12 grossly intact    Skin: Skin is warm and dry. Capillary refill takes less than 2 seconds. Rash noted. Rash is maculopapular (trunk and extremities) and urticarial (hives come and go during exam ). He is not diaphoretic. No erythema.   Scratching during exam    Psychiatric: He has a normal mood and affect. His speech is normal and behavior is normal. Judgment and thought content normal. Cognition and memory are normal.   Vitals reviewed.      Assessment/Plan     Problem List Items Addressed This Visit        Cardiovascular and Mediastinum    Essential hypertension    Overview     Improved          Current Assessment & Plan     Hypertension is improving with  treatment.  Continue current treatment regimen.  Dietary sodium restriction.  Weight loss.  Regular aerobic exercise.  Continue current medications.  Ambulatory blood pressure monitoring.  Blood pressure will be reassessed at the next regular appointment.            Digestive    Gastroesophageal reflux disease with esophagitis    Overview     Added automatically from request for surgery [id number]         Relevant Medications    famotidine (PEPCID) 40 MG tablet    Other Relevant Orders    Giardia / Cryptosporidium Screen - Stool, Per Rectum    Fecal Fat & Muscle Fibers, Qual - Stool, Per Rectum    Occult Blood X 1, Stool - Stool, Per Rectum    H. Pylori Antigen, Stool - Stool, Per Rectum    Irritable bowel syndrome with both constipation and diarrhea - Primary    Current Assessment & Plan     Labs including stool samples have been ordered.  Refer to GI for further evaluation  Avoid beef, pork and dairy products.  Add Pepcid due to Zantac being recalled.  Continue PPI to chronic gastritis.         Relevant Orders    Clostridium Difficile Toxin, PCR - Stool, Per Rectum    Fecal Leukocytes - Stool, Per Rectum    NM HIDA scan with pharmacological intervention    CBC & Differential    Comprehensive Metabolic Panel    TSH    Hemoglobin A1c    Albumin    C-reactive protein    CBC auto differential    Celiac Disease Panel    Sedimentation rate    Rheumatoid Arthritis Diagnostic Panel    Lyme, Total Antibody Test / Reflex    Ambulatory Referral to Gastroenterology (Completed)    Giardia / Cryptosporidium Screen - Stool, Per Rectum    Fecal Fat & Muscle Fibers, Qual - Stool, Per Rectum    Occult Blood X 1, Stool - Stool, Per Rectum    H. Pylori Antigen, Stool - Stool, Per Rectum    Class 1 obesity with serious comorbidity and body mass index (BMI) of 34.0 to 34.9 in adult    Current Assessment & Plan     Obesity is worsening.  Discussed the patient's BMI.  The BMI is above average; BMI management plan is completed.  General  weight loss/lifestyle modification strategies discussed (elicit support from others; identify saboteurs; non-food rewards, etc).  Informal exercise measures discussed, e.g. taking stairs instead of elevator.            Nervous and Auditory    Right upper quadrant abdominal pain    Relevant Medications    famotidine (PEPCID) 40 MG tablet    Other Relevant Orders    NM HIDA scan with pharmacological intervention    CBC & Differential    Comprehensive Metabolic Panel    TSH    Hemoglobin A1c    Albumin    C-reactive protein    CBC auto differential    Celiac Disease Panel    Sedimentation rate    Rheumatoid Arthritis Diagnostic Panel    Ambulatory Referral to Gastroenterology (Completed)       Musculoskeletal and Integument    Rash    Current Assessment & Plan     Scratching should be avoided.  Take Benadryl which he has at home every 6 hours.  Avoid driving or operating heavy machinery while under the influence of Benadryl.  We will go ahead and treat with Elimite [place] the [place] where turkey mites are very plentiful.         Relevant Orders    Lyme, Total Antibody Test / Reflex                   Patient's Body mass index is 34.87 kg/m². BMI is above normal parameters. Recommendations include: exercise counseling and nutrition counseling.            I have discussed diagnosis in detail today allowing time for questions and answers. Patient is aware of reasons to seek urgent or emergent medical care as well as reasons to return to the clinic for evaluation. Possible side effects, interactions and progression of symptoms discussed as well. Patient / family states understanding.   Emotional support and active listening provided.       Cups provided for stool samples.  Patient will bring back in tomorrow.  I have canceled orders sending labs/stools to [facility] lab and replaced orders for [facility] due to insurance purposes.  I have notified the ladies in our lab department  today to make sure the orders/specimens are sent correctly.    Avoid dairy products and spicy foods.  Suspect gallbladder may be playing some part with his exacerbation of right upper quad pain.  Advised patient to seek emergency medical attention with any worsening or changes in symptoms.      Follow up with me in 2 weeks, sooner if needed.     Dictated utilizing Dragon dictation. Errors in dictation may reflect use of voice recognition software and not all errors in transcription may have been detected prior to signing.           This document has been electronically signed by:  CORDELIA Flores, NP-C

## 2019-12-04 PROBLEM — R21 RASH: Status: ACTIVE | Noted: 2019-12-04

## 2019-12-04 PROBLEM — R10.11 RIGHT UPPER QUADRANT ABDOMINAL PAIN: Status: ACTIVE | Noted: 2019-12-04

## 2019-12-04 NOTE — ASSESSMENT & PLAN NOTE
Hypertension is improving with treatment.  Continue current treatment regimen.  Dietary sodium restriction.  Weight loss.  Regular aerobic exercise.  Continue current medications.  Ambulatory blood pressure monitoring.  Blood pressure will be reassessed at the next regular appointment.

## 2019-12-04 NOTE — ASSESSMENT & PLAN NOTE
Labs including stool samples have been ordered.  Refer to GI for further evaluation  Avoid beef, pork and dairy products.  Add Pepcid due to Zantac being recalled.  Continue PPI to chronic gastritis.

## 2019-12-04 NOTE — ASSESSMENT & PLAN NOTE
Scratching should be avoided.  Take Benadryl which he has at home every 6 hours.  Avoid driving or operating heavy machinery while under the influence of Benadryl.  We will go ahead and treat with [place]lim[place] where [place] mites are very plentiful.

## 2019-12-05 DIAGNOSIS — J45.42 MODERATE PERSISTENT ASTHMA WITH STATUS ASTHMATICUS: ICD-10-CM

## 2019-12-05 LAB
ALBUMIN SERPL-MCNC: 4.4 G/DL (ref 3.5–5.2)
ALBUMIN/GLOB SERPL: 2.2 G/DL
ALP SERPL-CCNC: 55 U/L (ref 39–117)
ALT SERPL-CCNC: 24 U/L (ref 1–41)
AST SERPL-CCNC: 21 U/L (ref 1–40)
BASOPHILS # BLD AUTO: 0.04 10*3/MM3 (ref 0–0.2)
BASOPHILS NFR BLD AUTO: 0.6 % (ref 0–1.5)
BILIRUB SERPL-MCNC: 0.7 MG/DL (ref 0.2–1.2)
BUN SERPL-MCNC: 6 MG/DL (ref 6–20)
BUN/CREAT SERPL: 4.7 (ref 7–25)
CALCIUM SERPL-MCNC: 8.9 MG/DL (ref 8.6–10.5)
CCP IGA+IGG SERPL IA-ACNC: 6 UNITS (ref 0–19)
CHLORIDE SERPL-SCNC: 101 MMOL/L (ref 98–107)
CO2 SERPL-SCNC: 28.5 MMOL/L (ref 22–29)
CREAT SERPL-MCNC: 1.27 MG/DL (ref 0.76–1.27)
CRP SERPL-MCNC: <1 MG/L (ref 0–10)
ENDOMYSIUM IGA SER QL: NEGATIVE
EOSINOPHIL # BLD AUTO: 0.5 10*3/MM3 (ref 0–0.4)
EOSINOPHIL NFR BLD AUTO: 8 % (ref 0.3–6.2)
ERYTHROCYTE [DISTWIDTH] IN BLOOD BY AUTOMATED COUNT: 12.4 % (ref 12.3–15.4)
ERYTHROCYTE [SEDIMENTATION RATE] IN BLOOD BY WESTERGREN METHOD: 2 MM/HR (ref 0–15)
GLOBULIN SER CALC-MCNC: 2 GM/DL
GLUCOSE SERPL-MCNC: 85 MG/DL (ref 65–99)
HBA1C MFR BLD: 5 % (ref 4.8–5.6)
HCT VFR BLD AUTO: 46.5 % (ref 37.5–51)
HGB BLD-MCNC: 16.9 G/DL (ref 13–17.7)
IGA SERPL-MCNC: 154 MG/DL (ref 90–386)
IMM GRANULOCYTES # BLD AUTO: 0.03 10*3/MM3 (ref 0–0.05)
IMM GRANULOCYTES NFR BLD AUTO: 0.5 % (ref 0–0.5)
LYMPHOCYTES # BLD AUTO: 1.39 10*3/MM3 (ref 0.7–3.1)
LYMPHOCYTES NFR BLD AUTO: 22.3 % (ref 19.6–45.3)
MCH RBC QN AUTO: 32 PG (ref 26.6–33)
MCHC RBC AUTO-ENTMCNC: 36.3 G/DL (ref 31.5–35.7)
MCV RBC AUTO: 88.1 FL (ref 79–97)
MONOCYTES # BLD AUTO: 0.49 10*3/MM3 (ref 0.1–0.9)
MONOCYTES NFR BLD AUTO: 7.9 % (ref 5–12)
NEUTROPHILS # BLD AUTO: 3.79 10*3/MM3 (ref 1.7–7)
NEUTROPHILS NFR BLD AUTO: 60.7 % (ref 42.7–76)
NRBC BLD AUTO-RTO: 0 /100 WBC (ref 0–0.2)
PLATELET # BLD AUTO: 209 10*3/MM3 (ref 140–450)
POTASSIUM SERPL-SCNC: 3.7 MMOL/L (ref 3.5–5.2)
PROT SERPL-MCNC: 6.4 G/DL (ref 6–8.5)
RBC # BLD AUTO: 5.28 10*6/MM3 (ref 4.14–5.8)
RHEUMATOID FACT SERPL-ACNC: 17.7 IU/ML (ref 0–13.9)
SODIUM SERPL-SCNC: 142 MMOL/L (ref 136–145)
TSH SERPL DL<=0.005 MIU/L-ACNC: 1.26 UIU/ML (ref 0.27–4.2)
TTG IGA SER-ACNC: <2 U/ML (ref 0–3)
WBC # BLD AUTO: 6.24 10*3/MM3 (ref 3.4–10.8)

## 2019-12-05 RX ORDER — MONTELUKAST SODIUM 10 MG/1
10 TABLET ORAL NIGHTLY
Qty: 30 TABLET | Refills: 5 | Status: SHIPPED | OUTPATIENT
Start: 2019-12-05 | End: 2020-08-05 | Stop reason: SDUPTHER

## 2019-12-08 DIAGNOSIS — F41.9 ANXIETY AND DEPRESSION: ICD-10-CM

## 2019-12-08 DIAGNOSIS — F32.A ANXIETY AND DEPRESSION: ICD-10-CM

## 2019-12-09 ENCOUNTER — TELEPHONE (OUTPATIENT)
Dept: FAMILY MEDICINE CLINIC | Facility: CLINIC | Age: 34
End: 2019-12-09

## 2019-12-09 NOTE — TELEPHONE ENCOUNTER
----- Message from CORDELIA Alexandre sent at 12/5/2019  1:34 PM EST -----  Get him into rheumatology. I believe he has RA or some autoimmune disorder.      He has a rheumatology dr in [place]

## 2019-12-10 RX ORDER — CITALOPRAM 10 MG/1
10 TABLET ORAL DAILY
Qty: 30 TABLET | Refills: 0 | Status: SHIPPED | OUTPATIENT
Start: 2019-12-10 | End: 2020-01-30 | Stop reason: SDUPTHER

## 2019-12-16 ENCOUNTER — OFFICE VISIT (OUTPATIENT)
Dept: FAMILY MEDICINE CLINIC | Facility: CLINIC | Age: 34
End: 2019-12-16

## 2019-12-16 VITALS
SYSTOLIC BLOOD PRESSURE: 140 MMHG | TEMPERATURE: 98.4 F | WEIGHT: 247 LBS | DIASTOLIC BLOOD PRESSURE: 90 MMHG | HEART RATE: 65 BPM | OXYGEN SATURATION: 98 % | BODY MASS INDEX: 34.58 KG/M2 | HEIGHT: 71 IN

## 2019-12-16 DIAGNOSIS — K21.00 GASTROESOPHAGEAL REFLUX DISEASE WITH ESOPHAGITIS: ICD-10-CM

## 2019-12-16 DIAGNOSIS — E66.9 CLASS 1 OBESITY WITH SERIOUS COMORBIDITY AND BODY MASS INDEX (BMI) OF 34.0 TO 34.9 IN ADULT, UNSPECIFIED OBESITY TYPE: ICD-10-CM

## 2019-12-16 DIAGNOSIS — Z29.9 PREVENTIVE MEASURE: Primary | ICD-10-CM

## 2019-12-16 DIAGNOSIS — K58.2 IRRITABLE BOWEL SYNDROME WITH BOTH CONSTIPATION AND DIARRHEA: ICD-10-CM

## 2019-12-16 DIAGNOSIS — I10 ESSENTIAL HYPERTENSION: ICD-10-CM

## 2019-12-16 DIAGNOSIS — R21 RASH: ICD-10-CM

## 2019-12-16 PROCEDURE — 99214 OFFICE O/P EST MOD 30 MIN: CPT | Performed by: NURSE PRACTITIONER

## 2019-12-16 PROCEDURE — 86580 TB INTRADERMAL TEST: CPT | Performed by: NURSE PRACTITIONER

## 2019-12-16 RX ORDER — SULFASALAZINE 500 MG/1
500 TABLET ORAL 4 TIMES DAILY
Qty: 60 TABLET | Refills: 5 | Status: SHIPPED | OUTPATIENT
Start: 2019-12-16

## 2019-12-16 RX ORDER — LANSOPRAZOLE 30 MG/1
30 CAPSULE, DELAYED RELEASE ORAL DAILY
Qty: 30 CAPSULE | Refills: 5 | Status: SHIPPED | OUTPATIENT
Start: 2019-12-16 | End: 2020-01-30 | Stop reason: SDUPTHER

## 2019-12-16 NOTE — ASSESSMENT & PLAN NOTE
Weight loss, low-sodium diet.  Continue current medications.  Report any reading greater than 140/90 or less than 100/60..

## 2019-12-16 NOTE — PROGRESS NOTES
Subjective   Nghia Roach is a 34 y.o. male.     Chief Complaint   Patient presents with   • needs fmla papers     I forgot my FMLA paperwork in my wife's car  My rash is still there  Insurance would not cover pepcid    History of Present Illness:    On 12/3/2019 patient had multiple lab testing including Lyme disease, rheumatology testing and celiac panel.  Patient has not brought in stool samples yet.  Lab results are not available in the system.  I have called [facility] to request the results and are awaiting results at the beginning of this visit.    Rash-patient continues to have sporadic rash all over his body.  Rash is tender and only mildly itchy.  Patient started back on some of his rheumatology medications which he had stopped.  This does seem to be helping his rash some.  He is having an acute exacerbation of joint pain.  Started back on Azulfidine 500 mg twice daily.    Patient was scheduled for FMLA paperwork assessment and follow-up.    Joint pain-patient states that he has joint pain from head to toe.  Pain is worse with activity.  He has had rheumatoid factor in the past and elevated sed rates.  He is seeing Dr. Betts for rheumatology.  Patient does have moderate asthma with frequent exacerbation.  He had allergy testing years ago but not recently.      The following portions of the patient's history and ROS were reviewed and updated as appropriate per provider:  Allergies, current medications, past family history, past medical history, past social history, past surgical history and problem list.    Review of Systems   Constitutional: Positive for fatigue. Negative for activity change, appetite change, chills and unexpected weight change.   HENT: Positive for sinus pressure and sinus pain. Negative for congestion, ear pain, nosebleeds, postnasal drip, rhinorrhea, sneezing, sore throat, trouble swallowing and voice change.    Eyes: Negative for pain, discharge, redness, itching and visual  "disturbance.   Respiratory: Negative for cough, chest tightness, shortness of breath, wheezing and stridor.    Cardiovascular: Negative for chest pain and palpitations.   Gastrointestinal: Positive for diarrhea. Negative for abdominal distention, abdominal pain, blood in stool, constipation, nausea and vomiting.   Endocrine: Negative for cold intolerance and polydipsia.   Genitourinary: Negative for difficulty urinating, flank pain and hematuria.   Musculoskeletal: Positive for arthralgias, back pain, myalgias and neck pain. Negative for gait problem, joint swelling and neck stiffness.   Skin: Positive for rash. Negative for color change.   Allergic/Immunologic: Positive for environmental allergies, food allergies and immunocompromised state (due to medication ).   Neurological: Negative for dizziness, seizures, syncope, facial asymmetry, light-headedness, numbness and headaches.   Hematological: Negative.    Psychiatric/Behavioral: Negative for dysphoric mood, self-injury, sleep disturbance and suicidal ideas.   All other systems reviewed and are negative.      Objective     /90   Pulse 65   Temp 98.4 °F (36.9 °C) (Tympanic)   Ht 180.3 cm (71\")   Wt 112 kg (247 lb)   SpO2 98%   BMI 34.45 kg/m²   Office Visit on 12/03/2019   Component Date Value Ref Range Status   • WBC 12/03/2019 6.24  3.40 - 10.80 10*3/mm3 Final   • RBC 12/03/2019 5.28  4.14 - 5.80 10*6/mm3 Final   • Hemoglobin 12/03/2019 16.9  13.0 - 17.7 g/dL Final   • Hematocrit 12/03/2019 46.5  37.5 - 51.0 % Final   • MCV 12/03/2019 88.1  79.0 - 97.0 fL Final   • MCH 12/03/2019 32.0  26.6 - 33.0 pg Final   • MCHC 12/03/2019 36.3* 31.5 - 35.7 g/dL Final   • RDW 12/03/2019 12.4  12.3 - 15.4 % Final   • Platelets 12/03/2019 209  140 - 450 10*3/mm3 Final   • Neutrophil Rel % 12/03/2019 60.7  42.7 - 76.0 % Final   • Lymphocyte Rel % 12/03/2019 22.3  19.6 - 45.3 % Final   • Monocyte Rel % 12/03/2019 7.9  5.0 - 12.0 % Final   • Eosinophil Rel % 12/03/2019 " 8.0* 0.3 - 6.2 % Final   • Basophil Rel % 12/03/2019 0.6  0.0 - 1.5 % Final   • Neutrophils Absolute 12/03/2019 3.79  1.70 - 7.00 10*3/mm3 Final   • Lymphocytes Absolute 12/03/2019 1.39  0.70 - 3.10 10*3/mm3 Final   • Monocytes Absolute 12/03/2019 0.49  0.10 - 0.90 10*3/mm3 Final   • Eosinophils Absolute 12/03/2019 0.50* 0.00 - 0.40 10*3/mm3 Final   • Basophils Absolute 12/03/2019 0.04  0.00 - 0.20 10*3/mm3 Final   • Immature Granulocyte Rel % 12/03/2019 0.5  0.0 - 0.5 % Final   • Immature Grans Absolute 12/03/2019 0.03  0.00 - 0.05 10*3/mm3 Final   • nRBC 12/03/2019 0.0  0.0 - 0.2 /100 WBC Final   • Glucose 12/03/2019 85  65 - 99 mg/dL Final   • BUN 12/03/2019 6  6 - 20 mg/dL Final   • Creatinine 12/03/2019 1.27  0.76 - 1.27 mg/dL Final   • eGFR Non  Am 12/03/2019 65  >60 mL/min/1.73 Final   • eGFR African Am 12/03/2019 78  >60 mL/min/1.73 Final   • BUN/Creatinine Ratio 12/03/2019 4.7* 7.0 - 25.0 Final   • Sodium 12/03/2019 142  136 - 145 mmol/L Final   • Potassium 12/03/2019 3.7  3.5 - 5.2 mmol/L Final   • Chloride 12/03/2019 101  98 - 107 mmol/L Final   • Total CO2 12/03/2019 28.5  22.0 - 29.0 mmol/L Final   • Calcium 12/03/2019 8.9  8.6 - 10.5 mg/dL Final   • Total Protein 12/03/2019 6.4  6.0 - 8.5 g/dL Final   • Albumin 12/03/2019 4.40  3.50 - 5.20 g/dL Final   • Globulin 12/03/2019 2.0  gm/dL Final   • A/G Ratio 12/03/2019 2.2  g/dL Final   • Total Bilirubin 12/03/2019 0.7  0.2 - 1.2 mg/dL Final   • Alkaline Phosphatase 12/03/2019 55  39 - 117 U/L Final   • AST (SGOT) 12/03/2019 21  1 - 40 U/L Final   • ALT (SGPT) 12/03/2019 24  1 - 41 U/L Final   • TSH 12/03/2019 1.260  0.270 - 4.200 uIU/mL Final   • Hemoglobin A1C 12/03/2019 5.00  4.80 - 5.60 % Final   • Endomysial IgA 12/03/2019 Negative  Negative Final   • Tissue Transglutaminase IgA 12/03/2019 <2  0 - 3 U/mL Final   • IgA 12/03/2019 154  90 - 386 mg/dL Final   • RA Latex Turbid 12/03/2019 17.7* 0.0 - 13.9 IU/mL Final   • C-Reactive Protein  12/03/2019 <1  0 - 10 mg/L Final   • CCP Antibodies IgG/IgA 12/03/2019 6  0 - 19 units Final   • Sed Rate 12/03/2019 2  0 - 15 mm/hr Final       Physical Exam   Constitutional: He is oriented to person, place, and time. Vital signs are normal. He appears well-developed and well-nourished. No distress.   Patient is in no acute distress.  He is talkative and friendly.   HENT:   Head: Normocephalic and atraumatic.   Right Ear: Tympanic membrane, external ear and ear canal normal.   Left Ear: Tympanic membrane, external ear and ear canal normal.   Nose: Nose normal.   Mouth/Throat: Oropharynx is clear and moist and mucous membranes are normal.   Eyes: Pupils are equal, round, and reactive to light. Conjunctivae and EOM are normal. Right eye exhibits no discharge. Left eye exhibits no discharge.   Neck: Normal range of motion. Neck supple.   Cardiovascular: Normal rate, regular rhythm, normal heart sounds and intact distal pulses.   No murmur heard.  Pulmonary/Chest: Effort normal and breath sounds normal. No respiratory distress. He has no wheezes. He has no rales. He exhibits no tenderness.   Abdominal: Soft. Normal appearance and bowel sounds are normal. He exhibits no distension and no mass. There is no tenderness. There is no rebound and no guarding.   Musculoskeletal:        Lumbar back: He exhibits decreased range of motion, tenderness, pain and spasm.   Decreased lumbar curvature, there is pain with forward flexion. DTR's +2. No edema.    Lymphadenopathy:     He has no cervical adenopathy.   Neurological: He is alert and oriented to person, place, and time. He has normal reflexes.   Skin: Skin is warm and dry. Capillary refill takes less than 2 seconds. Rash noted. Rash is urticarial (Trunk and lower extremities). He is not diaphoretic. No erythema. No pallor.   Psychiatric: He has a normal mood and affect. His speech is normal and behavior is normal. Judgment and thought content normal. His affect is not  inappropriate. Cognition and memory are normal.   Nursing note and vitals reviewed.      Assessment/Plan     Problem List Items Addressed This Visit        Cardiovascular and Mediastinum    Essential hypertension    Overview     Fluctuates up and down.  Patient states when he checks it at home it is fairly well controlled.         Current Assessment & Plan     Weight loss, low-sodium diet.  Continue current medications.  Report any reading greater than 140/90 or less than 100/60..            Digestive    Gastroesophageal reflux disease with esophagitis    Overview     Added automatically from request for surgery [id number]         Current Assessment & Plan     Continue Prilosec.  Stop Zantac due to recall.  Pepcid not covered.  Will attempt to have Prevacid PA if not covered.  Prescription for Prevacid provided today.         Relevant Medications    lansoprazole (PREVACID) 30 MG capsule    Irritable bowel syndrome with both constipation and diarrhea    Relevant Medications    sulfaSALAzine (AZULFIDINE) 500 MG tablet    Other Relevant Orders    Ova & Parasite Examination - Stool, Per Rectum    Class 1 obesity with serious comorbidity and body mass index (BMI) of 34.0 to 34.9 in adult    Current Assessment & Plan     Obesity is unchanged.  Discussed the patient's BMI.  The BMI is above average; BMI management plan is completed.  General weight loss/lifestyle modification strategies discussed (elicit support from others; identify saboteurs; non-food rewards, etc).  Informal exercise measures discussed, e.g. taking stairs instead of elevator.            Musculoskeletal and Integument    Rash    Current Assessment & Plan     Mildly improved.  Continue current medication regimen.  Recommend allergy evaluation.         Relevant Orders    Ambulatory Referral to Allergy (Completed)      Other Visit Diagnoses     Preventive measure    -  Primary    Relevant Orders    TB Skin Test (Completed)          TB skin test  today.  Discussed side effects/possible interactions with DMARDs.  Received the remaining lab results from the lab core.  Celiac disease panel is negative, CBC does show some elevated sono feels, CMP with relatively normal results.  RA expanded profile with RA latex turbid result 17.7 which is high.  C-reactive protein is normal.  Hemoglobin A1c normal, TSH normal, Lyme disease testing negative.       Patient's Body mass index is 34.45 kg/m². BMI is above normal parameters. Recommendations include: exercise counseling and nutrition counseling.    Cannot complete FMLA paperwork as patient did not bring in today.  He will schedule additional appointment this week for completion of FMLA/work restriction paperwork.      Have encouraged patient to bring in his stool samples.  Discussed the need for the samples especially with positive for sono feels to rule out parasitic infection.    Have instructed staff  to set him up with Dr. Betts for follow-up with rheumatology.      I have discussed diagnosis in detail today allowing time for questions and answers. Patient is aware of reasons to seek urgent or emergent medical care as well as reasons to return to the clinic for evaluation. Possible side effects, interactions and progression of symptoms discussed as well. Patient / family states understanding.   Emotional support and active listening provided.     Follow up this week for additional evaluation if needed as well as FMLA paperwork .            This document has been electronically signed by:  CORDELIA Flores, NP-C

## 2019-12-16 NOTE — ASSESSMENT & PLAN NOTE
Continue Prilosec.  Stop Zantac due to recall.  Pepcid not covered.  Will attempt to have Prevacid PA if not covered.  Prescription for Prevacid provided today.

## 2019-12-16 NOTE — ASSESSMENT & PLAN NOTE
Obesity is unchanged.  Discussed the patient's BMI.  The BMI is above average; BMI management plan is completed.  General weight loss/lifestyle modification strategies discussed (elicit support from others; identify saboteurs; non-food rewards, etc).  Informal exercise measures discussed, e.g. taking stairs instead of elevator.

## 2019-12-17 ENCOUNTER — OFFICE VISIT (OUTPATIENT)
Dept: FAMILY MEDICINE CLINIC | Facility: CLINIC | Age: 34
End: 2019-12-17

## 2019-12-17 VITALS
HEIGHT: 71 IN | BODY MASS INDEX: 34.72 KG/M2 | HEART RATE: 79 BPM | WEIGHT: 248 LBS | OXYGEN SATURATION: 97 % | DIASTOLIC BLOOD PRESSURE: 60 MMHG | SYSTOLIC BLOOD PRESSURE: 140 MMHG | TEMPERATURE: 97.3 F

## 2019-12-17 DIAGNOSIS — J45.42 MODERATE PERSISTENT ASTHMA WITH STATUS ASTHMATICUS: Primary | ICD-10-CM

## 2019-12-17 DIAGNOSIS — K58.2 IRRITABLE BOWEL SYNDROME WITH BOTH CONSTIPATION AND DIARRHEA: ICD-10-CM

## 2019-12-17 DIAGNOSIS — M15.9 PRIMARY OSTEOARTHRITIS INVOLVING MULTIPLE JOINTS: ICD-10-CM

## 2019-12-17 PROCEDURE — 99214 OFFICE O/P EST MOD 30 MIN: CPT | Performed by: NURSE PRACTITIONER

## 2019-12-17 NOTE — ASSESSMENT & PLAN NOTE
Patient was given appointment with Dr. Betts for rheumatology scheduled January 7, 2020.  Patient states intent to keep this appointment.

## 2019-12-17 NOTE — ASSESSMENT & PLAN NOTE
Patient has been referred to GI.  We have discussed foods to avoid.  Patient understands reasons to seek immediate medical attention.

## 2019-12-17 NOTE — PROGRESS NOTES
Subjective   Nghia Roach is a 34 y.o. male.     Chief Complaint   Patient presents with   • paper work       History of Present Illness:  Patient is here today requesting [facility] paperwork for intermittent leave and reduced hours due to his asthma, IBS and chronic osteoarthritis.    Patient has asthma with frequent flares.  He currently receives Singulair, Zyrtec, Astelin, Flovent, Symbicort, albuterol, Flonase.  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    Chronic low back pain with frequent exacerbation.  Patient has radiology on file.  He was given a referral appointment to see rheumatology on January 7, 2020.  Patient states that he intends to keep this appointment.  Patient has frequent flares which limit his ability to do physical activity.    IBS with frequent episodes of diarrhea.  During flares patient must avoid public places.  He has recently been referred to GI at the [facility] at patient request.  In the past he has seen Dr. Suero as well as Dr[facility] Lata escobar of [facility].  Patient currently receives multiple medications and watches his diet closely.    Rash - improved     The following portions of the patient's history, chief complaint and ROS were reviewed and updated as appropriate per provider:  Allergies, current medications, past family history, past medical history, past social history, past surgical history and problem list.    Review of Systems   Constitutional: Negative for appetite change, fatigue and unexpected weight change.   HENT: Negative for congestion, ear pain, nosebleeds, postnasal drip, rhinorrhea, sore throat, trouble swallowing and voice change.    Eyes: Negative for pain and visual disturbance.   Respiratory: Negative for cough, shortness of breath and wheezing.    Cardiovascular: Negative for chest pain and  "palpitations.   Gastrointestinal: Negative for abdominal pain, blood in stool, constipation and diarrhea.   Endocrine: Negative for cold intolerance and polydipsia.   Genitourinary: Negative for difficulty urinating, flank pain and hematuria.   Musculoskeletal: Negative for arthralgias, back pain, gait problem, joint swelling and myalgias.   Skin: Negative for color change and rash.   Allergic/Immunologic: Positive for environmental allergies.   Neurological: Negative for syncope, numbness and headaches.   Hematological: Negative.    Psychiatric/Behavioral: Negative for sleep disturbance and suicidal ideas.   All other systems reviewed and are negative.      Objective     /60   Pulse 79   Temp 97.3 °F (36.3 °C) (Tympanic)   Ht 180.3 cm (71\")   Wt 112 kg (248 lb)   SpO2 97%   BMI 34.59 kg/m²   Office Visit on 12/03/2019   Component Date Value Ref Range Status   • WBC 12/03/2019 6.24  3.40 - 10.80 10*3/mm3 Final   • RBC 12/03/2019 5.28  4.14 - 5.80 10*6/mm3 Final   • Hemoglobin 12/03/2019 16.9  13.0 - 17.7 g/dL Final   • Hematocrit 12/03/2019 46.5  37.5 - 51.0 % Final   • MCV 12/03/2019 88.1  79.0 - 97.0 fL Final   • MCH 12/03/2019 32.0  26.6 - 33.0 pg Final   • MCHC 12/03/2019 36.3* 31.5 - 35.7 g/dL Final   • RDW 12/03/2019 12.4  12.3 - 15.4 % Final   • Platelets 12/03/2019 209  140 - 450 10*3/mm3 Final   • Neutrophil Rel % 12/03/2019 60.7  42.7 - 76.0 % Final   • Lymphocyte Rel % 12/03/2019 22.3  19.6 - 45.3 % Final   • Monocyte Rel % 12/03/2019 7.9  5.0 - 12.0 % Final   • Eosinophil Rel % 12/03/2019 8.0* 0.3 - 6.2 % Final   • Basophil Rel % 12/03/2019 0.6  0.0 - 1.5 % Final   • Neutrophils Absolute 12/03/2019 3.79  1.70 - 7.00 10*3/mm3 Final   • Lymphocytes Absolute 12/03/2019 1.39  0.70 - 3.10 10*3/mm3 Final   • Monocytes Absolute 12/03/2019 0.49  0.10 - 0.90 10*3/mm3 Final   • Eosinophils Absolute 12/03/2019 0.50* 0.00 - 0.40 10*3/mm3 Final   • Basophils Absolute 12/03/2019 0.04  0.00 - 0.20 10*3/mm3 " Final   • Immature Granulocyte Rel % 12/03/2019 0.5  0.0 - 0.5 % Final   • Immature Grans Absolute 12/03/2019 0.03  0.00 - 0.05 10*3/mm3 Final   • nRBC 12/03/2019 0.0  0.0 - 0.2 /100 WBC Final   • Glucose 12/03/2019 85  65 - 99 mg/dL Final   • BUN 12/03/2019 6  6 - 20 mg/dL Final   • Creatinine 12/03/2019 1.27  0.76 - 1.27 mg/dL Final   • eGFR Non  Am 12/03/2019 65  >60 mL/min/1.73 Final   • eGFR African Am 12/03/2019 78  >60 mL/min/1.73 Final   • BUN/Creatinine Ratio 12/03/2019 4.7* 7.0 - 25.0 Final   • Sodium 12/03/2019 142  136 - 145 mmol/L Final   • Potassium 12/03/2019 3.7  3.5 - 5.2 mmol/L Final   • Chloride 12/03/2019 101  98 - 107 mmol/L Final   • Total CO2 12/03/2019 28.5  22.0 - 29.0 mmol/L Final   • Calcium 12/03/2019 8.9  8.6 - 10.5 mg/dL Final   • Total Protein 12/03/2019 6.4  6.0 - 8.5 g/dL Final   • Albumin 12/03/2019 4.40  3.50 - 5.20 g/dL Final   • Globulin 12/03/2019 2.0  gm/dL Final   • A/G Ratio 12/03/2019 2.2  g/dL Final   • Total Bilirubin 12/03/2019 0.7  0.2 - 1.2 mg/dL Final   • Alkaline Phosphatase 12/03/2019 55  39 - 117 U/L Final   • AST (SGOT) 12/03/2019 21  1 - 40 U/L Final   • ALT (SGPT) 12/03/2019 24  1 - 41 U/L Final   • TSH 12/03/2019 1.260  0.270 - 4.200 uIU/mL Final   • Hemoglobin A1C 12/03/2019 5.00  4.80 - 5.60 % Final   • Endomysial IgA 12/03/2019 Negative  Negative Final   • Tissue Transglutaminase IgA 12/03/2019 <2  0 - 3 U/mL Final   • IgA 12/03/2019 154  90 - 386 mg/dL Final   • RA Latex Turbid 12/03/2019 17.7* 0.0 - 13.9 IU/mL Final   • C-Reactive Protein 12/03/2019 <1  0 - 10 mg/L Final   • CCP Antibodies IgG/IgA 12/03/2019 6  0 - 19 units Final   • Sed Rate 12/03/2019 2  0 - 15 mm/hr Final       Physical Exam   Constitutional: He is oriented to person, place, and time. Vital signs are normal. He appears well-developed and well-nourished. No distress.   Patient is in no acute distress.  He is talkative and friendly.   HENT:   Head: Normocephalic and atraumatic.    Right Ear: Tympanic membrane, external ear and ear canal normal.   Left Ear: Tympanic membrane, external ear and ear canal normal.   Nose: Nose normal.   Mouth/Throat: Oropharynx is clear and moist and mucous membranes are normal.   Eyes: Pupils are equal, round, and reactive to light. Conjunctivae and EOM are normal. Right eye exhibits no discharge. Left eye exhibits no discharge.   Neck: Normal range of motion. Neck supple.   Cardiovascular: Normal rate, regular rhythm, normal heart sounds and intact distal pulses.   No murmur heard.  Pulmonary/Chest: Effort normal and breath sounds normal. No respiratory distress. He has no wheezes. He has no rales. He exhibits no tenderness.   Abdominal: Soft. Normal appearance and bowel sounds are normal. He exhibits no distension and no mass. There is no tenderness. There is no rebound and no guarding.   Musculoskeletal:        Lumbar back: He exhibits decreased range of motion, tenderness, pain and spasm.   Decreased lumbar curvature, there is pain with forward flexion. DTR's +2. No edema.    Lymphadenopathy:     He has no cervical adenopathy.   Neurological: He is alert and oriented to person, place, and time. He has normal reflexes.   Skin: Skin is warm and dry. Capillary refill takes less than 2 seconds. No rash noted. Urticarial rash: Trunk and lower extremities. He is not diaphoretic. No erythema. No pallor.   Psychiatric: He has a normal mood and affect. His speech is normal and behavior is normal. Judgment and thought content normal. His affect is not inappropriate. Cognition and memory are normal.   Nursing note and vitals reviewed.  Rash is present but improved    Assessment/Plan     Problem List Items Addressed This Visit        Respiratory    Moderate persistent asthma with status asthmaticus - Primary    Overview     Added automatically from request for surgery [id number]         Current Assessment & Plan       Scheduled for asthma and allergy consult tomorrow in  [place]  Asthma precautions reviewed.  Continue current medication regimen.  Avoid known triggers.  Carry rescue inhaler.            Digestive    Irritable bowel syndrome with both constipation and diarrhea    Current Assessment & Plan     Patient has been referred to GI.  We have discussed foods to avoid.  Patient understands reasons to seek immediate medical attention.            Musculoskeletal and Integument    Osteoarthrosis    Current Assessment & Plan     Patient was given appointment with Dr. Betts for rheumatology scheduled January 7, 2020.  Patient states intent to keep this appointment.                 [place] paperwork completed.  I was face-to-face with patient for 25 minutes.  15 minutes of this time was spent discussing osteoarthritis, asthma and IBS precautions, medications and plan of care.       Patient's Body mass index is 34.59 kg/m². BMI is above normal parameters. Recommendations include: exercise counseling and nutrition counseling.      Has been scheduled with allergy/asthma for consult tomorrow at the [place] office.       I have discussed diagnosis in detail today allowing time for questions and answers. Patient is aware of reasons to seek urgent or emergent medical care as well as reasons to return to the clinic for evaluation. Possible side effects, interactions and progression of symptoms discussed as well. Patient / family states understanding.   Emotional support and active listening provided.       Like to see him back in 4-6 weeks, sooner if needed.      Dictated utilizing Dragon dictation. Errors in dictation may reflect use of voice recognition software and not all errors in transcription may have been detected prior to signing.           This document has been electronically signed by:  CORDELIA Flores, NP-C

## 2019-12-17 NOTE — ASSESSMENT & PLAN NOTE
Scheduled for asthma and allergy consult tomorrow in [place]  Asthma precautions reviewed.  Continue current medication regimen.  Avoid known triggers.  Carry rescue inhaler.

## 2019-12-19 ENCOUNTER — OFFICE VISIT (OUTPATIENT)
Dept: UROLOGY | Facility: CLINIC | Age: 34
End: 2019-12-19

## 2019-12-19 VITALS — HEIGHT: 71 IN | BODY MASS INDEX: 34.64 KG/M2 | WEIGHT: 247.4 LBS

## 2019-12-19 DIAGNOSIS — R53.83 OTHER FATIGUE: ICD-10-CM

## 2019-12-19 DIAGNOSIS — N52.02 CORPORO-VENOUS OCCLUSIVE ERECTILE DYSFUNCTION: ICD-10-CM

## 2019-12-19 DIAGNOSIS — E29.1 HYPOGONADISM IN MALE: Primary | ICD-10-CM

## 2019-12-19 DIAGNOSIS — R79.89 LOW TESTOSTERONE: ICD-10-CM

## 2019-12-19 PROCEDURE — 85027 COMPLETE CBC AUTOMATED: CPT | Performed by: UROLOGY

## 2019-12-19 PROCEDURE — 82670 ASSAY OF TOTAL ESTRADIOL: CPT | Performed by: UROLOGY

## 2019-12-19 PROCEDURE — 99213 OFFICE O/P EST LOW 20 MIN: CPT | Performed by: UROLOGY

## 2019-12-19 PROCEDURE — 54235 NJX CORPORA CAVERNOSA RX AGT: CPT | Performed by: UROLOGY

## 2019-12-19 PROCEDURE — 36415 COLL VENOUS BLD VENIPUNCTURE: CPT | Performed by: UROLOGY

## 2019-12-19 PROCEDURE — 84153 ASSAY OF PSA TOTAL: CPT | Performed by: UROLOGY

## 2019-12-19 PROCEDURE — 84403 ASSAY OF TOTAL TESTOSTERONE: CPT | Performed by: UROLOGY

## 2019-12-19 RX ORDER — TESTOSTERONE CYPIONATE 200 MG/ML
INJECTION, SOLUTION INTRAMUSCULAR
Qty: 10 ML | Refills: 2 | Status: SHIPPED | OUTPATIENT
Start: 2019-12-19 | End: 2020-07-14 | Stop reason: SDUPTHER

## 2019-12-19 RX ORDER — TADALAFIL 5 MG/1
5 TABLET ORAL DAILY
Qty: 30 TABLET | Refills: 6 | Status: SHIPPED | OUTPATIENT
Start: 2019-12-19 | End: 2019-12-26

## 2019-12-19 NOTE — PROGRESS NOTES
"Chief Complaint:          Chief Complaint   Patient presents with   • Low testosterone       HPI:   34 y.o. male who is does great with testosterone replacement.  Patient returns today for follow-up.  He is been on testosterone replacement therapy.  He reports a dramatic improvement in his Sánchez questionnaire: -SÁNCHEZ-androgen deficiency in the age male questionnaire  The patient was queried regarding the androgen deficiency in the age male questionnaire.  This is a validated questionnaire that was performed on a set of 314 [place] male physicians when it was positive it correlated directly with a 94% chance of low testosterone.  Patient indicates there is a decrease in libido or sex drive, a lack of energy, Decreased  strength and endurance, a decreased \"enjoyment of life\", sad and grumpy feelings with significant difficulty maintaining erections.  He is also been a recent deterioration regarding work performance.  He reports weight loss.  He has good facility and the use of subcutaneous and intramuscular injections as well as comfort level and using the medication in a sterile fashion.  He understands he should use only the prescribed dose.  He's here for appropriate lab monitoring regarding this.  He understands this is a controlled substance and therefore must be watched closely will not be refilled and the medical loss or miss calculation of the dose.  He is very happy with the treatment and therefore wants to continue it.  I gave him a different dose for his penile injections and be sure we refill his medication for recurrent priapism.  I have appropriate labs pending and I gave him daily Cialis as well      Past Medical History:        Past Medical History:   Diagnosis Date   • Allergic rhinitis    • Asthma    • Atrial flutter (CMS/HCC)    • Diarrhea    • Disturbance of skin sensation    • Elevated cholesterol    • GERD (gastroesophageal reflux disease)    • Gout    • History of MRI of spine 12/16/2014    Done " "at [facility]    • Hx of exercise stress test 2014    \"IT WAS NORMAL\"     • Hyperlipidemia    • Hypertension    • Kidney disease     stage 2   • Lymphadenitis    • Malaise and fatigue    • Osteoarthrosis    • Rash    • Renal disorder    • Sleep apnea     NO CPAP   • Wears glasses     TO READ         Current Meds:     Current Outpatient Medications   Medication Sig Dispense Refill   • albuterol sulfate  (90 Base) MCG/ACT inhaler Inhale 2 puffs Every 4 (Four) Hours As Needed for Shortness of Air. 2 inhaler 5   • allopurinol (ZYLOPRIM) 100 MG tablet Take 1 tablet by mouth 2 (Two) Times a Day. 60 tablet 5   • azelastine (ASTELIN) 0.1 % nasal spray INSTILL 2 SPRAYS INTO EACH NOSTRIL BID  5   • B-D INTEGRA SYRINGE 25G X 5/8\" 3 ML misc See Admin Instructions.  0   • budesonide-formoterol (SYMBICORT) 160-4.5 MCG/ACT inhaler Inhale 2 puffs 2 (Two) Times a Day. 1 inhaler 5   • cetirizine (zyrTEC) 10 MG tablet Take 1 tablet by mouth At Night As Needed for Allergies. 30 tablet 0   • citalopram (CeleXA) 10 MG tablet TAKE 1 TABLET BY MOUTH DAILY 30 tablet 0   • CloNIDine (CATAPRES) 0.1 MG tablet Take 1 tablet by mouth 2 (Two) Times a Day As Needed for High Blood Pressure (BP higher than 160/90). 60 tablet 5   • clotrimazole-betamethasone (LOTRISONE) 1-0.05 % cream Apply  topically to the appropriate area as directed Every 12 (Twelve) Hours. 45 g 2   • diclofenac (VOLTAREN) 1 % gel gel Apply 4 g topically to the appropriate area as directed 4 (Four) Times a Day As Needed (joint pain). 4 tube 5   • FLOVENT HFA 44 MCG/ACT inhaler INHALE 1 PUFF D  5   • fluticasone (FLONASE) 50 MCG/ACT nasal spray 2 sprays into the nostril(s) as directed by provider Daily. Administer 2 sprays in each nostril for each dose. 1 bottle 5   • lansoprazole (PREVACID) 30 MG capsule Take 1 capsule by mouth Daily. 30 capsule 5   • methocarbamol (ROBAXIN) 750 MG tablet Take 1 tablet by mouth 4 (Four) Times a Day As Needed for Muscle Spasms. Take on " "days he works in place of flexeril 120 tablet 5   • montelukast (SINGULAIR) 10 MG tablet TAKE 1 TABLET BY MOUTH EVERY NIGHT 30 tablet 5   • omeprazole (priLOSEC) 40 MG capsule Take 1 capsule by mouth Daily. 30 capsule 5   • sodium chloride (OCEAN NASAL SPRAY) 0.65 % nasal spray 1 spray into each nostril As Needed for Congestion. 1 each 3   • sulfaSALAzine (AZULFIDINE) 500 MG tablet Take 1 tablet by mouth 4 (Four) Times a Day. Written by rheumatology , started back on his script 60 tablet 5   • Syringe 25G X 5/8\" 3 ML misc Use as directed 2 x weekly 24 each 3   • tadalafil (CIALIS) 5 MG tablet TAKE 1 TABLET BY MOUTH DAILY 30 tablet 0   • Testosterone Cypionate (DEPO-TESTOSTERONE) 200 MG/ML injection Inject 0.5 mL into the appropriate muscle as directed by prescriber 2 (Two) Times a Week. He is to use 1/2 cc every Monday and Thursday SQ 10 mL 2   • valsartan-hydrochlorothiazide (DIOVAN HCT) 320-25 MG per tablet Take 1 tablet by mouth Daily. 30 tablet 11     Current Facility-Administered Medications   Medication Dose Route Frequency Provider Last Rate Last Dose   • CloNIDine (CATAPRES) tablet 0.1 mg  0.1 mg Oral Once Estela Louis APRN            Allergies:      Allergies   Allergen Reactions   • Erythromycin Rash        Past Surgical History:     Past Surgical History:   Procedure Laterality Date   • BRAVO PROCEDURE N/A 8/4/2017    Procedure: ESOPHAGOGASTRODUODENOSCOPY AND FULLER;  Surgeon: Efra Suero MD;  Location: [facility] OR;  Service:    • BRAVO PROCEDURE N/A 12/19/2018    Procedure: ESOPHAGOGASTRODUODENOSCOPY AND FULLER;  Surgeon: Efra Suero MD;  Location: [facility] OR;  Service: Gastroenterology   • COLONOSCOPY  2014   • COLONOSCOPY      UNSURE OF DATE   • COLONOSCOPY N/A 5/3/2018    Procedure: COLONOSCOPY WITH COLD BIOPSIES AND COLD POLYPECTOMIES;  Surgeon: Bang Segundo MD;  Location: [facility] ENDOSCOPY;  Service: Gastroenterology   • NISSEN FUNDOPLICATION     • NISSEN FUNDOPLICATION N/A " 10/11/2017    Procedure: NISSEN FUNDOPLICATION LAPAROSCOPIC;  Surgeon: Efra Suero MD;  Location: [facility] OR;  Service:    • NV LAP,ESOPHAGOGAST FUNDOPLASTY N/A 10/11/2017    Procedure: HIATAL HERNIA REPAIR LAPAROSCOPIC;  Surgeon: Efra Suero MD;  Location: [facility] OR;  Service: General   • VASECTOMY           Social History:     Social History     Socioeconomic History   • Marital status:      Spouse name: Radha    • Number of children: 2   • Years of education: Not on file   • Highest education level: Not on file   Occupational History   • Occupation: [employer]   Social Needs   • Financial resource strain: Not very hard   • Food insecurity:     Worry: Never true     Inability: Never true   • Transportation needs:     Medical: No     Non-medical: No   Tobacco Use   • Smoking status: Never Smoker   • Smokeless tobacco: Never Used   Substance and Sexual Activity   • Alcohol use: Yes     Comment: social   • Drug use: No   • Sexual activity: Defer   Lifestyle   • Physical activity:     Days per week: 0 days     Minutes per session: 0 min   • Stress: To some extent       Family History:     Family History   Problem Relation Age of Onset   • Asthma Other    • Cancer Other    • COPD Other    • Diabetes Other    • Stroke Other    • Asthma Mother    • COPD Mother    • Asthma Father    • Depression Father    • Drug abuse Father    • Arthritis Maternal Grandmother    • Cancer Maternal Grandmother    • Diabetes Maternal Grandmother    • Heart disease Maternal Grandmother    • Hyperlipidemia Maternal Grandmother    • Stroke Paternal Grandfather    • Colon cancer Maternal Grandfather         mid 40s       Review of Systems:     Review of Systems   Constitutional: Negative.    HENT: Negative.    Eyes: Negative.    Respiratory: Negative.    Cardiovascular: Negative.    Gastrointestinal: Negative.    Endocrine: Negative.    Musculoskeletal: Negative.    Allergic/Immunologic: Negative.    Neurological: Negative.     Hematological: Negative.    Psychiatric/Behavioral: Negative.        Physical Exam:     Physical Exam   Constitutional: He is oriented to person, place, and time. He appears well-developed and well-nourished.   HENT:   Head: Normocephalic and atraumatic.   Eyes: Pupils are equal, round, and reactive to light. Conjunctivae and EOM are normal.   Neck: Normal range of motion.   Cardiovascular: Normal rate, regular rhythm, normal heart sounds and intact distal pulses.   Pulmonary/Chest: Effort normal and breath sounds normal.   Abdominal: Soft. Bowel sounds are normal.   Musculoskeletal: Normal range of motion.   Neurological: He is alert and oriented to person, place, and time. He has normal reflexes.   Skin: Skin is warm and dry.   Psychiatric: He has a normal mood and affect. His behavior is normal. Judgment and thought content normal.   Nursing note and vitals reviewed.      I have reviewed the following portions of the patient's history: allergies, current medications, past family history, past medical history, past social history, past surgical history, problem list and ROS and confirm it's accurate.      Procedure:       Assessment/Plan:   Low testosterone:  patient is here for follow-up.  Since beginning the medication he's been very pleased.  He reports a dramatic improvement in his erections, ability to achieve and maintain an erection, improvement in libido, increase in frequency of morning erections, a noticeable weight loss consistent with the treatment.  No development of breast problems or abnormalities.  He's going to have appropriate safety laboratory parameters checked.   He understands that the new data implicates testosterone with the development of prostate cancer and this is all but been disproven and the medical literature as well as the risks of cardiovascular disease which is actually also been disproven.  He understands that while he is a candidate for topical therapy if he is in contact with  children this is not an option because it's been shown to accentuate genitalia development at an early age that this frequently irreversible.  He also understands this is a controlled substance and as such will not be prescribed without appropriate follow-up and appropriate laboratory investigation.  He understands effects on spermatogenesis including the fact that this is not always completely reversible and not always completely limited his ability to father a child.  He has demonstrated facility in the technique of both intramuscular and subcutaneous injection.  And has been taught sterility one drawing up the medication.  Penile injection--the patient is interested in an attempt at a pharmacological penile injection for severe organic erectile dysfunction having failed the traditional oral therapy and having been offered a vacuum erection device.  After an appropriate informed consent including the significant risk of priapism, pain,, and lack of efficacy of the penis was prepped and draped.  Appropriate anatomy was demonstrated including the dorsal nerve complex at 12:00 on the dorsum of the penis and the urethra at 6:00 we recommend injections between 9 and 10:00 on the right side and to 3:00 on the left side up and down the shaft into the corporal body we utilized a 27-gauge needle and began our dose of 0.1 cc of Trymex compound.  Observed for 30 minutes before its results.  We discussed the grading on a 10 scale indicating a 5 would be tumescence with poor axial rigidity.  I indicated that when the patient goes home though be about a 30-40% improvement.  It was emphasized that the erection should last no more than 2 hours and anything more than that should prompt an immediate call to the office.  We also talked about pharmacological manipulation if the erection lasts for prolonged period including oral Sudafed or terbutaline.  We talked about pharmacological reduction of the erection using phenylephrine.   The patient wishes to proceed.            Patient's Body mass index is 34.51 kg/m². BMI is above normal parameters. Recommendations include: educational material.              This document has been electronically signed by STEPHEN BANGURA MD December 19, 2019 2:26 PM

## 2019-12-20 LAB
DEPRECATED RDW RBC AUTO: 41.5 FL (ref 37–54)
ERYTHROCYTE [DISTWIDTH] IN BLOOD BY AUTOMATED COUNT: 12.7 % (ref 12.3–15.4)
ESTRADIOL SERPL HS-MCNC: 70.6 PG/ML
HCT VFR BLD AUTO: 49.1 % (ref 37.5–51)
HGB BLD-MCNC: 17.6 G/DL (ref 13–17.7)
MCH RBC QN AUTO: 31.8 PG (ref 26.6–33)
MCHC RBC AUTO-ENTMCNC: 35.8 G/DL (ref 31.5–35.7)
MCV RBC AUTO: 88.6 FL (ref 79–97)
PLATELET # BLD AUTO: 195 10*3/MM3 (ref 140–450)
PMV BLD AUTO: 12.4 FL (ref 6–12)
PSA SERPL-MCNC: 0.34 NG/ML (ref 0–4)
RBC # BLD AUTO: 5.54 10*6/MM3 (ref 4.14–5.8)
TESTOST SERPL-MCNC: >1500 NG/DL (ref 249–836)
WBC NRBC COR # BLD: 6.96 10*3/MM3 (ref 3.4–10.8)

## 2019-12-23 ENCOUNTER — TELEPHONE (OUTPATIENT)
Dept: UROLOGY | Facility: CLINIC | Age: 34
End: 2019-12-23

## 2019-12-23 DIAGNOSIS — E28.0 ESTRADIOL EXCESS: Primary | ICD-10-CM

## 2019-12-23 RX ORDER — ANASTROZOLE 1 MG/1
1 TABLET ORAL WEEKLY
Qty: 12 TABLET | Refills: 6 | Status: SHIPPED | OUTPATIENT
Start: 2019-12-23 | End: 2020-07-14 | Stop reason: SDUPTHER

## 2019-12-23 NOTE — TELEPHONE ENCOUNTER
I called the pt with his test results and sent him over some armidex because his estradiol level was elevated.

## 2019-12-26 DIAGNOSIS — R53.83 OTHER FATIGUE: ICD-10-CM

## 2019-12-26 RX ORDER — TADALAFIL 5 MG/1
5 TABLET ORAL DAILY
Qty: 30 TABLET | Refills: 6 | Status: SHIPPED | OUTPATIENT
Start: 2019-12-26 | End: 2020-07-14 | Stop reason: SDUPTHER

## 2019-12-27 LAB
B BURGDOR IGG+IGM SER-ACNC: <0.91 ISR (ref 0–0.9)
WRITTEN AUTHORIZATION: NORMAL

## 2020-01-02 DIAGNOSIS — J45.42 MODERATE PERSISTENT ASTHMA WITH STATUS ASTHMATICUS: ICD-10-CM

## 2020-01-02 RX ORDER — FLUTICASONE PROPIONATE 50 MCG
SPRAY, SUSPENSION (ML) NASAL
Qty: 2 BOTTLE | Refills: 11 | Status: SHIPPED | OUTPATIENT
Start: 2020-01-02 | End: 2021-01-12

## 2020-01-30 ENCOUNTER — OFFICE VISIT (OUTPATIENT)
Dept: FAMILY MEDICINE CLINIC | Facility: CLINIC | Age: 35
End: 2020-01-30

## 2020-01-30 VITALS
WEIGHT: 237 LBS | BODY MASS INDEX: 33.18 KG/M2 | TEMPERATURE: 99 F | HEART RATE: 71 BPM | SYSTOLIC BLOOD PRESSURE: 150 MMHG | HEIGHT: 71 IN | OXYGEN SATURATION: 96 % | DIASTOLIC BLOOD PRESSURE: 80 MMHG

## 2020-01-30 DIAGNOSIS — G89.29 EXACERBATION OF CHRONIC BACK PAIN: ICD-10-CM

## 2020-01-30 DIAGNOSIS — R21 RASH: ICD-10-CM

## 2020-01-30 DIAGNOSIS — K21.00 GASTROESOPHAGEAL REFLUX DISEASE WITH ESOPHAGITIS: ICD-10-CM

## 2020-01-30 DIAGNOSIS — F32.A ANXIETY AND DEPRESSION: ICD-10-CM

## 2020-01-30 DIAGNOSIS — K20.90 ESOPHAGITIS WITH GASTRITIS: ICD-10-CM

## 2020-01-30 DIAGNOSIS — Z23 ENCOUNTER FOR VACCINATION: ICD-10-CM

## 2020-01-30 DIAGNOSIS — J45.42 MODERATE PERSISTENT ASTHMA WITH STATUS ASTHMATICUS: ICD-10-CM

## 2020-01-30 DIAGNOSIS — M54.9 EXACERBATION OF CHRONIC BACK PAIN: ICD-10-CM

## 2020-01-30 DIAGNOSIS — K58.2 IRRITABLE BOWEL SYNDROME WITH BOTH CONSTIPATION AND DIARRHEA: Primary | ICD-10-CM

## 2020-01-30 DIAGNOSIS — M1A.09X0 IDIOPATHIC CHRONIC GOUT OF MULTIPLE SITES WITHOUT TOPHUS: ICD-10-CM

## 2020-01-30 DIAGNOSIS — K29.70 ESOPHAGITIS WITH GASTRITIS: ICD-10-CM

## 2020-01-30 DIAGNOSIS — I10 ESSENTIAL HYPERTENSION: ICD-10-CM

## 2020-01-30 DIAGNOSIS — F41.9 ANXIETY AND DEPRESSION: ICD-10-CM

## 2020-01-30 PROCEDURE — 99214 OFFICE O/P EST MOD 30 MIN: CPT | Performed by: NURSE PRACTITIONER

## 2020-01-30 RX ORDER — CITALOPRAM 10 MG/1
10 TABLET ORAL DAILY
Qty: 30 TABLET | Refills: 5 | Status: SHIPPED | OUTPATIENT
Start: 2020-01-30 | End: 2020-05-04 | Stop reason: SDUPTHER

## 2020-01-30 RX ORDER — AMITRIPTYLINE HYDROCHLORIDE 25 MG/1
TABLET, FILM COATED ORAL
COMMUNITY
Start: 2020-01-07 | End: 2020-01-30 | Stop reason: SDUPTHER

## 2020-01-30 RX ORDER — AMITRIPTYLINE HYDROCHLORIDE 25 MG/1
25 TABLET, FILM COATED ORAL NIGHTLY PRN
Qty: 30 TABLET | Refills: 5 | Status: SHIPPED | OUTPATIENT
Start: 2020-01-30 | End: 2021-02-01

## 2020-01-30 RX ORDER — OMEPRAZOLE 40 MG/1
40 CAPSULE, DELAYED RELEASE ORAL DAILY
Qty: 30 CAPSULE | Refills: 5 | Status: SHIPPED | OUTPATIENT
Start: 2020-01-30 | End: 2020-02-13

## 2020-01-30 RX ORDER — CETIRIZINE HYDROCHLORIDE 10 MG/1
10 TABLET ORAL NIGHTLY PRN
Qty: 30 TABLET | Refills: 5 | Status: SHIPPED | OUTPATIENT
Start: 2020-01-30 | End: 2021-03-29 | Stop reason: SDUPTHER

## 2020-01-30 RX ORDER — FLUTICASONE PROPIONATE 44 MCG
2 AEROSOL WITH ADAPTER (GRAM) INHALATION
Qty: 1 INHALER | Refills: 5 | Status: SHIPPED | OUTPATIENT
Start: 2020-01-30 | End: 2021-02-01

## 2020-01-30 RX ORDER — BUDESONIDE AND FORMOTEROL FUMARATE DIHYDRATE 160; 4.5 UG/1; UG/1
2 AEROSOL RESPIRATORY (INHALATION)
Qty: 1 INHALER | Refills: 5 | Status: SHIPPED | OUTPATIENT
Start: 2020-01-30 | End: 2021-02-01

## 2020-01-30 RX ORDER — ALLOPURINOL 100 MG/1
100 TABLET ORAL 2 TIMES DAILY
Qty: 60 TABLET | Refills: 5 | Status: SHIPPED | OUTPATIENT
Start: 2020-01-30 | End: 2021-02-01

## 2020-01-30 RX ORDER — AMOXICILLIN 875 MG/1
875 TABLET, COATED ORAL EVERY 12 HOURS SCHEDULED
Qty: 20 TABLET | Refills: 0 | Status: SHIPPED | OUTPATIENT
Start: 2020-01-30 | End: 2020-02-13

## 2020-01-30 RX ORDER — METHOCARBAMOL 750 MG/1
750 TABLET, FILM COATED ORAL 4 TIMES DAILY PRN
Qty: 120 TABLET | Refills: 5 | Status: SHIPPED | OUTPATIENT
Start: 2020-01-30

## 2020-01-30 RX ORDER — ALBUTEROL SULFATE 90 UG/1
2 AEROSOL, METERED RESPIRATORY (INHALATION) EVERY 4 HOURS PRN
Qty: 2 INHALER | Refills: 5 | Status: SHIPPED | OUTPATIENT
Start: 2020-01-30

## 2020-01-30 RX ORDER — LANSOPRAZOLE 30 MG/1
30 CAPSULE, DELAYED RELEASE ORAL DAILY
Qty: 30 CAPSULE | Refills: 5 | Status: SHIPPED | OUTPATIENT
Start: 2020-01-30 | End: 2020-02-13

## 2020-01-30 RX ORDER — VALSARTAN AND HYDROCHLOROTHIAZIDE 160; 25 MG/1; MG/1
1 TABLET ORAL DAILY
Qty: 30 TABLET | Refills: 5 | Status: SHIPPED | OUTPATIENT
Start: 2020-01-30 | End: 2020-11-16

## 2020-02-03 NOTE — ASSESSMENT & PLAN NOTE
We will go ahead and decrease his Diovan strength as patient feels that the strong dose is making him feel weak.  He reports that his blood pressure is well maintained.  I have encouraged him to follow a low sodium diet and continue weight loss.  He is to check his blood pressure and report any reading greater than 150/90 or less than 100/60.  Patient states understanding.

## 2020-02-13 ENCOUNTER — CONSULT (OUTPATIENT)
Dept: GASTROENTEROLOGY | Facility: CLINIC | Age: 35
End: 2020-02-13

## 2020-02-13 VITALS
HEIGHT: 71 IN | HEART RATE: 66 BPM | OXYGEN SATURATION: 97 % | DIASTOLIC BLOOD PRESSURE: 80 MMHG | SYSTOLIC BLOOD PRESSURE: 148 MMHG | WEIGHT: 234 LBS | BODY MASS INDEX: 32.76 KG/M2

## 2020-02-13 DIAGNOSIS — Z98.890 HISTORY OF GASTRIC SURGERY: ICD-10-CM

## 2020-02-13 DIAGNOSIS — R10.13 DYSPEPSIA: Primary | ICD-10-CM

## 2020-02-13 DIAGNOSIS — R19.7 DIARRHEA, UNSPECIFIED TYPE: ICD-10-CM

## 2020-02-13 DIAGNOSIS — R68.81 EARLY SATIETY: ICD-10-CM

## 2020-02-13 DIAGNOSIS — Z80.0 FAMILY HISTORY OF COLON CANCER: ICD-10-CM

## 2020-02-13 DIAGNOSIS — K20.0 EOSINOPHILIC ESOPHAGITIS: ICD-10-CM

## 2020-02-13 DIAGNOSIS — R63.0 POOR APPETITE: ICD-10-CM

## 2020-02-13 PROBLEM — R13.10 DIFFICULTY SWALLOWING: Status: RESOLVED | Noted: 2017-06-12 | Resolved: 2020-02-13

## 2020-02-13 PROBLEM — K58.2 IRRITABLE BOWEL SYNDROME WITH BOTH CONSTIPATION AND DIARRHEA: Status: RESOLVED | Noted: 2018-02-22 | Resolved: 2020-02-13

## 2020-02-13 PROBLEM — R13.10 DYSPHAGIA: Status: RESOLVED | Noted: 2017-08-02 | Resolved: 2020-02-13

## 2020-02-13 PROBLEM — K21.00 GASTROESOPHAGEAL REFLUX DISEASE WITH ESOPHAGITIS: Status: RESOLVED | Noted: 2017-08-02 | Resolved: 2020-02-13

## 2020-02-13 PROCEDURE — 99243 OFF/OP CNSLTJ NEW/EST LOW 30: CPT | Performed by: PHYSICIAN ASSISTANT

## 2020-02-13 NOTE — PROGRESS NOTES
: 1985    Chief Complaint   Patient presents with   • Irritable Bowel Syndrome   • gastritis       Nghia Roach is a 34 y.o. male who presents to the office today as a follow up appointment regarding Irritable Bowel Syndrome and gastritis.    History of Present Illness:  Has been having poor appetite, feels full with only a couple of bites, also has nausea. This episode seemed to start Fall of  and seems to be getting worse. Food does not taste good to him. Has constant dyspepsia and sensation of fullness. He only eats small portions when he eats. Eating a meal makes him very ill. Has lost about 70 lbs over the past several years, he has had intermittent similar episodes and has lost weight each time. Had HIDA ordered by PCP but did not get a call to schedule it. He had HIDA in 2017 which showed 46% EF. Had noticed yellow stools when symptoms first became severe. Maternal grandfather had colon cancer, passed in mid 40s.     Last couple of stools have been semi-formed. He feels that he has not been eating enough to have many BMs. He had several stools in the past 2 days, prior to that went 4 days without any stools. Denies any rectal bleeding or black stool. Has history of Nissen. Had EGD with [facility] prior to that surgery. PCP gave him omeprazole 40 mg once daily but that does not seem to help. Denies any heartburn since his Nissen surgery. Last colonoscopy was in 2018, he had colon polyps and 1 was tubular adenomatous, told to have another in 3 years.     Abdominal pains are described as intermittent, aching, stays in his generalized abdomen. Points to LUQ and then moves over to RUQ, sometimes in bilateral lower quadrants. Admits borborygmi. Does also admit past history of eosinophilic esophagitis found on esophageal biopsy with Dr. Suero in 2018. Uses an inhaler for treatment. Never had food allergy testing.    Review of Systems   Constitutional: Positive for chills, fatigue and unexpected weight  "change. Negative for fever.   HENT: Negative for trouble swallowing.    Eyes: Negative.    Respiratory: Positive for chest tightness and shortness of breath. Negative for cough and choking.    Cardiovascular: Negative for chest pain.   Gastrointestinal: Positive for abdominal pain, constipation, diarrhea and nausea. Negative for abdominal distention, anal bleeding, blood in stool and vomiting.   Endocrine: Negative.    Genitourinary: Negative for difficulty urinating.   Musculoskeletal: Positive for back pain and neck pain.   Skin: Negative.    Allergic/Immunologic: Positive for environmental allergies. Negative for food allergies.   Neurological: Positive for dizziness, light-headedness and headaches.   Hematological: Does not bruise/bleed easily.   Psychiatric/Behavioral: Negative.      I have reviewed and confirmed the accuracy of the ROS as documented by the MA/LPN/RN Kavitha Vu PA-C    Past Medical History:   Diagnosis Date   • Allergic rhinitis    • Asthma    • Atrial flutter (CMS/HCC)    • Diarrhea    • Disturbance of skin sensation    • Elevated cholesterol    • GERD (gastroesophageal reflux disease)    • Gout    • History of MRI of spine 12/16/2014    Done at [facility]    • Hx of exercise stress test 2014    \"IT WAS NORMAL\"     • Hyperlipidemia    • Hypertension    • Kidney disease     stage 2   • Lymphadenitis    • Malaise and fatigue    • Osteoarthrosis    • Rash    • Renal disorder    • Sleep apnea     NO CPAP   • Wears glasses     TO READ       Past Surgical History:   Procedure Laterality Date   • BRAVO PROCEDURE N/A 8/4/2017    Procedure: ESOPHAGOGASTRODUODENOSCOPY AND FULLER;  Surgeon: Efra Suero MD;  Location: [facility];  Service:    • BRAVO PROCEDURE N/A 12/19/2018    Procedure: ESOPHAGOGASTRODUODENOSCOPY AND FULLER;  Surgeon: Efra Suero MD;  Location: [facility];  Service: Gastroenterology   • COLONOSCOPY  2014   • COLONOSCOPY      UNSURE OF DATE   • COLONOSCOPY N/A 5/3/2018    " Procedure: COLONOSCOPY WITH COLD BIOPSIES AND COLD POLYPECTOMIES;  Surgeon: Bang Segundo MD;  Location: [facility] ENDOSCOPY;  Service: Gastroenterology   • NISSEN FUNDOPLICATION     • NISSEN FUNDOPLICATION N/A 10/11/2017    Procedure: NISSEN FUNDOPLICATION LAPAROSCOPIC;  Surgeon: Efra Suero MD;  Location: [facility] OR;  Service:    • [place] LAP,ESOPHAGOGAST FUNDOPLASTY N/A 10/11/2017    Procedure: HIATAL HERNIA REPAIR LAPAROSCOPIC;  Surgeon: Efra Suero MD;  Location: [facility] OR;  Service: General   • VASECTOMY         Family History   Problem Relation Age of Onset   • Asthma Other    • Cancer Other    • COPD Other    • Diabetes Other    • Stroke Other    • Asthma Mother    • COPD Mother    • Asthma Father    • Depression Father    • Drug abuse Father    • Arthritis Maternal Grandmother    • Cancer Maternal Grandmother    • Diabetes Maternal Grandmother    • Heart disease Maternal Grandmother    • Hyperlipidemia Maternal Grandmother    • Stroke Paternal Grandfather    • Colon cancer Maternal Grandfather         mid 40s       Social History     Socioeconomic History   • Marital status:      Spouse name: Radha    • Number of children: 2   • Years of education: Not on file   • Highest education level: Not on file   Occupational History   • Occupation: [employer]   Social Needs   • Financial resource strain: Not very hard   • Food insecurity:     Worry: Never true     Inability: Never true   • Transportation needs:     Medical: No     Non-medical: No   Tobacco Use   • Smoking status: Never Smoker   • Smokeless tobacco: Never Used   Substance and Sexual Activity   • Alcohol use: Yes     Comment: social   • Drug use: No   • Sexual activity: Defer   Lifestyle   • Physical activity:     Days per week: 0 days     Minutes per session: 0 min   • Stress: To some extent       Current Outpatient Medications:   •  albuterol sulfate  (90 Base) MCG/ACT inhaler, Inhale 2 puffs Every 4 (Four) Hours As Needed for  "Shortness of Air., Disp: 2 inhaler, Rfl: 5  •  allopurinol (ZYLOPRIM) 100 MG tablet, Take 1 tablet by mouth 2 (Two) Times a Day., Disp: 60 tablet, Rfl: 5  •  amitriptyline (ELAVIL) 25 MG tablet, Take 1 tablet by mouth At Night As Needed for Sleep., Disp: 30 tablet, Rfl: 5  •  anastrozole (ARIMIDEX) 1 MG tablet, Take 1 tablet by mouth 1 (One) Time Per Week. For 12 weeks., Disp: 12 tablet, Rfl: 6  •  azelastine (ASTELIN) 0.1 % nasal spray, INSTILL 2 SPRAYS INTO EACH NOSTRIL BID, Disp: , Rfl: 5  •  B-D INTEGRA SYRINGE 25G X 5/8\" 3 ML misc, See Admin Instructions., Disp: , Rfl: 0  •  budesonide-formoterol (SYMBICORT) 160-4.5 MCG/ACT inhaler, Inhale 2 puffs 2 (Two) Times a Day., Disp: 1 inhaler, Rfl: 5  •  cetirizine (zyrTEC) 10 MG tablet, Take 1 tablet by mouth At Night As Needed for Allergies., Disp: 30 tablet, Rfl: 5  •  citalopram (CeleXA) 10 MG tablet, Take 1 tablet by mouth Daily., Disp: 30 tablet, Rfl: 5  •  clotrimazole-betamethasone (LOTRISONE) 1-0.05 % cream, Apply  topically to the appropriate area as directed Every 12 (Twelve) Hours., Disp: 45 g, Rfl: 2  •  diclofenac (VOLTAREN) 1 % gel gel, Apply 4 g topically to the appropriate area as directed 4 (Four) Times a Day As Needed (joint pain)., Disp: 4 tube, Rfl: 5  •  FLOVENT HFA 44 MCG/ACT inhaler, Inhale 2 puffs 2 (Two) Times a Day., Disp: 1 inhaler, Rfl: 5  •  fluticasone (FLONASE) 50 MCG/ACT nasal spray, INSTILL 2 SPRAYS INTO EACH NOSTRIL AS DIRECTED DAILY, Disp: 2 bottle, Rfl: 11  •  lansoprazole (PREVACID) 30 MG capsule, Take 1 capsule by mouth Daily., Disp: 30 capsule, Rfl: 5  •  methocarbamol (ROBAXIN) 750 MG tablet, Take 1 tablet by mouth 4 (Four) Times a Day As Needed for Muscle Spasms. Take on days he works in place of flexeril, Disp: 120 tablet, Rfl: 5  •  montelukast (SINGULAIR) 10 MG tablet, TAKE 1 TABLET BY MOUTH EVERY NIGHT, Disp: 30 tablet, Rfl: 5  •  omeprazole (priLOSEC) 40 MG capsule, Take 1 capsule by mouth Daily., Disp: 30 capsule, Rfl: " "5  •  sulfaSALAzine (AZULFIDINE) 500 MG tablet, Take 1 tablet by mouth 4 (Four) Times a Day. Written by rheumatology , started back on his script, Disp: 60 tablet, Rfl: 5  •  Syringe 25G X 5/8\" 3 ML misc, Use as directed 2 x weekly, Disp: 24 each, Rfl: 3  •  tadalafil (CIALIS) 5 MG tablet, TAKE 1 TABLET BY MOUTH DAILY, Disp: 30 tablet, Rfl: 6  •  Testosterone Cypionate (DEPO-TESTOSTERONE) 200 MG/ML injection, He is to use 1/2 cc every Monday and Thursday SQ, Disp: 10 mL, Rfl: 2  •  valsartan-hydrochlorothiazide (DIOVAN HCT) 160-25 MG per tablet, Take 1 tablet by mouth Daily., Disp: 30 tablet, Rfl: 5    Current Facility-Administered Medications:   •  CloNIDine (CATAPRES) tablet 0.1 mg, 0.1 mg, Oral, Once, Estela Louis APRN    Allergies:   Erythromycin    Vitals:  /80 (BP Location: Left arm, Patient Position: Sitting, Cuff Size: Adult)   Pulse 66   Ht 180.3 cm (71\")   Wt 106 kg (234 lb)   SpO2 97%   BMI 32.64 kg/m²     Physical Exam   Constitutional: He is oriented to person, place, and time. He appears well-developed and well-nourished. No distress.   HENT:   Head: Normocephalic and atraumatic.   Nose: Nose normal.   Mouth/Throat: Oropharynx is clear and moist.   Eyes: Conjunctivae are normal. Right eye exhibits no discharge. Left eye exhibits no discharge. No scleral icterus.   Neck: Normal range of motion. No JVD present.   Cardiovascular: Normal rate, regular rhythm and normal heart sounds. Exam reveals no gallop and no friction rub.   No murmur heard.  Pulmonary/Chest: Effort normal and breath sounds normal. No respiratory distress. He has no wheezes. He has no rales. He exhibits no tenderness.   Abdominal: Soft. Bowel sounds are normal. He exhibits no mass. There is no tenderness.   Musculoskeletal: Normal range of motion. He exhibits no edema or deformity.   Neurological: He is alert and oriented to person, place, and time. Coordination normal.   Skin: Skin is warm and dry. No rash " noted. He is not diaphoretic. No erythema.   Psychiatric: His behavior is normal. Judgment and thought content normal.   Anxious affect, mild   Vitals reviewed.    Assessment:  1. Dyspepsia    2. Early satiety    3. Poor appetite    4. History of gastric surgery    5. Eosinophilic esophagitis    6. Diarrhea, unspecified type    7. Family history of colon cancer      Plan:  Orders Placed This Encounter   Procedures   • Follow Anesthesia Guidelines / Standing Orders   • Obtain Informed Consent     ESOPHAGOGASTRODUODENOSCOPY WITH BIOPSY CPT CODE: 08081 (N/A)  He will need an esophagogastroduodenoscopy performed with IV general sedation. All of the risks, benefits and alternatives of this procedure have been discussed with him, all of his questions have been answered and he has elected to proceed. He should follow up in the office after this procedure to discuss the results and further recommendations can be made at that time.    I suggested that he have EGD first for evaluation of complaints. Reviewed last labs in Dec 2019 with normal CBC and normal CMP. He has also had previous negative testing for celiac disease and previous antrum biopsy showed negative H pylori. If EGD reveals no findings, I have suggested CT scan of the abd/pelv for further evaluation. Discussed possible HIDA scan as well but will defer at this time.     Due for next CRCS due to history of adenomatous colon polyps and family history of colon cancer in 2023.         Return for follow up after procedure to discuss results.      Electronically signed 2/13/2020 3:25 PM  Kavitha Vu PA-C, [facility]

## 2020-03-04 PROBLEM — Z98.890 HISTORY OF GASTRIC SURGERY: Status: ACTIVE | Noted: 2020-03-04

## 2020-03-04 PROBLEM — R68.81 EARLY SATIETY: Status: ACTIVE | Noted: 2020-03-04

## 2020-03-04 PROBLEM — K20.0 EOSINOPHILIC ESOPHAGITIS: Status: ACTIVE | Noted: 2020-03-04

## 2020-03-04 PROBLEM — R10.13 DYSPEPSIA: Status: ACTIVE | Noted: 2020-03-04

## 2020-03-04 PROBLEM — R63.0 POOR APPETITE: Status: ACTIVE | Noted: 2020-03-04

## 2020-03-09 ENCOUNTER — ANESTHESIA (OUTPATIENT)
Dept: PERIOP | Facility: HOSPITAL | Age: 35
End: 2020-03-09

## 2020-03-09 ENCOUNTER — ANESTHESIA EVENT (OUTPATIENT)
Dept: PERIOP | Facility: HOSPITAL | Age: 35
End: 2020-03-09

## 2020-03-09 ENCOUNTER — HOSPITAL ENCOUNTER (OUTPATIENT)
Facility: HOSPITAL | Age: 35
Setting detail: HOSPITAL OUTPATIENT SURGERY
Discharge: HOME | End: 2020-03-09
Attending: INTERNAL MEDICINE | Admitting: INTERNAL MEDICINE

## 2020-03-09 VITALS
RESPIRATION RATE: 20 BRPM | HEIGHT: 71 IN | HEART RATE: 63 BPM | WEIGHT: 220 LBS | TEMPERATURE: 98.3 F | SYSTOLIC BLOOD PRESSURE: 139 MMHG | BODY MASS INDEX: 30.8 KG/M2 | OXYGEN SATURATION: 98 % | DIASTOLIC BLOOD PRESSURE: 81 MMHG

## 2020-03-09 DIAGNOSIS — K20.0 EOSINOPHILIC ESOPHAGITIS: ICD-10-CM

## 2020-03-09 DIAGNOSIS — R68.81 EARLY SATIETY: ICD-10-CM

## 2020-03-09 DIAGNOSIS — Z98.890 HISTORY OF GASTRIC SURGERY: ICD-10-CM

## 2020-03-09 DIAGNOSIS — R63.0 POOR APPETITE: ICD-10-CM

## 2020-03-09 DIAGNOSIS — R19.7 DIARRHEA, UNSPECIFIED TYPE: ICD-10-CM

## 2020-03-09 DIAGNOSIS — R10.13 DYSPEPSIA: ICD-10-CM

## 2020-03-09 PROCEDURE — C1726 CATH, BAL DIL, NON-VASCULAR: HCPCS | Performed by: INTERNAL MEDICINE

## 2020-03-09 PROCEDURE — 25010000002 PROPOFOL 10 MG/ML EMULSION: Performed by: NURSE ANESTHETIST, CERTIFIED REGISTERED

## 2020-03-09 PROCEDURE — 43239 EGD BIOPSY SINGLE/MULTIPLE: CPT | Performed by: INTERNAL MEDICINE

## 2020-03-09 PROCEDURE — 43249 ESOPH EGD DILATION <30 MM: CPT | Performed by: INTERNAL MEDICINE

## 2020-03-09 RX ORDER — SODIUM CHLORIDE 0.9 % (FLUSH) 0.9 %
10 SYRINGE (ML) INJECTION AS NEEDED
Status: DISCONTINUED | OUTPATIENT
Start: 2020-03-09 | End: 2020-03-09 | Stop reason: HOSPADM

## 2020-03-09 RX ORDER — SODIUM CHLORIDE, SODIUM LACTATE, POTASSIUM CHLORIDE, CALCIUM CHLORIDE 600; 310; 30; 20 MG/100ML; MG/100ML; MG/100ML; MG/100ML
125 INJECTION, SOLUTION INTRAVENOUS CONTINUOUS
Status: DISCONTINUED | OUTPATIENT
Start: 2020-03-09 | End: 2020-03-09 | Stop reason: HOSPADM

## 2020-03-09 RX ORDER — ONDANSETRON 2 MG/ML
4 INJECTION INTRAMUSCULAR; INTRAVENOUS AS NEEDED
Status: DISCONTINUED | OUTPATIENT
Start: 2020-03-09 | End: 2020-03-09 | Stop reason: HOSPADM

## 2020-03-09 RX ORDER — OMEPRAZOLE 40 MG/1
40 CAPSULE, DELAYED RELEASE ORAL
Qty: 60 CAPSULE | Refills: 11 | Status: SHIPPED | OUTPATIENT
Start: 2020-03-09 | End: 2022-06-15

## 2020-03-09 RX ORDER — SODIUM CHLORIDE 0.9 % (FLUSH) 0.9 %
10 SYRINGE (ML) INJECTION EVERY 12 HOURS SCHEDULED
Status: DISCONTINUED | OUTPATIENT
Start: 2020-03-09 | End: 2020-03-09 | Stop reason: HOSPADM

## 2020-03-09 RX ORDER — OXYCODONE HYDROCHLORIDE AND ACETAMINOPHEN 5; 325 MG/1; MG/1
1 TABLET ORAL ONCE AS NEEDED
Status: DISCONTINUED | OUTPATIENT
Start: 2020-03-09 | End: 2020-03-09 | Stop reason: HOSPADM

## 2020-03-09 RX ORDER — IPRATROPIUM BROMIDE AND ALBUTEROL SULFATE 2.5; .5 MG/3ML; MG/3ML
3 SOLUTION RESPIRATORY (INHALATION) ONCE AS NEEDED
Status: DISCONTINUED | OUTPATIENT
Start: 2020-03-09 | End: 2020-03-09 | Stop reason: HOSPADM

## 2020-03-09 RX ORDER — PROPOFOL 10 MG/ML
VIAL (ML) INTRAVENOUS AS NEEDED
Status: DISCONTINUED | OUTPATIENT
Start: 2020-03-09 | End: 2020-03-09 | Stop reason: SURG

## 2020-03-09 RX ORDER — LIDOCAINE HYDROCHLORIDE 20 MG/ML
INJECTION, SOLUTION INFILTRATION; PERINEURAL AS NEEDED
Status: DISCONTINUED | OUTPATIENT
Start: 2020-03-09 | End: 2020-03-09 | Stop reason: SURG

## 2020-03-09 RX ORDER — MEPERIDINE HYDROCHLORIDE 25 MG/ML
12.5 INJECTION INTRAMUSCULAR; INTRAVENOUS; SUBCUTANEOUS
Status: DISCONTINUED | OUTPATIENT
Start: 2020-03-09 | End: 2020-03-09 | Stop reason: HOSPADM

## 2020-03-09 RX ORDER — FENTANYL CITRATE 50 UG/ML
50 INJECTION, SOLUTION INTRAMUSCULAR; INTRAVENOUS
Status: DISCONTINUED | OUTPATIENT
Start: 2020-03-09 | End: 2020-03-09 | Stop reason: HOSPADM

## 2020-03-09 RX ORDER — SODIUM CHLORIDE, SODIUM LACTATE, POTASSIUM CHLORIDE, CALCIUM CHLORIDE 600; 310; 30; 20 MG/100ML; MG/100ML; MG/100ML; MG/100ML
INJECTION, SOLUTION INTRAVENOUS CONTINUOUS PRN
Status: DISCONTINUED | OUTPATIENT
Start: 2020-03-09 | End: 2020-03-09 | Stop reason: SURG

## 2020-03-09 RX ADMIN — LIDOCAINE HYDROCHLORIDE 30 MG: 20 INJECTION, SOLUTION INFILTRATION; PERINEURAL at 15:28

## 2020-03-09 RX ADMIN — PROPOFOL 200 MCG/KG/MIN: 10 INJECTION, EMULSION INTRAVENOUS at 15:31

## 2020-03-09 RX ADMIN — PROPOFOL 30 MG: 10 INJECTION, EMULSION INTRAVENOUS at 15:31

## 2020-03-09 RX ADMIN — SODIUM CHLORIDE, POTASSIUM CHLORIDE, SODIUM LACTATE AND CALCIUM CHLORIDE: 600; 310; 30; 20 INJECTION, SOLUTION INTRAVENOUS at 15:31

## 2020-03-09 NOTE — OP NOTE
ESOPHAGOGASTRODUODENOSCOPY PROCEDURE REPORT    Nghia Roach  3/9/2020    PRE-PROCEDURE DIAGNOSIS:  Dyspepsia [R10.13]  Early satiety [R68.81]  Poor appetite [R63.0]  History of gastric surgery [Z98.890]  Eosinophilic esophagitis [K20.0]  Diarrhea, unspecified type [R19.7]    POST-PROCEDURE DIAGNOSIS:  1.-  Eosinophilic esophagitis with longitudinal furrows and circumferential rings.  Pathology pending for confirmation  2.-  EG junction at 38 cm with circumferential ringlike stricture and focal erosive esophagitis and suspected short segment Mckeon's esophagus.  Biopsies pending for confirmation  3.-  Status post esophageal dilation to 20 mm with through-the-scope balloon  4.-  Small sliding hiatal hernia  5.-  Evidence of prior fundoplication grossly appears intact.    INDICATION:  Intermittent dysphagia solids  GERD  History of eosinophilic esophagitis    Procedure(s):  ESOPHAGOGASTRODUODENOSCOPY with biopsy and dilation    GASTROENTEROLOGIST:  Ricardo Nolasco MD    ANESTHESIA:  Propofol administered by anesthesia.  See anesthesia notes for ASA classification    STAFF:  Circulator: Petty Rodrigues RN  Endo Technician: Cristofer Alaniz    FINDINGS:  As noted in the post procedure diagnosis    OPERATIVE PROCEDURE:  After proper informed consent was obtained, patient was transferred to the OR/endoscopy suite.  Patient was then placed in left lateral decubitus position. The Olympus 180 series video gastroscope was inserted orally under direct visualization.  Esophagus, stomach, and duodenum were inspected.  The endoscope was passed to the third portion of the duodenum.  Scope was retroflexed for visualization of the cardia and incisuraThe endoscope was then withdrawn. Patient tolerated the procedure well. There were no immediate complications.    ESTIMATED BLOOD LOSS:  None    SPECIMENS:  Esophageal body biopsies to rule out eosinophilia  Distal esophageal biopsies to rule out short segment  Mckeon's esophagus               COMPLICATIONS;  None    RECOMMENDATIONS/ PLAN:  1.-  Omeprazole 40 mg p.o. twice daily  2.-  Await pathology report  3.-  GI clinic Follow-up appointment  Ricardo Nolasco MD     03/09/20 3:43 PM

## 2020-03-09 NOTE — ANESTHESIA PREPROCEDURE EVALUATION
Anesthesia Evaluation     Patient summary reviewed and Nursing notes reviewed   no history of anesthetic complications:  NPO Solid Status: > 8 hours  NPO Liquid Status: > 8 hours           Airway   Mallampati: II  TM distance: >3 FB  Neck ROM: full  no difficulty expected  Dental - normal exam   (+) poor dentition        Pulmonary - normal exam   (+) asthma,sleep apnea,   (-) not a smoker (secondhand smoke exposure for over 21 years)  Cardiovascular - normal exam  Exercise tolerance: good (4-7 METS)    NYHA Classification: II  Rhythm: regular  Rate: normal    (+) hypertension, dysrhythmias Atrial Flutter, hyperlipidemia,   (-) past MI, angina, CHF      Neuro/Psych  (+) headaches, numbness, psychiatric history Anxiety and Depression,     (-) seizures, CVA  GI/Hepatic/Renal/Endo    (+) obesity,  hiatal hernia, GERD well controlled,  renal disease CRI,   (-) diabetes    Musculoskeletal     (+) back pain, chronic pain, myalgias, radiculopathy Right lower extremity      ROS comment: Fibromyalgia  Abdominal  - normal exam    Abdomen: soft.  Bowel sounds: normal.   Substance History - negative use     OB/GYN negative ob/gyn ROS         Other   arthritis,                     Anesthesia Evaluation     Patient summary reviewed and Nursing notes reviewed   no history of anesthetic complications:  NPO Solid Status: > 8 hours  NPO Liquid Status: > 8 hours     Airway   Mallampati: II  TM distance: >3 FB  Neck ROM: full  no difficulty expected  Dental - normal exam         Pulmonary - normal exam   (+) asthma,   (-) not a smoker  Cardiovascular - normal exam  Exercise tolerance: good (4-7 METS)    NYHA Classification: II    (+) hypertension, dysrhythmias Atrial Flutter, hyperlipidemia  (-) past MI, angina      Neuro/Psych  (+) headaches,    (-) seizures, CVA  GI/Hepatic/Renal/Endo    (+) obesity,  hiatal hernia, GERD, renal disease,   (-) diabetes, hypothyroidism    Musculoskeletal     (+) back pain, myalgias,   (-) radiculopathy   Abdominal  - normal exam    Bowel sounds: normal.   Substance History - negative use     OB/GYN negative ob/gyn ROS         Other   (+) arthritis         Phys Exam Other: Lower partial                                Anesthesia Plan    ASA 3     general     intravenous induction   Anesthetic plan and risks discussed with patient.  Use of blood products discussed with patient  Consented to blood products.       Anesthesia Plan    ASA 3     general   total IV anesthesia(Risks and benefits discussed including risk of aspiration, recall and dental damage. All patient questions answered.)  intravenous induction     Anesthetic plan, all risks, benefits, and alternatives have been provided, discussed and informed consent has been obtained with: patient.  Use of blood products discussed with patient and spouse/significant other  Consented to blood products.   Plan discussed with CRNA.

## 2020-03-09 NOTE — ANESTHESIA POSTPROCEDURE EVALUATION
Patient: Nghia Roach    Procedure Summary     Date:  03/09/20 Room / Location:   [facility]  /  [facility]    Anesthesia Start:  1528 Anesthesia Stop:  1545    Procedure:  ESOPHAGOGASTRODUODENOSCOPY WITH BIOPSY CPT CODE: 22139 (N/A Esophagus) Diagnosis:       Dyspepsia      Early satiety      Poor appetite      History of gastric surgery      Eosinophilic esophagitis      Diarrhea, unspecified type      (Dyspepsia [R10.13])      (Early satiety [R68.81])      (Poor appetite [R63.0])      (History of gastric surgery [Z98.890])      (Eosinophilic esophagitis [K20.0])      (Diarrhea, unspecified type [R19.7])    Surgeon:  Ricardo Nolasco MD Provider:  Rex Haas MD    Anesthesia Type:  general ASA Status:  3          Anesthesia Type: general    Vitals  Vitals Value Taken Time   /43 3/9/2020  3:46 PM   Temp 98.3 °F (36.8 °C) 3/9/2020  3:46 PM   Pulse 69 3/9/2020  3:46 PM   Resp 18 3/9/2020  3:46 PM   SpO2 99 % 3/9/2020  3:46 PM           Post Anesthesia Care and Evaluation    Patient location during evaluation: PHASE II  Patient participation: complete - patient participated  Level of consciousness: awake and alert  Pain score: 1  Pain management: adequate  Airway patency: patent  Anesthetic complications: No anesthetic complications  PONV Status: controlled  Cardiovascular status: acceptable  Respiratory status: acceptable  Hydration status: acceptable

## 2020-03-12 LAB
LAB AP CASE REPORT: NORMAL
PATH REPORT.FINAL DX SPEC: NORMAL

## 2020-04-06 ENCOUNTER — TELEMEDICINE (OUTPATIENT)
Dept: FAMILY MEDICINE CLINIC | Facility: CLINIC | Age: 35
End: 2020-04-06

## 2020-04-06 DIAGNOSIS — I10 ESSENTIAL HYPERTENSION: ICD-10-CM

## 2020-04-06 DIAGNOSIS — M05.79 RHEUMATOID ARTHRITIS INVOLVING MULTIPLE SITES WITH POSITIVE RHEUMATOID FACTOR (HCC): ICD-10-CM

## 2020-04-06 DIAGNOSIS — J45.42 MODERATE PERSISTENT ASTHMA WITH STATUS ASTHMATICUS: ICD-10-CM

## 2020-04-06 DIAGNOSIS — K20.0 EOSINOPHILIC ESOPHAGITIS: Primary | ICD-10-CM

## 2020-04-06 PROCEDURE — 99213 OFFICE O/P EST LOW 20 MIN: CPT | Performed by: NURSE PRACTITIONER

## 2020-04-06 NOTE — ASSESSMENT & PLAN NOTE
Asthma is Frequent exacerbations.  The patient is experiencing frequent daytime asthma symptoms. He is experiencing frequent nighttime asthma symptoms.  Discussed monitoring symptoms and use of quick-relief medications and contacting us early in the course of exacerbations.  Warning signs of respiratory distress were reviewed with the patient.   Reduce exposure to inhaled allergens: use impermeable mattress and pillow covers.  Discussed technique for using MDIs and/or nebulizer.  High risk for coronavirus complications. symptoms are worse with changes in temperature.  Does have reactive asthma to activity and certain triggers.

## 2020-04-06 NOTE — ASSESSMENT & PLAN NOTE
Patient is under the care of rheumatology.  He is at high risk for complications coronavirus due to receiving immunologic medications.

## 2020-04-06 NOTE — PROGRESS NOTES
Subjective   Nghia Roach is a 34 y.o. male.     No chief complaint on file.    Telemedicine visit through my chart using both audio and video.  cc  Asthma   Auto immune   Hypertension    History of Present Illness:    First positive for coronavirus in his home county.  There are several in the surrounding counties.  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  Patient has a diagnosis including hypertension, asthma, esophagitis with gastritis and rheumatoid arthritis.  Does receive rheumatology medications which can be immunosuppressive.  He is considered high risk for coronavirus.  Patient states that his company is not allowing voluntary layoffs.  He would like to know if he is at high risk and if he should go to work.      The following portions of the patient's history and ROS were reviewed and updated as appropriate per provider:  Allergies, current medications, past family history, past medical history, past social history, past surgical history and problem list.    Review of Systems   Constitutional: Negative for activity change, appetite change, fatigue and fever.   HENT: Positive for sinus pressure and sneezing. Negative for congestion, ear pain, sinus pain, sore throat and trouble swallowing.    Eyes: Negative.    Respiratory: Negative for cough, chest tightness and shortness of breath.    Gastrointestinal: Negative for abdominal pain, anal bleeding, blood in stool and constipation.   Endocrine: Negative.    Genitourinary: Negative for dysuria and flank pain.   Musculoskeletal: Positive for arthralgias and back pain. Negative for neck pain and neck stiffness.   Skin: Negative.    Allergic/Immunologic: Positive for environmental allergies and immunocompromised state. Negative for food allergies.   Neurological: Negative for facial asymmetry, light-headedness and numbness.   Hematological: Negative.    Psychiatric/Behavioral: Negative for dysphoric mood, self-injury and suicidal  ideas.       Objective     There were no vitals taken for this visit.  Admission on 03/09/2020, Discharged on 03/09/2020   Component Date Value Ref Range Status   • Case Report 03/09/2020    Final                    Value:Surgical Pathology Report                         Case: [id number]                                  Authorizing Provider:  Constance Ricardo     Collected:           03/09/2020 03:38 PM                                 MD Avery                                                                    Ordering Location:     [facility]      Received:            03/09/2020 04:13 PM                                 OPERATING ROOM DEPARTMENT                                                    Pathologist:           Marilee Macias MD                                                       Specimens:   1) - Esophagus, Distal                                                                              2) - Esophagus                                                                            • Final Diagnosis 03/09/2020    Final                    Value:This result contains rich text formatting which cannot be displayed here.       Physical Exam   Constitutional: He appears well-developed and well-nourished. He is cooperative. No distress.   HENT:   Right Ear: External ear normal.   Left Ear: External ear normal.   Eyes: Pupils are equal, round, and reactive to light.   Pulmonary/Chest: Effort normal.   Skin: No rash noted.   Psychiatric: He has a normal mood and affect. His speech is normal and behavior is normal. Judgment and thought content normal. Cognition and memory are normal.       Assessment/Plan     Problem List Items Addressed This Visit        Cardiovascular and Mediastinum    Essential hypertension    Overview     Fluctuates up and down.  Patient states when he checks it at home it is fairly well controlled.         Current Assessment & Plan     High risk for coronavirus  complications            Respiratory    Moderate persistent asthma with status asthmaticus    Current Assessment & Plan     Asthma is Frequent exacerbations.  The patient is experiencing frequent daytime asthma symptoms. He is experiencing frequent nighttime asthma symptoms.  Discussed monitoring symptoms and use of quick-relief medications and contacting us early in the course of exacerbations.  Warning signs of respiratory distress were reviewed with the patient.   Reduce exposure to inhaled allergens: use impermeable mattress and pillow covers.  Discussed technique for using MDIs and/or nebulizer.  High risk for coronavirus complications. symptoms are worse with changes in temperature.  Does have reactive asthma to activity and certain triggers.                Digestive    Eosinophilic esophagitis - Primary    Overview     Added automatically from request for surgery [id number]            Musculoskeletal and Integument    Rheumatoid arthritis involving multiple sites with positive rheumatoid factor (CMS/[facility])    Current Assessment & Plan     Patient is under the care of rheumatology.  He is at high risk for complications coronavirus due to receiving immunologic medications.                      I reviewed the patient's medication list and problem list with him today.  It is my medical opinion that he does not need to be going to work or involved in any social activity at this time.  I recommend he remain home for the next 4 weeks.  We will evaluate further at that time.  I am placing him off of work at this time.  Patient has been provided with a letter stating he is off work for the next 4 weeks which has been sent through my chart.  Patient will notify his employer and have [facility] paperwork sent to this office so I can complete it if needed.    I have discussed diagnosis in detail today allowing time for questions and answers. Patient is aware of reasons to seek urgent or emergent medical care as well as reasons to  return to the clinic for evaluation. Possible side effects, interactions and progression of symptoms discussed as well. Patient / family states understanding.   Emotional support and active listening provided.       I would like to see him back in 4 weeks for further evaluation via video telehealth.           This document has been electronically signed by:  CORDELIA Flores, NP-C

## 2020-05-04 ENCOUNTER — OFFICE VISIT (OUTPATIENT)
Dept: FAMILY MEDICINE CLINIC | Facility: CLINIC | Age: 35
End: 2020-05-04

## 2020-05-04 DIAGNOSIS — I10 ESSENTIAL HYPERTENSION: ICD-10-CM

## 2020-05-04 DIAGNOSIS — J45.42 MODERATE PERSISTENT ASTHMA WITH STATUS ASTHMATICUS: Primary | ICD-10-CM

## 2020-05-04 DIAGNOSIS — F32.A ANXIETY AND DEPRESSION: ICD-10-CM

## 2020-05-04 DIAGNOSIS — F41.9 ANXIETY AND DEPRESSION: ICD-10-CM

## 2020-05-04 PROCEDURE — 99441 PR PHYS/QHP TELEPHONE EVALUATION 5-10 MIN: CPT | Performed by: NURSE PRACTITIONER

## 2020-05-04 RX ORDER — CITALOPRAM 20 MG/1
20 TABLET ORAL DAILY
Qty: 30 TABLET | Refills: 3 | Status: SHIPPED | OUTPATIENT
Start: 2020-05-04 | End: 2020-10-25 | Stop reason: SDUPTHER

## 2020-05-04 RX ORDER — LANSOPRAZOLE 30 MG/1
30 CAPSULE, DELAYED RELEASE ORAL AS NEEDED
COMMUNITY
Start: 2020-03-22 | End: 2022-06-15

## 2020-05-04 RX ORDER — METHYLPREDNISOLONE 4 MG/1
TABLET ORAL
Qty: 21 TABLET | Refills: 0 | Status: SHIPPED | OUTPATIENT
Start: 2020-05-04 | End: 2020-06-01

## 2020-05-04 NOTE — PROGRESS NOTES
Establish patient called office of APRN today to discuss:   CC    Asthma      You have chosen to receive care through a telephone visit today. Do you consent to use a telephone visit for your medical care today? Yes     Brief HPI/ROS obtained as follows:    Asthma exacerbation for past few days. Inhalers not helping as much as usual.  Has been out doing some yard work.  Usually has summer exacerbations caused by environmental allergies.  He is using his inhalers and trying to avoid exacerbation.    Hypertension-checking his blood pressure daily with good control.      Anxiety and depression-currently taking Celexa 10 mg daily.  He also receives Elavil on a as needed basis at night.  Patient states that he has been very anxious and worried over the current coronavirus pandemic.  He is requesting an increase on his Celexa.  Denies any thoughts of hurting self or others.        Review of Systems   Constitutional: Positive for activity change. Negative for fatigue and fever.   HENT: Positive for congestion and rhinorrhea. Negative for sore throat.    Eyes: Negative.    Respiratory: Positive for cough, chest tightness and wheezing. Negative for choking.    Cardiovascular: Negative for chest pain, palpitations and leg swelling.   Gastrointestinal: Negative for abdominal pain, blood in stool and nausea.   Endocrine: Negative.    Genitourinary: Negative for dysuria, flank pain and frequency.   Musculoskeletal: Positive for arthralgias, back pain and myalgias. Negative for neck pain and neck stiffness.   Skin: Negative.    Allergic/Immunologic: Positive for environmental allergies. Negative for food allergies.   Neurological: Negative for dizziness, light-headedness and numbness.   Hematological: Negative.    Psychiatric/Behavioral: Negative for dysphoric mood and suicidal ideas. The patient is nervous/anxious.        The current allergy list and medication list was reviewed with patient for accuracy.     Assessment    Talkative and friendly.  Cooperative.  Alert and oriented x3.  No respiratory distress noted.  Mild wheezing which clears with cough.      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  Orders:  -     methylPREDNISolone (MEDROL, JOSEF,) 4 MG tablet; Take as directed on package instructions.    Essential hypertension  Comments:  Continue current medication regimen.  Monitor blood pressure routinely and report any reading greater than 140/90.    Anxiety and depression  Comments:  Increase Celexa from 10 mg daily up to 20 mg daily.  Reviewed possible side effects and interactions.  Orders:  -     citalopram (CeleXA) 20 MG tablet; Take 1 tablet by mouth Daily.             Coronavirus precautions have been reviewed and discussed.  I have discussed the CDC recommendations  of social distancing, hand washing and disinfecting commonly touched items. Reviewed need to notify PCP and self quarantine with mild symptoms.  Discussed procedure to obtain Covid-19 testing and notification of PCP/health dept/ED/Urgent Center if symptoms begin. Understanding verbalized.    Reviewed disease process, possible complications, reasons for urgent care and possible side effects of medications. Pt/family state understanding.     Emotional support and active listening provided.     Patient instructed and advised to call if symptoms are increasing or new symptoms occur.    Understands reasons for urgent and emergent care.  Patient (& family) verbalized agreement for treatment plan.     This visit has been rescheduled as a phone visit to comply with patient safety concerns in accordance with CDC recommendations. Total time of discussion was 12 minutes.      I would like to see him back in 3-4 weeks for reevaluation, sooner if needed.

## 2020-05-04 NOTE — PROGRESS NOTES
Subjective   Nghia Roach is a 34 y.o. male.     No chief complaint on file.      History of Present Illness:      The following portions of the patient's history and ROS were reviewed and updated as appropriate per provider:  Allergies, current medications, past family history, past medical history, past social history, past surgical history and problem list.    Review of Systems    Objective     There were no vitals taken for this visit.  Admission on 03/09/2020, Discharged on 03/09/2020   Component Date Value Ref Range Status   • Case Report 03/09/2020    Final                    Value:Surgical Pathology Report                         Case: [id number]                                  Authorizing Provider:  Ricardo Nolasco     Collected:           03/09/2020 03:38 PM                                 MD Avery                                                                    Ordering Location:     [facility]      Received:            03/09/2020 04:13 PM                                 OPERATING ROOM DEPARTMENT                                                    Pathologist:           Marilee Macias MD                                                       Specimens:   1) - Esophagus, Distal                                                                              2) - Esophagus                                                                            • Final Diagnosis 03/09/2020    Final                    Value:This result contains rich text formatting which cannot be displayed here.       Physical Exam    Assessment/Plan     Problem List Items Addressed This Visit     None                   Patient's There is no height or weight on file to calculate BMI. BMI is {BMI range:49734}.    Nghia Roach  reports that he has never smoked. He has never used smokeless tobacco.. I have educated him on the risk of diseases from using tobacco products such as {Tobacco Cessation  "Diseases:68227::\"cancer\",\"COPD\",\"heart diease\"}.     I advised him to quit and he is {Willing/Not Willing to Quit Tobacco Products:40558}.    I spent {Time Spent Tobacco :75774} minutes counseling the patient.           I have discussed diagnosis in detail today allowing time for questions and answers. Patient is aware of reasons to seek urgent or emergent medical care as well as reasons to return to the clinic for evaluation. Possible side effects, interactions and progression of symptoms discussed as well. Patient / family states understanding.   Emotional support and active listening provided.         This document has been electronically signed by:  CORDELIA Flores, NP-C  "

## 2020-06-01 ENCOUNTER — OFFICE VISIT (OUTPATIENT)
Dept: FAMILY MEDICINE CLINIC | Facility: CLINIC | Age: 35
End: 2020-06-01

## 2020-06-01 VITALS
DIASTOLIC BLOOD PRESSURE: 80 MMHG | HEIGHT: 71 IN | OXYGEN SATURATION: 98 % | BODY MASS INDEX: 31.08 KG/M2 | TEMPERATURE: 97.8 F | HEART RATE: 74 BPM | WEIGHT: 222 LBS | SYSTOLIC BLOOD PRESSURE: 150 MMHG

## 2020-06-01 DIAGNOSIS — E66.9 CLASS 1 OBESITY WITH SERIOUS COMORBIDITY AND BODY MASS INDEX (BMI) OF 34.0 TO 34.9 IN ADULT, UNSPECIFIED OBESITY TYPE: ICD-10-CM

## 2020-06-01 DIAGNOSIS — J45.42 MODERATE PERSISTENT ASTHMA WITH STATUS ASTHMATICUS: ICD-10-CM

## 2020-06-01 DIAGNOSIS — M15.9 PRIMARY OSTEOARTHRITIS INVOLVING MULTIPLE JOINTS: ICD-10-CM

## 2020-06-01 DIAGNOSIS — M79.7 FIBROMYALGIA: ICD-10-CM

## 2020-06-01 DIAGNOSIS — I10 ESSENTIAL HYPERTENSION: Primary | ICD-10-CM

## 2020-06-01 PROBLEM — R10.11 RIGHT UPPER QUADRANT ABDOMINAL PAIN: Status: RESOLVED | Noted: 2019-12-04 | Resolved: 2020-06-01

## 2020-06-01 PROCEDURE — 99214 OFFICE O/P EST MOD 30 MIN: CPT | Performed by: NURSE PRACTITIONER

## 2020-06-01 NOTE — ASSESSMENT & PLAN NOTE
Obesity is Fluctuating up and down.  Discussed the patient's BMI.  The BMI is above average; BMI management plan is completed.  General weight loss/lifestyle modification strategies discussed (elicit support from others; identify saboteurs; non-food rewards, etc).  Informal exercise measures discussed, e.g. taking stairs instead of elevator.

## 2020-06-01 NOTE — ASSESSMENT & PLAN NOTE
Asthma precautions discussed.  Continue current medication regimen.  Avoid known triggers.  Use of mask when in public.

## 2020-06-01 NOTE — PROGRESS NOTES
Subjective   Nghia Roach is a 34 y.o. male.     Asthma  Back pain  Hypertension    History of Present Illness:     Patient has been off work for the past several weeks due to high risk COVID-19.  Works at a factory.  No precautions have been enforced at his place of work.  He does have an N 95 mask.  His asthma has been under control.    Essential hypertension-stable with current medications.  Checking his blood pressure routinely.    Fibromyalgia/chronic osteoarthritis pain- patient currently receives Robaxin.  Tries to watch his body mechanics.  Has been to pain management in the past.  Patient does use diclofenac gel.  States that as long as he is conservative with his movements, watches his body mechanics and takes frequent rest periods he can control the majority of his pain with over-the-counter medications and conservative treatment.      The following portions of the patient's history and ROS were reviewed and updated as appropriate per provider:  Allergies, current medications, past family history, past medical history, past social history, past surgical history and problem list.    Review of Systems   Constitutional: Negative for activity change, appetite change, fatigue and unexpected weight change.   HENT: Negative for congestion, nosebleeds, sinus pressure, sinus pain and sore throat.    Eyes: Negative.    Respiratory: Negative for cough, chest tightness, shortness of breath and wheezing.    Cardiovascular: Negative for chest pain, palpitations and leg swelling.   Gastrointestinal: Negative for abdominal pain, constipation, diarrhea, nausea and vomiting.   Endocrine: Negative.    Genitourinary: Negative for difficulty urinating, dysuria and frequency.   Musculoskeletal: Positive for arthralgias, back pain and myalgias. Negative for neck pain and neck stiffness.   Skin: Negative.    Allergic/Immunologic: Positive for environmental allergies. Negative for food allergies and immunocompromised state.  "  Neurological: Negative for seizures, speech difficulty and headaches.   Hematological: Negative.    Psychiatric/Behavioral: Negative for dysphoric mood, self-injury and suicidal ideas.       Objective     /80   Pulse 74   Temp 97.8 °F (36.6 °C) (Tympanic)   Ht 180.3 cm (71\")   Wt 101 kg (222 lb)   SpO2 98%   BMI 30.96 kg/m²   Admission on 03/09/2020, Discharged on 03/09/2020   Component Date Value Ref Range Status   • Case Report 03/09/2020    Final                    Value:Surgical Pathology Report                         Case: [id number]                                  Authorizing Provider:  Ricardo Nolasco     Collected:           03/09/2020 03:38 PM                                 MD vAery                                                                    Ordering Location:     [facility]      Received:            03/09/2020 04:13 PM                                 OPERATING ROOM DEPARTMENT                                                    Pathologist:           Marilee Macias MD                                                       Specimens:   1) - Esophagus, Distal                                                                              2) - Esophagus                                                                            • Final Diagnosis 03/09/2020    Final                    Value:This result contains rich text formatting which cannot be displayed here.       Physical Exam   Constitutional: He is oriented to person, place, and time. Vital signs are normal. He appears well-developed and well-nourished. He is cooperative. He does not have a sickly appearance. He does not appear ill. No distress.    friendly 34-year-old male.   HENT:   Head: Normocephalic and atraumatic.   Right Ear: Hearing and external ear normal.   Left Ear: Hearing and external ear normal.   Nose: Nose normal. Right sinus exhibits no maxillary sinus tenderness and no frontal sinus tenderness. " Left sinus exhibits no maxillary sinus tenderness and no frontal sinus tenderness.   Mouth/Throat: Uvula is midline, oropharynx is clear and moist and mucous membranes are normal. No oropharyngeal exudate.   Eyes: Pupils are equal, round, and reactive to light. Conjunctivae, EOM and lids are normal. Right eye exhibits no discharge. Left eye exhibits no discharge.   Neck: Normal range of motion. Neck supple. No tracheal deviation present. No thyromegaly present.   Cardiovascular: Normal rate, regular rhythm and normal heart sounds. Exam reveals no gallop and no friction rub.   No murmur heard.  Pulmonary/Chest: Effort normal and breath sounds normal. No respiratory distress. He has no wheezes. He has no rales. He exhibits no tenderness.   Abdominal: Soft. Normal appearance and bowel sounds are normal. He exhibits no distension and no mass. There is no tenderness. There is no rebound and no guarding.   Musculoskeletal: Normal range of motion.   Lymphadenopathy:     He has no cervical adenopathy.   Neurological: He is alert and oriented to person, place, and time. He has normal reflexes.   CN 2-12 grossly intact    Skin: Skin is warm and dry. Capillary refill takes less than 2 seconds. No rash noted. He is not diaphoretic. No cyanosis or erythema. No pallor. Nails show no clubbing.   Psychiatric: He has a normal mood and affect. His speech is normal and behavior is normal. Judgment and thought content normal. Cognition and memory are normal.   Vitals reviewed.      Assessment/Plan     Problem List Items Addressed This Visit        Cardiovascular and Mediastinum    Essential hypertension - Primary    Current Assessment & Plan     Stable with current medication.         Relevant Orders    CBC & Differential    Comprehensive Metabolic Panel    TSH    Hemoglobin A1c    Vitamin D 25 Hydroxy    Vitamin B12    Lipid Panel    MicroAlbumin, Urine, Random - Urine, Clean Catch       Respiratory    Moderate persistent asthma with  status asthmaticus    Current Assessment & Plan     Asthma precautions discussed.  Continue current medication regimen.  Avoid known triggers.  Use of mask when in public.             Relevant Orders    CBC & Differential    Comprehensive Metabolic Panel    TSH    Hemoglobin A1c    Vitamin D 25 Hydroxy    Vitamin B12    Lipid Panel    MicroAlbumin, Urine, Random - Urine, Clean Catch       Digestive    Class 1 obesity with serious comorbidity and body mass index (BMI) of 34.0 to 34.9 in adult    Current Assessment & Plan     Obesity is Fluctuating up and down.  Discussed the patient's BMI.  The BMI is above average; BMI management plan is completed.  General weight loss/lifestyle modification strategies discussed (elicit support from others; identify saboteurs; non-food rewards, etc).  Informal exercise measures discussed, e.g. taking stairs instead of elevator.           Relevant Orders    CBC & Differential    Comprehensive Metabolic Panel    TSH    Hemoglobin A1c    Vitamin D 25 Hydroxy    Vitamin B12    Lipid Panel    MicroAlbumin, Urine, Random - Urine, Clean Catch       Nervous and Auditory    Fibromyalgia    Current Assessment & Plan     Body mechanics reviewed.         Relevant Orders    CBC & Differential    Comprehensive Metabolic Panel    TSH    Hemoglobin A1c    Vitamin D 25 Hydroxy    Vitamin B12    Lipid Panel    MicroAlbumin, Urine, Random - Urine, Clean Catch       Musculoskeletal and Integument    Osteoarthrosis    Current Assessment & Plan     Body mechanics reviewed.           Relevant Orders    CBC & Differential    Comprehensive Metabolic Panel    TSH    Hemoglobin A1c    Vitamin D 25 Hydroxy    Vitamin B12    Lipid Panel    MicroAlbumin, Urine, Random - Urine, Clean Catch           Patient's Body mass index is 30.96 kg/m². BMI is above normal parameters. Recommendations include: exercise counseling and nutrition counseling.      Medication list reviewed and discussed.  Continue current  medications.    I have discussed diagnosis in detail today allowing time for questions and answers. Patient is aware of reasons to seek urgent or emergent medical care as well as reasons to return to the clinic for evaluation. Possible side effects, interactions and progression of symptoms discussed as well. Patient / family states understanding.   Emotional support and active listening provided.     Return to work tomorrow with no restrictions.    Coronavirus precautions have been reviewed and discussed.  I have discussed the CDC recommendations  of social distancing, hand washing and disinfecting commonly touched items. Reviewed need to notify PCP and self quarantine with mild symptoms.  Discussed procedure to obtain Covid-19 testing and notification of PCP/health dept/ED/Urgent Center if symptoms begin. Understanding verbalized.    I like to see him back in 3-6 months, sooner if needed.  Fasting labs 1 week prior.            This document has been electronically signed by:  CORDELIA Flores, NP-C

## 2020-07-06 DIAGNOSIS — R79.89 LOW TESTOSTERONE: ICD-10-CM

## 2020-07-06 RX ORDER — TESTOSTERONE CYPIONATE 200 MG/ML
INJECTION, SOLUTION INTRAMUSCULAR
Qty: 10 ML | Refills: 2 | OUTPATIENT
Start: 2020-07-06

## 2020-07-09 ENCOUNTER — OFFICE VISIT (OUTPATIENT)
Dept: FAMILY MEDICINE CLINIC | Facility: CLINIC | Age: 35
End: 2020-07-09

## 2020-07-09 ENCOUNTER — HOSPITAL ENCOUNTER (OUTPATIENT)
Dept: GENERAL RADIOLOGY | Facility: HOSPITAL | Age: 35
Discharge: HOME | End: 2020-07-09
Admitting: NURSE PRACTITIONER

## 2020-07-09 VITALS
BODY MASS INDEX: 31.3 KG/M2 | HEART RATE: 76 BPM | OXYGEN SATURATION: 98 % | TEMPERATURE: 98.2 F | SYSTOLIC BLOOD PRESSURE: 140 MMHG | HEIGHT: 71 IN | DIASTOLIC BLOOD PRESSURE: 90 MMHG | WEIGHT: 223.6 LBS

## 2020-07-09 DIAGNOSIS — M25.551 CHRONIC RIGHT HIP PAIN: Chronic | ICD-10-CM

## 2020-07-09 DIAGNOSIS — M15.9 PRIMARY OSTEOARTHRITIS INVOLVING MULTIPLE JOINTS: Chronic | ICD-10-CM

## 2020-07-09 DIAGNOSIS — M25.551 CHRONIC RIGHT HIP PAIN: ICD-10-CM

## 2020-07-09 DIAGNOSIS — G89.29 CHRONIC PAIN OF RIGHT KNEE: ICD-10-CM

## 2020-07-09 DIAGNOSIS — M1A.09X0 IDIOPATHIC CHRONIC GOUT OF MULTIPLE SITES WITHOUT TOPHUS: Chronic | ICD-10-CM

## 2020-07-09 DIAGNOSIS — M25.561 CHRONIC PAIN OF RIGHT KNEE: ICD-10-CM

## 2020-07-09 DIAGNOSIS — M54.50 LUMBAR PAIN: ICD-10-CM

## 2020-07-09 DIAGNOSIS — G89.29 CHRONIC RIGHT HIP PAIN: Chronic | ICD-10-CM

## 2020-07-09 DIAGNOSIS — R93.89 ABNORMAL CT SCAN: ICD-10-CM

## 2020-07-09 DIAGNOSIS — M25.561 CHRONIC PAIN OF RIGHT KNEE: Chronic | ICD-10-CM

## 2020-07-09 DIAGNOSIS — I10 ESSENTIAL HYPERTENSION: Chronic | ICD-10-CM

## 2020-07-09 DIAGNOSIS — M54.41 CHRONIC RIGHT-SIDED LOW BACK PAIN WITH RIGHT-SIDED SCIATICA: Primary | Chronic | ICD-10-CM

## 2020-07-09 DIAGNOSIS — G89.29 CHRONIC PAIN OF RIGHT KNEE: Chronic | ICD-10-CM

## 2020-07-09 DIAGNOSIS — G89.29 CHRONIC RIGHT HIP PAIN: ICD-10-CM

## 2020-07-09 DIAGNOSIS — G89.29 CHRONIC RIGHT-SIDED LOW BACK PAIN WITH RIGHT-SIDED SCIATICA: Primary | Chronic | ICD-10-CM

## 2020-07-09 LAB
25(OH)D3 SERPL-MCNC: 34.4 NG/ML (ref 30–100)
ALBUMIN SERPL-MCNC: 4.47 G/DL (ref 3.5–5.2)
ALBUMIN UR-MCNC: <1.2 MG/DL
ALBUMIN/GLOB SERPL: 1.6 G/DL
ALP SERPL-CCNC: 65 U/L (ref 39–117)
ALT SERPL W P-5'-P-CCNC: 26 U/L (ref 1–41)
ANION GAP SERPL CALCULATED.3IONS-SCNC: 10.6 MMOL/L (ref 5–15)
AST SERPL-CCNC: 28 U/L (ref 1–40)
BASOPHILS # BLD AUTO: 0.04 10*3/MM3 (ref 0–0.2)
BASOPHILS NFR BLD AUTO: 0.6 % (ref 0–1.5)
BILIRUB SERPL-MCNC: 1.2 MG/DL (ref 0–1.2)
BUN SERPL-MCNC: 10 MG/DL (ref 6–20)
BUN/CREAT SERPL: 9.6 (ref 7–25)
CALCIUM SPEC-SCNC: 9.5 MG/DL (ref 8.6–10.5)
CHLORIDE SERPL-SCNC: 104 MMOL/L (ref 98–107)
CO2 SERPL-SCNC: 26.4 MMOL/L (ref 22–29)
CREAT SERPL-MCNC: 1.04 MG/DL (ref 0.76–1.27)
DEPRECATED RDW RBC AUTO: 40.4 FL (ref 37–54)
EOSINOPHIL # BLD AUTO: 0.42 10*3/MM3 (ref 0–0.4)
EOSINOPHIL NFR BLD AUTO: 6.3 % (ref 0.3–6.2)
ERYTHROCYTE [DISTWIDTH] IN BLOOD BY AUTOMATED COUNT: 12.3 % (ref 12.3–15.4)
GFR SERPL CREATININE-BSD FRML MDRD: 81 ML/MIN/1.73
GLOBULIN UR ELPH-MCNC: 2.7 GM/DL
GLUCOSE SERPL-MCNC: 96 MG/DL (ref 65–99)
HBA1C MFR BLD: 5.05 % (ref 4.8–5.6)
HCT VFR BLD AUTO: 50 % (ref 37.5–51)
HGB BLD-MCNC: 17 G/DL (ref 13–17.7)
IMM GRANULOCYTES # BLD AUTO: 0.02 10*3/MM3 (ref 0–0.05)
IMM GRANULOCYTES NFR BLD AUTO: 0.3 % (ref 0–0.5)
LYMPHOCYTES # BLD AUTO: 1.23 10*3/MM3 (ref 0.7–3.1)
LYMPHOCYTES NFR BLD AUTO: 18.5 % (ref 19.6–45.3)
MCH RBC QN AUTO: 30.5 PG (ref 26.6–33)
MCHC RBC AUTO-ENTMCNC: 34 G/DL (ref 31.5–35.7)
MCV RBC AUTO: 89.6 FL (ref 79–97)
MONOCYTES # BLD AUTO: 0.38 10*3/MM3 (ref 0.1–0.9)
MONOCYTES NFR BLD AUTO: 5.7 % (ref 5–12)
NEUTROPHILS NFR BLD AUTO: 4.55 10*3/MM3 (ref 1.7–7)
NEUTROPHILS NFR BLD AUTO: 68.6 % (ref 42.7–76)
NRBC BLD AUTO-RTO: 0 /100 WBC (ref 0–0.2)
PLATELET # BLD AUTO: 201 10*3/MM3 (ref 140–450)
PMV BLD AUTO: 12.5 FL (ref 6–12)
POTASSIUM SERPL-SCNC: 4.4 MMOL/L (ref 3.5–5.2)
PROT SERPL-MCNC: 7.2 G/DL (ref 6–8.5)
RBC # BLD AUTO: 5.58 10*6/MM3 (ref 4.14–5.8)
SODIUM SERPL-SCNC: 141 MMOL/L (ref 136–145)
TSH SERPL DL<=0.05 MIU/L-ACNC: 1.07 UIU/ML (ref 0.27–4.2)
URATE SERPL-MCNC: 6.1 MG/DL (ref 3.4–7)
VIT B12 BLD-MCNC: 765 PG/ML (ref 211–946)
WBC # BLD AUTO: 6.64 10*3/MM3 (ref 3.4–10.8)

## 2020-07-09 PROCEDURE — 84443 ASSAY THYROID STIM HORMONE: CPT | Performed by: NURSE PRACTITIONER

## 2020-07-09 PROCEDURE — 72100 X-RAY EXAM L-S SPINE 2/3 VWS: CPT | Performed by: RADIOLOGY

## 2020-07-09 PROCEDURE — 85025 COMPLETE CBC W/AUTO DIFF WBC: CPT | Performed by: NURSE PRACTITIONER

## 2020-07-09 PROCEDURE — 80053 COMPREHEN METABOLIC PANEL: CPT | Performed by: NURSE PRACTITIONER

## 2020-07-09 PROCEDURE — 72100 X-RAY EXAM L-S SPINE 2/3 VWS: CPT

## 2020-07-09 PROCEDURE — 96372 THER/PROPH/DIAG INJ SC/IM: CPT | Performed by: NURSE PRACTITIONER

## 2020-07-09 PROCEDURE — 82306 VITAMIN D 25 HYDROXY: CPT | Performed by: NURSE PRACTITIONER

## 2020-07-09 PROCEDURE — 73562 X-RAY EXAM OF KNEE 3: CPT | Performed by: RADIOLOGY

## 2020-07-09 PROCEDURE — 84550 ASSAY OF BLOOD/URIC ACID: CPT | Performed by: NURSE PRACTITIONER

## 2020-07-09 PROCEDURE — 82043 UR ALBUMIN QUANTITATIVE: CPT | Performed by: NURSE PRACTITIONER

## 2020-07-09 PROCEDURE — 73562 X-RAY EXAM OF KNEE 3: CPT

## 2020-07-09 PROCEDURE — 82607 VITAMIN B-12: CPT | Performed by: NURSE PRACTITIONER

## 2020-07-09 PROCEDURE — 73502 X-RAY EXAM HIP UNI 2-3 VIEWS: CPT | Performed by: RADIOLOGY

## 2020-07-09 PROCEDURE — 83036 HEMOGLOBIN GLYCOSYLATED A1C: CPT | Performed by: NURSE PRACTITIONER

## 2020-07-09 PROCEDURE — 73502 X-RAY EXAM HIP UNI 2-3 VIEWS: CPT

## 2020-07-09 PROCEDURE — 99214 OFFICE O/P EST MOD 30 MIN: CPT | Performed by: NURSE PRACTITIONER

## 2020-07-09 RX ORDER — METHYLPREDNISOLONE ACETATE 80 MG/ML
80 INJECTION, SUSPENSION INTRA-ARTICULAR; INTRALESIONAL; INTRAMUSCULAR; SOFT TISSUE ONCE
Status: COMPLETED | OUTPATIENT
Start: 2020-07-09 | End: 2020-07-09

## 2020-07-09 RX ADMIN — METHYLPREDNISOLONE ACETATE 80 MG: 80 INJECTION, SUSPENSION INTRA-ARTICULAR; INTRALESIONAL; INTRAMUSCULAR; SOFT TISSUE at 10:12

## 2020-07-09 NOTE — PROGRESS NOTES
Nghia Roach is a 34 y.o. male who presents to the clinic today c/o right hip and knee back with associated low back pain. He does report he has this issue for many years but two days ago, he was outside working at home when lighting struck close to him which startled him. He twisted around causing worsening pain.      Hip Pain    The incident occurred 2 days ago. The incident occurred at home. The injury mechanism was a twisting injury. The pain is present in the right hip and right knee. The quality of the pain is described as stabbing. The pain is moderate. Associated symptoms include numbness and tingling. Pertinent negatives include no inability to bear weight, loss of sensation or muscle weakness. Loss of motion: Decrease. The symptoms are aggravated by movement and palpation. He has tried NSAIDs for the symptoms. The treatment provided no relief.   Back Pain   The current episode started in the past 7 days. The problem occurs daily. The pain is present in the lumbar spine. The quality of the pain is described as stabbing. The pain radiates to the right knee. The symptoms are aggravated by sitting. Associated symptoms include leg pain, numbness, tingling and weakness. Pertinent negatives include no bladder incontinence, bowel incontinence, chest pain, dysuria, fever, headaches, paresis, paresthesias, pelvic pain, perianal numbness or weight loss. He has tried NSAIDs for the symptoms. The treatment provided no relief.     The following portions of the patient's history were reviewed and updated as appropriate: allergies, current medications, past family history, past medical history, past social history, past surgical history and problem list.    Current Outpatient Medications:   •  albuterol sulfate  (90 Base) MCG/ACT inhaler, Inhale 2 puffs Every 4 (Four) Hours As Needed for Shortness of Air., Disp: 2 inhaler, Rfl: 5  •  allopurinol (ZYLOPRIM) 100 MG tablet, Take 1 tablet by mouth 2 (Two) Times  "a Day., Disp: 60 tablet, Rfl: 5  •  amitriptyline (ELAVIL) 25 MG tablet, Take 1 tablet by mouth At Night As Needed for Sleep., Disp: 30 tablet, Rfl: 5  •  anastrozole (ARIMIDEX) 1 MG tablet, Take 1 tablet by mouth 1 (One) Time Per Week. For 12 weeks., Disp: 12 tablet, Rfl: 6  •  azelastine (ASTELIN) 0.1 % nasal spray, INSTILL 2 SPRAYS INTO EACH NOSTRIL BID, Disp: , Rfl: 5  •  B-D INTEGRA SYRINGE 25G X 5/8\" 3 ML misc, See Admin Instructions., Disp: , Rfl: 0  •  budesonide-formoterol (SYMBICORT) 160-4.5 MCG/ACT inhaler, Inhale 2 puffs 2 (Two) Times a Day., Disp: 1 inhaler, Rfl: 5  •  cetirizine (zyrTEC) 10 MG tablet, Take 1 tablet by mouth At Night As Needed for Allergies., Disp: 30 tablet, Rfl: 5  •  citalopram (CeleXA) 20 MG tablet, Take 1 tablet by mouth Daily., Disp: 30 tablet, Rfl: 3  •  clotrimazole-betamethasone (LOTRISONE) 1-0.05 % cream, Apply  topically to the appropriate area as directed Every 12 (Twelve) Hours., Disp: 45 g, Rfl: 2  •  diclofenac (VOLTAREN) 1 % gel gel, Apply 4 g topically to the appropriate area as directed 4 (Four) Times a Day As Needed (joint pain)., Disp: 4 tube, Rfl: 5  •  FLOVENT HFA 44 MCG/ACT inhaler, Inhale 2 puffs 2 (Two) Times a Day., Disp: 1 inhaler, Rfl: 5  •  fluticasone (FLONASE) 50 MCG/ACT nasal spray, INSTILL 2 SPRAYS INTO EACH NOSTRIL AS DIRECTED DAILY, Disp: 2 bottle, Rfl: 11  •  lansoprazole (PREVACID) 30 MG capsule, , Disp: , Rfl:   •  methocarbamol (ROBAXIN) 750 MG tablet, Take 1 tablet by mouth 4 (Four) Times a Day As Needed for Muscle Spasms. Take on days he works in place of flexeril, Disp: 120 tablet, Rfl: 5  •  montelukast (SINGULAIR) 10 MG tablet, TAKE 1 TABLET BY MOUTH EVERY NIGHT, Disp: 30 tablet, Rfl: 5  •  omeprazole (priLOSEC) 40 MG capsule, Take 1 capsule by mouth 2 (Two) Times a Day Before Meals. Take a half hour before breakfast, Disp: 60 capsule, Rfl: 11  •  sulfaSALAzine (AZULFIDINE) 500 MG tablet, Take 1 tablet by mouth 4 (Four) Times a Day. Written by " "rheumatology , started back on his script, Disp: 60 tablet, Rfl: 5  •  Syringe 25G X 5/8\" 3 ML misc, Use as directed 2 x weekly, Disp: 24 each, Rfl: 3  •  tadalafil (CIALIS) 5 MG tablet, TAKE 1 TABLET BY MOUTH DAILY, Disp: 30 tablet, Rfl: 6  •  Testosterone Cypionate (DEPO-TESTOSTERONE) 200 MG/ML injection, He is to use 1/2 cc every Monday and Thursday SQ, Disp: 10 mL, Rfl: 2  •  valsartan-hydrochlorothiazide (DIOVAN HCT) 160-25 MG per tablet, Take 1 tablet by mouth Daily., Disp: 30 tablet, Rfl: 5    Allergies   Allergen Reactions   • Erythromycin Rash     Review of Systems   Constitutional: Positive for activity change, appetite change and fatigue. Negative for chills, fever and weight loss.   Eyes: Negative for visual disturbance.   Respiratory: Negative for cough, shortness of breath and wheezing.    Cardiovascular: Negative for chest pain, palpitations and leg swelling.   Gastrointestinal: Negative for bowel incontinence, nausea and vomiting.   Genitourinary: Negative for bladder incontinence, dysuria, flank pain, pelvic pain and testicular pain.   Musculoskeletal: Positive for arthralgias and back pain. Negative for myalgias.   Skin: Negative for color change and rash.   Neurological: Positive for tingling, weakness and numbness. Negative for dizziness, tremors, seizures, syncope, light-headedness, headaches and paresthesias.   Hematological: Negative for adenopathy.     Visit Vitals  /90   Pulse 76   Temp 98.2 °F (36.8 °C) (Temporal)   Ht 180.3 cm (71\")   Wt 101 kg (223 lb 9.6 oz)   SpO2 98%   BMI 31.19 kg/m²     Physical Exam   Constitutional: He is oriented to person, place, and time. He appears well-developed and well-nourished. No distress.   Pleasant; grimacing at times. Wearing appropriate face mask    HENT:   Head: Normocephalic.   Nose: Nose normal.   Eyes: Pupils are equal, round, and reactive to light. Conjunctivae are normal. Right eye exhibits no discharge. Left eye exhibits no discharge. No " scleral icterus.   Neck: Neck supple.   Cardiovascular: Normal rate, regular rhythm and normal heart sounds. Exam reveals no friction rub.   No murmur heard.  Pulmonary/Chest: Effort normal and breath sounds normal. No respiratory distress. He has no decreased breath sounds. He has no wheezes. He has no rhonchi. He has no rales.   Abdominal: Soft. There is no tenderness. There is no guarding.   Musculoskeletal: He exhibits tenderness. He exhibits no edema.        Right hip: He exhibits decreased range of motion and tenderness. He exhibits no crepitus.        Right knee: He exhibits decreased range of motion. He exhibits no swelling and no deformity.        Lumbar back: He exhibits decreased range of motion, tenderness, pain and spasm.   Lymphadenopathy:     He has no cervical adenopathy.   Neurological: He is alert and oriented to person, place, and time.   Skin: Skin is warm and dry. Capillary refill takes less than 2 seconds. No rash noted. No erythema.   Psychiatric: He has a normal mood and affect. His behavior is normal. Judgment and thought content normal.   Nursing note and vitals reviewed.    Assessment/Plan   Diagnoses and all orders for this visit:    Chronic right-sided low back pain with right-sided sciatica  Comments:  Findings and recommendations discussed with Nghia. Diagnostic testing and labs ordered.  Orders:  -     methylPREDNISolone acetate (DEPO-medrol) injection 80 mg    Chronic pain of right knee  Comments:  Diagnostic testing ordered  Orders:  -     XR Knee 3 View Right; Future    Chronic right hip pain  Comments:  Diagnostic testing ordered  Orders:  -     XR Hip With or Without Pelvis 2 - 3 View Right; Future    Essential hypertension  Comments:  Mildly elevated which is contributed to his pain  Orders:  -     Cancel: CBC & Differential  -     Cancel: Comprehensive Metabolic Panel  -     TSH  -     Hemoglobin A1c  -     Vitamin D 25 Hydroxy  -     Vitamin B12  -     MicroAlbumin, Urine,  Random - Urine, Clean Catch  -     Cancel: CBC Auto Differential    Idiopathic chronic gout of multiple sites without tophus  Comments:  Labs ordered. Continue Allopurinol  Orders:  -     CBC & Differential  -     Comprehensive Metabolic Panel  -     Uric Acid  -     CBC Auto Differential    Primary osteoarthritis involving multiple joints  Comments:  Labs ordered  Orders:  -     CBC & Differential  -     Comprehensive Metabolic Panel  -     Cancel: CBC & Differential  -     Cancel: Comprehensive Metabolic Panel  -     TSH  -     Hemoglobin A1c  -     Vitamin D 25 Hydroxy  -     Vitamin B12  -     MicroAlbumin, Urine, Random - Urine, Clean Catch  -     Cancel: CBC Auto Differential  -     CBC Auto Differential    Lumbar pain  Comments:  MRI order  placed  Orders:  -     XR Spine Lumbar 2 or 3 View; Future    Abnormal CT scan  Comments:  6/2019 abnormal CT of lumbar. Recommended MRI  Orders:  -     MRI Lumbar Spine Without Contrast; Future    Findings and recommendations discussed with Nghia. Diagnostic testing and labs ordered. At this time, he declines controlled substances. Given Depo-Medrol 80 mg today.  Counseled regarding supportive care measures. S/S of concern reviewed and if occur to seek further medical evaluation immediately. He will f/u with me on July 21 st or f/u sooner with CORDELIA Flores.      This document has been electronically signed by CORDELIA Kelley, JAIMIE-BC, CDE  July 9, 2020 08:46

## 2020-07-13 ENCOUNTER — OFFICE VISIT (OUTPATIENT)
Dept: FAMILY MEDICINE CLINIC | Facility: CLINIC | Age: 35
End: 2020-07-13

## 2020-07-13 VITALS
SYSTOLIC BLOOD PRESSURE: 132 MMHG | OXYGEN SATURATION: 97 % | BODY MASS INDEX: 30.38 KG/M2 | DIASTOLIC BLOOD PRESSURE: 85 MMHG | WEIGHT: 217 LBS | HEART RATE: 80 BPM | TEMPERATURE: 98.2 F | HEIGHT: 71 IN

## 2020-07-13 DIAGNOSIS — M54.41 CHRONIC RIGHT-SIDED LOW BACK PAIN WITH RIGHT-SIDED SCIATICA: ICD-10-CM

## 2020-07-13 DIAGNOSIS — G89.29 CHRONIC RIGHT-SIDED LOW BACK PAIN WITH RIGHT-SIDED SCIATICA: ICD-10-CM

## 2020-07-13 DIAGNOSIS — K20.0 EOSINOPHILIC ESOPHAGITIS: Primary | ICD-10-CM

## 2020-07-13 PROCEDURE — 96372 THER/PROPH/DIAG INJ SC/IM: CPT | Performed by: NURSE PRACTITIONER

## 2020-07-13 PROCEDURE — 99214 OFFICE O/P EST MOD 30 MIN: CPT | Performed by: NURSE PRACTITIONER

## 2020-07-13 RX ORDER — METHYLPREDNISOLONE ACETATE 80 MG/ML
80 INJECTION, SUSPENSION INTRA-ARTICULAR; INTRALESIONAL; INTRAMUSCULAR; SOFT TISSUE ONCE
Status: DISCONTINUED | OUTPATIENT
Start: 2020-07-13 | End: 2020-07-13

## 2020-07-13 RX ORDER — NABUMETONE 500 MG/1
500 TABLET, FILM COATED ORAL 2 TIMES DAILY PRN
Qty: 60 TABLET | Refills: 2 | Status: SHIPPED | OUTPATIENT
Start: 2020-07-13 | End: 2020-10-12

## 2020-07-13 RX ADMIN — METHYLPREDNISOLONE ACETATE 80 MG: 80 INJECTION, SUSPENSION INTRA-ARTICULAR; INTRALESIONAL; INTRAMUSCULAR; SOFT TISSUE at 10:30

## 2020-07-13 NOTE — PROGRESS NOTES
Subjective   Nghia Roach is a 34 y.o. male.     Chief complaint- low back pain with radiation down to the right lower extremity    History of Present Illness:      Hip pain - sharp shooting pain in his low back with radiation into his hip. 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 Is scheduled for an MRI tomorrow. Rates the pain as 7-8 on psr of 0-10. Worse with movement. Never completely out of pain. Not sleeping well.   Patient is not currently in a pain clinic.  He has been in a pain clinic in the past.  Patient does report that he has a marijuana and pain medicine in his system today which he obtained off the street due to severe pain.  He reports that the medication is not really helping.  Toradol usually makes him, sick of his stomach.  Steroids do ease it some.  Patient reports this is probably at some of the worst pain he is ever felt in his life.  Low back pain with exacerbation.    GERD-under the care of GI.  Has reflux.  Recent GI consult in March 2020.  Gastritis is improved.      The following portions of the patient's history and ROS were reviewed and updated as appropriate per provider:  Allergies, current medications, past family history, past medical history, past social history, past surgical history and problem list.    Review of Systems   Constitutional: Positive for activity change, appetite change and unexpected weight change. Negative for chills and fatigue.   HENT: Negative for congestion, sinus pressure, sinus pain, sneezing and sore throat.    Respiratory: Negative for cough, chest tightness and shortness of breath.    Cardiovascular: Negative for chest pain, palpitations and leg swelling.   Gastrointestinal: Negative for blood in stool, nausea and vomiting.   Endocrine: Negative.    Genitourinary: Negative for difficulty urinating.   Musculoskeletal: Positive for arthralgias, back pain and gait problem. Negative for neck pain and neck stiffness.   Skin: Negative.   "  Allergic/Immunologic: Positive for environmental allergies and food allergies.   Neurological: Negative for facial asymmetry and light-headedness.   Hematological: Negative.    Psychiatric/Behavioral: Positive for sleep disturbance. Negative for dysphoric mood, self-injury and suicidal ideas.       Objective     /85   Pulse 80   Temp 98.2 °F (36.8 °C) (Temporal)   Ht 180.3 cm (71\")   Wt 98.4 kg (217 lb)   SpO2 97%   BMI 30.27 kg/m²   Office Visit on 07/09/2020   Component Date Value Ref Range Status   • Glucose 07/09/2020 96  65 - 99 mg/dL Final   • BUN 07/09/2020 10  6 - 20 mg/dL Final   • Creatinine 07/09/2020 1.04  0.76 - 1.27 mg/dL Final   • Sodium 07/09/2020 141  136 - 145 mmol/L Final   • Potassium 07/09/2020 4.4  3.5 - 5.2 mmol/L Final   • Chloride 07/09/2020 104  98 - 107 mmol/L Final   • CO2 07/09/2020 26.4  22.0 - 29.0 mmol/L Final   • Calcium 07/09/2020 9.5  8.6 - 10.5 mg/dL Final   • Total Protein 07/09/2020 7.2  6.0 - 8.5 g/dL Final   • Albumin 07/09/2020 4.47  3.50 - 5.20 g/dL Final   • ALT (SGPT) 07/09/2020 26  1 - 41 U/L Final   • AST (SGOT) 07/09/2020 28  1 - 40 U/L Final   • Alkaline Phosphatase 07/09/2020 65  39 - 117 U/L Final   • Total Bilirubin 07/09/2020 1.2  0.0 - 1.2 mg/dL Final   • eGFR Non African Amer 07/09/2020 81  >60 mL/min/1.73 Final   • Globulin 07/09/2020 2.7  gm/dL Final   • A/G Ratio 07/09/2020 1.6  g/dL Final   • BUN/Creatinine Ratio 07/09/2020 9.6  7.0 - 25.0 Final   • Anion Gap 07/09/2020 10.6  5.0 - 15.0 mmol/L Final   • Uric Acid 07/09/2020 6.1  3.4 - 7.0 mg/dL Final   • TSH 07/09/2020 1.070  0.270 - 4.200 uIU/mL Final   • Hemoglobin A1C 07/09/2020 5.05  4.80 - 5.60 % Final   • 25 Hydroxy, Vitamin D 07/09/2020 34.4  30.0 - 100.0 ng/ml Final   • Vitamin B-12 07/09/2020 765  211 - 946 pg/mL Final   • Microalbumin, Urine 07/09/2020 <1.2  mg/dL Final   • WBC 07/09/2020 6.64  3.40 - 10.80 10*3/mm3 Final   • RBC 07/09/2020 5.58  4.14 - 5.80 10*6/mm3 Final   • " Hemoglobin 07/09/2020 17.0  13.0 - 17.7 g/dL Final   • Hematocrit 07/09/2020 50.0  37.5 - 51.0 % Final   • MCV 07/09/2020 89.6  79.0 - 97.0 fL Final   • MCH 07/09/2020 30.5  26.6 - 33.0 pg Final   • MCHC 07/09/2020 34.0  31.5 - 35.7 g/dL Final   • RDW 07/09/2020 12.3  12.3 - 15.4 % Final   • RDW-SD 07/09/2020 40.4  37.0 - 54.0 fl Final   • MPV 07/09/2020 12.5* 6.0 - 12.0 fL Final   • Platelets 07/09/2020 201  140 - 450 10*3/mm3 Final   • Neutrophil % 07/09/2020 68.6  42.7 - 76.0 % Final   • Lymphocyte % 07/09/2020 18.5* 19.6 - 45.3 % Final   • Monocyte % 07/09/2020 5.7  5.0 - 12.0 % Final   • Eosinophil % 07/09/2020 6.3* 0.3 - 6.2 % Final   • Basophil % 07/09/2020 0.6  0.0 - 1.5 % Final   • Immature Grans % 07/09/2020 0.3  0.0 - 0.5 % Final   • Neutrophils, Absolute 07/09/2020 4.55  1.70 - 7.00 10*3/mm3 Final   • Lymphocytes, Absolute 07/09/2020 1.23  0.70 - 3.10 10*3/mm3 Final   • Monocytes, Absolute 07/09/2020 0.38  0.10 - 0.90 10*3/mm3 Final   • Eosinophils, Absolute 07/09/2020 0.42* 0.00 - 0.40 10*3/mm3 Final   • Basophils, Absolute 07/09/2020 0.04  0.00 - 0.20 10*3/mm3 Final   • Immature Grans, Absolute 07/09/2020 0.02  0.00 - 0.05 10*3/mm3 Final   • nRBC 07/09/2020 0.0  0.0 - 0.2 /100 WBC Final       Physical Exam   Constitutional: He is oriented to person, place, and time. Vital signs are normal. He appears well-developed and well-nourished. He is cooperative.  Non-toxic appearance. He does not have a sickly appearance. He does not appear ill. No distress.   HENT:   Head: Normocephalic. Hair is normal.   Right Ear: Tympanic membrane, external ear and ear canal normal.   Left Ear: Tympanic membrane, external ear and ear canal normal.   Nose: Nose normal. Right sinus exhibits no maxillary sinus tenderness. Left sinus exhibits no maxillary sinus tenderness.   Mouth/Throat: Oropharynx is clear and moist and mucous membranes are normal. No oropharyngeal exudate.   Eyes: Pupils are equal, round, and reactive to  light. Conjunctivae, EOM and lids are normal. Right eye exhibits no discharge. Left eye exhibits no discharge.   Neck: Normal range of motion. Neck supple. No tracheal deviation present. No thyromegaly present.   Cardiovascular: Normal rate, regular rhythm and normal heart sounds. Exam reveals no gallop and no friction rub.   No murmur heard.  Pulmonary/Chest: Effort normal and breath sounds normal. No respiratory distress. He has no wheezes. He has no rales. He exhibits no tenderness.   Abdominal: Soft. Normal appearance and bowel sounds are normal. He exhibits no distension and no mass. There is no tenderness. There is no rebound and no guarding.   Musculoskeletal:        Lumbar back: He exhibits decreased range of motion, tenderness, pain and spasm.        Back:      Vascular Status -  His right foot exhibits normal foot vasculature  and no edema.  Skin Integrity  -  His right foot skin is intact..  Lymphadenopathy:     He has no cervical adenopathy.   Neurological: He is alert and oriented to person, place, and time. He is not disoriented.   Reflex Scores:       Patellar reflexes are 1+ on the right side and 2+ on the left side.  CN 2-12 grossly intact    Skin: Skin is warm and dry. Capillary refill takes less than 2 seconds. No rash noted. He is not diaphoretic. No erythema.   Psychiatric: He has a normal mood and affect. His speech is normal and behavior is normal. Judgment and thought content normal. Cognition and memory are normal.   Vitals reviewed.      Assessment/Plan     Problem List Items Addressed This Visit        Digestive    Eosinophilic esophagitis - Primary    Overview     Added automatically from request for surgery [id number]            Nervous and Auditory    Chronic low back pain with right-sided sciatica    Relevant Medications    methylPREDNISolone acetate (DEPO-medrol) injection 80 mg (Completed)          Exacerbation of chronic condition treated today.         Patient's Body mass index is  30.27 kg/m². BMI is above normal parameters. Recommendations include: exercise counseling and nutrition counseling.    Body mechanics reviewed.  Steroid injection today.  Keep appointment for MRI tomorrow.  Avoid any twisting, lifting or bending.  Avoid painful movements.  We will go ahead and give him a steroid injection today which should offer him some mild pain relief.  Continue Robaxin for which she has a prescription.  Will add Relafen twice daily.  Stop diclofenac gel due to possible interaction with Relafen.    I have discussed diagnosis in detail today allowing time for questions and answers. Patient is aware of reasons to seek urgent or emergent medical care as well as reasons to return to the clinic for evaluation. Possible side effects, interactions and progression of symptoms discussed as well. Patient / family states understanding.   Emotional support and active listening provided.   Monitor for changes in bowel or bladder function and report immediately.  I would like to see him back next week to go over MRI results.  I am fearful of a lumbar disc herniation due to his symptoms.  Have discussed reasons to seek immediate medical attention with patient stating understanding.  I have placed him off work for the next 2 weeks.  Patient will request [facility] paperwork which will be completed upon receipt.            This document has been electronically signed by:  CORDELIA Flores, NP-C

## 2020-07-14 ENCOUNTER — OFFICE VISIT (OUTPATIENT)
Dept: UROLOGY | Facility: CLINIC | Age: 35
End: 2020-07-14

## 2020-07-14 ENCOUNTER — HOSPITAL ENCOUNTER (OUTPATIENT)
Dept: MRI IMAGING | Facility: HOSPITAL | Age: 35
Discharge: HOME | End: 2020-07-14
Admitting: NURSE PRACTITIONER

## 2020-07-14 VITALS — WEIGHT: 222 LBS | TEMPERATURE: 98.3 F | HEIGHT: 71 IN | BODY MASS INDEX: 31.08 KG/M2

## 2020-07-14 DIAGNOSIS — N42.9 DISORDER OF PROSTATE: ICD-10-CM

## 2020-07-14 DIAGNOSIS — R79.89 LOW TESTOSTERONE: Primary | ICD-10-CM

## 2020-07-14 DIAGNOSIS — R93.89 ABNORMAL CT SCAN: ICD-10-CM

## 2020-07-14 DIAGNOSIS — E28.0 ESTRADIOL EXCESS: ICD-10-CM

## 2020-07-14 DIAGNOSIS — R53.83 OTHER FATIGUE: ICD-10-CM

## 2020-07-14 LAB
DEPRECATED RDW RBC AUTO: 40.8 FL (ref 37–54)
ERYTHROCYTE [DISTWIDTH] IN BLOOD BY AUTOMATED COUNT: 12.6 % (ref 12.3–15.4)
ESTRADIOL SERPL HS-MCNC: 14 PG/ML
HCT VFR BLD AUTO: 51.1 % (ref 37.5–51)
HGB BLD-MCNC: 17.9 G/DL (ref 13–17.7)
MCH RBC QN AUTO: 30.9 PG (ref 26.6–33)
MCHC RBC AUTO-ENTMCNC: 35 G/DL (ref 31.5–35.7)
MCV RBC AUTO: 88.3 FL (ref 79–97)
PLATELET # BLD AUTO: 222 10*3/MM3 (ref 140–450)
PMV BLD AUTO: 12.8 FL (ref 6–12)
PSA SERPL-MCNC: 0.35 NG/ML (ref 0–4)
RBC # BLD AUTO: 5.79 10*6/MM3 (ref 4.14–5.8)
TESTOST SERPL-MCNC: 108 NG/DL (ref 249–836)
WBC # BLD AUTO: 8.32 10*3/MM3 (ref 3.4–10.8)

## 2020-07-14 PROCEDURE — 99213 OFFICE O/P EST LOW 20 MIN: CPT | Performed by: UROLOGY

## 2020-07-14 PROCEDURE — 84403 ASSAY OF TOTAL TESTOSTERONE: CPT | Performed by: UROLOGY

## 2020-07-14 PROCEDURE — 36415 COLL VENOUS BLD VENIPUNCTURE: CPT | Performed by: UROLOGY

## 2020-07-14 PROCEDURE — 85027 COMPLETE CBC AUTOMATED: CPT | Performed by: UROLOGY

## 2020-07-14 PROCEDURE — 84153 ASSAY OF PSA TOTAL: CPT | Performed by: UROLOGY

## 2020-07-14 PROCEDURE — 72148 MRI LUMBAR SPINE W/O DYE: CPT

## 2020-07-14 PROCEDURE — 82670 ASSAY OF TOTAL ESTRADIOL: CPT | Performed by: UROLOGY

## 2020-07-14 PROCEDURE — 72148 MRI LUMBAR SPINE W/O DYE: CPT | Performed by: RADIOLOGY

## 2020-07-14 RX ORDER — TESTOSTERONE CYPIONATE 200 MG/ML
INJECTION, SOLUTION INTRAMUSCULAR
Qty: 10 ML | Refills: 2 | Status: SHIPPED | OUTPATIENT
Start: 2020-07-14 | End: 2021-11-02 | Stop reason: SDUPTHER

## 2020-07-14 RX ORDER — TADALAFIL 5 MG/1
5 TABLET ORAL DAILY
Qty: 30 TABLET | Refills: 6 | Status: SHIPPED | OUTPATIENT
Start: 2020-07-14 | End: 2021-05-24 | Stop reason: SDUPTHER

## 2020-07-14 RX ORDER — ANASTROZOLE 1 MG/1
1 TABLET ORAL WEEKLY
Qty: 12 TABLET | Refills: 6 | Status: SHIPPED | OUTPATIENT
Start: 2020-07-14 | End: 2021-03-02

## 2020-07-14 NOTE — PROGRESS NOTES
"Chief Complaint:          Chief Complaint   Patient presents with   • Hypogonadism     6 mnth f/u       HPI:   34 y.o. male doing fantastic.  Patient returns today for follow-up.  He is been on testosterone replacement therapy.  He reports a dramatic improvement in his Sánchez questionnaire: -SÁNCHEZ-androgen deficiency in the age male questionnaire  The patient was queried regarding the androgen deficiency in the age male questionnaire.  This is a validated questionnaire that was performed on a set of 314 [place] male physicians when it was positive it correlated directly with a 94% chance of low testosterone.  Patient indicates there is a decrease in libido or sex drive, a lack of energy, Decreased  strength and endurance, a decreased \"enjoyment of life\", sad and grumpy feelings with significant difficulty maintaining erections.  He is also been a recent deterioration regarding work performance.  He reports weight loss.  He has good facility and the use of subcutaneous and intramuscular injections as well as comfort level and using the medication in a sterile fashion.  He understands he should use only the prescribed dose.  He's here for appropriate lab monitoring regarding this.  He understands this is a controlled substance and therefore must be watched closely will not be refilled and the medical loss or miss calculation of the dose.  He is very happy with the treatment and therefore wants to continue it.      Past Medical History:        Past Medical History:   Diagnosis Date   • Allergic    • Allergic rhinitis    • Asthma    • Atrial flutter (CMS/HCC)    • Diarrhea    • Disturbance of skin sensation    • Elevated cholesterol    • GERD (gastroesophageal reflux disease)    • Gout    • History of MRI of spine 12/16/2014    Done at [facility]    • Hx of exercise stress test 2014    \"IT WAS NORMAL\"     • Hyperlipidemia    • Hypertension    • Kidney disease     stage 2   • Lymphadenitis    • Malaise and fatigue    • " "Osteoarthrosis    • Rash    • Renal disorder    • Sleep apnea     NO CPAP   • Wears glasses     TO READ         Current Meds:     Current Outpatient Medications   Medication Sig Dispense Refill   • albuterol sulfate  (90 Base) MCG/ACT inhaler Inhale 2 puffs Every 4 (Four) Hours As Needed for Shortness of Air. 2 inhaler 5   • allopurinol (ZYLOPRIM) 100 MG tablet Take 1 tablet by mouth 2 (Two) Times a Day. 60 tablet 5   • amitriptyline (ELAVIL) 25 MG tablet Take 1 tablet by mouth At Night As Needed for Sleep. 30 tablet 5   • anastrozole (ARIMIDEX) 1 MG tablet Take 1 tablet by mouth 1 (One) Time Per Week. For 12 weeks. 12 tablet 6   • azelastine (ASTELIN) 0.1 % nasal spray INSTILL 2 SPRAYS INTO EACH NOSTRIL BID  5   • B-D INTEGRA SYRINGE 25G X 5/8\" 3 ML misc See Admin Instructions.  0   • budesonide-formoterol (SYMBICORT) 160-4.5 MCG/ACT inhaler Inhale 2 puffs 2 (Two) Times a Day. 1 inhaler 5   • cetirizine (zyrTEC) 10 MG tablet Take 1 tablet by mouth At Night As Needed for Allergies. 30 tablet 5   • citalopram (CeleXA) 20 MG tablet Take 1 tablet by mouth Daily. 30 tablet 3   • clotrimazole-betamethasone (LOTRISONE) 1-0.05 % cream Apply  topically to the appropriate area as directed Every 12 (Twelve) Hours. 45 g 2   • FLOVENT HFA 44 MCG/ACT inhaler Inhale 2 puffs 2 (Two) Times a Day. 1 inhaler 5   • fluticasone (FLONASE) 50 MCG/ACT nasal spray INSTILL 2 SPRAYS INTO EACH NOSTRIL AS DIRECTED DAILY 2 bottle 11   • lansoprazole (PREVACID) 30 MG capsule      • methocarbamol (ROBAXIN) 750 MG tablet Take 1 tablet by mouth 4 (Four) Times a Day As Needed for Muscle Spasms. Take on days he works in place of flexeril 120 tablet 5   • montelukast (SINGULAIR) 10 MG tablet TAKE 1 TABLET BY MOUTH EVERY NIGHT 30 tablet 5   • nabumetone (RELAFEN) 500 MG tablet Take 1 tablet by mouth 2 (Two) Times a Day As Needed for Mild Pain  or Moderate Pain . 60 tablet 2   • omeprazole (priLOSEC) 40 MG capsule Take 1 capsule by mouth 2 (Two) " "Times a Day Before Meals. Take a half hour before breakfast 60 capsule 11   • sulfaSALAzine (AZULFIDINE) 500 MG tablet Take 1 tablet by mouth 4 (Four) Times a Day. Written by rheumatology , started back on his script 60 tablet 5   • Syringe 25G X 5/8\" 3 ML misc Use as directed 2 x weekly 24 each 3   • tadalafil (CIALIS) 5 MG tablet TAKE 1 TABLET BY MOUTH DAILY 30 tablet 6   • Testosterone Cypionate (DEPO-TESTOSTERONE) 200 MG/ML injection He is to use 1/2 cc every Monday and Thursday SQ 10 mL 2   • valsartan-hydrochlorothiazide (DIOVAN HCT) 160-25 MG per tablet Take 1 tablet by mouth Daily. 30 tablet 5     No current facility-administered medications for this visit.         Allergies:      Allergies   Allergen Reactions   • Erythromycin Rash        Past Surgical History:     Past Surgical History:   Procedure Laterality Date   • BRAVO PROCEDURE N/A 8/4/2017    Procedure: ESOPHAGOGASTRODUODENOSCOPY AND FULLER;  Surgeon: Efra Suero MD;  Location: [facility];  Service:    • BRAVO PROCEDURE N/A 12/19/2018    Procedure: ESOPHAGOGASTRODUODENOSCOPY AND FULLER;  Surgeon: Efra Suero MD;  Location: [facility];  Service: Gastroenterology   • COLONOSCOPY  2014   • COLONOSCOPY      UNSURE OF DATE   • COLONOSCOPY N/A 5/3/2018    Procedure: COLONOSCOPY WITH COLD BIOPSIES AND COLD POLYPECTOMIES;  Surgeon: Bang Segundo MD;  Location: [facility] ENDOSCOPY;  Service: Gastroenterology   • ENDOSCOPY     • ENDOSCOPY N/A 3/9/2020    Procedure: ESOPHAGOGASTRODUODENOSCOPY WITH BIOPSY CPT CODE: 69993;  Surgeon: Ricardo Nolasco MD;  Location: [facility];  Service: Gastroenterology;  Laterality: N/A;   • NISSEN FUNDOPLICATION     • NISSEN FUNDOPLICATION N/A 10/11/2017    Procedure: NISSEN FUNDOPLICATION LAPAROSCOPIC;  Surgeon: Efra Suero MD;  Location: [facility];  Service:    • NE LAP,ESOPHAGOGAST FUNDOPLASTY N/A 10/11/2017    Procedure: HIATAL HERNIA REPAIR LAPAROSCOPIC;  Surgeon: Efra Suero MD;  Location: [facility]" OR;  Service: General   • VASECTOMY           Social History:     Social History     Socioeconomic History   • Marital status:      Spouse name: Radha    • Number of children: 2   • Years of education: Not on file   • Highest education level: Not on file   Occupational History   • Occupation: [employer]   Social Needs   • Financial resource strain: Not very hard   • Food insecurity:     Worry: Never true     Inability: Never true   • Transportation needs:     Medical: No     Non-medical: No   Tobacco Use   • Smoking status: Never Smoker   • Smokeless tobacco: Never Used   Substance and Sexual Activity   • Alcohol use: Not Currently     Comment: social   • Drug use: No   • Sexual activity: Yes     Partners: Female   Lifestyle   • Physical activity:     Days per week: 0 days     Minutes per session: 0 min   • Stress: To some extent   Relationships   • Social connections:     Talks on phone: Patient refused     Gets together: Patient refused     Attends Taoism service: Patient refused     Active member of club or organization: Patient refused     Attends meetings of clubs or organizations: Patient refused     Relationship status: Patient refused       Family History:     Family History   Problem Relation Age of Onset   • Asthma Other    • Cancer Other    • COPD Other    • Diabetes Other    • Stroke Other    • Asthma Mother    • COPD Mother    • Asthma Father    • Depression Father    • Drug abuse Father    • Arthritis Maternal Grandmother    • Cancer Maternal Grandmother    • Diabetes Maternal Grandmother    • Heart disease Maternal Grandmother    • Hyperlipidemia Maternal Grandmother    • Stroke Paternal Grandfather    • Colon cancer Maternal Grandfather         mid 40s       Review of Systems:     Review of Systems   Constitutional: Negative.    HENT: Negative.    Eyes: Negative.    Respiratory: Negative.    Cardiovascular: Negative.    Gastrointestinal: Negative.    Endocrine: Negative.       Musculoskeletal: Negative.    Allergic/Immunologic: Negative.    Neurological: Negative.    Hematological: Negative.    Psychiatric/Behavioral: Negative.        Physical Exam:     Physical Exam   Constitutional: He is oriented to person, place, and time. He appears well-developed and well-nourished.   HENT:   Head: Normocephalic and atraumatic.   Eyes: Pupils are equal, round, and reactive to light. Conjunctivae and EOM are normal.   Neck: Normal range of motion.   Cardiovascular: Normal rate, regular rhythm, normal heart sounds and intact distal pulses.   Pulmonary/Chest: Effort normal and breath sounds normal.   Abdominal: Soft. Bowel sounds are normal.   Musculoskeletal: Normal range of motion.   Neurological: He is alert and oriented to person, place, and time. He has normal reflexes.   Skin: Skin is warm and dry.   Psychiatric: He has a normal mood and affect. His behavior is normal. Judgment and thought content normal.   Nursing note and vitals reviewed.      I have reviewed the following portions of the patient's history: allergies, current medications, past family history, past medical history, past social history, past surgical history, problem list and ROS and confirm it's accurate.      Procedure:       Assessment/Plan:   Low testosterone:  patient is here for follow-up.  Since beginning the medication he's been very pleased.  He reports a dramatic improvement in his erections, ability to achieve and maintain an erection, improvement in libido, increase in frequency of morning erections, a noticeable weight loss consistent with the treatment.  No development of breast problems or abnormalities.  He's going to have appropriate safety laboratory parameters checked.   He understands that the new data implicates testosterone with the development of prostate cancer and this is all but been disproven and the medical literature as well as the risks of cardiovascular disease which is actually also been  disproven.  He understands that while he is a candidate for topical therapy if he is in contact with children this is not an option because it's been shown to accentuate genitalia development at an early age that this frequently irreversible.  He also understands this is a controlled substance and as such will not be prescribed without appropriate follow-up and appropriate laboratory investigation.  He understands effects on spermatogenesis including the fact that this is not always completely reversible and not always completely limited his ability to father a child.  He has demonstrated facility in the technique of both intramuscular and subcutaneous injection.  And has been taught sterility one drawing up the medication.            Patient's Body mass index is 30.96 kg/m². BMI is above normal parameters. Recommendations include: educational material.              This document has been electronically signed by STEPHEN BANGURA MD July 14, 2020 08:24

## 2020-07-15 DIAGNOSIS — M25.551 RIGHT HIP PAIN: Primary | ICD-10-CM

## 2020-07-20 ENCOUNTER — OFFICE VISIT (OUTPATIENT)
Dept: FAMILY MEDICINE CLINIC | Facility: CLINIC | Age: 35
End: 2020-07-20

## 2020-07-20 DIAGNOSIS — G89.29 CHRONIC BILATERAL LOW BACK PAIN WITH RIGHT-SIDED SCIATICA: Primary | ICD-10-CM

## 2020-07-20 DIAGNOSIS — M25.551 RIGHT HIP PAIN: ICD-10-CM

## 2020-07-20 DIAGNOSIS — M54.41 CHRONIC BILATERAL LOW BACK PAIN WITH RIGHT-SIDED SCIATICA: Primary | ICD-10-CM

## 2020-07-20 PROBLEM — S61.012A LACERATION OF LEFT THUMB WITHOUT FOREIGN BODY WITHOUT DAMAGE TO NAIL: Status: RESOLVED | Noted: 2018-04-23 | Resolved: 2020-07-20

## 2020-07-20 PROCEDURE — 99441 PR PHYS/QHP TELEPHONE EVALUATION 5-10 MIN: CPT | Performed by: NURSE PRACTITIONER

## 2020-07-20 NOTE — ASSESSMENT & PLAN NOTE
X-ray reviewed and discussed with patient.  Schedule with orthopedic provider for consult/evaluation this week.  Body mechanics reviewed and discussed.

## 2020-07-20 NOTE — PROGRESS NOTES
Establish patient called office of APRN today to discuss:   CC  Go over x-rays      You have chosen to receive care through a telephone visit today. Do you consent to use a telephone visit for your medical care today? Yes     Brief HPI/ROS obtained as follows:    Continues to have pain in his hip hip and low back.  Pain is worse with walking or standing.  Patient does have x-rays to review and an MRI.  MRI is within normal ranges of the lumbar spine.  There are some abnormal findings on the x-ray of his right pelvis and hip.  Xray right pelvis and hip 7/9/2020  FINDINGS:   No acute osseous or articular abnormality. No soft tissue abnormality.   Unfused acetabular ossicle is noted. Subperiosteal cystic change both  femoral head/neck regions which can be in association with femoral  acetabular impingement syndrome, likely the cam type.     Seeing ortho this week. He is off work at this time.  He will need a new provider note writing him off work if symptoms continue or are not improving.    He does report that anti-inflammatory medications are helping some with the pain.  Patient reports that he is never quite felt like his right hip was correct.  He is always had a little pain in that area.    Patient has been seen by multiple orthopedic providers, neurologic providers, pain clinics and physical therapist.  He reports that nothing has ever taken care of his pain completely.  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      The following portions of the patient's history, chief complaint and ROS were reviewed and updated as appropriate per provider:  Allergies, current medications, past family history, past medical history, past social history, past surgical history and problem list.      Review of Systems   Constitutional: Positive for activity change. Negative for appetite change, fatigue and fever.   HENT: Negative for congestion, sinus pressure, sinus pain and sore throat.    Eyes: Negative.     Respiratory: Negative for cough, chest tightness, shortness of breath and wheezing.    Cardiovascular: Negative.    Gastrointestinal: Positive for diarrhea (Frequent, under the care of GI). Negative for abdominal distention, abdominal pain, blood in stool, nausea and vomiting.   Endocrine: Negative.    Genitourinary: Negative for dysuria, frequency and hematuria.   Musculoskeletal: Positive for arthralgias, back pain and gait problem. Negative for neck pain and neck stiffness.   Skin: Negative.    Allergic/Immunologic: Positive for environmental allergies. Negative for food allergies and immunocompromised state.   Neurological: Negative for seizures, facial asymmetry and light-headedness.   Hematological: Negative.    Psychiatric/Behavioral: Negative for dysphoric mood, self-injury and suicidal ideas.       The current allergy list and medication list was reviewed with patient for accuracy.     Assessment      Alert and oriented x3.  Respirations not labored with conversation.  No cough.  Speech normal.  Hearing adequate.  Cooperative.  Mood normal.  Thought process normal.    Diagnoses and all orders for this visit:    Chronic bilateral low back pain with right-sided sciatica    Right hip pain      Diagnoses and all orders for this visit:    1. Chronic bilateral low back pain with right-sided sciatica (Primary)  Assessment & Plan:  Reviewed recent lumbar MRI with patient.  Findings are within normal.  Body mechanics reviewed.      2. Right hip pain  Assessment & Plan:  X-ray reviewed and discussed with patient.  Schedule with orthopedic provider for consult/evaluation this week.  Body mechanics reviewed and discussed.               Proper body mechanics has been reviewed and discussed today.    Remain off work at this time.  Keep Ortho appointment this week on Wednesday.  X-ray results have been reviewed and discussed.  Avoid painful movements.  Continue current medication regimen.  Continue home  exercises/stretching for strengthening.    Coronavirus precautions have been reviewed and discussed.  I have discussed the CDC recommendations  of social distancing, hand washing, wearing mask and disinfecting commonly touched items. Reviewed need to notify PCP and self quarantine with mild symptoms.  Discussed procedure to obtain Covid-19 testing and notification of PCP/health dept/ED/Urgent Center if symptoms begin. Understanding verbalized.      Patient instructed and advised to call if symptoms are increasing or new symptoms occur.    Understands reasons for urgent and emergent care.  Patient (& family) verbalized agreement for treatment plan.       I would like to do a telephone visit with him later this week to discuss additional FMLA paperwork and review findings of orthopedic consult.      This visit has been rescheduled as a phone visit to comply with patient safety concerns in accordance with CDC recommendations. Total time of discussion was 10 minutes.

## 2020-07-22 ENCOUNTER — OFFICE VISIT (OUTPATIENT)
Dept: ORTHOPEDIC SURGERY | Facility: CLINIC | Age: 35
End: 2020-07-22

## 2020-07-22 VITALS — BODY MASS INDEX: 31.17 KG/M2 | OXYGEN SATURATION: 99 % | HEIGHT: 71 IN | WEIGHT: 222.66 LBS | HEART RATE: 77 BPM

## 2020-07-22 DIAGNOSIS — M25.859 FEMORAL ACETABULAR IMPINGEMENT: ICD-10-CM

## 2020-07-22 DIAGNOSIS — M25.551 RIGHT HIP PAIN: Primary | ICD-10-CM

## 2020-07-22 PROCEDURE — 99203 OFFICE O/P NEW LOW 30 MIN: CPT | Performed by: ORTHOPAEDIC SURGERY

## 2020-07-22 NOTE — PROGRESS NOTES
"    [facility] Note    Subjective     Chief Complaint   Patient presents with   • Right Hip - Pain        HPI    Nghia Roach is a 34 y.o. male who presents with right hip pain.  Onset: twisting injury. The issue has been ongoing for 2 week(s). Pain is a 6/10 on the pain scale. Pain is described as aching, throbbing, stabbing and shooting. Associated symptoms include pain, popping, grinding, stiffness, giving way/buckling and same as prior visit. The pain is worse with walking, standing, sitting, climbing stairs, working and leisure; resting improve the pain. Previous treatments have included: NSAIDS.    I have reviewed the following portions of the patient's history:History of Present Illness        Past Medical History:   Diagnosis Date   • Allergic    • Allergic rhinitis    • Asthma    • Atrial flutter (CMS/HCC)    • Diarrhea    • Disturbance of skin sensation    • Elevated cholesterol    • GERD (gastroesophageal reflux disease)    • Gout    • History of MRI of spine 12/16/2014    Done at [facility]    • Hx of exercise stress test 2014    \"IT WAS NORMAL\"     • Hyperlipidemia    • Hypertension    • Kidney disease     stage 2   • Lymphadenitis    • Malaise and fatigue    • Osteoarthrosis    • Rash    • Renal disorder    • Sleep apnea     NO CPAP   • Wears glasses     TO READ      Past Surgical History:   Procedure Laterality Date   • BRAVO PROCEDURE N/A 8/4/2017    Procedure: ESOPHAGOGASTRODUODENOSCOPY AND FULLER;  Surgeon: Efra Suero MD;  Location: [facility] OR;  Service:    • BRAVO PROCEDURE N/A 12/19/2018    Procedure: ESOPHAGOGASTRODUODENOSCOPY AND FULLER;  Surgeon: Efra Suero MD;  Location: [facility] OR;  Service: Gastroenterology   • COLONOSCOPY  2014   • COLONOSCOPY      UNSURE OF DATE   • COLONOSCOPY N/A 5/3/2018    Procedure: COLONOSCOPY WITH COLD BIOPSIES AND COLD POLYPECTOMIES;  Surgeon: Bang Segundo MD;  Location: [facility] ENDOSCOPY;  Service: Gastroenterology   • " ENDOSCOPY     • ENDOSCOPY N/A 3/9/2020    Procedure: ESOPHAGOGASTRODUODENOSCOPY WITH BIOPSY CPT CODE: 76398;  Surgeon: Ricardo Nolasco MD;  Location: [facility] OR;  Service: Gastroenterology;  Laterality: N/A;   • NISSEN FUNDOPLICATION     • NISSEN FUNDOPLICATION N/A 10/11/2017    Procedure: NISSEN FUNDOPLICATION LAPAROSCOPIC;  Surgeon: Efra Suero MD;  Location: [facility] OR;  Service:    • [place] LAP,ESOPHAGOGAST FUNDOPLASTY N/A 10/11/2017    Procedure: HIATAL HERNIA REPAIR LAPAROSCOPIC;  Surgeon: Efra Suero MD;  Location: [facility] OR;  Service: General   • VASECTOMY        Family History   Problem Relation Age of Onset   • Asthma Other    • Cancer Other    • COPD Other    • Diabetes Other    • Stroke Other    • Asthma Mother    • COPD Mother    • Asthma Father    • Depression Father    • Drug abuse Father    • Arthritis Maternal Grandmother    • Cancer Maternal Grandmother    • Diabetes Maternal Grandmother    • Heart disease Maternal Grandmother    • Hyperlipidemia Maternal Grandmother    • Stroke Paternal Grandfather    • Colon cancer Maternal Grandfather         mid 40s     Social History     Socioeconomic History   • Marital status:      Spouse name: Radha    • Number of children: 2   • Years of education: Not on file   • Highest education level: Not on file   Occupational History   • Occupation: [employer] (acquired by [employer] in 2014)   Social Needs   • Financial resource strain: Not very hard   • Food insecurity:     Worry: Never true     Inability: Never true   • Transportation needs:     Medical: No     Non-medical: No   Tobacco Use   • Smoking status: Never Smoker   • Smokeless tobacco: Never Used   Substance and Sexual Activity   • Alcohol use: Not Currently     Comment: social   • Drug use: No   • Sexual activity: Yes     Partners: Female   Lifestyle   • Physical activity:     Days per week: 0 days     Minutes per session: 0 min   • Stress: To some extent   Relationships   • Social connections:     Talks on  "phone: Patient refused     Gets together: Patient refused     Attends Synagogue service: Patient refused     Active member of club or organization: Patient refused     Attends meetings of clubs or organizations: Patient refused     Relationship status: Patient refused      Current Outpatient Medications on File Prior to Visit   Medication Sig Dispense Refill   • albuterol sulfate  (90 Base) MCG/ACT inhaler Inhale 2 puffs Every 4 (Four) Hours As Needed for Shortness of Air. 2 inhaler 5   • allopurinol (ZYLOPRIM) 100 MG tablet Take 1 tablet by mouth 2 (Two) Times a Day. 60 tablet 5   • amitriptyline (ELAVIL) 25 MG tablet Take 1 tablet by mouth At Night As Needed for Sleep. 30 tablet 5   • anastrozole (Arimidex) 1 MG tablet Take 1 tablet by mouth 1 (One) Time Per Week. For 12 weeks. 12 tablet 6   • azelastine (ASTELIN) 0.1 % nasal spray INSTILL 2 SPRAYS INTO EACH NOSTRIL BID  5   • B-D INTEGRA SYRINGE 25G X 5/8\" 3 ML misc See Admin Instructions.  0   • budesonide-formoterol (SYMBICORT) 160-4.5 MCG/ACT inhaler Inhale 2 puffs 2 (Two) Times a Day. 1 inhaler 5   • cetirizine (zyrTEC) 10 MG tablet Take 1 tablet by mouth At Night As Needed for Allergies. 30 tablet 5   • citalopram (CeleXA) 20 MG tablet Take 1 tablet by mouth Daily. 30 tablet 3   • clotrimazole-betamethasone (LOTRISONE) 1-0.05 % cream Apply  topically to the appropriate area as directed Every 12 (Twelve) Hours. 45 g 2   • FLOVENT HFA 44 MCG/ACT inhaler Inhale 2 puffs 2 (Two) Times a Day. 1 inhaler 5   • fluticasone (FLONASE) 50 MCG/ACT nasal spray INSTILL 2 SPRAYS INTO EACH NOSTRIL AS DIRECTED DAILY 2 bottle 11   • lansoprazole (PREVACID) 30 MG capsule Take 30 mg by mouth As Needed.     • methocarbamol (ROBAXIN) 750 MG tablet Take 1 tablet by mouth 4 (Four) Times a Day As Needed for Muscle Spasms. Take on days he works in place of flexeril 120 tablet 5   • montelukast (SINGULAIR) 10 MG tablet TAKE 1 TABLET BY MOUTH EVERY NIGHT 30 tablet 5   • " "nabumetone (RELAFEN) 500 MG tablet Take 1 tablet by mouth 2 (Two) Times a Day As Needed for Mild Pain  or Moderate Pain . 60 tablet 2   • omeprazole (priLOSEC) 40 MG capsule Take 1 capsule by mouth 2 (Two) Times a Day Before Meals. Take a half hour before breakfast 60 capsule 11   • sulfaSALAzine (AZULFIDINE) 500 MG tablet Take 1 tablet by mouth 4 (Four) Times a Day. Written by rheumatology , started back on his script 60 tablet 5   • Syringe 25G X 5/8\" 3 ML misc Use as directed 2 x weekly 24 each 3   • tadalafil (CIALIS) 5 MG tablet Take 1 tablet by mouth Daily. 30 tablet 6   • Testosterone Cypionate (Depo-Testosterone) 200 MG/ML injection He is to use 1/2 cc every Monday and Thursday SQ 10 mL 2   • valsartan-hydrochlorothiazide (DIOVAN HCT) 160-25 MG per tablet Take 1 tablet by mouth Daily. 30 tablet 5     No current facility-administered medications on file prior to visit.       Allergies   Allergen Reactions   • Erythromycin Rash        The following portions of the patient's history were reviewed and updated as appropriate: allergies, current medications, past family history, past medical history, past social history, past surgical history and problem list.    Review of Systems   Constitutional: Negative.    HENT: Negative.    Eyes: Negative.    Respiratory: Negative.    Cardiovascular: Negative.    Gastrointestinal: Negative.    Endocrine: Negative.    Genitourinary: Negative.    Musculoskeletal: Positive for arthralgias and joint swelling.   Skin: Negative.    Allergic/Immunologic: Negative.    Neurological: Negative.    Hematological: Negative.    Psychiatric/Behavioral: Negative.         Objective      Physical Exam  Pulse 77   Ht 180.3 cm (70.98\")   Wt 101 kg (222 lb 10.6 oz)   SpO2 99%   BMI 31.07 kg/m²     Body mass index is 31.07 kg/m².    GENERAL APPEARANCE: awake, alert & oriented x 3, in no acute distress and well developed, well nourished  PSYCH: normal mood and affect  LUNGS:  breathing " nonlabored, no wheezing  EYES: sclera anicteric, pupils equal  CARDIOVASCULAR: palpable pulses dorsalis pedis, palpable posterior tibial bilaterally. Capillary refill less than 2 seconds  INTEGUMENTARY: skin intact, no clubbing, cyanosis  NEUROLOGIC:  Normal Sensation and reflexes       Ortho Exam  Peripheral Vascular  Lower Extremity   Edema - None bilaterally    Musculoskeletal  Lower Extremity   Left Hip    Normal sensation and coordination    No crepitus, instability, subluxation or laxity    No known fractures or dislocations   Right Hip    Normal sensation and coordination    No crepitus, instability, subluxation or laxity    No known fractures or dislocations     Inspection and palpation    Tenderness -      Right - none     Left - none    Tissue tension/texture is pliable and soft bilaterally     Normal warmth bilaterally   Strength and Tone    Left hip flexors - 5/5     Right hip flexors - 5/5   Deformities/Postures/Misalignments/Discrepancies    No leg length discrepancy   Functional Testing    Stinchfield test negative bilaterally    90-90 straight leg raise negative bilaterally  Pain with internal X rotation right hip  Imaging/Studies  Imaging Results (Last 7 Days)     ** No results found for the last 168 hours. **        I viewed the x-rays from July 9 which showed cam impingement.  I reviewed the MRI of the lumbar spine from 714 which is negative  Assessment/Plan        ICD-10-CM ICD-9-CM   1. Right hip pain M25.551 719.45   2. Femoral acetabular impingement M25.859 719.85       Orders Placed This Encounter   Procedures   • Ambulatory Referral to Physical Therapy      We will start physical therapy.  I have ordered an MRI of the hip because of his impingement.  He most likely has some soft tissue issues as he describes muscular pain down into his medial thigh.  He has a chronic history of back pain.  His MRI of the lumbar spine was negative.  I will see him back after the MRI right hip  Medical  Decision Making  Management Options : over-the-counter medicine and physical/occupational therapy  Data/Risk: radiology tests and independent visualization of imaging, lab tests, or EMG/NCV    Kal Lyons MD  07/22/20  13:47         EMR Dragon/Transcription disclaimer:  Much of this encounter note is an electronic transcription of spoken language to printed text. Electronic transcription of spoken language may permit erroneous, or at times, nonsensical words or phrases to be inadvertently transcribed. Although I have reviewed the note for such errors, some may still exist.

## 2020-07-23 ENCOUNTER — OFFICE VISIT (OUTPATIENT)
Dept: FAMILY MEDICINE CLINIC | Facility: CLINIC | Age: 35
End: 2020-07-23

## 2020-07-23 DIAGNOSIS — G89.29 CHRONIC RIGHT-SIDED LOW BACK PAIN WITH RIGHT-SIDED SCIATICA: ICD-10-CM

## 2020-07-23 DIAGNOSIS — M54.41 CHRONIC RIGHT-SIDED LOW BACK PAIN WITH RIGHT-SIDED SCIATICA: ICD-10-CM

## 2020-07-23 DIAGNOSIS — M25.859 FEMORAL ACETABULAR IMPINGEMENT: ICD-10-CM

## 2020-07-23 DIAGNOSIS — J45.42 MODERATE PERSISTENT ASTHMA WITH STATUS ASTHMATICUS: Primary | ICD-10-CM

## 2020-07-23 DIAGNOSIS — M25.551 RIGHT HIP PAIN: ICD-10-CM

## 2020-07-23 DIAGNOSIS — M05.79 RHEUMATOID ARTHRITIS INVOLVING MULTIPLE SITES WITH POSITIVE RHEUMATOID FACTOR (HCC): ICD-10-CM

## 2020-07-23 PROCEDURE — 99442 PR PHYS/QHP TELEPHONE EVALUATION 11-20 MIN: CPT | Performed by: NURSE PRACTITIONER

## 2020-07-23 NOTE — PROGRESS NOTES
Establish patient called office of APRN today to discuss:   CC    FMLA/disability management insurance papers  Go over this weeks Ortho consult    You have chosen to receive care through a telephone visit today. Do you consent to use a telephone visit for your medical care today? Yes     Brief HPI/ROS obtained as follows:    Patient has been seen by orthopedic provider yesterday.  An MRI of his right hip has been ordered.  Diagnosis of femoral acetabular impingement has been placed.  Patient continues to have low back pain and right hip pain that radiates down into his right lower extremity.  This pain is worse with any activity/standing/weightbearing.  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  He does have a short-term disability policy that will help provide him with income supplementation while he is all for medical reasons.    Patient does have follow-up scheduled with orthopedic provider as well as additional testing.  He is going to start physical therapy.    Other diagnosis includes rheumatoid arthritis with positive rheumatoid factor/chronic joint pain.    Asthma-no recent exacerbation.  Patient is at risk for COVID-19 due to asthma, hypertension and multiple other chronic health conditions.    The following portions of the patient's history, chief complaint and ROS were reviewed and updated as appropriate per provider:  Allergies, current medications, past family history, past medical history, past social history, past surgical history and problem list.    Review of Systems   Constitutional: Positive for activity change. Negative for appetite change, fatigue and fever.   HENT: Negative for congestion, sinus pressure, sinus pain, sneezing, sore throat and trouble swallowing.    Eyes: Negative.    Respiratory: Negative for cough, chest tightness, shortness of breath and wheezing.    Gastrointestinal: Negative for abdominal pain, constipation, nausea and vomiting.   Endocrine: Negative.     Genitourinary: Negative for difficulty urinating and flank pain.   Musculoskeletal: Positive for arthralgias, back pain and gait problem. Negative for neck stiffness.   Skin: Negative.    Allergic/Immunologic: Positive for environmental allergies. Negative for food allergies.   Neurological: Negative for dizziness, seizures, facial asymmetry and speech difficulty.   Hematological: Negative.    Psychiatric/Behavioral: Negative for confusion, decreased concentration, self-injury and suicidal ideas.       The current allergy list and medication list was reviewed with patient for accuracy.     Assessment     Alert and oriented x3.  Respirations not labored with conversation.  No cough.  Speech normal.  Hearing adequate.  Cooperative.  Mood normal.  Thought process normal.      Diagnoses and all orders for this visit:    Moderate persistent asthma with status asthmaticus    Rheumatoid arthritis involving multiple sites with positive rheumatoid factor (CMS/[facility])    Chronic right-sided low back pain with right-sided sciatica    Femoral acetabular impingement    Right hip pain      Reviewed recent/yesterday's orthopedic consult.  Body mechanics reviewed.  Continue current plan of care.  Start physical therapy this week.  Avoid painful movements.  FMLA/short-term disability paperwork completed for dates 7/9/2020 through 12/9/2020.  Will release sooner if patient becomes able to work.  He will remain under the care of orthopedic provider at this time.    Coronavirus precautions have been reviewed and discussed.  I have discussed the CDC recommendations  of social distancing, hand washing, wearing mask and disinfecting commonly touched items. Reviewed need to notify PCP and self quarantine with mild symptoms.  Discussed procedure to obtain Covid-19 testing and notification of PCP/health dept/ED/Urgent Center if symptoms begin. Understanding verbalized.    Emotional support and active listening provided.   I have discussed  diagnosis in detail today allowing time for questions and answers. Pt is aware of reasons to seek urgent or emergent medical care as well as reasons to return to the clinic for evaluation. Possible side effects, interactions and progression of symptoms discussed as well. Pt / family states understanding.          Patient instructed and advised to call if symptoms are increasing or new symptoms occur.    Understands reasons for urgent and emergent care.  Patient (& family) verbalized agreement for treatment plan.       Follow up 3 months, sooner if needed.   Routine labs every 3-6 months.     This visit has been rescheduled as a phone visit to comply with patient safety concerns in accordance with CDC recommendations. Total time of discussion was 12 minutes.

## 2020-08-05 DIAGNOSIS — J45.42 MODERATE PERSISTENT ASTHMA WITH STATUS ASTHMATICUS: ICD-10-CM

## 2020-08-06 RX ORDER — MONTELUKAST SODIUM 10 MG/1
10 TABLET ORAL NIGHTLY
Qty: 30 TABLET | Refills: 5 | Status: SHIPPED | OUTPATIENT
Start: 2020-08-06 | End: 2020-10-25 | Stop reason: SDUPTHER

## 2020-08-11 ENCOUNTER — HOSPITAL ENCOUNTER (OUTPATIENT)
Dept: MRI IMAGING | Facility: HOSPITAL | Age: 35
Discharge: HOME | End: 2020-08-11
Admitting: ORTHOPAEDIC SURGERY

## 2020-08-11 DIAGNOSIS — M25.551 RIGHT HIP PAIN: ICD-10-CM

## 2020-08-11 DIAGNOSIS — M25.859 FEMORAL ACETABULAR IMPINGEMENT: ICD-10-CM

## 2020-08-11 PROCEDURE — 73721 MRI JNT OF LWR EXTRE W/O DYE: CPT

## 2020-08-14 ENCOUNTER — OFFICE VISIT (OUTPATIENT)
Dept: ORTHOPEDIC SURGERY | Facility: CLINIC | Age: 35
End: 2020-08-14

## 2020-08-14 VITALS — WEIGHT: 222.66 LBS | HEIGHT: 71 IN | BODY MASS INDEX: 31.17 KG/M2 | HEART RATE: 85 BPM | OXYGEN SATURATION: 98 %

## 2020-08-14 DIAGNOSIS — M25.859 FEMORAL ACETABULAR IMPINGEMENT: Primary | ICD-10-CM

## 2020-08-14 DIAGNOSIS — M25.551 RIGHT HIP PAIN: ICD-10-CM

## 2020-08-14 PROCEDURE — 99213 OFFICE O/P EST LOW 20 MIN: CPT | Performed by: ORTHOPAEDIC SURGERY

## 2020-08-14 NOTE — PROGRESS NOTES
"    [facility] [place]    Subjective     Chief Complaint   Patient presents with   • Follow-up     MRI f/u right hip;          HPI  Nghia Roach is a 34 y.o. male.  He complains of right hip pain.  He has had it for years.  He is follow-up after the MRI.  The MRI was August 11.  His pain is 5 out of 10.  He walks with a limp.    Past Medical History:   Diagnosis Date   • Allergic    • Allergic rhinitis    • Asthma    • Atrial flutter (CMS/HCC)    • Diarrhea    • Disturbance of skin sensation    • Elevated cholesterol    • GERD (gastroesophageal reflux disease)    • Gout    • History of MRI of spine 12/16/2014    Done at [facility]    • Hx of exercise stress test 2014    \"IT WAS NORMAL\"     • Hyperlipidemia    • Hypertension    • Kidney disease     stage 2   • Lymphadenitis    • Malaise and fatigue    • Osteoarthrosis    • Rash    • Renal disorder    • Sleep apnea     NO CPAP   • Wears glasses     TO READ      Past Surgical History:   Procedure Laterality Date   • BRAVO PROCEDURE N/A 8/4/2017    Procedure: ESOPHAGOGASTRODUODENOSCOPY AND FULLER;  Surgeon: Efra Suero MD;  Location: [facility];  Service:    • BRAVO PROCEDURE N/A 12/19/2018    Procedure: ESOPHAGOGASTRODUODENOSCOPY AND FULLER;  Surgeon: Efra Suero MD;  Location: [facility];  Service: Gastroenterology   • COLONOSCOPY  2014   • COLONOSCOPY      UNSURE OF DATE   • COLONOSCOPY N/A 5/3/2018    Procedure: COLONOSCOPY WITH COLD BIOPSIES AND COLD POLYPECTOMIES;  Surgeon: Bang Segundo MD;  Location: [facility] ENDOSCOPY;  Service: Gastroenterology   • ENDOSCOPY     • ENDOSCOPY N/A 3/9/2020    Procedure: ESOPHAGOGASTRODUODENOSCOPY WITH BIOPSY CPT CODE: 76339;  Surgeon: Ricardo Nolasco MD;  Location: [facility] OR;  Service: Gastroenterology;  Laterality: N/A;   • NISSEN FUNDOPLICATION     • NISSEN FUNDOPLICATION N/A 10/11/2017    Procedure: NISSEN FUNDOPLICATION LAPAROSCOPIC;  Surgeon: Efra Suero MD;  Location: [facility]" OR;  Service:    • [place] LAP,ESOPHAGOGAST FUNDOPLASTY N/A 10/11/2017    Procedure: HIATAL HERNIA REPAIR LAPAROSCOPIC;  Surgeon: Efra Suero MD;  Location: [facility];  Service: General   • VASECTOMY        Family History   Problem Relation Age of Onset   • Asthma Other    • Cancer Other    • COPD Other    • Diabetes Other    • Stroke Other    • Asthma Mother    • COPD Mother    • Asthma Father    • Depression Father    • Drug abuse Father    • Arthritis Maternal Grandmother    • Cancer Maternal Grandmother    • Diabetes Maternal Grandmother    • Heart disease Maternal Grandmother    • Hyperlipidemia Maternal Grandmother    • Stroke Paternal Grandfather    • Colon cancer Maternal Grandfather         mid 40s     Social History     Socioeconomic History   • Marital status:      Spouse name: Radha    • Number of children: 2   • Years of education: Not on file   • Highest education level: Not on file   Occupational History   • Occupation: [employer]   Social Needs   • Financial resource strain: Not very hard   • Food insecurity:     Worry: Never true     Inability: Never true   • Transportation needs:     Medical: No     Non-medical: No   Tobacco Use   • Smoking status: Never Smoker   • Smokeless tobacco: Never Used   Substance and Sexual Activity   • Alcohol use: Not Currently     Comment: social   • Drug use: No   • Sexual activity: Yes     Partners: Female   Lifestyle   • Physical activity:     Days per week: 0 days     Minutes per session: 0 min   • Stress: To some extent   Relationships   • Social connections:     Talks on phone: Patient refused     Gets together: Patient refused     Attends Orthodox service: Patient refused     Active member of club or organization: Patient refused     Attends meetings of clubs or organizations: Patient refused     Relationship status: Patient refused      Current Outpatient Medications on File Prior to Visit   Medication Sig Dispense Refill   • albuterol sulfate   "(90 Base) MCG/ACT inhaler Inhale 2 puffs Every 4 (Four) Hours As Needed for Shortness of Air. 2 inhaler 5   • allopurinol (ZYLOPRIM) 100 MG tablet Take 1 tablet by mouth 2 (Two) Times a Day. 60 tablet 5   • amitriptyline (ELAVIL) 25 MG tablet Take 1 tablet by mouth At Night As Needed for Sleep. 30 tablet 5   • anastrozole (Arimidex) 1 MG tablet Take 1 tablet by mouth 1 (One) Time Per Week. For 12 weeks. 12 tablet 6   • azelastine (ASTELIN) 0.1 % nasal spray INSTILL 2 SPRAYS INTO EACH NOSTRIL BID  5   • B-D INTEGRA SYRINGE 25G X 5/8\" 3 ML misc See Admin Instructions.  0   • budesonide-formoterol (SYMBICORT) 160-4.5 MCG/ACT inhaler Inhale 2 puffs 2 (Two) Times a Day. 1 inhaler 5   • cetirizine (zyrTEC) 10 MG tablet Take 1 tablet by mouth At Night As Needed for Allergies. 30 tablet 5   • citalopram (CeleXA) 20 MG tablet Take 1 tablet by mouth Daily. 30 tablet 3   • clotrimazole-betamethasone (LOTRISONE) 1-0.05 % cream Apply  topically to the appropriate area as directed Every 12 (Twelve) Hours. 45 g 2   • FLOVENT HFA 44 MCG/ACT inhaler Inhale 2 puffs 2 (Two) Times a Day. 1 inhaler 5   • fluticasone (FLONASE) 50 MCG/ACT nasal spray INSTILL 2 SPRAYS INTO EACH NOSTRIL AS DIRECTED DAILY 2 bottle 11   • lansoprazole (PREVACID) 30 MG capsule Take 30 mg by mouth As Needed.     • methocarbamol (ROBAXIN) 750 MG tablet Take 1 tablet by mouth 4 (Four) Times a Day As Needed for Muscle Spasms. Take on days he works in place of flexeril 120 tablet 5   • montelukast (SINGULAIR) 10 MG tablet Take 1 tablet by mouth Every Night. 30 tablet 5   • nabumetone (RELAFEN) 500 MG tablet Take 1 tablet by mouth 2 (Two) Times a Day As Needed for Mild Pain  or Moderate Pain . 60 tablet 2   • omeprazole (priLOSEC) 40 MG capsule Take 1 capsule by mouth 2 (Two) Times a Day Before Meals. Take a half hour before breakfast 60 capsule 11   • sulfaSALAzine (AZULFIDINE) 500 MG tablet Take 1 tablet by mouth 4 (Four) Times a Day. Written by rheumatology , " "started back on his script 60 tablet 5   • Syringe 25G X 5/8\" 3 ML misc Use as directed 2 x weekly 24 each 3   • tadalafil (CIALIS) 5 MG tablet Take 1 tablet by mouth Daily. 30 tablet 6   • Testosterone Cypionate (Depo-Testosterone) 200 MG/ML injection He is to use 1/2 cc every Monday and Thursday SQ 10 mL 2   • valsartan-hydrochlorothiazide (DIOVAN HCT) 160-25 MG per tablet Take 1 tablet by mouth Daily. 30 tablet 5     No current facility-administered medications on file prior to visit.       Allergies   Allergen Reactions   • Erythromycin Rash        The following portions of the patient's history were reviewed and updated as appropriate: allergies, current medications, past family history, past medical history, past social history, past surgical history and problem list.    Review of Systems   Constitutional: Positive for activity change.   Eyes: Negative.    Respiratory: Negative.    Cardiovascular: Negative.    Gastrointestinal: Positive for abdominal pain, constipation and diarrhea.   Endocrine: Positive for cold intolerance.   Genitourinary: Negative.    Musculoskeletal: Positive for arthralgias and back pain.   Skin: Negative.    Allergic/Immunologic: Negative.    Neurological: Positive for tremors, weakness, light-headedness, numbness and headaches.   Hematological: Negative.    Psychiatric/Behavioral: Positive for agitation.        Objective      Physical Exam  Pulse 85   Ht 180.3 cm (70.98\")   Wt 101 kg (222 lb 10.6 oz)   SpO2 98%   BMI 31.07 kg/m²     Body mass index is 31.07 kg/m².    GENERAL APPEARANCE: awake, alert & oriented x 3, in no acute distress and well developed, well nourished  PSYCH: normal mood and affect  LUNGS:  breathing nonlabored, no wheezing  No change on exam.  He has pain with hip range of motion.  He has a coxalgia gait.  Imaging/Studies  Imaging Results (Last 7 Days)     ** No results found for the last 168 hours. **      I viewed his hip MRI from August 11 which shows femoral " acetabular impingement and labral degeneration with a possible small tear    Assessment/Plan        ICD-10-CM ICD-9-CM   1. Femoral acetabular impingement M25.859 719.85   2. Right hip pain M25.551 719.45       Orders Placed This Encounter   Procedures   • [place] Guide For Pain Meds Inj   • Ambulatory Referral to Orthopedic Surgery   • Ambulatory Referral to Physical Therapy      I have ordered a hip cortisone injection.  I have ordered a referral to Dr. Pruett at  for possible surgery.  He is currently off work.  He will follow-up with me in 6 weeks.  He will continue physical therapy.  Medical Decision Making  Management Options : prescription/IM medicine and physical/occupational therapy  Data/Risk: radiology tests and independent visualization of imaging, lab tests, or EMG/NCV    Kal Lyons MD  08/14/20  08:25         EMR Dragon/Transcription disclaimer:  Much of this encounter note is an electronic transcription of spoken language to printed text. Electronic transcription of spoken language may permit erroneous, or at times, nonsensical words or phrases to be inadvertently transcribed. Although I have reviewed the note for such errors, some may still exist.

## 2020-08-24 ENCOUNTER — HOSPITAL ENCOUNTER (OUTPATIENT)
Dept: GENERAL RADIOLOGY | Facility: HOSPITAL | Age: 35
Discharge: HOME | End: 2020-08-24
Admitting: ORTHOPAEDIC SURGERY

## 2020-08-24 DIAGNOSIS — M25.859 FEMORAL ACETABULAR IMPINGEMENT: ICD-10-CM

## 2020-08-24 DIAGNOSIS — M25.551 RIGHT HIP PAIN: ICD-10-CM

## 2020-08-24 PROCEDURE — 25010000002 IOPAMIDOL 61 % SOLUTION: Performed by: PHYSICIAN ASSISTANT

## 2020-08-24 PROCEDURE — 77002 NEEDLE LOCALIZATION BY XRAY: CPT

## 2020-08-24 PROCEDURE — 25010000002 BETAMETHASONE ACET & SOD PHOS PER 4 MG: Performed by: ORTHOPAEDIC SURGERY

## 2020-08-24 RX ORDER — LIDOCAINE HYDROCHLORIDE 10 MG/ML
5 INJECTION, SOLUTION EPIDURAL; INFILTRATION; INTRACAUDAL; PERINEURAL ONCE
Status: COMPLETED | OUTPATIENT
Start: 2020-08-24 | End: 2020-08-24

## 2020-08-24 RX ORDER — BETAMETHASONE SODIUM PHOSPHATE AND BETAMETHASONE ACETATE 3; 3 MG/ML; MG/ML
6 INJECTION, SUSPENSION INTRA-ARTICULAR; INTRALESIONAL; INTRAMUSCULAR; SOFT TISSUE ONCE
Status: COMPLETED | OUTPATIENT
Start: 2020-08-24 | End: 2020-08-24

## 2020-08-24 RX ORDER — LIDOCAINE HYDROCHLORIDE 10 MG/ML
4 INJECTION, SOLUTION EPIDURAL; INFILTRATION; INTRACAUDAL; PERINEURAL ONCE
Status: COMPLETED | OUTPATIENT
Start: 2020-08-24 | End: 2020-08-24

## 2020-08-24 RX ORDER — BUPIVACAINE HYDROCHLORIDE 2.5 MG/ML
4 INJECTION, SOLUTION EPIDURAL; INFILTRATION; INTRACAUDAL ONCE
Status: COMPLETED | OUTPATIENT
Start: 2020-08-24 | End: 2020-08-24

## 2020-08-24 RX ADMIN — LIDOCAINE HYDROCHLORIDE 4 ML: 10 INJECTION, SOLUTION EPIDURAL; INFILTRATION; INTRACAUDAL; PERINEURAL at 14:04

## 2020-08-24 RX ADMIN — BUPIVACAINE HYDROCHLORIDE 4 ML: 2.5 INJECTION, SOLUTION EPIDURAL; INFILTRATION; INTRACAUDAL; PERINEURAL at 14:04

## 2020-08-24 RX ADMIN — LIDOCAINE HYDROCHLORIDE 5 ML: 10 INJECTION, SOLUTION EPIDURAL; INFILTRATION; INTRACAUDAL; PERINEURAL at 14:05

## 2020-08-24 RX ADMIN — BETAMETHASONE SODIUM PHOSPHATE AND BETAMETHASONE ACETATE 6 MG: 3; 3 INJECTION, SUSPENSION INTRA-ARTICULAR; INTRALESIONAL; INTRAMUSCULAR at 14:04

## 2020-08-24 RX ADMIN — IOPAMIDOL 50 ML: 612 INJECTION, SOLUTION INTRAVENOUS at 14:03

## 2020-09-08 ENCOUNTER — OFFICE VISIT (OUTPATIENT)
Dept: FAMILY MEDICINE CLINIC | Facility: CLINIC | Age: 35
End: 2020-09-08

## 2020-09-08 VITALS
TEMPERATURE: 97.1 F | HEIGHT: 71 IN | DIASTOLIC BLOOD PRESSURE: 90 MMHG | OXYGEN SATURATION: 97 % | WEIGHT: 223 LBS | BODY MASS INDEX: 31.22 KG/M2 | SYSTOLIC BLOOD PRESSURE: 150 MMHG | HEART RATE: 67 BPM

## 2020-09-08 DIAGNOSIS — J45.42 MODERATE PERSISTENT ASTHMA WITH STATUS ASTHMATICUS: Primary | ICD-10-CM

## 2020-09-08 DIAGNOSIS — M25.859 FEMORAL ACETABULAR IMPINGEMENT: ICD-10-CM

## 2020-09-08 DIAGNOSIS — I10 ESSENTIAL HYPERTENSION: ICD-10-CM

## 2020-09-08 PROCEDURE — 99214 OFFICE O/P EST MOD 30 MIN: CPT | Performed by: NURSE PRACTITIONER

## 2020-09-08 NOTE — ASSESSMENT & PLAN NOTE
Off work times next 6 months for surgery and recovery.  Will release earlier if it reaches goals sooner.  Placed off until February 8, 2021.

## 2020-09-08 NOTE — PROGRESS NOTES
Subjective   Nghia Roach is a 35 y.o. male.     No chief complaint on file.    Chief complaint  Acetabular impingement    History of Present Illness:    Having hip surgery October 13, 2020 on right hip. Will eventually likely need a hip replacement. Works in a factory. Needs long-term disability paperwork.  Patient has exhausted his FMLA and will be going off in his short/long-term disability.  It is expected that he will need to have arthroscopy of his left hip eventually as well.  Patient will require some time for recovery.    Hypertension-mildly elevated this morning.  Patient took his blood pressure medication last night.  Does report he is in a little bit of pain this morning walking on his hip.  Does monitor his blood pressure closely.    Moderate persistent asthma with status asthmaticus-patient is increased risk for complication if he obtained coronavirus.  He is taking precautions, wearing mask, social distancing and doing extensive handwashing.      The following portions of the patient's history and ROS were reviewed and updated as appropriate per provider:  Allergies, current medications, past family history, past medical history, past social history, past surgical history and problem list.    Review of Systems   Constitutional: Positive for activity change (Less active due to hip pain). Negative for chills, fatigue and fever.   HENT: Negative for congestion, sinus pressure, sinus pain, sneezing and sore throat.    Eyes: Negative for pain, discharge and itching.   Respiratory: Negative for cough, chest tightness, shortness of breath and wheezing.    Cardiovascular: Negative.    Gastrointestinal: Positive for diarrhea (Frequent, IBS under the care of GI). Negative for blood in stool and vomiting.   Endocrine: Negative.    Genitourinary: Negative for difficulty urinating, flank pain and frequency.   Musculoskeletal: Positive for arthralgias, back pain and gait problem. Negative for neck pain and  "neck stiffness.   Allergic/Immunologic: Positive for environmental allergies and immunocompromised state. Negative for food allergies.   Neurological: Negative for seizures, facial asymmetry and light-headedness.   Hematological: Negative.    Psychiatric/Behavioral: Negative for dysphoric mood, self-injury and suicidal ideas.       Objective     /90   Pulse 67   Temp 97.1 °F (36.2 °C) (Temporal)   Ht 180.3 cm (70.98\")   Wt 101 kg (223 lb)   SpO2 97%   BMI 31.12 kg/m²   Office Visit on 07/14/2020   Component Date Value Ref Range Status   • WBC 07/14/2020 8.32  3.40 - 10.80 10*3/mm3 Final   • RBC 07/14/2020 5.79  4.14 - 5.80 10*6/mm3 Final   • Hemoglobin 07/14/2020 17.9* 13.0 - 17.7 g/dL Final   • Hematocrit 07/14/2020 51.1* 37.5 - 51.0 % Final   • MCV 07/14/2020 88.3  79.0 - 97.0 fL Final   • MCH 07/14/2020 30.9  26.6 - 33.0 pg Final   • MCHC 07/14/2020 35.0  31.5 - 35.7 g/dL Final   • RDW 07/14/2020 12.6  12.3 - 15.4 % Final   • RDW-SD 07/14/2020 40.8  37.0 - 54.0 fl Final   • MPV 07/14/2020 12.8* 6.0 - 12.0 fL Final   • Platelets 07/14/2020 222  140 - 450 10*3/mm3 Final   • PSA 07/14/2020 0.351  0.000 - 4.000 ng/mL Final   • Testosterone, Total 07/14/2020 108.00* 249.00 - 836.00 ng/dL Final   • Estradiol 07/14/2020 14.0  pg/mL Final       Physical Exam   Constitutional: He is oriented to person, place, and time. Vital signs are normal. He appears well-developed and well-nourished. He is cooperative.  Non-toxic appearance. He does not have a sickly appearance. He does not appear ill. No distress.   HENT:   Head: Normocephalic. Hair is normal.   Right Ear: Hearing, tympanic membrane, external ear and ear canal normal.   Left Ear: Hearing, tympanic membrane, external ear and ear canal normal.   Nose: Right sinus exhibits no frontal sinus tenderness. Left sinus exhibits no frontal sinus tenderness.   Wearing a mask   Eyes: Pupils are equal, round, and reactive to light. Conjunctivae, EOM and lids are " normal. Right eye exhibits no discharge. Left eye exhibits no discharge.   Neck: Normal range of motion. Neck supple. No spinous process tenderness present. No tracheal deviation and normal range of motion present. No thyromegaly present.   Cardiovascular: Normal rate, regular rhythm and normal heart sounds. Exam reveals no gallop and no friction rub.   No murmur heard.  Pulmonary/Chest: Effort normal and breath sounds normal. No respiratory distress. He has no wheezes. He has no rales. He exhibits no tenderness.   Abdominal: Soft. Normal appearance and bowel sounds are normal. He exhibits no distension and no mass. There is no tenderness. There is no rebound and no guarding.   Musculoskeletal: Normal range of motion.        Right hip: He exhibits tenderness and crepitus. He exhibits normal strength.        Lumbar back: He exhibits tenderness.   Lymphadenopathy:     He has no cervical adenopathy.   Neurological: He is alert and oriented to person, place, and time. He has normal reflexes. He is not disoriented.   CN 2-12 grossly intact    Skin: Skin is warm and dry. Capillary refill takes less than 2 seconds. No rash noted. He is not diaphoretic. No erythema.   Psychiatric: He has a normal mood and affect. His speech is normal and behavior is normal. Judgment and thought content normal. His affect is not inappropriate. Cognition and memory are normal. He is attentive.   Vitals reviewed.      Assessment/Plan     Problem List Items Addressed This Visit        Cardiovascular and Mediastinum    Essential hypertension    Current Assessment & Plan     Hypertension is improving with treatment.  Dietary sodium restriction.  Weight loss.  Continue current medications.  Ambulatory blood pressure monitoring.  Report any blood pressure reading greater than 140/90 or less than 100/60.  Blood pressure is mildly elevated this morning likely due to pain.  Blood pressure will be reassessed at the next regular appointment.             Respiratory    Moderate persistent asthma with status asthmaticus - Primary    Current Assessment & Plan     Asthma is stable at this time with multiple medications and precautions.                Musculoskeletal and Integument    Femoral acetabular impingement    Current Assessment & Plan     Off work times next 6 months for surgery and recovery.  Will release earlier if it reaches goals sooner.  Placed off until February 8, 2021.               Body mechanics reviewed.    Short-term disability paperwork completed placing patient off work 7/9/2020 through 2/8/2020.       Patient's Body mass index is 31.12 kg/m². BMI is above normal parameters. Recommendations include: nutrition counseling.    Coronavirus precautions have been reviewed and discussed.  I have discussed the CDC recommendations  of social distancing, hand washing, wearing mask and disinfecting commonly touched items. Reviewed need to notify PCP and self quarantine with mild symptoms.  Discussed procedure to obtain Covid-19 testing and notification of PCP/health dept/ED/Urgent Center if symptoms begin. Understanding verbalized.      I have discussed diagnosis in detail today allowing time for questions and answers. Patient is aware of reasons to seek urgent or emergent medical care as well as reasons to return to the clinic for evaluation. Possible side effects, interactions and progression of symptoms discussed as well. Patient / family states understanding.   Emotional support and active listening provided.       Follow-up 3 months, sooner if needed.  Fasting labs every 6 months.          This document has been electronically signed by:  CORDELIA Flores, NP-C

## 2020-09-08 NOTE — ASSESSMENT & PLAN NOTE
Hypertension is improving with treatment.  Dietary sodium restriction.  Weight loss.  Continue current medications.  Ambulatory blood pressure monitoring.  Report any blood pressure reading greater than 140/90 or less than 100/60.  Blood pressure is mildly elevated this morning likely due to pain.  Blood pressure will be reassessed at the next regular appointment.

## 2020-09-17 DIAGNOSIS — R21 RASH: ICD-10-CM

## 2020-09-17 RX ORDER — PERMETHRIN 50 MG/G
CREAM TOPICAL
Qty: 60 G | Refills: 0 | Status: SHIPPED | OUTPATIENT
Start: 2020-09-17

## 2020-10-12 RX ORDER — NABUMETONE 500 MG/1
TABLET, FILM COATED ORAL
Qty: 60 TABLET | Refills: 2 | Status: SHIPPED | OUTPATIENT
Start: 2020-10-12

## 2020-10-25 DIAGNOSIS — F41.9 ANXIETY AND DEPRESSION: ICD-10-CM

## 2020-10-25 DIAGNOSIS — J45.42 MODERATE PERSISTENT ASTHMA WITH STATUS ASTHMATICUS: ICD-10-CM

## 2020-10-25 DIAGNOSIS — F32.A ANXIETY AND DEPRESSION: ICD-10-CM

## 2020-10-26 RX ORDER — MONTELUKAST SODIUM 10 MG/1
10 TABLET ORAL NIGHTLY
Qty: 30 TABLET | Refills: 5 | Status: SHIPPED | OUTPATIENT
Start: 2020-10-26 | End: 2021-02-22 | Stop reason: SDUPTHER

## 2020-10-26 RX ORDER — CITALOPRAM 20 MG/1
20 TABLET ORAL DAILY
Qty: 30 TABLET | Refills: 3 | Status: SHIPPED | OUTPATIENT
Start: 2020-10-26 | End: 2021-02-01

## 2020-11-12 DIAGNOSIS — I10 ESSENTIAL HYPERTENSION: ICD-10-CM

## 2020-11-16 RX ORDER — VALSARTAN AND HYDROCHLOROTHIAZIDE 160; 25 MG/1; MG/1
1 TABLET ORAL DAILY
Qty: 30 TABLET | Refills: 5 | Status: SHIPPED | OUTPATIENT
Start: 2020-11-16 | End: 2021-02-22

## 2020-12-01 ENCOUNTER — OFFICE VISIT (OUTPATIENT)
Dept: FAMILY MEDICINE CLINIC | Facility: CLINIC | Age: 35
End: 2020-12-01

## 2020-12-01 VITALS — WEIGHT: 223 LBS | HEIGHT: 71 IN | BODY MASS INDEX: 31.22 KG/M2

## 2020-12-01 DIAGNOSIS — J30.1 SEASONAL ALLERGIC RHINITIS DUE TO POLLEN: ICD-10-CM

## 2020-12-01 DIAGNOSIS — M25.859 FEMORAL ACETABULAR IMPINGEMENT: Primary | ICD-10-CM

## 2020-12-01 DIAGNOSIS — M15.9 OSTEOARTHRITIS OF MULTIPLE JOINTS, UNSPECIFIED OSTEOARTHRITIS TYPE: ICD-10-CM

## 2020-12-01 PROCEDURE — 99442 PR PHYS/QHP TELEPHONE EVALUATION 11-20 MIN: CPT | Performed by: NURSE PRACTITIONER

## 2020-12-01 NOTE — PROGRESS NOTES
Establish patient called office of APRN today to discuss:   CC  FMLA release  Allergies      You have chosen to receive care through a telephone visit today. Do you consent to use a telephone visit for your medical care today? Yes     Brief HPI/ROS obtained as follows:      Seasonal allergy exacerbation-has been out in the weather.  Not taking his allergy medications as routinely.    Recent orthopedic surgery at [facility] due to femoral acetabular impingement.  Still off work following surgery. Needs release to go to work next week. Pt tolerated surgery well. Improved back and hip pain on the right side. Still mildly sore and slowly regaining strength. Still has pain but not as severe as it was. Pt is planning left side surgery as well. Performed by dr serrano at  October 13, 2020.  Patient states he would like to return to work next week and feels he is ready.        The following portions of the patient's history, chief complaint and ROS were reviewed and updated as appropriate per provider:  Allergies, current medications, past family history, past medical history, past social history, past surgical history and problem list.      Review of Systems   Constitutional: Negative for appetite change, fatigue and fever.   HENT: Positive for rhinorrhea and sneezing. Negative for congestion and sore throat.    Eyes: Negative.    Respiratory: Negative.    Cardiovascular: Negative.    Gastrointestinal: Positive for constipation and diarrhea. Negative for abdominal pain, nausea and vomiting.        IBS with alternating diarrhea and constipation   Endocrine: Negative.    Genitourinary: Negative for difficulty urinating, dysuria and flank pain.   Musculoskeletal: Positive for arthralgias, back pain and myalgias. Negative for gait problem.   Skin: Negative.    Allergic/Immunologic: Positive for environmental allergies. Negative for food allergies and immunocompromised state.   Neurological: Negative for  "dizziness, facial asymmetry and light-headedness.   Hematological: Negative.    Psychiatric/Behavioral: Negative for decreased concentration, dysphoric mood, self-injury and suicidal ideas.       The current allergy list and medication list was reviewed with patient for accuracy.     Assessment     Alert and oriented x3.  Respirations not labored with conversation.  No cough.  Speech normal.  Hearing adequate.  Cooperative.  Mood normal.  Thought process normal.    Diagnoses and all orders for this visit:    1. Femoral acetabular impingement (Primary)  Comments:  Release patient to return to work next week    2. Osteoarthritis of multiple joints, unspecified osteoarthritis type  Comments:  Body mechanics reviewed, remain under the care of orthopedic provider    3. Seasonal allergic rhinitis due to pollen  Comments:  Take allergy medications as ordered.  Avoid known triggers.      Current Outpatient Medications:   •  albuterol sulfate  (90 Base) MCG/ACT inhaler, Inhale 2 puffs Every 4 (Four) Hours As Needed for Shortness of Air., Disp: 2 inhaler, Rfl: 5  •  allopurinol (ZYLOPRIM) 100 MG tablet, Take 1 tablet by mouth 2 (Two) Times a Day., Disp: 60 tablet, Rfl: 5  •  amitriptyline (ELAVIL) 25 MG tablet, Take 1 tablet by mouth At Night As Needed for Sleep., Disp: 30 tablet, Rfl: 5  •  anastrozole (Arimidex) 1 MG tablet, Take 1 tablet by mouth 1 (One) Time Per Week. For 12 weeks., Disp: 12 tablet, Rfl: 6  •  azelastine (ASTELIN) 0.1 % nasal spray, INSTILL 2 SPRAYS INTO EACH NOSTRIL BID, Disp: , Rfl: 5  •  B-D INTEGRA SYRINGE 25G X 5/8\" 3 ML misc, See Admin Instructions., Disp: , Rfl: 0  •  budesonide-formoterol (SYMBICORT) 160-4.5 MCG/ACT inhaler, Inhale 2 puffs 2 (Two) Times a Day., Disp: 1 inhaler, Rfl: 5  •  cetirizine (zyrTEC) 10 MG tablet, Take 1 tablet by mouth At Night As Needed for Allergies., Disp: 30 tablet, Rfl: 5  •  citalopram (CeleXA) 20 MG tablet, Take 1 tablet by mouth Daily., Disp: 30 tablet, " "Rfl: 3  •  clotrimazole-betamethasone (LOTRISONE) 1-0.05 % cream, Apply  topically to the appropriate area as directed Every 12 (Twelve) Hours., Disp: 45 g, Rfl: 2  •  FLOVENT HFA 44 MCG/ACT inhaler, Inhale 2 puffs 2 (Two) Times a Day., Disp: 1 inhaler, Rfl: 5  •  fluticasone (FLONASE) 50 MCG/ACT nasal spray, INSTILL 2 SPRAYS INTO EACH NOSTRIL AS DIRECTED DAILY, Disp: 2 bottle, Rfl: 11  •  lansoprazole (PREVACID) 30 MG capsule, Take 30 mg by mouth As Needed., Disp: , Rfl:   •  methocarbamol (ROBAXIN) 750 MG tablet, Take 1 tablet by mouth 4 (Four) Times a Day As Needed for Muscle Spasms. Take on days he works in place of flexeril, Disp: 120 tablet, Rfl: 5  •  montelukast (SINGULAIR) 10 MG tablet, Take 1 tablet by mouth Every Night., Disp: 30 tablet, Rfl: 5  •  nabumetone (RELAFEN) 500 MG tablet, TAKE 1 TABLET BY MOUTH TWICE DAILY AS NEEDED FOR MILD PAIN OR MODERATE PAIN, Disp: 60 tablet, Rfl: 2  •  omeprazole (priLOSEC) 40 MG capsule, Take 1 capsule by mouth 2 (Two) Times a Day Before Meals. Take a half hour before breakfast, Disp: 60 capsule, Rfl: 11  •  permethrin (ELIMITE) 5 % cream, APPLY TOPICALLY TO THE APPROPRIATE AREA AS DIRECTED, Disp: 60 g, Rfl: 0  •  sulfaSALAzine (AZULFIDINE) 500 MG tablet, Take 1 tablet by mouth 4 (Four) Times a Day. Written by rheumatology , started back on his script, Disp: 60 tablet, Rfl: 5  •  Syringe 25G X 5/8\" 3 ML misc, Use as directed 2 x weekly, Disp: 24 each, Rfl: 3  •  tadalafil (CIALIS) 5 MG tablet, Take 1 tablet by mouth Daily., Disp: 30 tablet, Rfl: 6  •  Testosterone Cypionate (Depo-Testosterone) 200 MG/ML injection, He is to use 1/2 cc every Monday and Thursday SQ, Disp: 10 mL, Rfl: 2  •  valsartan-hydrochlorothiazide (DIOVAN-HCT) 160-25 MG per tablet, TAKE 1 TABLET BY MOUTH DAILY, Disp: 30 tablet, Rfl: 5    Discussed known triggers of his allergies such as pollen, leaves and outdoor allergens.  Take Flonase as directed as well as Zyrtec and Singulair.  Report failure to " improve over the next 2 to 3 days.    Coronavirus precautions have been reviewed and discussed.  I have discussed the CDC recommendations  of social distancing, hand washing and disinfecting commonly touched items. Reviewed need to notify PCP and self quarantine with mild symptoms.  Discussed procedure to obtain Covid-19 testing and notification of PCP/health dept/ED/Urgent Center if symptoms begin. Understanding verbalized.    Reviewed recent surgical notes/[facility] medical notes.  Patient may return to work next week with no restrictions.       I have discussed diagnosis in detail today allowing time for questions and answers. Pt is aware of reasons to seek urgent or emergent medical care as well as reasons to return to the clinic for evaluation. Possible side effects, interactions and progression of symptoms discussed as well. Pt / family states understanding.   Emotional support and active listening provided.     Patient instructed and advised to call if symptoms are increasing or new symptoms occur.    Understands reasons for urgent and emergent care.  Patient (& family) verbalized agreement for treatment plan.     This visit has been rescheduled as a phone visit to comply with patient safety concerns in accordance with CDC recommendations. Total time of discussion was 12 minutes.

## 2021-01-12 DIAGNOSIS — J45.42 MODERATE PERSISTENT ASTHMA WITH STATUS ASTHMATICUS: ICD-10-CM

## 2021-01-12 RX ORDER — FLUTICASONE PROPIONATE 50 MCG
SPRAY, SUSPENSION (ML) NASAL
Qty: 32 G | Refills: 5 | Status: SHIPPED | OUTPATIENT
Start: 2021-01-12

## 2021-01-15 ENCOUNTER — OFFICE VISIT (OUTPATIENT)
Dept: UROLOGY | Facility: CLINIC | Age: 36
End: 2021-01-15

## 2021-01-15 VITALS — HEIGHT: 71 IN | TEMPERATURE: 97.5 F | BODY MASS INDEX: 31.17 KG/M2 | WEIGHT: 222.66 LBS

## 2021-01-15 DIAGNOSIS — R79.89 LOW TESTOSTERONE: ICD-10-CM

## 2021-01-15 DIAGNOSIS — N52.02 CORPORO-VENOUS OCCLUSIVE ERECTILE DYSFUNCTION: ICD-10-CM

## 2021-01-15 DIAGNOSIS — N42.9 DISORDER OF PROSTATE: Primary | ICD-10-CM

## 2021-01-15 PROCEDURE — 84153 ASSAY OF PSA TOTAL: CPT | Performed by: UROLOGY

## 2021-01-15 PROCEDURE — 82670 ASSAY OF TOTAL ESTRADIOL: CPT | Performed by: UROLOGY

## 2021-01-15 PROCEDURE — 85027 COMPLETE CBC AUTOMATED: CPT | Performed by: UROLOGY

## 2021-01-15 PROCEDURE — 84403 ASSAY OF TOTAL TESTOSTERONE: CPT | Performed by: UROLOGY

## 2021-01-15 PROCEDURE — 99214 OFFICE O/P EST MOD 30 MIN: CPT | Performed by: UROLOGY

## 2021-01-15 RX ORDER — TESTOSTERONE CYPIONATE 200 MG/ML
INJECTION, SOLUTION INTRAMUSCULAR
Qty: 10 ML | Refills: 2 | Status: SHIPPED | OUTPATIENT
Start: 2021-01-15 | End: 2021-11-02

## 2021-01-15 NOTE — PROGRESS NOTES
"Chief Complaint:          Chief Complaint   Patient presents with   • LOW TESTOSTERONE       HPI:   35 y.o. male.  Returns today doing fantastic uses penile injections.  I will switch him over to the super compound.  He has lost 70 pounds.  Patient returns today for follow-up.  He is been on testosterone replacement therapy.  He reports a dramatic improvement in his Sánchez questionnaire: -SÁNCHEZ-androgen deficiency in the age male questionnaire  The patient was queried regarding the androgen deficiency in the age male questionnaire.  This is a validated questionnaire that was performed on a set of 314 [place] male physicians when it was positive it correlated directly with a 94% chance of low testosterone.  Patient indicates there is a decrease in libido or sex drive, a lack of energy, Decreased  strength and endurance, a decreased \"enjoyment of life\", sad and grumpy feelings with significant difficulty maintaining erections.  He is also been a recent deterioration regarding work performance.  He reports weight loss.  He has good facility and the use of subcutaneous and intramuscular injections as well as comfort level and using the medication in a sterile fashion.  He understands he should use only the prescribed dose.  He's here for appropriate lab monitoring regarding this.  He understands this is a controlled substance and therefore must be watched closely will not be refilled and the medical loss or miss calculation of the dose.  He is very happy with the treatment and therefore wants to continue it.    Past Medical History:        Past Medical History:   Diagnosis Date   • Allergic    • Allergic rhinitis    • Asthma    • Atrial flutter (CMS/HCC)    • Diarrhea    • Disturbance of skin sensation    • Elevated cholesterol    • GERD (gastroesophageal reflux disease)    • Gout    • History of MRI of spine 12/16/2014    Done at [facility]    • Hx of exercise stress test 2014    \"IT WAS NORMAL\"     • Hyperlipidemia    " "  • Hypertension    • Kidney disease     stage 2   • Lymphadenitis    • Malaise and fatigue    • Osteoarthrosis    • Rash    • Renal disorder    • Sleep apnea     NO CPAP   • Wears glasses     TO READ         Current Meds:     Current Outpatient Medications   Medication Sig Dispense Refill   • albuterol sulfate  (90 Base) MCG/ACT inhaler Inhale 2 puffs Every 4 (Four) Hours As Needed for Shortness of Air. 2 inhaler 5   • allopurinol (ZYLOPRIM) 100 MG tablet Take 1 tablet by mouth 2 (Two) Times a Day. 60 tablet 5   • amitriptyline (ELAVIL) 25 MG tablet Take 1 tablet by mouth At Night As Needed for Sleep. 30 tablet 5   • anastrozole (Arimidex) 1 MG tablet Take 1 tablet by mouth 1 (One) Time Per Week. For 12 weeks. 12 tablet 6   • azelastine (ASTELIN) 0.1 % nasal spray INSTILL 2 SPRAYS INTO EACH NOSTRIL BID  5   • B-D INTEGRA SYRINGE 25G X 5/8\" 3 ML misc See Admin Instructions.  0   • budesonide-formoterol (SYMBICORT) 160-4.5 MCG/ACT inhaler Inhale 2 puffs 2 (Two) Times a Day. 1 inhaler 5   • cetirizine (zyrTEC) 10 MG tablet Take 1 tablet by mouth At Night As Needed for Allergies. 30 tablet 5   • citalopram (CeleXA) 20 MG tablet Take 1 tablet by mouth Daily. 30 tablet 3   • clotrimazole-betamethasone (LOTRISONE) 1-0.05 % cream Apply  topically to the appropriate area as directed Every 12 (Twelve) Hours. 45 g 2   • FLOVENT HFA 44 MCG/ACT inhaler Inhale 2 puffs 2 (Two) Times a Day. 1 inhaler 5   • fluticasone (FLONASE) 50 MCG/ACT nasal spray SHAKE LIQUID AND USE 2 SPRAYS IN EACH NOSTRIL DAILY AS DIRECTED 32 g 5   • lansoprazole (PREVACID) 30 MG capsule Take 30 mg by mouth As Needed.     • methocarbamol (ROBAXIN) 750 MG tablet Take 1 tablet by mouth 4 (Four) Times a Day As Needed for Muscle Spasms. Take on days he works in place of flexeril 120 tablet 5   • montelukast (SINGULAIR) 10 MG tablet Take 1 tablet by mouth Every Night. 30 tablet 5   • nabumetone (RELAFEN) 500 MG tablet TAKE 1 TABLET BY MOUTH TWICE DAILY AS " "NEEDED FOR MILD PAIN OR MODERATE PAIN 60 tablet 2   • omeprazole (priLOSEC) 40 MG capsule Take 1 capsule by mouth 2 (Two) Times a Day Before Meals. Take a half hour before breakfast 60 capsule 11   • permethrin (ELIMITE) 5 % cream APPLY TOPICALLY TO THE APPROPRIATE AREA AS DIRECTED 60 g 0   • sulfaSALAzine (AZULFIDINE) 500 MG tablet Take 1 tablet by mouth 4 (Four) Times a Day. Written by rheumatology , started back on his script 60 tablet 5   • Syringe 25G X 5/8\" 3 ML misc Use as directed 2 x weekly 24 each 3   • tadalafil (CIALIS) 5 MG tablet Take 1 tablet by mouth Daily. 30 tablet 6   • Testosterone Cypionate (Depo-Testosterone) 200 MG/ML injection He is to use 1/2 cc every Monday and Thursday SQ 10 mL 2   • valsartan-hydrochlorothiazide (DIOVAN-HCT) 160-25 MG per tablet TAKE 1 TABLET BY MOUTH DAILY 30 tablet 5     No current facility-administered medications for this visit.         Allergies:      Allergies   Allergen Reactions   • Erythromycin Rash        Past Surgical History:     Past Surgical History:   Procedure Laterality Date   • BRAVO PROCEDURE N/A 8/4/2017    Procedure: ESOPHAGOGASTRODUODENOSCOPY AND FULLER;  Surgeon: Efra Suero MD;  Location: [facility] OR;  Service:    • BRAVO PROCEDURE N/A 12/19/2018    Procedure: ESOPHAGOGASTRODUODENOSCOPY AND FULLER;  Surgeon: Efra Suero MD;  Location: [facility] OR;  Service: Gastroenterology   • COLONOSCOPY  2014   • COLONOSCOPY      UNSURE OF DATE   • COLONOSCOPY N/A 5/3/2018    Procedure: COLONOSCOPY WITH COLD BIOPSIES AND COLD POLYPECTOMIES;  Surgeon: Bang Segundo MD;  Location[facility] ENDOSCOPY;  Service: Gastroenterology   • ENDOSCOPY     • ENDOSCOPY N/A 3/9/2020    Procedure: ESOPHAGOGASTRODUODENOSCOPY WITH BIOPSY CPT CODE: 97425;  Surgeon: Ricardo Nolasco MD;  Location: [facility] OR;  Service: Gastroenterology;  Laterality: N/A;   • HIP SURGERY Right    • NISSEN FUNDOPLICATION     • NISSEN FUNDOPLICATION N/A 10/11/2017    Procedure: NISSEN " FUNDOPLICATION LAPAROSCOPIC;  Surgeon: Efra Suero MD;  Location: [facility];  Service:    • AK LAP,ESOPHAGOGAST FUNDOPLASTY N/A 10/11/2017    Procedure: HIATAL HERNIA REPAIR LAPAROSCOPIC;  Surgeon: Efra Suero MD;  Location: [facility];  Service: General   • VASECTOMY           Social History:     Social History     Socioeconomic History   • Marital status:      Spouse name: Radha    • Number of children: 2   • Years of education: Not on file   • Highest education level: Not on file   Occupational History   • Occupation: [employer]   Social Needs   • Financial resource strain: Not very hard   • Food insecurity     Worry: Never true     Inability: Never true   • Transportation needs     Medical: No     Non-medical: No   Tobacco Use   • Smoking status: Never Smoker   • Smokeless tobacco: Never Used   Substance and Sexual Activity   • Alcohol use: Not Currently     Comment: social   • Drug use: No   • Sexual activity: Yes     Partners: Female   Lifestyle   • Physical activity     Days per week: 0 days     Minutes per session: 0 min   • Stress: To some extent   Relationships   • Social connections     Talks on phone: Patient refused     Gets together: Patient refused     Attends Sikhism service: Patient refused     Active member of club or organization: Patient refused     Attends meetings of clubs or organizations: Patient refused     Relationship status: Patient refused       Family History:     Family History   Problem Relation Age of Onset   • Asthma Other    • Cancer Other    • COPD Other    • Diabetes Other    • Stroke Other    • Asthma Mother    • COPD Mother    • Asthma Father    • Depression Father    • Drug abuse Father    • Arthritis Maternal Grandmother    • Cancer Maternal Grandmother    • Diabetes Maternal Grandmother    • Heart disease Maternal Grandmother    • Hyperlipidemia Maternal Grandmother    • Stroke Paternal Grandfather    • Colon cancer Maternal Grandfather         mid 40s            Review of Systems:     Review of Systems   Constitutional: Negative.    HENT: Negative.    Eyes: Negative.    Respiratory: Negative.    Cardiovascular: Negative.    Gastrointestinal: Negative.    Endocrine: Negative.    Musculoskeletal: Negative.    Allergic/Immunologic: Negative.    Neurological: Negative.    Hematological: Negative.    Psychiatric/Behavioral: Negative.        Physical Exam:     Physical Exam  Vitals signs and nursing note reviewed.   Constitutional:       Appearance: He is well-developed.   HENT:      Head: Normocephalic and atraumatic.   Eyes:      Conjunctiva/sclera: Conjunctivae normal.      Pupils: Pupils are equal, round, and reactive to light.   Neck:      Musculoskeletal: Normal range of motion.   Cardiovascular:      Rate and Rhythm: Normal rate and regular rhythm.      Heart sounds: Normal heart sounds.   Pulmonary:      Effort: Pulmonary effort is normal.      Breath sounds: Normal breath sounds.   Abdominal:      General: Bowel sounds are normal.      Palpations: Abdomen is soft.   Musculoskeletal: Normal range of motion.   Skin:     General: Skin is warm and dry.   Neurological:      Mental Status: He is alert and oriented to person, place, and time.      Deep Tendon Reflexes: Reflexes are normal and symmetric.   Psychiatric:         Behavior: Behavior normal.         Thought Content: Thought content normal.         Judgment: Judgment normal.         I have reviewed the following portions of the patient's history: allergies, current medications, past family history, past medical history, past social history, past surgical history, problem list and ROS and confirm it's accurate.      Procedure:       Assessment/Plan:   Low testosterone:  patient is here for follow-up.  Since beginning the medication he's been very pleased.  He reports a dramatic improvement in his erections, ability to achieve and maintain an erection, improvement in libido, increase in frequency of morning  erections, a noticeable weight loss consistent with the treatment.  No development of breast problems or abnormalities.  He's going to have appropriate safety laboratory parameters checked.   He understands that the new data implicates testosterone with the development of prostate cancer and this is all but been disproven and the medical literature as well as the risks of cardiovascular disease which is actually also been disproven.  He understands that while he is a candidate for topical therapy if he is in contact with children this is not an option because it's been shown to accentuate genitalia development at an early age that this frequently irreversible.  He also understands this is a controlled substance and as such will not be prescribed without appropriate follow-up and appropriate laboratory investigation.  He understands effects on spermatogenesis including the fact that this is not always completely reversible and not always completely limited his ability to father a child.  He has demonstrated facility in the technique of both intramuscular and subcutaneous injection.  And has been taught sterility one drawing up the medication.  Penile injection--the patient is interested in an attempt at a pharmacological penile injection for severe organic erectile dysfunction having failed the traditional oral therapy and having been offered a vacuum erection device.  After an appropriate informed consent including the significant risk of priapism, pain,, and lack of efficacy of the penis was prepped and draped.  Appropriate anatomy was demonstrated including the dorsal nerve complex at 12:00 on the dorsum of the penis and the urethra at 6:00 we recommend injections between 9 and 10:00 on the right side and to 3:00 on the left side up and down the shaft into the corporal body we utilized a 27-gauge needle and began our dose of 0.1 cc of Trymex compound.  Observed for 30 minutes before its results.  We discussed the  grading on a 10 scale indicating a 5 would be tumescence with poor axial rigidity.  I indicated that when the patient goes home though be about a 30-40% improvement.  It was emphasized that the erection should last no more than 2 hours and anything more than that should prompt an immediate call to the office.  We also talked about pharmacological manipulation if the erection lasts for prolonged period including oral Sudafed or terbutaline.  We talked about pharmacological reduction of the erection using phenylephrine.  The patient wishes to proceed.  I am changing him over to super Trimix from [facility] pharmacy            Patient's Body mass index is 31.07 kg/m². BMI is above normal parameters. Recommendations include: educational material.              This document has been electronically signed by STEPHEN BANGURA MD January 15, 2021 14:02 EST

## 2021-01-16 LAB
DEPRECATED RDW RBC AUTO: 40.1 FL (ref 37–54)
ERYTHROCYTE [DISTWIDTH] IN BLOOD BY AUTOMATED COUNT: 12.6 % (ref 12.3–15.4)
ESTRADIOL SERPL HS-MCNC: 12.7 PG/ML
HCT VFR BLD AUTO: 46.5 % (ref 37.5–51)
HGB BLD-MCNC: 16.3 G/DL (ref 13–17.7)
MCH RBC QN AUTO: 30.9 PG (ref 26.6–33)
MCHC RBC AUTO-ENTMCNC: 35.1 G/DL (ref 31.5–35.7)
MCV RBC AUTO: 88.1 FL (ref 79–97)
PLATELET # BLD AUTO: 223 10*3/MM3 (ref 140–450)
PMV BLD AUTO: 12.5 FL (ref 6–12)
PSA SERPL-MCNC: 0.35 NG/ML (ref 0–4)
RBC # BLD AUTO: 5.28 10*6/MM3 (ref 4.14–5.8)
TESTOST SERPL-MCNC: 1108 NG/DL (ref 249–836)
WBC # BLD AUTO: 6.2 10*3/MM3 (ref 3.4–10.8)

## 2021-02-01 DIAGNOSIS — J45.42 MODERATE PERSISTENT ASTHMA WITH STATUS ASTHMATICUS: ICD-10-CM

## 2021-02-01 DIAGNOSIS — M1A.09X0 IDIOPATHIC CHRONIC GOUT OF MULTIPLE SITES WITHOUT TOPHUS: ICD-10-CM

## 2021-02-01 DIAGNOSIS — R21 RASH: ICD-10-CM

## 2021-02-01 RX ORDER — FLUTICASONE PROPIONATE 44 MCG
AEROSOL WITH ADAPTER (GRAM) INHALATION
Qty: 10.6 G | Refills: 5 | Status: SHIPPED | OUTPATIENT
Start: 2021-02-01

## 2021-02-01 RX ORDER — AMITRIPTYLINE HYDROCHLORIDE 25 MG/1
25 TABLET, FILM COATED ORAL NIGHTLY PRN
Qty: 30 TABLET | Refills: 5 | Status: SHIPPED | OUTPATIENT
Start: 2021-02-01 | End: 2021-08-16

## 2021-02-01 RX ORDER — ALLOPURINOL 100 MG/1
TABLET ORAL
Qty: 60 TABLET | Refills: 5 | Status: SHIPPED | OUTPATIENT
Start: 2021-02-01 | End: 2021-08-16

## 2021-02-20 ENCOUNTER — HOSPITAL ENCOUNTER (EMERGENCY)
Facility: HOSPITAL | Age: 36
Discharge: LEFT BEFORE DISCHARGE | End: 2021-02-20

## 2021-02-20 VITALS
TEMPERATURE: 98.2 F | BODY MASS INDEX: 30.8 KG/M2 | RESPIRATION RATE: 18 BRPM | WEIGHT: 220 LBS | HEIGHT: 71 IN | DIASTOLIC BLOOD PRESSURE: 90 MMHG | HEART RATE: 75 BPM | OXYGEN SATURATION: 98 % | SYSTOLIC BLOOD PRESSURE: 158 MMHG

## 2021-02-20 PROCEDURE — 99211 OFF/OP EST MAY X REQ PHY/QHP: CPT

## 2021-02-20 PROCEDURE — 93005 ELECTROCARDIOGRAM TRACING: CPT | Performed by: EMERGENCY MEDICINE

## 2021-02-20 PROCEDURE — 93010 ELECTROCARDIOGRAM REPORT: CPT | Performed by: SPECIALIST

## 2021-02-21 LAB
QT INTERVAL: 368 MS
QTC INTERVAL: 394 MS

## 2021-02-21 NOTE — ED NOTES
"Patient ripped off arm band and handed it to me and stated, \"I'm leaving, thank you.\" Triage Nurse is aware.      Christina Maria  02/20/21 2139    "

## 2021-02-22 ENCOUNTER — OFFICE VISIT (OUTPATIENT)
Dept: FAMILY MEDICINE CLINIC | Facility: CLINIC | Age: 36
End: 2021-02-22

## 2021-02-22 VITALS
BODY MASS INDEX: 31.5 KG/M2 | TEMPERATURE: 97.1 F | OXYGEN SATURATION: 97 % | HEART RATE: 57 BPM | WEIGHT: 225 LBS | SYSTOLIC BLOOD PRESSURE: 140 MMHG | DIASTOLIC BLOOD PRESSURE: 90 MMHG | HEIGHT: 71 IN

## 2021-02-22 DIAGNOSIS — E78.2 MIXED HYPERLIPIDEMIA: ICD-10-CM

## 2021-02-22 DIAGNOSIS — M15.8 OTHER OSTEOARTHRITIS INVOLVING MULTIPLE JOINTS: Primary | ICD-10-CM

## 2021-02-22 DIAGNOSIS — R53.82 CHRONIC FATIGUE: ICD-10-CM

## 2021-02-22 DIAGNOSIS — I10 ESSENTIAL HYPERTENSION: ICD-10-CM

## 2021-02-22 DIAGNOSIS — J45.42 MODERATE PERSISTENT ASTHMA WITH STATUS ASTHMATICUS: ICD-10-CM

## 2021-02-22 DIAGNOSIS — M1A.09X0 IDIOPATHIC CHRONIC GOUT OF MULTIPLE SITES WITHOUT TOPHUS: ICD-10-CM

## 2021-02-22 LAB
25(OH)D3 SERPL-MCNC: 20.3 NG/ML (ref 30–100)
ALBUMIN SERPL-MCNC: 4.8 G/DL (ref 3.5–5.2)
ALBUMIN/GLOB SERPL: 2.2 G/DL
ALP SERPL-CCNC: 70 U/L (ref 39–117)
ALT SERPL W P-5'-P-CCNC: 24 U/L (ref 1–41)
ANION GAP SERPL CALCULATED.3IONS-SCNC: 9.8 MMOL/L (ref 5–15)
AST SERPL-CCNC: 26 U/L (ref 1–40)
BASOPHILS # BLD AUTO: 0.05 10*3/MM3 (ref 0–0.2)
BASOPHILS NFR BLD AUTO: 0.9 % (ref 0–1.5)
BILIRUB SERPL-MCNC: 1.5 MG/DL (ref 0–1.2)
BUN SERPL-MCNC: 13 MG/DL (ref 6–20)
BUN/CREAT SERPL: 9.6 (ref 7–25)
CALCIUM SPEC-SCNC: 10.1 MG/DL (ref 8.6–10.5)
CHLORIDE SERPL-SCNC: 100 MMOL/L (ref 98–107)
CHOLEST SERPL-MCNC: 211 MG/DL (ref 0–200)
CO2 SERPL-SCNC: 30.2 MMOL/L (ref 22–29)
CREAT SERPL-MCNC: 1.35 MG/DL (ref 0.76–1.27)
DEPRECATED RDW RBC AUTO: 41.9 FL (ref 37–54)
EOSINOPHIL # BLD AUTO: 0.73 10*3/MM3 (ref 0–0.4)
EOSINOPHIL NFR BLD AUTO: 12.5 % (ref 0.3–6.2)
ERYTHROCYTE [DISTWIDTH] IN BLOOD BY AUTOMATED COUNT: 13.3 % (ref 12.3–15.4)
GFR SERPL CREATININE-BSD FRML MDRD: 60 ML/MIN/1.73
GLOBULIN UR ELPH-MCNC: 2.2 GM/DL
GLUCOSE SERPL-MCNC: 87 MG/DL (ref 65–99)
HBA1C MFR BLD: 5.18 % (ref 4.8–5.6)
HCT VFR BLD AUTO: 52.4 % (ref 37.5–51)
HDLC SERPL-MCNC: 34 MG/DL (ref 40–60)
HGB BLD-MCNC: 18.6 G/DL (ref 13–17.7)
IMM GRANULOCYTES # BLD AUTO: 0.02 10*3/MM3 (ref 0–0.05)
IMM GRANULOCYTES NFR BLD AUTO: 0.3 % (ref 0–0.5)
LDLC SERPL CALC-MCNC: 149 MG/DL (ref 0–100)
LDLC/HDLC SERPL: 4.29 {RATIO}
LYMPHOCYTES # BLD AUTO: 1.65 10*3/MM3 (ref 0.7–3.1)
LYMPHOCYTES NFR BLD AUTO: 28.2 % (ref 19.6–45.3)
MCH RBC QN AUTO: 31.3 PG (ref 26.6–33)
MCHC RBC AUTO-ENTMCNC: 35.5 G/DL (ref 31.5–35.7)
MCV RBC AUTO: 88.2 FL (ref 79–97)
MONOCYTES # BLD AUTO: 0.53 10*3/MM3 (ref 0.1–0.9)
MONOCYTES NFR BLD AUTO: 9 % (ref 5–12)
NEUTROPHILS NFR BLD AUTO: 2.88 10*3/MM3 (ref 1.7–7)
NEUTROPHILS NFR BLD AUTO: 49.1 % (ref 42.7–76)
NRBC BLD AUTO-RTO: 0.2 /100 WBC (ref 0–0.2)
PLATELET # BLD AUTO: 209 10*3/MM3 (ref 140–450)
PMV BLD AUTO: 11.8 FL (ref 6–12)
POTASSIUM SERPL-SCNC: 4.5 MMOL/L (ref 3.5–5.2)
PROT SERPL-MCNC: 7 G/DL (ref 6–8.5)
RBC # BLD AUTO: 5.94 10*6/MM3 (ref 4.14–5.8)
SODIUM SERPL-SCNC: 140 MMOL/L (ref 136–145)
TRIGL SERPL-MCNC: 155 MG/DL (ref 0–150)
TSH SERPL DL<=0.05 MIU/L-ACNC: 2.61 UIU/ML (ref 0.27–4.2)
URATE SERPL-MCNC: 7 MG/DL (ref 3.4–7)
VIT B12 BLD-MCNC: 925 PG/ML (ref 211–946)
VLDLC SERPL-MCNC: 28 MG/DL (ref 5–40)
WBC # BLD AUTO: 5.86 10*3/MM3 (ref 3.4–10.8)

## 2021-02-22 PROCEDURE — 83036 HEMOGLOBIN GLYCOSYLATED A1C: CPT | Performed by: NURSE PRACTITIONER

## 2021-02-22 PROCEDURE — 80050 GENERAL HEALTH PANEL: CPT | Performed by: NURSE PRACTITIONER

## 2021-02-22 PROCEDURE — 96372 THER/PROPH/DIAG INJ SC/IM: CPT | Performed by: NURSE PRACTITIONER

## 2021-02-22 PROCEDURE — 84550 ASSAY OF BLOOD/URIC ACID: CPT | Performed by: NURSE PRACTITIONER

## 2021-02-22 PROCEDURE — 80061 LIPID PANEL: CPT | Performed by: NURSE PRACTITIONER

## 2021-02-22 PROCEDURE — 82306 VITAMIN D 25 HYDROXY: CPT | Performed by: NURSE PRACTITIONER

## 2021-02-22 PROCEDURE — 99214 OFFICE O/P EST MOD 30 MIN: CPT | Performed by: NURSE PRACTITIONER

## 2021-02-22 PROCEDURE — 82607 VITAMIN B-12: CPT | Performed by: NURSE PRACTITIONER

## 2021-02-22 RX ORDER — KETOROLAC TROMETHAMINE 30 MG/ML
60 INJECTION, SOLUTION INTRAMUSCULAR; INTRAVENOUS ONCE
Status: COMPLETED | OUTPATIENT
Start: 2021-02-22 | End: 2021-02-22

## 2021-02-22 RX ORDER — CLONIDINE HYDROCHLORIDE 0.1 MG/1
0.1 TABLET ORAL 2 TIMES DAILY
Qty: 60 TABLET | Refills: 5 | Status: SHIPPED | OUTPATIENT
Start: 2021-02-22

## 2021-02-22 RX ORDER — VALSARTAN AND HYDROCHLOROTHIAZIDE 160; 25 MG/1; MG/1
1 TABLET ORAL DAILY
Qty: 30 TABLET | Refills: 5 | Status: SHIPPED | OUTPATIENT
Start: 2021-02-22 | End: 2021-07-19

## 2021-02-22 RX ORDER — MONTELUKAST SODIUM 10 MG/1
10 TABLET ORAL NIGHTLY
Qty: 30 TABLET | Refills: 5 | Status: SHIPPED | OUTPATIENT
Start: 2021-02-22

## 2021-02-22 RX ADMIN — KETOROLAC TROMETHAMINE 60 MG: 30 INJECTION, SOLUTION INTRAMUSCULAR; INTRAVENOUS at 08:41

## 2021-03-02 ENCOUNTER — TELEPHONE (OUTPATIENT)
Dept: FAMILY MEDICINE CLINIC | Facility: CLINIC | Age: 36
End: 2021-03-02

## 2021-03-02 DIAGNOSIS — E28.0 ESTRADIOL EXCESS: ICD-10-CM

## 2021-03-02 RX ORDER — ANASTROZOLE 1 MG/1
TABLET ORAL
Qty: 12 TABLET | Refills: 6 | Status: SHIPPED | OUTPATIENT
Start: 2021-03-02 | End: 2022-04-05

## 2021-03-02 NOTE — TELEPHONE ENCOUNTER
Pt is aware of this information.       ----- Message from CORDELIA Alexandre sent at 3/2/2021  1:54 PM EST -----  Abnormal as compared to previous ekg. Get him into cardiology ASAP and tell him go to eR with any chest pain.     Mb   ----- Message -----  From: Sylvia Goodwin MA  Sent: 3/2/2021   1:06 PM EST  To: CORDELIA Alexandre    Patient had a EKG done at the ER, he left without being seen. He is wanting to know what the results are.

## 2021-03-09 ENCOUNTER — OFFICE VISIT (OUTPATIENT)
Dept: CARDIOLOGY | Facility: CLINIC | Age: 36
End: 2021-03-09

## 2021-03-09 VITALS
OXYGEN SATURATION: 97 % | SYSTOLIC BLOOD PRESSURE: 149 MMHG | DIASTOLIC BLOOD PRESSURE: 77 MMHG | HEIGHT: 71 IN | WEIGHT: 228 LBS | BODY MASS INDEX: 31.92 KG/M2 | HEART RATE: 72 BPM

## 2021-03-09 DIAGNOSIS — I10 UNCONTROLLED HYPERTENSION: Primary | ICD-10-CM

## 2021-03-09 DIAGNOSIS — E78.5 DYSLIPIDEMIA: ICD-10-CM

## 2021-03-09 DIAGNOSIS — R07.2 PRECORDIAL PAIN: ICD-10-CM

## 2021-03-09 PROCEDURE — 93000 ELECTROCARDIOGRAM COMPLETE: CPT | Performed by: INTERNAL MEDICINE

## 2021-03-09 PROCEDURE — 99204 OFFICE O/P NEW MOD 45 MIN: CPT | Performed by: INTERNAL MEDICINE

## 2021-03-09 RX ORDER — CARVEDILOL 3.12 MG/1
3.12 TABLET ORAL 2 TIMES DAILY WITH MEALS
COMMUNITY
End: 2021-03-09 | Stop reason: SDUPTHER

## 2021-03-09 NOTE — PROGRESS NOTES
"Chief Complaint  Hypertension and Hyperlipidemia    Subjective         Nghia Roach presents to [facility] CARDIOLOGY for follow up ED    History of Present Illness   He presented to the ED for uncontrolled hypertension. His wife checked BP at home it was 170's systolic.States he was red from mid chest and radiated up to face. Intermittent episodes of shortness of breath. Associated symptoms fatigue and sleepiness. Chest tightness. Substernal. Onset was when mask was mandatory. He has a past medical history significant for marijuana use, hypertension, asthma, and dyslipidemia.  The chart, PMH, FMH, social history, PSH, and medications were reviewed today. Problems above addressed this visit.    Objective     Vital Signs:   /77 (BP Location: Right arm, Patient Position: Sitting)   Pulse 72   Ht 180.3 cm (71\")   Wt 103 kg (228 lb)   SpO2 97%   BMI 31.80 kg/m²       Physical Exam  Constitutional:       General: He is not in acute distress.     Appearance: Normal appearance.   HENT:      Nose: Nose normal.      Mouth/Throat:      Pharynx: Oropharynx is clear.   Eyes:      Pupils: Pupils are equal, round, and reactive to light.   Neck:      Vascular: No carotid bruit.   Cardiovascular:      Rate and Rhythm: Normal rate and regular rhythm.      Pulses: Normal pulses.      Heart sounds: Normal heart sounds.   Pulmonary:      Effort: Pulmonary effort is normal.      Breath sounds: Normal breath sounds. No wheezing.   Abdominal:      General: Bowel sounds are normal.      Palpations: Abdomen is soft.   Musculoskeletal:         General: No swelling.      Cervical back: Neck supple.   Skin:     Findings: No bruising.   Neurological:      General: No focal deficit present.      Mental Status: He is alert and oriented to person, place, and time.      Motor: No weakness.   Psychiatric:         Mood and Affect: Mood normal.         Behavior: Behavior normal.          Result Review :   EKG   No " significant changes compared to previous EKG  Normal sinus rhythm  Vent rate:  65 bpm  PA int:  141 ms  QRS dur:  106 ms  QT/QTc:  362/374 ms  P-R-T axes   49   61   34  Normal EKG    Assessment  Uncontrolled hypertension  Dyslipidemia  Angina  Drug use    Plan  Proceed with an Echocardiogram, Nuclear stress test   Start Coreg 3.125 mg BID  Start keeping a log of BP to see if we need to increase or change medications.  Follow up in 3 months.     Problem List Items Addressed This Visit     None      Visit Diagnoses     Uncontrolled hypertension    -  Primary    Relevant Medications    carvedilol (COREG) 3.125 MG tablet    Other Relevant Orders    Adult Transthoracic Echo Complete W/ Cont if Necessary Per Protocol    Stress Test With Myocardial Perfusion One Day    Dyslipidemia        Relevant Orders    Adult Transthoracic Echo Complete W/ Cont if Necessary Per Protocol    Stress Test With Myocardial Perfusion One Day    Precordial pain        Relevant Orders    Adult Transthoracic Echo Complete W/ Cont if Necessary Per Protocol    Stress Test With Myocardial Perfusion One Day            Follow Up     Return in about 3 months (around 6/9/2021).  Patient was given instructions and counseling regarding his condition or for health maintenance advice. Please see specific information pulled into the AVS if appropriate.           Lacho Hoang MD, [facility]  Interventional Cardiology    JYOTI Rodríguez, acting as scribe for Lacho Hoang MD, [facility]    03/09/21  17:32 EST

## 2021-03-18 ENCOUNTER — BULK ORDERING (OUTPATIENT)
Dept: CASE MANAGEMENT | Facility: OTHER | Age: 36
End: 2021-03-18

## 2021-03-18 DIAGNOSIS — Z23 IMMUNIZATION DUE: ICD-10-CM

## 2021-03-21 RX ORDER — CARVEDILOL 3.12 MG/1
3.12 TABLET ORAL 2 TIMES DAILY WITH MEALS
Qty: 60 TABLET | Refills: 5 | Status: SHIPPED | OUTPATIENT
Start: 2021-03-21 | End: 2021-11-15

## 2021-03-29 ENCOUNTER — OFFICE VISIT (OUTPATIENT)
Dept: FAMILY MEDICINE CLINIC | Facility: CLINIC | Age: 36
End: 2021-03-29

## 2021-03-29 VITALS
RESPIRATION RATE: 20 BRPM | HEIGHT: 71 IN | SYSTOLIC BLOOD PRESSURE: 140 MMHG | DIASTOLIC BLOOD PRESSURE: 78 MMHG | BODY MASS INDEX: 31.08 KG/M2 | WEIGHT: 222 LBS

## 2021-03-29 DIAGNOSIS — I10 ESSENTIAL HYPERTENSION: ICD-10-CM

## 2021-03-29 DIAGNOSIS — J30.1 NON-SEASONAL ALLERGIC RHINITIS DUE TO POLLEN: Primary | ICD-10-CM

## 2021-03-29 DIAGNOSIS — M1A.09X0 IDIOPATHIC CHRONIC GOUT OF MULTIPLE SITES WITHOUT TOPHUS: ICD-10-CM

## 2021-03-29 DIAGNOSIS — J45.42 MODERATE PERSISTENT ASTHMA WITH STATUS ASTHMATICUS: ICD-10-CM

## 2021-03-29 DIAGNOSIS — E78.2 MIXED HYPERLIPIDEMIA: ICD-10-CM

## 2021-03-29 PROCEDURE — 99214 OFFICE O/P EST MOD 30 MIN: CPT | Performed by: NURSE PRACTITIONER

## 2021-03-29 RX ORDER — CITALOPRAM 20 MG/1
20 TABLET ORAL DAILY
Qty: 90 TABLET | Refills: 2 | Status: SHIPPED | OUTPATIENT
Start: 2021-03-29 | End: 2021-03-29 | Stop reason: SDUPTHER

## 2021-03-29 RX ORDER — CITALOPRAM 20 MG/1
20 TABLET ORAL DAILY
COMMUNITY
Start: 2021-02-05 | End: 2021-03-29 | Stop reason: SDUPTHER

## 2021-03-29 RX ORDER — CITALOPRAM 20 MG/1
20 TABLET ORAL DAILY
Qty: 90 TABLET | Refills: 2 | Status: SHIPPED | OUTPATIENT
Start: 2021-03-29

## 2021-03-29 RX ORDER — CETIRIZINE HYDROCHLORIDE 10 MG/1
10 TABLET ORAL NIGHTLY PRN
Qty: 30 TABLET | Refills: 5 | Status: SHIPPED | OUTPATIENT
Start: 2021-03-29

## 2021-03-29 RX ORDER — AZELASTINE 1 MG/ML
2 SPRAY, METERED NASAL 2 TIMES DAILY PRN
Qty: 1 EACH | Refills: 5 | Status: SHIPPED | OUTPATIENT
Start: 2021-03-29

## 2021-03-29 NOTE — PROGRESS NOTES
Subjective   Nghia Roach is a 35 y.o. male.     No chief complaint on file.    Chief complaint  Need refill on depression medication    History of Present Illness:    Patient needs refill on Celexa.  Reports his depression is stable.  Currently receives Celexa 20 mg daily.  Patient denies any thoughts of hurting self or others.  Reports that his energy has improved and he is not as fatigued as he had been.  He is feeling much better.  Continues to have a chronic joint pain for which he has been referred to multiple specialty providers.    Chronic allergies with current exacerbation due to onset of spring allergies.  Patient is taking Zyrtec, Singulair and Flonase.      The following portions of the patient's history and ROS were reviewed and updated as appropriate per provider:  Allergies, current medications, past family history, past medical history, past social history, past surgical history and problem list.    Review of Systems   Constitutional: Negative for activity change, appetite change, chills, fatigue and fever.   HENT: Positive for congestion and rhinorrhea. Negative for ear pain, facial swelling, hearing loss, sinus pressure, sore throat, trouble swallowing and voice change.    Eyes: Negative for pain, discharge and visual disturbance.   Respiratory: Negative for apnea, cough, chest tightness, shortness of breath and wheezing.    Cardiovascular: Negative for chest pain, palpitations and leg swelling.   Gastrointestinal: Negative for abdominal pain, blood in stool, constipation, diarrhea, nausea and vomiting.   Endocrine: Negative.    Genitourinary: Negative for dysuria and frequency.   Musculoskeletal: Positive for arthralgias and back pain. Negative for neck stiffness.   Skin: Negative.  Negative for color change.   Allergic/Immunologic: Positive for environmental allergies. Negative for food allergies and immunocompromised state.   Neurological: Negative for dizziness and headaches.  "  Hematological: Negative.    Psychiatric/Behavioral: Negative for confusion, self-injury and suicidal ideas. The patient is not nervous/anxious.        Objective     /78   Resp 20   Ht 180.3 cm (71\")   Wt 101 kg (222 lb)   BMI 30.96 kg/m²   Office Visit on 02/22/2021   Component Date Value Ref Range Status   • Glucose 02/22/2021 87  65 - 99 mg/dL Final   • BUN 02/22/2021 13  6 - 20 mg/dL Final   • Creatinine 02/22/2021 1.35* 0.76 - 1.27 mg/dL Final   • Sodium 02/22/2021 140  136 - 145 mmol/L Final   • Potassium 02/22/2021 4.5  3.5 - 5.2 mmol/L Final   • Chloride 02/22/2021 100  98 - 107 mmol/L Final   • CO2 02/22/2021 30.2* 22.0 - 29.0 mmol/L Final   • Calcium 02/22/2021 10.1  8.6 - 10.5 mg/dL Final   • Total Protein 02/22/2021 7.0  6.0 - 8.5 g/dL Final   • Albumin 02/22/2021 4.80  3.50 - 5.20 g/dL Final   • ALT (SGPT) 02/22/2021 24  1 - 41 U/L Final   • AST (SGOT) 02/22/2021 26  1 - 40 U/L Final   • Alkaline Phosphatase 02/22/2021 70  39 - 117 U/L Final   • Total Bilirubin 02/22/2021 1.5* 0.0 - 1.2 mg/dL Final   • eGFR Non African Amer 02/22/2021 60* >60 mL/min/1.73 Final   • Globulin 02/22/2021 2.2  gm/dL Final   • A/G Ratio 02/22/2021 2.2  g/dL Final   • BUN/Creatinine Ratio 02/22/2021 9.6  7.0 - 25.0 Final   • Anion Gap 02/22/2021 9.8  5.0 - 15.0 mmol/L Final   • Total Cholesterol 02/22/2021 211* 0 - 200 mg/dL Final   • Triglycerides 02/22/2021 155* 0 - 150 mg/dL Final   • HDL Cholesterol 02/22/2021 34* 40 - 60 mg/dL Final   • LDL Cholesterol  02/22/2021 149* 0 - 100 mg/dL Final   • VLDL Cholesterol 02/22/2021 28  5 - 40 mg/dL Final   • LDL/HDL Ratio 02/22/2021 4.29   Final   • TSH 02/22/2021 2.610  0.270 - 4.200 uIU/mL Final   • Hemoglobin A1C 02/22/2021 5.18  4.80 - 5.60 % Final   • Vitamin B-12 02/22/2021 925  211 - 946 pg/mL Final   • Uric Acid 02/22/2021 7.0  3.4 - 7.0 mg/dL Final   • 25 Hydroxy, Vitamin D 02/22/2021 20.3* 30.0 - 100.0 ng/ml Final   • WBC 02/22/2021 5.86  3.40 - 10.80 10*3/mm3 " Final   • RBC 02/22/2021 5.94* 4.14 - 5.80 10*6/mm3 Final   • Hemoglobin 02/22/2021 18.6* 13.0 - 17.7 g/dL Final   • Hematocrit 02/22/2021 52.4* 37.5 - 51.0 % Final   • MCV 02/22/2021 88.2  79.0 - 97.0 fL Final   • MCH 02/22/2021 31.3  26.6 - 33.0 pg Final   • MCHC 02/22/2021 35.5  31.5 - 35.7 g/dL Final   • RDW 02/22/2021 13.3  12.3 - 15.4 % Final   • RDW-SD 02/22/2021 41.9  37.0 - 54.0 fl Final   • MPV 02/22/2021 11.8  6.0 - 12.0 fL Final   • Platelets 02/22/2021 209  140 - 450 10*3/mm3 Final   • Neutrophil % 02/22/2021 49.1  42.7 - 76.0 % Final   • Lymphocyte % 02/22/2021 28.2  19.6 - 45.3 % Final   • Monocyte % 02/22/2021 9.0  5.0 - 12.0 % Final   • Eosinophil % 02/22/2021 12.5* 0.3 - 6.2 % Final   • Basophil % 02/22/2021 0.9  0.0 - 1.5 % Final   • Immature Grans % 02/22/2021 0.3  0.0 - 0.5 % Final   • Neutrophils, Absolute 02/22/2021 2.88  1.70 - 7.00 10*3/mm3 Final   • Lymphocytes, Absolute 02/22/2021 1.65  0.70 - 3.10 10*3/mm3 Final   • Monocytes, Absolute 02/22/2021 0.53  0.10 - 0.90 10*3/mm3 Final   • Eosinophils, Absolute 02/22/2021 0.73* 0.00 - 0.40 10*3/mm3 Final   • Basophils, Absolute 02/22/2021 0.05  0.00 - 0.20 10*3/mm3 Final   • Immature Grans, Absolute 02/22/2021 0.02  0.00 - 0.05 10*3/mm3 Final   • nRBC 02/22/2021 0.2  0.0 - 0.2 /100 WBC Final       Physical Exam  Vitals reviewed.   Constitutional:       Appearance: Normal appearance. He is not ill-appearing.   HENT:      Head: Atraumatic.      Right Ear: Hearing and external ear normal.      Left Ear: Hearing and external ear normal.      Nose:      Comments: External nose normal      Mouth/Throat:      Lips: Pink. No lesions.   Eyes:      General: Lids are normal. Vision grossly intact. Gaze aligned appropriately.   Pulmonary:      Effort: No accessory muscle usage or respiratory distress.   Musculoskeletal:         General: Normal range of motion.      Cervical back: Normal range of motion.   Skin:     Coloration: Skin is not cyanotic or pale.       Nails: There is no clubbing.   Neurological:      Mental Status: He is alert and oriented to person, place, and time.   Psychiatric:         Attention and Perception: Attention normal.         Mood and Affect: Mood and affect normal.         Speech: Speech normal.         Behavior: Behavior normal. Behavior is cooperative.         Thought Content: Thought content normal.         Cognition and Memory: Cognition and memory normal.         Assessment/Plan     Problem List Items Addressed This Visit        Cardiac and Vasculature    Mixed hyperlipidemia    Relevant Orders    CBC & Differential    Comprehensive Metabolic Panel    TSH    Hemoglobin A1c    Vitamin D 25 Hydroxy    Vitamin B12    Lipid Panel    Essential hypertension    Relevant Orders    CBC & Differential    Comprehensive Metabolic Panel    TSH    Hemoglobin A1c    Vitamin D 25 Hydroxy    Vitamin B12    Lipid Panel       Musculoskeletal and Injuries    Chronic gout of multiple sites    Relevant Orders    CBC & Differential    Comprehensive Metabolic Panel    TSH    Hemoglobin A1c    Vitamin D 25 Hydroxy    Vitamin B12    Lipid Panel       Pulmonary and Pneumonias    Moderate persistent asthma with status asthmaticus    Relevant Medications    citalopram (CeleXA) 20 MG tablet    Other Relevant Orders    CBC & Differential    Comprehensive Metabolic Panel    TSH    Hemoglobin A1c    Vitamin D 25 Hydroxy    Vitamin B12    Lipid Panel      Other Visit Diagnoses     Non-seasonal allergic rhinitis due to pollen    -  Primary    Relevant Medications    azelastine (ASTELIN) 0.1 % nasal spray    cetirizine (zyrTEC) 10 MG tablet    Other Relevant Orders    CBC & Differential    Comprehensive Metabolic Panel    TSH    Hemoglobin A1c    Vitamin D 25 Hydroxy    Vitamin B12    Lipid Panel      I have reviewed medication list and discussed with patient the possible side effects and interactions.  We have discussed purpose for medication, route, dosage, frequency.   Refill routine medications.    Pt has been instructed today regarding low fat heart smart diet. Weight management and routine exercise has been recommended. Avoid high fat foods, starchy foods and processed foods. Increase lean meats, fresh vegetables and fresh fruits.          Patient's Body mass index is 30.96 kg/m². BMI is above normal parameters. Recommendations include: exercise counseling and nutrition counseling.        I have discussed diagnosis in detail today allowing time for questions and answers. Patient is aware of reasons to seek urgent or emergent medical care as well as reasons to return to the clinic for evaluation. Possible side effects, interactions and progression of symptoms discussed as well. Patient / family states understanding.   Emotional support and active listening provided.     Follow up in 2-3 months. Routine labs fasting one week prior to next office visit. Return sooner if needed. '    This was an audio and video enabled telemedicine encounter.        This document has been electronically signed by:  CORDELIA Flores, NP-C

## 2021-04-07 DIAGNOSIS — I20.8 ATYPICAL ANGINA (HCC): Primary | ICD-10-CM

## 2021-04-07 RX ORDER — OMEPRAZOLE 40 MG/1
40 CAPSULE, DELAYED RELEASE ORAL 2 TIMES DAILY
Qty: 60 CAPSULE | Refills: 0 | OUTPATIENT
Start: 2021-04-07

## 2021-04-08 ENCOUNTER — APPOINTMENT (OUTPATIENT)
Dept: CARDIOLOGY | Facility: HOSPITAL | Age: 36
End: 2021-04-08

## 2021-04-08 ENCOUNTER — APPOINTMENT (OUTPATIENT)
Dept: NUCLEAR MEDICINE | Facility: HOSPITAL | Age: 36
End: 2021-04-08

## 2021-05-24 DIAGNOSIS — R79.89 LOW TESTOSTERONE: ICD-10-CM

## 2021-05-24 DIAGNOSIS — R53.83 OTHER FATIGUE: ICD-10-CM

## 2021-05-24 RX ORDER — TADALAFIL 5 MG/1
5 TABLET ORAL DAILY
Qty: 30 TABLET | Refills: 6 | Status: SHIPPED | OUTPATIENT
Start: 2021-05-24 | End: 2021-11-02 | Stop reason: SDUPTHER

## 2021-07-17 DIAGNOSIS — I10 ESSENTIAL HYPERTENSION: ICD-10-CM

## 2021-07-19 RX ORDER — VALSARTAN AND HYDROCHLOROTHIAZIDE 160; 25 MG/1; MG/1
1 TABLET ORAL DAILY
Qty: 30 TABLET | Refills: 5 | Status: SHIPPED | OUTPATIENT
Start: 2021-07-19

## 2021-08-16 DIAGNOSIS — M1A.09X0 IDIOPATHIC CHRONIC GOUT OF MULTIPLE SITES WITHOUT TOPHUS: ICD-10-CM

## 2021-08-16 DIAGNOSIS — R21 RASH: ICD-10-CM

## 2021-08-16 RX ORDER — ALLOPURINOL 100 MG/1
TABLET ORAL
Qty: 60 TABLET | Refills: 1 | Status: SHIPPED | OUTPATIENT
Start: 2021-08-16

## 2021-08-16 RX ORDER — AMITRIPTYLINE HYDROCHLORIDE 25 MG/1
25 TABLET, FILM COATED ORAL NIGHTLY PRN
Qty: 30 TABLET | Refills: 1 | Status: SHIPPED | OUTPATIENT
Start: 2021-08-16

## 2021-10-19 ENCOUNTER — OFFICE VISIT (OUTPATIENT)
Dept: SURGERY | Facility: CLINIC | Age: 36
End: 2021-10-19

## 2021-10-19 VITALS — BODY MASS INDEX: 32.48 KG/M2 | HEIGHT: 71 IN | WEIGHT: 232 LBS

## 2021-10-19 DIAGNOSIS — K82.4 POLYP OF GALLBLADDER: Primary | ICD-10-CM

## 2021-10-19 DIAGNOSIS — K59.1 FUNCTIONAL DIARRHEA: ICD-10-CM

## 2021-10-19 PROCEDURE — 99214 OFFICE O/P EST MOD 30 MIN: CPT | Performed by: SURGERY

## 2021-10-19 RX ORDER — SODIUM, POTASSIUM,MAG SULFATES 17.5-3.13G
SOLUTION, RECONSTITUTED, ORAL ORAL
Qty: 175 ML | Refills: 0 | Status: SHIPPED | OUTPATIENT
Start: 2021-10-19

## 2021-10-19 NOTE — PROGRESS NOTES
Subjective   Nghia Roach is a 36 y.o. male.     Chief Complaint: gallbladder polyps    History of Present Illness He is a 35 yo who has had some upper abdominal pains and nausea. He has a US showing gallbladder polyps. He has been having this as well as episodes of diarrhea for a couple of years. He had a colonoscopy about 3 years ago with polyps and was told he needed it repeated this year.   He has a history of a fib and was supposed to get a stress test. He has not had it because of problems with his hip.    The following portions of the patient's history were reviewed and updated as appropriate: current medications, past family history, past medical history, past social history, past surgical history and problem list.    Review of Systems   Constitutional: Negative for activity change, appetite change, chills, fever and unexpected weight change.   HENT: Negative for congestion, facial swelling and sore throat.    Eyes: Negative for photophobia and visual disturbance.   Respiratory: Negative for chest tightness, shortness of breath and wheezing.    Cardiovascular: Negative for chest pain, palpitations and leg swelling.   Gastrointestinal: Positive for abdominal pain and nausea. Negative for abdominal distention, anal bleeding, blood in stool, constipation, diarrhea, rectal pain and vomiting.   Endocrine: Negative for cold intolerance, heat intolerance, polydipsia and polyuria.   Genitourinary: Negative for difficulty urinating, dysuria, flank pain and urgency.   Musculoskeletal: Negative for back pain and myalgias.   Skin: Negative for rash and wound.   Allergic/Immunologic: Negative for immunocompromised state.   Neurological: Negative for dizziness, seizures, syncope, light-headedness, numbness and headaches.   Hematological: Negative for adenopathy. Does not bruise/bleed easily.   Psychiatric/Behavioral: Negative for behavioral problems and confusion. The patient is not nervous/anxious.        Objective    Physical Exam  Vitals reviewed.   Constitutional:       General: He is not in acute distress.     Appearance: He is well-developed. He is not ill-appearing.   HENT:      Head: Normocephalic. No laceration. Hair is normal.      Right Ear: Hearing and ear canal normal.      Left Ear: Hearing and ear canal normal.      Nose: Nose normal.      Right Sinus: No maxillary sinus tenderness or frontal sinus tenderness.      Left Sinus: No maxillary sinus tenderness or frontal sinus tenderness.   Eyes:      General: Lids are normal.      Conjunctiva/sclera: Conjunctivae normal.      Pupils: Pupils are equal, round, and reactive to light.   Neck:      Thyroid: No thyroid mass or thyromegaly.      Vascular: No JVD.      Trachea: No tracheal tenderness or tracheal deviation.   Cardiovascular:      Rate and Rhythm: Normal rate and regular rhythm.      Heart sounds: No murmur heard.  No gallop.    Pulmonary:      Effort: Pulmonary effort is normal.      Breath sounds: Normal breath sounds. No stridor. No wheezing.   Chest:      Chest wall: No tenderness.   Breasts:      Right: No supraclavicular adenopathy.      Left: No supraclavicular adenopathy.       Abdominal:      General: Bowel sounds are normal. There is no distension.      Palpations: Abdomen is soft. There is no mass.      Tenderness: There is no abdominal tenderness. There is no guarding or rebound.      Hernia: No hernia is present.   Musculoskeletal:         General: No deformity.      Cervical back: Normal range of motion.   Lymphadenopathy:      Cervical: No cervical adenopathy.      Upper Body:      Right upper body: No supraclavicular adenopathy.      Left upper body: No supraclavicular adenopathy.   Skin:     General: Skin is warm and dry.      Coloration: Skin is not pale.      Findings: No erythema or rash.   Neurological:      Mental Status: He is alert and oriented to person, place, and time.      Motor: No abnormal muscle tone.   Psychiatric:          "Behavior: Behavior normal.         Thought Content: Thought content normal.         Past Medical History:   Diagnosis Date   • Allergic    • Allergic rhinitis    • Asthma    • Atrial flutter (HCC)    • Diarrhea    • Disturbance of skin sensation    • Elevated cholesterol    • GERD (gastroesophageal reflux disease)    • Gout    • History of MRI of spine 12/16/2014    Done at [facility]    • Hx of exercise stress test 2014    \"IT WAS NORMAL\"     • Hyperlipidemia    • Hypertension    • Kidney disease     stage 2   • Lymphadenitis    • Malaise and fatigue    • Osteoarthrosis    • Rash    • Renal disorder    • Sleep apnea     NO CPAP   • Wears glasses     TO READ       Family History   Problem Relation Age of Onset   • Asthma Other    • Cancer Other    • COPD Other    • Diabetes Other    • Stroke Other    • Asthma Mother    • COPD Mother    • Asthma Father    • Depression Father    • Drug abuse Father    • Arthritis Maternal Grandmother    • Cancer Maternal Grandmother    • Diabetes Maternal Grandmother    • Heart disease Maternal Grandmother    • Hyperlipidemia Maternal Grandmother    • Stroke Paternal Grandfather    • Colon cancer Maternal Grandfather         mid 40s       Social History     Tobacco Use   • Smoking status: Never Smoker   • Smokeless tobacco: Never Used   Substance Use Topics   • Alcohol use: Not Currently     Comment: social   • Drug use: No       Past Surgical History:   Procedure Laterality Date   • BRAVO PROCEDURE N/A 8/4/2017    Procedure: ESOPHAGOGASTRODUODENOSCOPY AND FULLER;  Surgeon: Efra Suero MD;  Location: [facility];  Service:    • BRAVO PROCEDURE N/A 12/19/2018    Procedure: ESOPHAGOGASTRODUODENOSCOPY AND FULLER;  Surgeon: Efra Suero MD;  Location: [facility];  Service: Gastroenterology   • COLONOSCOPY  2014   • COLONOSCOPY      UNSURE OF DATE   • COLONOSCOPY N/A 5/3/2018    Procedure: COLONOSCOPY WITH COLD BIOPSIES AND COLD POLYPECTOMIES;  Surgeon: Bang Segundo MD;  Location: " "[facility] ENDOSCOPY;  Service: Gastroenterology   • ENDOSCOPY     • ENDOSCOPY N/A 3/9/2020    Procedure: ESOPHAGOGASTRODUODENOSCOPY WITH BIOPSY CPT CODE: 78951;  Surgeon: Ricardo Nolasco MD;  Location: [facility] OR;  Service: Gastroenterology;  Laterality: N/A;   • HIP SURGERY Right    • NISSEN FUNDOPLICATION     • NISSEN FUNDOPLICATION N/A 10/11/2017    Procedure: NISSEN FUNDOPLICATION LAPAROSCOPIC;  Surgeon: Efra Suero MD;  Location:  [facility] OR;  Service:    • NE LAP,ESOPHAGOGAST FUNDOPLASTY N/A 10/11/2017    Procedure: HIATAL HERNIA REPAIR LAPAROSCOPIC;  Surgeon: Efra Suero MD;  Location: [facility] OR;  Service: General   • VASECTOMY         Current Outpatient Medications   Medication Instructions   • albuterol sulfate  (90 Base) MCG/ACT inhaler 2 puffs, Inhalation, Every 4 Hours PRN   • allopurinol (ZYLOPRIM) 100 MG tablet TAKE 1 TABLET BY MOUTH TWICE DAILY   • amitriptyline (ELAVIL) 25 mg, Oral, Nightly PRN   • anastrozole (ARIMIDEX) 1 MG tablet TAKE 1 TABLET BY MOUTH 1 TIME EVERY WEEK FOR 12 WEEKS   • azelastine (ASTELIN) 0.1 % nasal spray 2 sprays, Nasal, 2 Times Daily PRN, Use in each nostril as directed   • B-D INTEGRA SYRINGE 25G X 5/8\" 3 ML misc See Admin Instructions   • carvedilol (COREG) 3.125 mg, Oral, 2 Times Daily With Meals   • cetirizine (ZYRTEC) 10 mg, Oral, Nightly PRN   • citalopram (CELEXA) 20 mg, Oral, Daily   • cloNIDine (CATAPRES) 0.1 mg, Oral, 2 Times Daily   • clotrimazole-betamethasone (LOTRISONE) 1-0.05 % cream Topical, Every 12 Hours Scheduled   • Flovent HFA 44 MCG/ACT inhaler INHALE 2 PUFFS BY MOUTH TWICE DAILY   • fluticasone (FLONASE) 50 MCG/ACT nasal spray SHAKE LIQUID AND USE 2 SPRAYS IN EACH NOSTRIL DAILY AS DIRECTED   • lansoprazole (PREVACID) 30 mg, Oral, As Needed   • methocarbamol (ROBAXIN) 750 mg, Oral, 4 Times Daily PRN, Take on days he works in place of flexeril   • montelukast (SINGULAIR) 10 mg, Oral, Nightly   • nabumetone (RELAFEN) 500 MG tablet " "TAKE 1 TABLET BY MOUTH TWICE DAILY AS NEEDED FOR MILD PAIN OR MODERATE PAIN   • O2 (OXYGEN) 2 L/min, Inhalation, Once   • omeprazole (PRILOSEC) 40 mg, Oral, 2 Times Daily Before Meals, Take a half hour before breakfast   • permethrin (ELIMITE) 5 % cream APPLY TOPICALLY TO THE APPROPRIATE AREA AS DIRECTED   • sulfaSALAzine (AZULFIDINE) 500 mg, Oral, 4 Times Daily, Written by rheumatology , started back on his script   • Syringe 25G X 5/8\" 3 ML misc Use as directed 2 x weekly   • Syringe 25G X 5/8\" 3 ML misc Use as directed 2 x weekly   • tadalafil (CIALIS) 5 mg, Oral, Daily   • Testosterone Cypionate (Depo-Testosterone) 200 MG/ML injection He is to use 1/2 cc every Monday and Thursday SQ   • Testosterone Cypionate (Depo-Testosterone) 200 MG/ML injection Inject 1/2 cc subcutaneously every Monday and Thursday   • valsartan-hydrochlorothiazide (DIOVAN-HCT) 160-25 MG per tablet 1 tablet, Oral, Daily         Assessment/Plan   Diagnoses and all orders for this visit:    1. Polyp of gallbladder (Primary)    2. Functional diarrhea    Other orders  -     SCANNED - IMAGING      Lap angela and colonoscopy after cardiac clearance       "

## 2021-10-21 ENCOUNTER — PATIENT ROUNDING (BHMG ONLY) (OUTPATIENT)
Dept: SURGERY | Facility: CLINIC | Age: 36
End: 2021-10-21

## 2021-10-21 NOTE — PROGRESS NOTES
October 21, 2021    Hello, may I speak with Nghia Roach?    My name is Maria A      I am  with [facility]  [address] [place] [address] [phone].    Before we get started may I verify your date of birth? 1985    I am calling to officially welcome you to our practice and ask about your recent visit. Is this a good time to talk? yes    Tell me about your visit with us. What things went well?  It was a great visit.  Everyone was very helpful.       We're always looking for ways to make our patients' experiences even better. Do you have recommendations on ways we may improve?  no    Overall were you satisfied with your first visit to our practice? yes       I appreciate you taking the time to speak with me today. Is there anything else I can do for you? no      Thank you, and have a great day.

## 2021-10-25 ENCOUNTER — TELEPHONE (OUTPATIENT)
Dept: UROLOGY | Facility: CLINIC | Age: 36
End: 2021-10-25

## 2021-10-25 NOTE — TELEPHONE ENCOUNTER
Caller: DELROY ELDER    Relationship: SELF      Medication requested (name and dosage): tadalafil (CIALIS) 5 MG tablet   PHENYLEPHRINE  Testosterone Cypionate (Depo-Testosterone) 200 MG/ML injection (OUT OF THIS MED)  TRIMIX (PREFERS THE LOWER DOSE OVER THE SUPER)      Requested Prescriptions:   Requested Prescriptions      No prescriptions requested or ordered in this encounter        Pharmacy where request should be sent: [facility]   [address] [place] [phone]    Additional details provided by patient: WOULD LIKE THE LOWER DOSE OF TRIMIX.    Best call back number: [phone]    Does the patient have less than a 3 day supply:  [x] Yes  [] No    Jayashree Galicia Rep   10/25/21 16:35 EDT

## 2021-11-02 ENCOUNTER — OFFICE VISIT (OUTPATIENT)
Dept: UROLOGY | Facility: CLINIC | Age: 36
End: 2021-11-02

## 2021-11-02 VITALS — BODY MASS INDEX: 32.48 KG/M2 | HEIGHT: 71 IN | WEIGHT: 232 LBS

## 2021-11-02 DIAGNOSIS — R53.83 OTHER FATIGUE: ICD-10-CM

## 2021-11-02 DIAGNOSIS — N42.9 DISORDER OF PROSTATE: Primary | ICD-10-CM

## 2021-11-02 DIAGNOSIS — N52.02 CORPORO-VENOUS OCCLUSIVE ERECTILE DYSFUNCTION: ICD-10-CM

## 2021-11-02 DIAGNOSIS — R79.89 LOW TESTOSTERONE: ICD-10-CM

## 2021-11-02 PROCEDURE — 84403 ASSAY OF TOTAL TESTOSTERONE: CPT | Performed by: UROLOGY

## 2021-11-02 PROCEDURE — 85027 COMPLETE CBC AUTOMATED: CPT | Performed by: UROLOGY

## 2021-11-02 PROCEDURE — 99214 OFFICE O/P EST MOD 30 MIN: CPT | Performed by: UROLOGY

## 2021-11-02 PROCEDURE — 84153 ASSAY OF PSA TOTAL: CPT | Performed by: UROLOGY

## 2021-11-02 PROCEDURE — 82670 ASSAY OF TOTAL ESTRADIOL: CPT | Performed by: UROLOGY

## 2021-11-02 RX ORDER — TADALAFIL 5 MG/1
5 TABLET ORAL DAILY
Qty: 30 TABLET | Refills: 6 | Status: SHIPPED | OUTPATIENT
Start: 2021-11-02 | End: 2022-05-02 | Stop reason: SDUPTHER

## 2021-11-02 RX ORDER — TESTOSTERONE CYPIONATE 200 MG/ML
INJECTION, SOLUTION INTRAMUSCULAR
Qty: 10 ML | Refills: 2 | Status: SHIPPED | OUTPATIENT
Start: 2021-11-02 | End: 2022-05-02 | Stop reason: SDUPTHER

## 2021-11-02 NOTE — PROGRESS NOTES
"Chief Complaint:          Chief Complaint   Patient presents with   • Benign Prostatic Hypertrophy     follow up        HPI:   36 y.o. male returns today he is doing great he has significant cholelithiasis he is going to have a cholecystectomy in the near future he also stopped his Arimidex secondary to perineal pain which is a distinctly unusual side effect he continues on weight loss he is down to 230 pounds.  Patient returns today for follow-up.  He has been on testosterone replacement therapy.  He reports a dramatic improvement in his Sánchez questionnaire: -SÁNCHEZ-androgen deficiency in the age male questionnaire. The patient was queried regarding the androgen deficiency in the age male questionnaire.  This is a validated questionnaire that was performed on a set of 314 [place] male physicians. When it was positive it correlated directly with a 94% chance of low testosterone.  Patient indicates there is a decrease in libido or sex drive, a lack of energy, decreased  strength and endurance, a decreased \"enjoyment of life\", sad and grumpy feelings with significant difficulty maintaining erections.  There has also been a recent deterioration regarding work performance. He reports weight loss.  He has good facility and the use of subcutaneous and intramuscular injections as well as comfort level and using the medication in a sterile fashion.  He understands he should use only the prescribed dose.  He is here for appropriate lab monitoring regarding this.  He understands this is a controlled substance and therefore must be watched closely, will not be refilled in the medical loss or miscalculation of the dose.  He is very happy with the treatment and therefore wants to continue it.      Past Medical History:        Past Medical History:   Diagnosis Date   • Allergic    • Allergic rhinitis    • Asthma    • Atrial flutter (HCC)    • Diarrhea    • Disturbance of skin sensation    • Elevated cholesterol    • GERD " "(gastroesophageal reflux disease)    • Gout    • History of MRI of spine 12/16/2014    Done at [facility]    • Hx of exercise stress test 2014    \"IT WAS NORMAL\"     • Hyperlipidemia    • Hypertension    • Kidney disease     stage 2   • Lymphadenitis    • Malaise and fatigue    • Osteoarthrosis    • Rash    • Renal disorder    • Sleep apnea     NO CPAP   • Wears glasses     TO READ         Current Meds:     Current Outpatient Medications   Medication Sig Dispense Refill   • albuterol sulfate  (90 Base) MCG/ACT inhaler Inhale 2 puffs Every 4 (Four) Hours As Needed for Shortness of Air. 2 inhaler 5   • allopurinol (ZYLOPRIM) 100 MG tablet TAKE 1 TABLET BY MOUTH TWICE DAILY 60 tablet 1   • amitriptyline (ELAVIL) 25 MG tablet TAKE 1 TABLET BY MOUTH AT NIGHT AS NEEDED FOR SLEEP 30 tablet 1   • anastrozole (ARIMIDEX) 1 MG tablet TAKE 1 TABLET BY MOUTH 1 TIME EVERY WEEK FOR 12 WEEKS 12 tablet 6   • azelastine (ASTELIN) 0.1 % nasal spray 2 sprays into the nostril(s) as directed by provider 2 (Two) Times a Day As Needed for Rhinitis. Use in each nostril as directed 1 each 5   • B-D INTEGRA SYRINGE 25G X 5/8\" 3 ML misc See Admin Instructions.  0   • carvedilol (COREG) 3.125 MG tablet Take 1 tablet by mouth 2 (Two) Times a Day With Meals. 60 tablet 5   • cetirizine (zyrTEC) 10 MG tablet Take 1 tablet by mouth At Night As Needed for Allergies. 30 tablet 5   • citalopram (CeleXA) 20 MG tablet Take 1 tablet by mouth Daily. 90 tablet 2   • cloNIDine (Catapres) 0.1 MG tablet Take 1 tablet by mouth 2 (Two) Times a Day. 60 tablet 5   • clotrimazole-betamethasone (LOTRISONE) 1-0.05 % cream Apply  topically to the appropriate area as directed Every 12 (Twelve) Hours. 45 g 2   • Flovent HFA 44 MCG/ACT inhaler INHALE 2 PUFFS BY MOUTH TWICE DAILY 10.6 g 5   • fluticasone (FLONASE) 50 MCG/ACT nasal spray SHAKE LIQUID AND USE 2 SPRAYS IN EACH NOSTRIL DAILY AS DIRECTED 32 g 5   • lansoprazole (PREVACID) 30 MG capsule Take 30 mg by " "mouth As Needed.     • methocarbamol (ROBAXIN) 750 MG tablet Take 1 tablet by mouth 4 (Four) Times a Day As Needed for Muscle Spasms. Take on days he works in place of flexeril 120 tablet 5   • montelukast (SINGULAIR) 10 MG tablet Take 1 tablet by mouth Every Night. 30 tablet 5   • nabumetone (RELAFEN) 500 MG tablet TAKE 1 TABLET BY MOUTH TWICE DAILY AS NEEDED FOR MILD PAIN OR MODERATE PAIN 60 tablet 2   • O2 (OXYGEN) Inhale 2 L/min 1 (One) Time.     • omeprazole (priLOSEC) 40 MG capsule Take 1 capsule by mouth 2 (Two) Times a Day Before Meals. Take a half hour before breakfast 60 capsule 11   • permethrin (ELIMITE) 5 % cream APPLY TOPICALLY TO THE APPROPRIATE AREA AS DIRECTED 60 g 0   • sulfaSALAzine (AZULFIDINE) 500 MG tablet Take 1 tablet by mouth 4 (Four) Times a Day. Written by rheumatology , started back on his script 60 tablet 5   • Suprep Bowel Prep Kit 17.5-3.13-1.6 GM/177ML solution oral solution Dispense one Kit        Sig: Used as Directed 175 mL 0   • Syringe 25G X 5/8\" 3 ML misc Use as directed 2 x weekly 24 each 3   • Syringe 25G X 5/8\" 3 ML misc Use as directed 2 x weekly 24 each 3   • tadalafil (CIALIS) 5 MG tablet Take 1 tablet by mouth Daily. 30 tablet 6   • Testosterone Cypionate (Depo-Testosterone) 200 MG/ML injection He is to use 1/2 cc every Monday and Thursday SQ 10 mL 2   • valsartan-hydrochlorothiazide (DIOVAN-HCT) 160-25 MG per tablet TAKE 1 TABLET BY MOUTH DAILY 30 tablet 5     No current facility-administered medications for this visit.        Allergies:      Allergies   Allergen Reactions   • Erythromycin Rash        Past Surgical History:     Past Surgical History:   Procedure Laterality Date   • BRAVO PROCEDURE N/A 8/4/2017    Procedure: ESOPHAGOGASTRODUODENOSCOPY AND FULLER;  Surgeon: Efra Suero MD;  Location:  [facility] OR;  Service:    • BRAVO PROCEDURE N/A 12/19/2018    Procedure: ESOPHAGOGASTRODUODENOSCOPY AND UFLLER;  Surgeon: Efra Suero MD;  Location:  [facility] OR;  Service: " Gastroenterology   • COLONOSCOPY  2014   • COLONOSCOPY      UNSURE OF DATE   • COLONOSCOPY N/A 5/3/2018    Procedure: COLONOSCOPY WITH COLD BIOPSIES AND COLD POLYPECTOMIES;  Surgeon: Bang Segundo MD;  Location: [facility] ENDOSCOPY;  Service: Gastroenterology   • ENDOSCOPY     • ENDOSCOPY N/A 3/9/2020    Procedure: ESOPHAGOGASTRODUODENOSCOPY WITH BIOPSY CPT CODE: 23939;  Surgeon: Ricardo Nolasco MD;  Location: [facility] OR;  Service: Gastroenterology;  Laterality: N/A;   • HIP SURGERY Right    • NISSEN FUNDOPLICATION     • NISSEN FUNDOPLICATION N/A 10/11/2017    Procedure: NISSEN FUNDOPLICATION LAPAROSCOPIC;  Surgeon: Efra Suero MD;  Location:  [facility] OR;  Service:    • HI LAP,ESOPHAGOGAST FUNDOPLASTY N/A 10/11/2017    Procedure: HIATAL HERNIA REPAIR LAPAROSCOPIC;  Surgeon: Efra Suero MD;  Location: [facility] OR;  Service: General   • VASECTOMY           Social History:     Social History     Socioeconomic History   • Marital status:      Spouse name: Radha    • Number of children: 2   Tobacco Use   • Smoking status: Never Smoker   • Smokeless tobacco: Never Used   Substance and Sexual Activity   • Alcohol use: Not Currently     Comment: social   • Drug use: No   • Sexual activity: Yes     Partners: Female       Family History:     Family History   Problem Relation Age of Onset   • Asthma Other    • Cancer Other    • COPD Other    • Diabetes Other    • Stroke Other    • Asthma Mother    • COPD Mother    • Asthma Father    • Depression Father    • Drug abuse Father    • Arthritis Maternal Grandmother    • Cancer Maternal Grandmother    • Diabetes Maternal Grandmother    • Heart disease Maternal Grandmother    • Hyperlipidemia Maternal Grandmother    • Stroke Paternal Grandfather    • Colon cancer Maternal Grandfather         mid 40s       Review of Systems:     Review of Systems   Constitutional: Negative.    HENT: Negative.    Eyes: Negative.    Respiratory: Negative.    Cardiovascular:  Negative.    Gastrointestinal: Negative.    Endocrine: Negative.    Musculoskeletal: Negative.    Allergic/Immunologic: Negative.    Neurological: Negative.    Hematological: Negative.    Psychiatric/Behavioral: Negative.        Physical Exam:     Physical Exam  Vitals and nursing note reviewed.   Constitutional:       Appearance: He is well-developed.   HENT:      Head: Normocephalic and atraumatic.   Eyes:      Conjunctiva/sclera: Conjunctivae normal.      Pupils: Pupils are equal, round, and reactive to light.   Cardiovascular:      Rate and Rhythm: Normal rate and regular rhythm.      Heart sounds: Normal heart sounds.   Pulmonary:      Effort: Pulmonary effort is normal.      Breath sounds: Normal breath sounds.   Abdominal:      General: Bowel sounds are normal.      Palpations: Abdomen is soft.   Musculoskeletal:         General: Normal range of motion.      Cervical back: Normal range of motion.   Skin:     General: Skin is warm and dry.   Neurological:      Mental Status: He is alert and oriented to person, place, and time.      Deep Tendon Reflexes: Reflexes are normal and symmetric.   Psychiatric:         Behavior: Behavior normal.         Thought Content: Thought content normal.         Judgment: Judgment normal.         I have reviewed the following portions of the patient's history: Allergies, current medications, past family history, past medical history, past social history, past surgical history, problem list, and ROS and confirm it is accurate.      Procedure:       Assessment/Plan:   Low testosterone: Patient is here for follow-up.  Since beginning the medication, he has been very pleased.  He reports a dramatic improvement in his erections, ability to achieve and maintain an erection, improvement in libido, increase in frequency of morning erections, and a noticeable weight loss consistent with the treatment.  No development of breast problems or abnormalities.  He is going to have appropriate  safety laboratory parameters checked.   He understands that the new data implicates testosterone with the development of prostate cancer and this is all but been disproven and the medical literature as well as the risks of cardiovascular disease which has actually also been disproven.  He understands that while he is a candidate for topical therapy if he is in contact with children this is not an option because it has been shown to accentuate genitalia development at an early age that is frequently irreversible.  He also understands this it is a controlled substance and as such will not be prescribed without appropriate follow-up and appropriate laboratory investigation.  He understands effects on spermatogenesis including the fact that this is not always completely reversible and not always completely limited his ability to father a child.  He has demonstrated facility in the technique of both intramuscular and subcutaneous injection and has been taught sterility when drawing up the medication.    Penile injection-the patient is interested in an attempt at a pharmacological penile injection for severe organic erectile dysfunction having failed the traditional oral therapy and having been offered a vacuum erection device.  After an appropriate informed consent including the significant risks of priapism, pain, and lack of efficacy, the penis was prepped and draped.  Appropriate anatomy was demonstrated including the dorsal nerve complex at 12 o'clock on the dorsum of the penis and the urethra at 6 o'clock.  We recommended injections between 9 o'clock and 10 o'clock on the right side and to 3 o'clock on the left side up and down the shaft into the corporal body.  We utilized a 27-gauge needle and began our dose of 0.1 mL of Trymex compound, observed for 30 minutes before its results.  We discussed the grading on a 10 scale indicating a 5 would be tumescence with poor axial rigidity.  I indicated that when the patient  goes home though be about a 30-40% improvement.  It was emphasized that the erection should last no more than 2 hours and anything more than that should prompt an immediate call to the office.  We also talked about pharmacological manipulation if the erection lasts for prolonged period including oral Sudafed or terbutaline.  We talked about pharmacological reduction of the erection using phenylephrine.      Hyperestrogenism-we spoke about the role of estrogen metabolism and breakdown in the  presence of testosterone replacement therapy.  We spoke about how high estradiol levels can interfere with the improvement noted in a man on testosterone as well as significant side effects such as pseudogynecomastia.  We discussed the use of the medication Arimidex using a very judicious low-dose fashion to prevent too low of an estradiol which would precipitate bone complications.  Going to check an estradiol level.    Polycythemia-I am going to check a CBC to rule out hemoglobin changes.  We utilized the American Heart Association guidelines for polycythemia which is a hemoglobin greater than 18 and a hematocrit greater than 54.5.  Recommend therapeutic phlebotomy as the treatment.  It is important that we indicate that is the most likely cause of the polycythemia.  We also discussed the possibility of decreasing the dose of testosterone.    Narcotic pain medication-patient has significant acute pain that I believe would be an indication for the use of narcotic pain medication.  I discussed the significant risks of pain medication and the fact that this will be a short only option and I will give her no more than a three-day supply of pain medication.  I will not plan long-term medication and that this will be sent to a pain clinic if at all becomes necessary.  We discussed signing a pain medication agreement and the fact that we are going to run a [facility] review to be sure the patient is not getting pain medication from  elsewhere.  If this is the case, we will not give pain medication as part of the patient's treatment plan of there being prescribed a controlled substance with potential for abuse.  This patient has been well aware of the appropriate dose of such medications including the risk for somnolence, limited ability to drive and/or safety and the significant potential for overdose.  It has been made clear that these medications are for the prescribed patient only without concomitant use of alcohol or other substance unless prescribed by the medical provider.  Has completed prescribing agreement detailing the terms of continue prescribing him a controlled substance including monitoring AARON reports, the possibility of urine drug screens, and pill counts.  The patient is aware that we review AARON reports on a regular basis and scan them into the chart.  History and physical examination exhibited continued safe and appropriate use of controlled substances. I discussed with him the fact that this is a controlled substance and by nature that I cannot prescribe more than the aforementioned amount to last until the next appointment.                  This document has been electronically signed by STEPHEN BANGURA MD November 2, 2021 13:36 EDT

## 2021-11-03 LAB
DEPRECATED RDW RBC AUTO: 41.6 FL (ref 37–54)
ERYTHROCYTE [DISTWIDTH] IN BLOOD BY AUTOMATED COUNT: 12.7 % (ref 12.3–15.4)
ESTRADIOL SERPL HS-MCNC: 20.1 PG/ML
HCT VFR BLD AUTO: 53.2 % (ref 37.5–51)
HGB BLD-MCNC: 17.8 G/DL (ref 13–17.7)
MCH RBC QN AUTO: 30 PG (ref 26.6–33)
MCHC RBC AUTO-ENTMCNC: 33.5 G/DL (ref 31.5–35.7)
MCV RBC AUTO: 89.7 FL (ref 79–97)
PLATELET # BLD AUTO: 195 10*3/MM3 (ref 140–450)
PMV BLD AUTO: 13.2 FL (ref 6–12)
PSA SERPL-MCNC: 0.35 NG/ML (ref 0–4)
RBC # BLD AUTO: 5.93 10*6/MM3 (ref 4.14–5.8)
TESTOST SERPL-MCNC: 484 NG/DL (ref 249–836)
WBC # BLD AUTO: 6.94 10*3/MM3 (ref 3.4–10.8)

## 2021-11-15 RX ORDER — CARVEDILOL 3.12 MG/1
TABLET ORAL
Qty: 180 TABLET | Refills: 3 | Status: SHIPPED | OUTPATIENT
Start: 2021-11-15 | End: 2022-05-02 | Stop reason: DRUGHIGH

## 2021-12-07 ENCOUNTER — OFFICE VISIT (OUTPATIENT)
Dept: SURGERY | Facility: CLINIC | Age: 36
End: 2021-12-07

## 2021-12-07 VITALS — WEIGHT: 232 LBS | BODY MASS INDEX: 32.48 KG/M2 | HEIGHT: 71 IN

## 2021-12-07 DIAGNOSIS — K82.4 POLYP OF GALLBLADDER: ICD-10-CM

## 2021-12-07 DIAGNOSIS — R63.0 POOR APPETITE: Primary | ICD-10-CM

## 2021-12-07 PROCEDURE — 99213 OFFICE O/P EST LOW 20 MIN: CPT | Performed by: SURGERY

## 2021-12-07 NOTE — H&P (VIEW-ONLY)
Subjective   Nghia Roach is a 36 y.o. male.     Chief Complaint: gallbladder polyp    History of Present Illness He is a 35 yo who has had some upper abdominal pains and nausea. He has a US showing gallbladder polyps. He has been having this as well as episodes of diarrhea for a couple of years. He had a colonoscopy about 3 years ago with polyps and was told he needed it repeated this year.   He has a history of a fib and was supposed to get a stress test. He has not had it because of problems with his hip.  He saw the cardiologist recently and is now cleared.     The following portions of the patient's history were reviewed and updated as appropriate: current medications, past family history, past medical history, past social history, past surgical history and problem list.    Review of Systems   Constitutional: Negative for activity change, appetite change, chills, fever and unexpected weight change.   HENT: Negative for congestion, facial swelling and sore throat.    Eyes: Negative for photophobia and visual disturbance.   Respiratory: Negative for chest tightness, shortness of breath and wheezing.    Cardiovascular: Negative for chest pain, palpitations and leg swelling.   Gastrointestinal: Positive for abdominal pain and nausea. Negative for abdominal distention, anal bleeding, blood in stool, constipation, diarrhea, rectal pain and vomiting.   Endocrine: Negative for cold intolerance, heat intolerance, polydipsia and polyuria.   Genitourinary: Negative for difficulty urinating, dysuria, flank pain and urgency.   Musculoskeletal: Negative for back pain and myalgias.   Skin: Negative for rash and wound.   Allergic/Immunologic: Negative for immunocompromised state.   Neurological: Negative for dizziness, seizures, syncope, light-headedness, numbness and headaches.   Hematological: Negative for adenopathy. Does not bruise/bleed easily.   Psychiatric/Behavioral: Negative for behavioral problems and confusion.  The patient is not nervous/anxious.        Objective   Physical Exam  Vitals reviewed.   Constitutional:       General: He is not in acute distress.     Appearance: He is well-developed. He is not ill-appearing.   HENT:      Head: Normocephalic. No laceration. Hair is normal.      Right Ear: Hearing and ear canal normal.      Left Ear: Hearing and ear canal normal.      Nose: Nose normal.      Right Sinus: No maxillary sinus tenderness or frontal sinus tenderness.      Left Sinus: No maxillary sinus tenderness or frontal sinus tenderness.   Eyes:      General: Lids are normal.      Conjunctiva/sclera: Conjunctivae normal.      Pupils: Pupils are equal, round, and reactive to light.   Neck:      Thyroid: No thyroid mass or thyromegaly.      Vascular: No JVD.      Trachea: No tracheal tenderness or tracheal deviation.   Cardiovascular:      Rate and Rhythm: Normal rate and regular rhythm.      Heart sounds: No murmur heard.  No gallop.    Pulmonary:      Effort: Pulmonary effort is normal.      Breath sounds: Normal breath sounds. No stridor. No wheezing.   Chest:      Chest wall: No tenderness.   Breasts:      Right: No supraclavicular adenopathy.      Left: No supraclavicular adenopathy.       Abdominal:      General: Bowel sounds are normal. There is no distension.      Palpations: Abdomen is soft. There is no mass.      Tenderness: There is no abdominal tenderness. There is no guarding or rebound.      Hernia: No hernia is present.   Musculoskeletal:         General: No deformity.      Cervical back: Normal range of motion.   Lymphadenopathy:      Cervical: No cervical adenopathy.      Upper Body:      Right upper body: No supraclavicular adenopathy.      Left upper body: No supraclavicular adenopathy.   Skin:     General: Skin is warm and dry.      Coloration: Skin is not pale.      Findings: No erythema or rash.   Neurological:      Mental Status: He is alert and oriented to person, place, and time.      Motor:  "No abnormal muscle tone.   Psychiatric:         Behavior: Behavior normal.         Thought Content: Thought content normal.         Past Medical History:   Diagnosis Date   • Allergic    • Allergic rhinitis    • Asthma    • Atrial flutter (HCC)    • Diarrhea    • Disturbance of skin sensation    • Elevated cholesterol    • GERD (gastroesophageal reflux disease)    • Gout    • History of MRI of spine 12/16/2014    Done at [facility]    • Hx of exercise stress test 2014    \"IT WAS NORMAL\"     • Hyperlipidemia    • Hypertension    • Kidney disease     stage 2   • Lymphadenitis    • Malaise and fatigue    • Osteoarthrosis    • Rash    • Renal disorder    • Sleep apnea     NO CPAP   • Wears glasses     TO READ       Family History   Problem Relation Age of Onset   • Asthma Other    • Cancer Other    • COPD Other    • Diabetes Other    • Stroke Other    • Asthma Mother    • COPD Mother    • Asthma Father    • Depression Father    • Drug abuse Father    • Arthritis Maternal Grandmother    • Cancer Maternal Grandmother    • Diabetes Maternal Grandmother    • Heart disease Maternal Grandmother    • Hyperlipidemia Maternal Grandmother    • Stroke Paternal Grandfather    • Colon cancer Maternal Grandfather         mid 40s       Social History     Tobacco Use   • Smoking status: Never Smoker   • Smokeless tobacco: Never Used   Vaping Use   • Vaping Use: Never used   Substance Use Topics   • Alcohol use: Not Currently     Comment: social   • Drug use: No       Past Surgical History:   Procedure Laterality Date   • BRAVO PROCEDURE N/A 8/4/2017    Procedure: ESOPHAGOGASTRODUODENOSCOPY AND FULLER;  Surgeon: Efra Suero MD;  Location: [facility];  Service:    • BRAVO PROCEDURE N/A 12/19/2018    Procedure: ESOPHAGOGASTRODUODENOSCOPY AND FULLER;  Surgeon: Efra Seuro MD;  Location: [facility];  Service: Gastroenterology   • COLONOSCOPY  2014   • COLONOSCOPY      UNSURE OF DATE   • COLONOSCOPY N/A 5/3/2018    Procedure: " "COLONOSCOPY WITH COLD BIOPSIES AND COLD POLYPECTOMIES;  Surgeon: Bang Segundo MD;  Location:  [facility];  Service: Gastroenterology   • ENDOSCOPY     • ENDOSCOPY N/A 3/9/2020    Procedure: ESOPHAGOGASTRODUODENOSCOPY WITH BIOPSY CPT CODE: 61120;  Surgeon: Ricardo Nolasco MD;  Location:  [facility] OR;  Service: Gastroenterology;  Laterality: N/A;   • HIP SURGERY Right    • NISSEN FUNDOPLICATION     • NISSEN FUNDOPLICATION N/A 10/11/2017    Procedure: NISSEN FUNDOPLICATION LAPAROSCOPIC;  Surgeon: Efra Suero MD;  Location:  [facility] OR;  Service:    • RI LAP,ESOPHAGOGAST FUNDOPLASTY N/A 10/11/2017    Procedure: HIATAL HERNIA REPAIR LAPAROSCOPIC;  Surgeon: Efra Suero MD;  Location: [facility] OR;  Service: General   • VASECTOMY         Current Outpatient Medications   Medication Instructions   • albuterol sulfate  (90 Base) MCG/ACT inhaler 2 puffs, Inhalation, Every 4 Hours PRN   • allopurinol (ZYLOPRIM) 100 MG tablet TAKE 1 TABLET BY MOUTH TWICE DAILY   • amitriptyline (ELAVIL) 25 mg, Oral, Nightly PRN   • anastrozole (ARIMIDEX) 1 MG tablet TAKE 1 TABLET BY MOUTH 1 TIME EVERY WEEK FOR 12 WEEKS   • azelastine (ASTELIN) 0.1 % nasal spray 2 sprays, Nasal, 2 Times Daily PRN, Use in each nostril as directed   • B-D INTEGRA SYRINGE 25G X 5/8\" 3 ML misc See Admin Instructions   • carvedilol (COREG) 3.125 MG tablet TAKE 1 TABLET BY MOUTH TWICE DAILY WITH MEALS   • cetirizine (ZYRTEC) 10 mg, Oral, Nightly PRN   • citalopram (CELEXA) 20 mg, Oral, Daily   • cloNIDine (CATAPRES) 0.1 mg, Oral, 2 Times Daily   • clotrimazole-betamethasone (LOTRISONE) 1-0.05 % cream Topical, Every 12 Hours Scheduled   • Flovent HFA 44 MCG/ACT inhaler INHALE 2 PUFFS BY MOUTH TWICE DAILY   • fluticasone (FLONASE) 50 MCG/ACT nasal spray SHAKE LIQUID AND USE 2 SPRAYS IN EACH NOSTRIL DAILY AS DIRECTED   • lansoprazole (PREVACID) 30 mg, Oral, As Needed   • methocarbamol (ROBAXIN) 750 mg, Oral, 4 Times Daily PRN, Take on days he " "works in place of flexeril   • montelukast (SINGULAIR) 10 mg, Oral, Nightly   • nabumetone (RELAFEN) 500 MG tablet TAKE 1 TABLET BY MOUTH TWICE DAILY AS NEEDED FOR MILD PAIN OR MODERATE PAIN   • O2 (OXYGEN) 2 L/min, Inhalation, Once   • omeprazole (PRILOSEC) 40 mg, Oral, 2 Times Daily Before Meals, Take a half hour before breakfast   • permethrin (ELIMITE) 5 % cream APPLY TOPICALLY TO THE APPROPRIATE AREA AS DIRECTED   • sulfaSALAzine (AZULFIDINE) 500 mg, Oral, 4 Times Daily, Written by rheumatology , started back on his script   • Suprep Bowel Prep Kit 17.5-3.13-1.6 GM/177ML solution oral solution Dispense one Kit        Sig: Used as Directed   • Syringe 25G X 5/8\" 3 ML misc Use as directed 2 x weekly   • Syringe 25G X 5/8\" 3 ML misc Use as directed 2 x weekly   • tadalafil (CIALIS) 5 mg, Oral, Daily   • Testosterone Cypionate (Depo-Testosterone) 200 MG/ML injection He is to use 1/2 cc every Monday and Thursday SQ   • valsartan-hydrochlorothiazide (DIOVAN-HCT) 160-25 MG per tablet 1 tablet, Oral, Daily         Assessment/Plan   Diagnoses and all orders for this visit:    1. Poor appetite (Primary)    2. Polyp of gallbladder    lap angela         "

## 2021-12-07 NOTE — PROGRESS NOTES
Subjective   Nghia Roach is a 36 y.o. male.     Chief Complaint: gallbladder polyp    History of Present Illness He is a 37 yo who has had some upper abdominal pains and nausea. He has a US showing gallbladder polyps. He has been having this as well as episodes of diarrhea for a couple of years. He had a colonoscopy about 3 years ago with polyps and was told he needed it repeated this year.   He has a history of a fib and was supposed to get a stress test. He has not had it because of problems with his hip.  He saw the cardiologist recently and is now cleared.     The following portions of the patient's history were reviewed and updated as appropriate: current medications, past family history, past medical history, past social history, past surgical history and problem list.    Review of Systems   Constitutional: Negative for activity change, appetite change, chills, fever and unexpected weight change.   HENT: Negative for congestion, facial swelling and sore throat.    Eyes: Negative for photophobia and visual disturbance.   Respiratory: Negative for chest tightness, shortness of breath and wheezing.    Cardiovascular: Negative for chest pain, palpitations and leg swelling.   Gastrointestinal: Positive for abdominal pain and nausea. Negative for abdominal distention, anal bleeding, blood in stool, constipation, diarrhea, rectal pain and vomiting.   Endocrine: Negative for cold intolerance, heat intolerance, polydipsia and polyuria.   Genitourinary: Negative for difficulty urinating, dysuria, flank pain and urgency.   Musculoskeletal: Negative for back pain and myalgias.   Skin: Negative for rash and wound.   Allergic/Immunologic: Negative for immunocompromised state.   Neurological: Negative for dizziness, seizures, syncope, light-headedness, numbness and headaches.   Hematological: Negative for adenopathy. Does not bruise/bleed easily.   Psychiatric/Behavioral: Negative for behavioral problems and confusion.  The patient is not nervous/anxious.        Objective   Physical Exam  Vitals reviewed.   Constitutional:       General: He is not in acute distress.     Appearance: He is well-developed. He is not ill-appearing.   HENT:      Head: Normocephalic. No laceration. Hair is normal.      Right Ear: Hearing and ear canal normal.      Left Ear: Hearing and ear canal normal.      Nose: Nose normal.      Right Sinus: No maxillary sinus tenderness or frontal sinus tenderness.      Left Sinus: No maxillary sinus tenderness or frontal sinus tenderness.   Eyes:      General: Lids are normal.      Conjunctiva/sclera: Conjunctivae normal.      Pupils: Pupils are equal, round, and reactive to light.   Neck:      Thyroid: No thyroid mass or thyromegaly.      Vascular: No JVD.      Trachea: No tracheal tenderness or tracheal deviation.   Cardiovascular:      Rate and Rhythm: Normal rate and regular rhythm.      Heart sounds: No murmur heard.  No gallop.    Pulmonary:      Effort: Pulmonary effort is normal.      Breath sounds: Normal breath sounds. No stridor. No wheezing.   Chest:      Chest wall: No tenderness.   Breasts:      Right: No supraclavicular adenopathy.      Left: No supraclavicular adenopathy.       Abdominal:      General: Bowel sounds are normal. There is no distension.      Palpations: Abdomen is soft. There is no mass.      Tenderness: There is no abdominal tenderness. There is no guarding or rebound.      Hernia: No hernia is present.   Musculoskeletal:         General: No deformity.      Cervical back: Normal range of motion.   Lymphadenopathy:      Cervical: No cervical adenopathy.      Upper Body:      Right upper body: No supraclavicular adenopathy.      Left upper body: No supraclavicular adenopathy.   Skin:     General: Skin is warm and dry.      Coloration: Skin is not pale.      Findings: No erythema or rash.   Neurological:      Mental Status: He is alert and oriented to person, place, and time.      Motor:  "No abnormal muscle tone.   Psychiatric:         Behavior: Behavior normal.         Thought Content: Thought content normal.         Past Medical History:   Diagnosis Date   • Allergic    • Allergic rhinitis    • Asthma    • Atrial flutter (HCC)    • Diarrhea    • Disturbance of skin sensation    • Elevated cholesterol    • GERD (gastroesophageal reflux disease)    • Gout    • History of MRI of spine 12/16/2014    Done at [facility]    • Hx of exercise stress test 2014    \"IT WAS NORMAL\"     • Hyperlipidemia    • Hypertension    • Kidney disease     stage 2   • Lymphadenitis    • Malaise and fatigue    • Osteoarthrosis    • Rash    • Renal disorder    • Sleep apnea     NO CPAP   • Wears glasses     TO READ       Family History   Problem Relation Age of Onset   • Asthma Other    • Cancer Other    • COPD Other    • Diabetes Other    • Stroke Other    • Asthma Mother    • COPD Mother    • Asthma Father    • Depression Father    • Drug abuse Father    • Arthritis Maternal Grandmother    • Cancer Maternal Grandmother    • Diabetes Maternal Grandmother    • Heart disease Maternal Grandmother    • Hyperlipidemia Maternal Grandmother    • Stroke Paternal Grandfather    • Colon cancer Maternal Grandfather         mid 40s       Social History     Tobacco Use   • Smoking status: Never Smoker   • Smokeless tobacco: Never Used   Vaping Use   • Vaping Use: Never used   Substance Use Topics   • Alcohol use: Not Currently     Comment: social   • Drug use: No       Past Surgical History:   Procedure Laterality Date   • BRAVO PROCEDURE N/A 8/4/2017    Procedure: ESOPHAGOGASTRODUODENOSCOPY AND FULLER;  Surgeon: Efra Suero MD;  Location: [facility];  Service:    • BRAVO PROCEDURE N/A 12/19/2018    Procedure: ESOPHAGOGASTRODUODENOSCOPY AND FULLER;  Surgeon: Efra Suero MD;  Location: [facility];  Service: Gastroenterology   • COLONOSCOPY  2014   • COLONOSCOPY      UNSURE OF DATE   • COLONOSCOPY N/A 5/3/2018    Procedure: " "COLONOSCOPY WITH COLD BIOPSIES AND COLD POLYPECTOMIES;  Surgeon: Bang Segundo MD;  Location:  [facility];  Service: Gastroenterology   • ENDOSCOPY     • ENDOSCOPY N/A 3/9/2020    Procedure: ESOPHAGOGASTRODUODENOSCOPY WITH BIOPSY CPT CODE: 19417;  Surgeon: Ricardo Nolasco MD;  Location:  [facility];  Service: Gastroenterology;  Laterality: N/A;   • HIP SURGERY Right    • NISSEN FUNDOPLICATION     • NISSEN FUNDOPLICATION N/A 10/11/2017    Procedure: NISSEN FUNDOPLICATION LAPAROSCOPIC;  Surgeon: Efra Suero MD;  Location:  [facility];  Service:    • AK LAP,ESOPHAGOGAST FUNDOPLASTY N/A 10/11/2017    Procedure: HIATAL HERNIA REPAIR LAPAROSCOPIC;  Surgeon: Efra Suero MD;  Location: [facility] OR;  Service: General   • VASECTOMY         Current Outpatient Medications   Medication Instructions   • albuterol sulfate  (90 Base) MCG/ACT inhaler 2 puffs, Inhalation, Every 4 Hours PRN   • allopurinol (ZYLOPRIM) 100 MG tablet TAKE 1 TABLET BY MOUTH TWICE DAILY   • amitriptyline (ELAVIL) 25 mg, Oral, Nightly PRN   • anastrozole (ARIMIDEX) 1 MG tablet TAKE 1 TABLET BY MOUTH 1 TIME EVERY WEEK FOR 12 WEEKS   • azelastine (ASTELIN) 0.1 % nasal spray 2 sprays, Nasal, 2 Times Daily PRN, Use in each nostril as directed   • B-D INTEGRA SYRINGE 25G X 5/8\" 3 ML misc See Admin Instructions   • carvedilol (COREG) 3.125 MG tablet TAKE 1 TABLET BY MOUTH TWICE DAILY WITH MEALS   • cetirizine (ZYRTEC) 10 mg, Oral, Nightly PRN   • citalopram (CELEXA) 20 mg, Oral, Daily   • cloNIDine (CATAPRES) 0.1 mg, Oral, 2 Times Daily   • clotrimazole-betamethasone (LOTRISONE) 1-0.05 % cream Topical, Every 12 Hours Scheduled   • Flovent HFA 44 MCG/ACT inhaler INHALE 2 PUFFS BY MOUTH TWICE DAILY   • fluticasone (FLONASE) 50 MCG/ACT nasal spray SHAKE LIQUID AND USE 2 SPRAYS IN EACH NOSTRIL DAILY AS DIRECTED   • lansoprazole (PREVACID) 30 mg, Oral, As Needed   • methocarbamol (ROBAXIN) 750 mg, Oral, 4 Times Daily PRN, Take on days he " "works in place of flexeril   • montelukast (SINGULAIR) 10 mg, Oral, Nightly   • nabumetone (RELAFEN) 500 MG tablet TAKE 1 TABLET BY MOUTH TWICE DAILY AS NEEDED FOR MILD PAIN OR MODERATE PAIN   • O2 (OXYGEN) 2 L/min, Inhalation, Once   • omeprazole (PRILOSEC) 40 mg, Oral, 2 Times Daily Before Meals, Take a half hour before breakfast   • permethrin (ELIMITE) 5 % cream APPLY TOPICALLY TO THE APPROPRIATE AREA AS DIRECTED   • sulfaSALAzine (AZULFIDINE) 500 mg, Oral, 4 Times Daily, Written by rheumatology , started back on his script   • Suprep Bowel Prep Kit 17.5-3.13-1.6 GM/177ML solution oral solution Dispense one Kit        Sig: Used as Directed   • Syringe 25G X 5/8\" 3 ML misc Use as directed 2 x weekly   • Syringe 25G X 5/8\" 3 ML misc Use as directed 2 x weekly   • tadalafil (CIALIS) 5 mg, Oral, Daily   • Testosterone Cypionate (Depo-Testosterone) 200 MG/ML injection He is to use 1/2 cc every Monday and Thursday SQ   • valsartan-hydrochlorothiazide (DIOVAN-HCT) 160-25 MG per tablet 1 tablet, Oral, Daily         Assessment/Plan   Diagnoses and all orders for this visit:    1. Poor appetite (Primary)    2. Polyp of gallbladder    lap angela         "

## 2021-12-10 ENCOUNTER — TELEPHONE (OUTPATIENT)
Dept: SURGERY | Facility: CLINIC | Age: 36
End: 2021-12-10

## 2021-12-10 NOTE — TELEPHONE ENCOUNTER
PAT Wed 12/15 @ 230 pm with Covid test after pre-op.  Surgery Fri 12/17 @ 8 am  Patient needs to be NPO and have a  with him for surgery.

## 2021-12-15 ENCOUNTER — PRE-ADMISSION TESTING (OUTPATIENT)
Dept: PREADMISSION TESTING | Facility: HOSPITAL | Age: 36
End: 2021-12-15

## 2021-12-15 ENCOUNTER — LAB (OUTPATIENT)
Dept: LAB | Facility: HOSPITAL | Age: 36
End: 2021-12-15

## 2021-12-15 DIAGNOSIS — K59.1 FUNCTIONAL DIARRHEA: ICD-10-CM

## 2021-12-15 DIAGNOSIS — K82.4 POLYP OF GALLBLADDER: ICD-10-CM

## 2021-12-15 LAB
ANION GAP SERPL CALCULATED.3IONS-SCNC: 9.9 MMOL/L (ref 5–15)
BUN SERPL-MCNC: 7 MG/DL (ref 6–20)
BUN/CREAT SERPL: 5.4 (ref 7–25)
CALCIUM SPEC-SCNC: 9.3 MG/DL (ref 8.6–10.5)
CHLORIDE SERPL-SCNC: 101 MMOL/L (ref 98–107)
CO2 SERPL-SCNC: 27.1 MMOL/L (ref 22–29)
CREAT SERPL-MCNC: 1.29 MG/DL (ref 0.76–1.27)
DEPRECATED RDW RBC AUTO: 42.3 FL (ref 37–54)
ERYTHROCYTE [DISTWIDTH] IN BLOOD BY AUTOMATED COUNT: 12.8 % (ref 12.3–15.4)
FLUAV RNA RESP QL NAA+PROBE: NOT DETECTED
FLUBV RNA RESP QL NAA+PROBE: NOT DETECTED
GFR SERPL CREATININE-BSD FRML MDRD: 63 ML/MIN/1.73
GLUCOSE SERPL-MCNC: 90 MG/DL (ref 65–99)
HCT VFR BLD AUTO: 51 % (ref 37.5–51)
HGB BLD-MCNC: 17.1 G/DL (ref 13–17.7)
MCH RBC QN AUTO: 30.5 PG (ref 26.6–33)
MCHC RBC AUTO-ENTMCNC: 33.5 G/DL (ref 31.5–35.7)
MCV RBC AUTO: 90.9 FL (ref 79–97)
PLATELET # BLD AUTO: 189 10*3/MM3 (ref 140–450)
PMV BLD AUTO: 11.6 FL (ref 6–12)
POTASSIUM SERPL-SCNC: 4.6 MMOL/L (ref 3.5–5.2)
RBC # BLD AUTO: 5.61 10*6/MM3 (ref 4.14–5.8)
SARS-COV-2 RNA RESP QL NAA+PROBE: NOT DETECTED
SODIUM SERPL-SCNC: 138 MMOL/L (ref 136–145)
WBC NRBC COR # BLD: 7.89 10*3/MM3 (ref 3.4–10.8)

## 2021-12-15 PROCEDURE — C9803 HOPD COVID-19 SPEC COLLECT: HCPCS

## 2021-12-15 PROCEDURE — 80048 BASIC METABOLIC PNL TOTAL CA: CPT

## 2021-12-15 PROCEDURE — 85027 COMPLETE CBC AUTOMATED: CPT

## 2021-12-15 PROCEDURE — 87636 SARSCOV2 & INF A&B AMP PRB: CPT | Performed by: SURGERY

## 2021-12-15 PROCEDURE — 36415 COLL VENOUS BLD VENIPUNCTURE: CPT

## 2021-12-15 NOTE — DISCHARGE INSTRUCTIONS
12/17/21    0800  ARRIVAL TIME    TAKE the following medications the morning of surgery:  All heart or blood pressure medications    HOLD all diabetic medications the morning of surgery as ordered by physician.    Please discontinue all blood thinners and anticoagulants (except aspirin) prior to surgery as per your surgeon and cardiologist instructions.  Aspirin may be continued up to the day prior to surgery.     CHLORHEXIDINE CLOTHS GIVEN WITH INSTRUCTIONS AND FORM TO RETURN TO HOSPITAL, IF APPLICABLE.    General Instructions:  · Do not eat or drink after midnight: includes water, mints, or gum. You may brush your teeth.  Dental appliances that are removable must be taken out day of surgery.  · Do not smoke, chew tobacco, or drink alcohol.  · Bring medications in original bottles, any inhalers and if applicable your C-PAP/BI-PAP machine.  · Bring any papers given to you in the doctor's office.  · Wear clean comfortable clothes and socks.  · Do not wear contact lenses or make-up. Bring a case for your glasses if applicable.  · Bring crutches or walker if applicable.  · Leave all other valuables and jewelry at home.    If you were given a blood bank ID arm band remember to bring it with you the day of surgery.    Preventing a Surgical Site Infection:  Shower the night before surgery (unless instructed other wise) using a fresh bar of anti-bacterial soap (such as Dial) and clean washcloth. Dry with a clean towel and dress in clean clothing.  For 2 to 3 days before surgery, avoid shaving with a razor near where you will have surgery because the razor can irritate skin and make it easier to develop an infection. Ask your surgeon if you will be receiving antibiotics prior to surgery.  Make sure you, your family, and all healthcare providers clear their hands with soap and water or an alcohol-based hand  before caring for you or your wound.  If at all possible, quit smoking as many days before surgery as you  can.    Day of surgery:  Upon arrival, a Pre-op nurse and Anesthesiologist will review your health history, obtain vital signs, and answer questions you may have. The only belongings needed at this time will be your home medications and if applicable your C-PAP/BI-PAP machine. If you are staying overnight your family can leave the rest of your belongings in the car and bring them to your room later. A Pre-op nurse will start an IV and you may receive medication in preparation for surgery, including something to help you relax. Your family will be able to see you in the Pre-op area. While you are in surgery your family should notify the waiting room  if they leave the waiting room area and provide a contact phone number.    Please be aware that surgery does come with discomfort. We want to make every effort to control your discomfort so please discuss any uncontrolled symptoms with your nurse. Your doctor will most likely have prescribed pain medications.    If you are going home after surgery you will receive individualized written care instructions before being discharged. A responsible adult must drive you to and from the hospital on the day of surgery and stay with you for 24 hours.    If you are staying overnight following surgery, you will be transported to your hospital room following the recovery period.  [facility] has all private rooms.    If you have any questions please call Pre-Admission Testing at [phone].  Deductibles and co-payments are collected on the day of service. Please be prepared to pay the required co-pay, deductible or deposit on the day of service as defined by your plan.    A RESPONSIBLE PERSON MUST REMAIN IN THE WAITING ROOM DURING YOUR PROCEDURE AND A RESPONSIBLE  MUST BE AVAILABLE UPON YOUR DISCHARGE.

## 2021-12-17 ENCOUNTER — HOSPITAL ENCOUNTER (OUTPATIENT)
Facility: HOSPITAL | Age: 36
Setting detail: HOSPITAL OUTPATIENT SURGERY
Discharge: HOME | End: 2021-12-17
Attending: SURGERY | Admitting: SURGERY

## 2021-12-17 ENCOUNTER — ANESTHESIA EVENT (OUTPATIENT)
Dept: PERIOP | Facility: HOSPITAL | Age: 36
End: 2021-12-17

## 2021-12-17 ENCOUNTER — ANESTHESIA (OUTPATIENT)
Dept: PERIOP | Facility: HOSPITAL | Age: 36
End: 2021-12-17

## 2021-12-17 VITALS
TEMPERATURE: 98.6 F | OXYGEN SATURATION: 96 % | SYSTOLIC BLOOD PRESSURE: 147 MMHG | DIASTOLIC BLOOD PRESSURE: 85 MMHG | HEIGHT: 71 IN | RESPIRATION RATE: 15 BRPM | BODY MASS INDEX: 31.67 KG/M2 | WEIGHT: 226.2 LBS | HEART RATE: 86 BPM

## 2021-12-17 DIAGNOSIS — K82.4 POLYP OF GALLBLADDER: ICD-10-CM

## 2021-12-17 DIAGNOSIS — K59.1 FUNCTIONAL DIARRHEA: ICD-10-CM

## 2021-12-17 PROCEDURE — 94640 AIRWAY INHALATION TREATMENT: CPT

## 2021-12-17 PROCEDURE — 94799 UNLISTED PULMONARY SVC/PX: CPT

## 2021-12-17 PROCEDURE — 25010000002 PROPOFOL 10 MG/ML EMULSION: Performed by: NURSE ANESTHETIST, CERTIFIED REGISTERED

## 2021-12-17 PROCEDURE — 25010000002 NEOSTIGMINE 10 MG/10ML SOLUTION: Performed by: NURSE ANESTHETIST, CERTIFIED REGISTERED

## 2021-12-17 PROCEDURE — 25010000002 MIDAZOLAM PER 1 MG: Performed by: NURSE ANESTHETIST, CERTIFIED REGISTERED

## 2021-12-17 PROCEDURE — C1889 IMPLANT/INSERT DEVICE, NOC: HCPCS | Performed by: SURGERY

## 2021-12-17 PROCEDURE — 0 MEPERIDINE PER 100 MG: Performed by: NURSE ANESTHETIST, CERTIFIED REGISTERED

## 2021-12-17 PROCEDURE — 25010000002 FENTANYL CITRATE (PF) 50 MCG/ML SOLUTION: Performed by: NURSE ANESTHETIST, CERTIFIED REGISTERED

## 2021-12-17 PROCEDURE — 25010000002 HYDROMORPHONE PER 4 MG: Performed by: NURSE ANESTHETIST, CERTIFIED REGISTERED

## 2021-12-17 PROCEDURE — 47562 LAPAROSCOPIC CHOLECYSTECTOMY: CPT | Performed by: SURGERY

## 2021-12-17 PROCEDURE — 25010000002 ONDANSETRON PER 1 MG: Performed by: NURSE ANESTHETIST, CERTIFIED REGISTERED

## 2021-12-17 DEVICE — LIGACLIP 10-M/L, 10MM ENDOSCOPIC ROTATING MULTIPLE CLIP APPLIERS
Type: IMPLANTABLE DEVICE | Site: ABDOMEN | Status: FUNCTIONAL
Brand: LIGACLIP

## 2021-12-17 RX ORDER — SODIUM CHLORIDE 0.9 % (FLUSH) 0.9 %
10 SYRINGE (ML) INJECTION EVERY 12 HOURS SCHEDULED
Status: DISCONTINUED | OUTPATIENT
Start: 2021-12-17 | End: 2021-12-17 | Stop reason: HOSPADM

## 2021-12-17 RX ORDER — KETOROLAC TROMETHAMINE 30 MG/ML
30 INJECTION, SOLUTION INTRAMUSCULAR; INTRAVENOUS EVERY 6 HOURS PRN
Status: DISCONTINUED | OUTPATIENT
Start: 2021-12-17 | End: 2021-12-17 | Stop reason: HOSPADM

## 2021-12-17 RX ORDER — FENTANYL CITRATE 50 UG/ML
50 INJECTION, SOLUTION INTRAMUSCULAR; INTRAVENOUS
Status: DISCONTINUED | OUTPATIENT
Start: 2021-12-17 | End: 2021-12-17 | Stop reason: HOSPADM

## 2021-12-17 RX ORDER — ALBUTEROL SULFATE 90 UG/1
AEROSOL, METERED RESPIRATORY (INHALATION) AS NEEDED
Status: DISCONTINUED | OUTPATIENT
Start: 2021-12-17 | End: 2021-12-17 | Stop reason: SURG

## 2021-12-17 RX ORDER — HYDROMORPHONE HCL 110MG/55ML
PATIENT CONTROLLED ANALGESIA SYRINGE INTRAVENOUS AS NEEDED
Status: DISCONTINUED | OUTPATIENT
Start: 2021-12-17 | End: 2021-12-17 | Stop reason: SURG

## 2021-12-17 RX ORDER — MAGNESIUM HYDROXIDE 1200 MG/15ML
LIQUID ORAL AS NEEDED
Status: DISCONTINUED | OUTPATIENT
Start: 2021-12-17 | End: 2021-12-17 | Stop reason: HOSPADM

## 2021-12-17 RX ORDER — SODIUM CHLORIDE 0.9 % (FLUSH) 0.9 %
10 SYRINGE (ML) INJECTION AS NEEDED
Status: DISCONTINUED | OUTPATIENT
Start: 2021-12-17 | End: 2021-12-17 | Stop reason: HOSPADM

## 2021-12-17 RX ORDER — FAMOTIDINE 10 MG/ML
INJECTION, SOLUTION INTRAVENOUS AS NEEDED
Status: DISCONTINUED | OUTPATIENT
Start: 2021-12-17 | End: 2021-12-17 | Stop reason: SURG

## 2021-12-17 RX ORDER — GLYCOPYRROLATE 0.2 MG/ML
INJECTION INTRAMUSCULAR; INTRAVENOUS AS NEEDED
Status: DISCONTINUED | OUTPATIENT
Start: 2021-12-17 | End: 2021-12-17 | Stop reason: SURG

## 2021-12-17 RX ORDER — FENTANYL CITRATE 50 UG/ML
INJECTION, SOLUTION INTRAMUSCULAR; INTRAVENOUS AS NEEDED
Status: DISCONTINUED | OUTPATIENT
Start: 2021-12-17 | End: 2021-12-17 | Stop reason: SURG

## 2021-12-17 RX ORDER — SODIUM CHLORIDE 9 MG/ML
INJECTION, SOLUTION INTRAVENOUS AS NEEDED
Status: DISCONTINUED | OUTPATIENT
Start: 2021-12-17 | End: 2021-12-17 | Stop reason: HOSPADM

## 2021-12-17 RX ORDER — PROPOFOL 10 MG/ML
VIAL (ML) INTRAVENOUS AS NEEDED
Status: DISCONTINUED | OUTPATIENT
Start: 2021-12-17 | End: 2021-12-17 | Stop reason: SURG

## 2021-12-17 RX ORDER — ONDANSETRON 2 MG/ML
4 INJECTION INTRAMUSCULAR; INTRAVENOUS AS NEEDED
Status: DISCONTINUED | OUTPATIENT
Start: 2021-12-17 | End: 2021-12-17 | Stop reason: HOSPADM

## 2021-12-17 RX ORDER — MEPERIDINE HYDROCHLORIDE 25 MG/ML
12.5 INJECTION INTRAMUSCULAR; INTRAVENOUS; SUBCUTANEOUS
Status: COMPLETED | OUTPATIENT
Start: 2021-12-17 | End: 2021-12-17

## 2021-12-17 RX ORDER — DROPERIDOL 2.5 MG/ML
0.62 INJECTION, SOLUTION INTRAMUSCULAR; INTRAVENOUS ONCE AS NEEDED
Status: DISCONTINUED | OUTPATIENT
Start: 2021-12-17 | End: 2021-12-17 | Stop reason: HOSPADM

## 2021-12-17 RX ORDER — IPRATROPIUM BROMIDE AND ALBUTEROL SULFATE 2.5; .5 MG/3ML; MG/3ML
3 SOLUTION RESPIRATORY (INHALATION) ONCE AS NEEDED
Status: COMPLETED | OUTPATIENT
Start: 2021-12-17 | End: 2021-12-17

## 2021-12-17 RX ORDER — NEOSTIGMINE METHYLSULFATE 1 MG/ML
INJECTION, SOLUTION INTRAVENOUS AS NEEDED
Status: DISCONTINUED | OUTPATIENT
Start: 2021-12-17 | End: 2021-12-17 | Stop reason: SURG

## 2021-12-17 RX ORDER — BUPIVACAINE HYDROCHLORIDE AND EPINEPHRINE 2.5; 5 MG/ML; UG/ML
INJECTION, SOLUTION EPIDURAL; INFILTRATION; INTRACAUDAL; PERINEURAL AS NEEDED
Status: DISCONTINUED | OUTPATIENT
Start: 2021-12-17 | End: 2021-12-17 | Stop reason: HOSPADM

## 2021-12-17 RX ORDER — MIDAZOLAM HYDROCHLORIDE 1 MG/ML
INJECTION INTRAMUSCULAR; INTRAVENOUS AS NEEDED
Status: DISCONTINUED | OUTPATIENT
Start: 2021-12-17 | End: 2021-12-17 | Stop reason: SURG

## 2021-12-17 RX ORDER — MIDAZOLAM HYDROCHLORIDE 1 MG/ML
1 INJECTION INTRAMUSCULAR; INTRAVENOUS
Status: DISCONTINUED | OUTPATIENT
Start: 2021-12-17 | End: 2021-12-17 | Stop reason: HOSPADM

## 2021-12-17 RX ORDER — ONDANSETRON 2 MG/ML
INJECTION INTRAMUSCULAR; INTRAVENOUS AS NEEDED
Status: DISCONTINUED | OUTPATIENT
Start: 2021-12-17 | End: 2021-12-17 | Stop reason: SURG

## 2021-12-17 RX ORDER — HYDROCODONE BITARTRATE AND ACETAMINOPHEN 5; 325 MG/1; MG/1
1 TABLET ORAL EVERY 4 HOURS PRN
Status: DISCONTINUED | OUTPATIENT
Start: 2021-12-17 | End: 2021-12-17 | Stop reason: HOSPADM

## 2021-12-17 RX ORDER — SODIUM CHLORIDE, SODIUM LACTATE, POTASSIUM CHLORIDE, CALCIUM CHLORIDE 600; 310; 30; 20 MG/100ML; MG/100ML; MG/100ML; MG/100ML
100 INJECTION, SOLUTION INTRAVENOUS ONCE AS NEEDED
Status: DISCONTINUED | OUTPATIENT
Start: 2021-12-17 | End: 2021-12-17 | Stop reason: HOSPADM

## 2021-12-17 RX ORDER — HYDROCODONE BITARTRATE AND ACETAMINOPHEN 5; 325 MG/1; MG/1
1 TABLET ORAL EVERY 4 HOURS PRN
Qty: 12 TABLET | Refills: 0 | Status: SHIPPED | OUTPATIENT
Start: 2021-12-17 | End: 2021-12-27

## 2021-12-17 RX ORDER — ROCURONIUM BROMIDE 10 MG/ML
INJECTION, SOLUTION INTRAVENOUS AS NEEDED
Status: DISCONTINUED | OUTPATIENT
Start: 2021-12-17 | End: 2021-12-17 | Stop reason: SURG

## 2021-12-17 RX ORDER — OXYCODONE HYDROCHLORIDE AND ACETAMINOPHEN 5; 325 MG/1; MG/1
1 TABLET ORAL ONCE AS NEEDED
Status: DISCONTINUED | OUTPATIENT
Start: 2021-12-17 | End: 2021-12-17 | Stop reason: HOSPADM

## 2021-12-17 RX ORDER — SODIUM CHLORIDE, SODIUM LACTATE, POTASSIUM CHLORIDE, CALCIUM CHLORIDE 600; 310; 30; 20 MG/100ML; MG/100ML; MG/100ML; MG/100ML
125 INJECTION, SOLUTION INTRAVENOUS ONCE
Status: COMPLETED | OUTPATIENT
Start: 2021-12-17 | End: 2021-12-17

## 2021-12-17 RX ADMIN — SODIUM CHLORIDE, POTASSIUM CHLORIDE, SODIUM LACTATE AND CALCIUM CHLORIDE: 600; 310; 30; 20 INJECTION, SOLUTION INTRAVENOUS at 11:55

## 2021-12-17 RX ADMIN — PROPOFOL 200 MG: 10 INJECTION, EMULSION INTRAVENOUS at 12:01

## 2021-12-17 RX ADMIN — MEPERIDINE HYDROCHLORIDE 12.5 MG: 25 INJECTION INTRAMUSCULAR; INTRAVENOUS; SUBCUTANEOUS at 12:53

## 2021-12-17 RX ADMIN — NEOSTIGMINE 2 MG: 1 INJECTION INTRAVENOUS at 12:23

## 2021-12-17 RX ADMIN — MEPERIDINE HYDROCHLORIDE 12.5 MG: 25 INJECTION INTRAMUSCULAR; INTRAVENOUS; SUBCUTANEOUS at 12:59

## 2021-12-17 RX ADMIN — FENTANYL CITRATE 100 MCG: 50 INJECTION INTRAMUSCULAR; INTRAVENOUS at 11:55

## 2021-12-17 RX ADMIN — HYDROMORPHONE HYDROCHLORIDE 0.5 MG: 2 INJECTION, SOLUTION INTRAMUSCULAR; INTRAVENOUS; SUBCUTANEOUS at 12:41

## 2021-12-17 RX ADMIN — ALBUTEROL SULFATE 3 PUFF: 90 AEROSOL, METERED RESPIRATORY (INHALATION) at 12:41

## 2021-12-17 RX ADMIN — IPRATROPIUM BROMIDE AND ALBUTEROL SULFATE 3 ML: .5; 3 SOLUTION RESPIRATORY (INHALATION) at 12:45

## 2021-12-17 RX ADMIN — ONDANSETRON 4 MG: 2 INJECTION INTRAMUSCULAR; INTRAVENOUS at 11:55

## 2021-12-17 RX ADMIN — FAMOTIDINE 20 MG: 10 INJECTION INTRAVENOUS at 11:55

## 2021-12-17 RX ADMIN — MIDAZOLAM 2 MG: 1 INJECTION INTRAMUSCULAR; INTRAVENOUS at 11:55

## 2021-12-17 RX ADMIN — GLYCOPYRROLATE 0.4 MG: 0.2 INJECTION INTRAMUSCULAR; INTRAVENOUS at 12:23

## 2021-12-17 RX ADMIN — ROCURONIUM BROMIDE 40 MG: 10 SOLUTION INTRAVENOUS at 12:01

## 2021-12-17 NOTE — ANESTHESIA PROCEDURE NOTES
Airway  Urgency: elective    Date/Time: 12/17/2021 12:09 PM  Airway not difficult    General Information and Staff    Patient location during procedure: OR  CRNA: Ariana Cabral CRNA    Indications and Patient Condition  Indications for airway management: airway protection    Preoxygenated: yes  MILS maintained throughout  Mask difficulty assessment: 1 - vent by mask    Final Airway Details  Final airway type: endotracheal airway      Successful airway: ETT  Cuffed: yes   Successful intubation technique: direct laryngoscopy  Endotracheal tube insertion site: oral  Blade: Maria  Blade size: 2  ETT size (mm): 7.5  Cormack-Lehane Classification: grade IIa - partial view of glottis  Placement verified by: chest auscultation   Measured from: lips  ETT/EBT  to lips (cm): 21  Number of attempts at approach: 1  Assessment: lips, teeth, and gum same as pre-op and atraumatic intubation    Additional Comments  Large existing chip of right central incisor prior to laryngoscopy witnessed by trav ramirez              rolling walker rolling walker

## 2021-12-17 NOTE — ANESTHESIA POSTPROCEDURE EVALUATION
Patient: Nghia Roach    Procedure Summary     Date: 12/17/21 Room / Location: [facility] OR  /  [facility] OR    Anesthesia Start: 1157 Anesthesia Stop: 1238    Procedure: CHOLECYSTECTOMY LAPAROSCOPIC (N/A Abdomen) Diagnosis:       Polyp of gallbladder      Functional diarrhea      (Polyp of gallbladder [K82.4])      (Functional diarrhea [K59.1])    Surgeons: Nghia Miller MD Provider: Fransisco Coleman DO    Anesthesia Type: general ASA Status: 3          Anesthesia Type: general    Vitals  Vitals Value Taken Time   /88 12/17/21 1331   Temp 98.6 °F (37 °C) 12/17/21 1231   Pulse 81 12/17/21 1331   Resp 14 12/17/21 1331   SpO2 92 % 12/17/21 1331           Post Anesthesia Care and Evaluation    Patient location during evaluation: PHASE II  Patient participation: complete - patient participated  Level of consciousness: awake and alert  Pain score: 1  Pain management: adequate  Airway patency: patent  Anesthetic complications: No anesthetic complications  PONV Status: controlled  Cardiovascular status: acceptable  Respiratory status: acceptable  Hydration status: acceptable

## 2021-12-17 NOTE — ANESTHESIA PREPROCEDURE EVALUATION
Anesthesia Evaluation     Patient summary reviewed and Nursing notes reviewed   no history of anesthetic complications:  NPO Solid Status: > 8 hours  NPO Liquid Status: > 8 hours           Airway   Mallampati: II  TM distance: >3 FB  Neck ROM: full  no difficulty expected  Dental - normal exam   (+) poor dentition        Pulmonary - normal exam   (+) asthma,sleep apnea,   (-) not a smoker (secondhand smoke exposure for over 21 years)  Cardiovascular - normal exam  Exercise tolerance: good (4-7 METS)    NYHA Classification: II  ECG reviewed  Rhythm: regular  Rate: normal    (+) hypertension, dysrhythmias Atrial Flutter, hyperlipidemia,   (-) past MI, angina, CHF      Neuro/Psych  (+) headaches, numbness, psychiatric history Anxiety and Depression,     (-) seizures, CVA  GI/Hepatic/Renal/Endo    (+) obesity, morbid obesity, hiatal hernia, GERD well controlled,  renal disease CRI,   (-) diabetes    Musculoskeletal     (+) back pain, chronic pain, myalgias, radiculopathy Right lower extremity      ROS comment: Fibromyalgia  Abdominal  - normal exam    Abdomen: soft.  Bowel sounds: normal.   Substance History - negative use     OB/GYN negative ob/gyn ROS         Other   arthritis,      ROS/Med Hx Other: Osteoarthrosis  Fibromyalgia  Tension headache, chronic  Mixed hyperlipidemia  Moderate persistent asthma with status asthmaticus  Acid reflux  Hoarseness  Corporo-venous occlusive erectile dysfunction  Hernia, hiatal  Back ache  Chronic gout of multiple sites  Essential hypertension  Low testosterone  Post-operative state  Diarrhea  Hematochezia  Family history of colon cancer  Periumbilical pain  Encounter for vaccination  Inflamed skin tag  Depression  Exacerbation of chronic back pain  Chronic low back pain with right-sided sciatica  Balanitis  Rheumatoid arthritis involving multiple sites with positive rheumatoid factor (HCC)  Esophagitis with gastritis  Anxiety and depression  Priapism, drug-induced  Class 1  obesity with serious comorbidity and body mass index (BMI) of 34.0 to 34.9 in adult  Rash  Dyspepsia  Early satiety  Poor appetite  History of gastric surgery  Eosinophilic esophagitis  Right hip pain  Femoral acetabular impingement  Polyp of gallbladder  Functional diarrhea                     Anesthesia Evaluation     Patient summary reviewed and Nursing notes reviewed   no history of anesthetic complications:  NPO Solid Status: > 8 hours  NPO Liquid Status: > 8 hours     Airway   Mallampati: II  TM distance: >3 FB  Neck ROM: full  no difficulty expected  Dental - normal exam         Pulmonary - normal exam   (+) asthma,   (-) not a smoker  Cardiovascular - normal exam  Exercise tolerance: good (4-7 METS)    NYHA Classification: II    (+) hypertension, dysrhythmias Atrial Flutter, hyperlipidemia  (-) past MI, angina      Neuro/Psych  (+) headaches,    (-) seizures, CVA  GI/Hepatic/Renal/Endo    (+) obesity,  hiatal hernia, GERD, renal disease,   (-) diabetes, hypothyroidism    Musculoskeletal     (+) back pain, myalgias,   (-) radiculopathy  Abdominal  - normal exam    Bowel sounds: normal.   Substance History - negative use     OB/GYN negative ob/gyn ROS         Other   (+) arthritis         Phys Exam Other: Lower partial                                Anesthesia Plan    ASA 3     general     intravenous induction   Anesthetic plan and risks discussed with patient.  Use of blood products discussed with patient  Consented to blood products.       Anesthesia Plan    ASA 3     general   total IV anesthesia(Risks and benefits discussed including risk of aspiration, recall and dental damage. All patient questions answered.)  intravenous induction     Anesthetic plan, all risks, benefits, and alternatives have been provided, discussed and informed consent has been obtained with: patient.  Use of blood products discussed with patient and spouse/significant other  Consented to blood products.   Plan discussed with  CRNA.

## 2021-12-17 NOTE — OP NOTE
CHOLECYSTECTOMY LAPAROSCOPIC  Procedure Note    Nghia Roach  12/17/2021    Pre-op Diagnosis:   Polyp of gallbladder [K82.4]  Functional diarrhea [K59.1]    Post-op Diagnosis:  Gallbladder polyp       Procedure(s):  CHOLECYSTECTOMY LAPAROSCOPIC    Surgeon(s):  Nghia Miller MD    Anesthesia: General    Staff:   Circulator: Cydney Campos RN  Scrub Person: Vonnie Schilling  Assistant: Chitra Paris    Estimated Blood Loss: minimal    Specimens:                Order Name Source Comment Collection Info Order Time   SURGICAL PATHOLOGY EXAM Gallbladder  Collected By: Nghia Miller MD 12/17/2021 12:22 PM     Collection Date   12/17/2021          Collection Time   12:22 PM          Release to patient   Immediate              Drains: * No LDAs found *    Procedure: The abdomen was prepped and draped. Two 5 mm and one 11 mm port placed. The cystic duct and artery were dissected out, clipped and divided. The gallbladder was taken off the liver with cautery and brought out the upper midline port. The area was irrigated and suctioned. The gas was allowed to escape and the ports removed. They were closed with vicryl, and local injected.     Findings:     Complications: none   Grafts / Implants N/A    Nghia Miller MD     Date: 12/17/2021  Time: 12:24 EST

## 2021-12-21 LAB
LAB AP CASE REPORT: NORMAL
PATH REPORT.FINAL DX SPEC: NORMAL

## 2021-12-27 ENCOUNTER — OFFICE VISIT (OUTPATIENT)
Dept: SURGERY | Facility: CLINIC | Age: 36
End: 2021-12-27

## 2021-12-27 VITALS — BODY MASS INDEX: 31.64 KG/M2 | HEIGHT: 71 IN | WEIGHT: 226 LBS

## 2021-12-27 DIAGNOSIS — Z90.49 STATUS POST LAPAROSCOPIC CHOLECYSTECTOMY: Primary | ICD-10-CM

## 2021-12-27 PROCEDURE — 99024 POSTOP FOLLOW-UP VISIT: CPT | Performed by: SURGERY

## 2021-12-27 NOTE — PROGRESS NOTES
"Subjective   Nghia Roach is a 36 y.o. male.     Chief Complaint: post lap angela    History of Present Illness He is doing well post lap angela.    The following portions of the patient's history were reviewed and updated as appropriate: current medications, past family history, past medical history, past social history, past surgical history and problem list.    Review of Systems    Objective   Physical Exam wounds ok    Past Medical History:   Diagnosis Date   • Allergic    • Allergic rhinitis    • Asthma    • Atrial flutter (HCC)    • Back pain    • Diarrhea    • Disturbance of skin sensation    • Elevated cholesterol    • GERD (gastroesophageal reflux disease)    • Gout    • History of MRI of spine 12/16/2014    Done at [facility]    • Hx of exercise stress test 2014    \"IT WAS NORMAL\"     • Hyperlipidemia    • Hypertension    • Kidney disease     stage 2   • Lymphadenitis    • Malaise and fatigue    • Osteoarthrosis    • Rash    • Renal disorder    • Right hip pain    • Sleep apnea     NO CPAP   • Wears glasses     TO READ       Family History   Problem Relation Age of Onset   • Asthma Other    • Cancer Other    • COPD Other    • Diabetes Other    • Stroke Other    • Asthma Mother    • COPD Mother    • Asthma Father    • Depression Father    • Drug abuse Father    • Arthritis Maternal Grandmother    • Cancer Maternal Grandmother    • Diabetes Maternal Grandmother    • Heart disease Maternal Grandmother    • Hyperlipidemia Maternal Grandmother    • Stroke Paternal Grandfather    • Colon cancer Maternal Grandfather         mid 40s       Social History     Tobacco Use   • Smoking status: Never Smoker   • Smokeless tobacco: Never Used   Vaping Use   • Vaping Use: Never used   Substance Use Topics   • Alcohol use: Not Currently     Comment: social   • Drug use: Yes     Types: Marijuana       Past Surgical History:   Procedure Laterality Date   • ABDOMINAL SURGERY     • BRAVO PROCEDURE N/A 8/4/2017    " "Procedure: ESOPHAGOGASTRODUODENOSCOPY AND FULLER;  Surgeon: Efra Suero MD;  Location:  [facility] OR;  Service:    • BRAVO PROCEDURE N/A 12/19/2018    Procedure: ESOPHAGOGASTRODUODENOSCOPY AND FULLER;  Surgeon: Efra Suero MD;  Location:  [facility] OR;  Service: Gastroenterology   • CHOLECYSTECTOMY N/A 12/17/2021    Procedure: CHOLECYSTECTOMY LAPAROSCOPIC;  Surgeon: Nghia Miller MD;  Location:  [facility];  Service: General;  Laterality: N/A;   • COLONOSCOPY  2014   • COLONOSCOPY      UNSURE OF DATE   • COLONOSCOPY N/A 5/3/2018    Procedure: COLONOSCOPY WITH COLD BIOPSIES AND COLD POLYPECTOMIES;  Surgeon: Bang Segundo MD;  Location: [facility] ENDOSCOPY;  Service: Gastroenterology   • ENDOSCOPY     • ENDOSCOPY N/A 3/9/2020    Procedure: ESOPHAGOGASTRODUODENOSCOPY WITH BIOPSY CPT CODE: 32722;  Surgeon: Ricardo Nolasco MD;  Location: [facility] OR;  Service: Gastroenterology;  Laterality: N/A;   • HIP SURGERY Right    • NISSEN FUNDOPLICATION     • NISSEN FUNDOPLICATION N/A 10/11/2017    Procedure: NISSEN FUNDOPLICATION LAPAROSCOPIC;  Surgeon: Efra Suero MD;  Location: [facility] OR;  Service:    • [place] LAP,ESOPHAGOGAST FUNDOPLASTY N/A 10/11/2017    Procedure: HIATAL HERNIA REPAIR LAPAROSCOPIC;  Surgeon: Efra Suero MD;  Location: [facility] OR;  Service: General   • VASECTOMY         Current Outpatient Medications   Medication Instructions   • albuterol sulfate  (90 Base) MCG/ACT inhaler 2 puffs, Inhalation, Every 4 Hours PRN   • allopurinol (ZYLOPRIM) 100 MG tablet TAKE 1 TABLET BY MOUTH TWICE DAILY   • amitriptyline (ELAVIL) 25 mg, Oral, Nightly PRN   • anastrozole (ARIMIDEX) 1 MG tablet TAKE 1 TABLET BY MOUTH 1 TIME EVERY WEEK FOR 12 WEEKS   • azelastine (ASTELIN) 0.1 % nasal spray 2 sprays, Nasal, 2 Times Daily PRN, Use in each nostril as directed   • B-D INTEGRA SYRINGE 25G X 5/8\" 3 ML misc See Admin Instructions   • carvedilol (COREG) 3.125 MG tablet TAKE 1 TABLET BY MOUTH TWICE DAILY WITH MEALS " "  • cetirizine (ZYRTEC) 10 mg, Oral, Nightly PRN   • citalopram (CELEXA) 20 mg, Oral, Daily   • cloNIDine (CATAPRES) 0.1 mg, Oral, 2 Times Daily   • clotrimazole-betamethasone (LOTRISONE) 1-0.05 % cream Topical, Every 12 Hours Scheduled   • Flovent HFA 44 MCG/ACT inhaler INHALE 2 PUFFS BY MOUTH TWICE DAILY   • fluticasone (FLONASE) 50 MCG/ACT nasal spray SHAKE LIQUID AND USE 2 SPRAYS IN EACH NOSTRIL DAILY AS DIRECTED   • HYDROcodone-acetaminophen (NORCO) 5-325 MG per tablet Take 1 tablet by mouth Every 4 (Four) Hours As Needed for Moderate Pain for up to 10 days.   • lansoprazole (PREVACID) 30 mg, Oral, As Needed   • methocarbamol (ROBAXIN) 750 mg, Oral, 4 Times Daily PRN, Take on days he works in place of flexeril   • montelukast (SINGULAIR) 10 mg, Oral, Nightly   • nabumetone (RELAFEN) 500 MG tablet TAKE 1 TABLET BY MOUTH TWICE DAILY AS NEEDED FOR MILD PAIN OR MODERATE PAIN   • O2 (OXYGEN) 2 L/min, Inhalation, Once   • omeprazole (PRILOSEC) 40 mg, Oral, 2 Times Daily Before Meals, Take a half hour before breakfast   • permethrin (ELIMITE) 5 % cream APPLY TOPICALLY TO THE APPROPRIATE AREA AS DIRECTED   • sulfaSALAzine (AZULFIDINE) 500 mg, Oral, 4 Times Daily, Written by rheumatology , started back on his script   • Suprep Bowel Prep Kit 17.5-3.13-1.6 GM/177ML solution oral solution Dispense one Kit        Sig: Used as Directed   • Syringe 25G X 5/8\" 3 ML misc Use as directed 2 x weekly   • Syringe 25G X 5/8\" 3 ML misc Use as directed 2 x weekly   • tadalafil (CIALIS) 5 mg, Oral, Daily   • Testosterone Cypionate (Depo-Testosterone) 200 MG/ML injection He is to use 1/2 cc every Monday and Thursday SQ   • valsartan-hydrochlorothiazide (DIOVAN-HCT) 160-25 MG per tablet 1 tablet, Oral, Daily         Assessment/Plan   Diagnoses and all orders for this visit:    1. Status post laparoscopic cholecystectomy (Primary)    return prn         "

## 2022-02-12 DIAGNOSIS — I10 ESSENTIAL HYPERTENSION: ICD-10-CM

## 2022-02-14 RX ORDER — VALSARTAN AND HYDROCHLOROTHIAZIDE 160; 25 MG/1; MG/1
1 TABLET ORAL DAILY
Qty: 30 TABLET | Refills: 5 | OUTPATIENT
Start: 2022-02-14

## 2022-04-05 DIAGNOSIS — E28.0 ESTRADIOL EXCESS: ICD-10-CM

## 2022-04-05 RX ORDER — ANASTROZOLE 1 MG/1
TABLET ORAL
Qty: 12 TABLET | Refills: 3 | Status: SHIPPED | OUTPATIENT
Start: 2022-04-05 | End: 2023-03-24

## 2022-05-02 ENCOUNTER — OFFICE VISIT (OUTPATIENT)
Dept: UROLOGY | Facility: CLINIC | Age: 37
End: 2022-05-02

## 2022-05-02 VITALS — BODY MASS INDEX: 31.52 KG/M2 | HEIGHT: 71 IN

## 2022-05-02 DIAGNOSIS — R79.89 LOW TESTOSTERONE: ICD-10-CM

## 2022-05-02 DIAGNOSIS — N42.9 DISORDER OF PROSTATE: ICD-10-CM

## 2022-05-02 DIAGNOSIS — E28.0 ESTRADIOL EXCESS: Primary | ICD-10-CM

## 2022-05-02 DIAGNOSIS — R53.83 OTHER FATIGUE: ICD-10-CM

## 2022-05-02 PROCEDURE — 99214 OFFICE O/P EST MOD 30 MIN: CPT | Performed by: UROLOGY

## 2022-05-02 PROCEDURE — 82670 ASSAY OF TOTAL ESTRADIOL: CPT | Performed by: UROLOGY

## 2022-05-02 PROCEDURE — 85027 COMPLETE CBC AUTOMATED: CPT | Performed by: UROLOGY

## 2022-05-02 PROCEDURE — 84153 ASSAY OF PSA TOTAL: CPT | Performed by: UROLOGY

## 2022-05-02 PROCEDURE — 84403 ASSAY OF TOTAL TESTOSTERONE: CPT | Performed by: UROLOGY

## 2022-05-02 RX ORDER — ASPIRIN 81 MG/1
TABLET, FILM COATED ORAL
COMMUNITY
Start: 2022-03-11

## 2022-05-02 RX ORDER — DOCUSATE SODIUM -SENNOSIDES 50; 8.6 MG/1; MG/1
TABLET, COATED ORAL
COMMUNITY
Start: 2022-03-11

## 2022-05-02 RX ORDER — TESTOSTERONE CYPIONATE 200 MG/ML
INJECTION, SOLUTION INTRAMUSCULAR
Qty: 10 ML | Refills: 2 | Status: SHIPPED | OUTPATIENT
Start: 2022-05-02 | End: 2022-12-01 | Stop reason: SDUPTHER

## 2022-05-02 RX ORDER — TRAMADOL HYDROCHLORIDE 50 MG/1
50 TABLET ORAL EVERY 6 HOURS PRN
Qty: 60 TABLET | Refills: 3 | Status: SHIPPED | OUTPATIENT
Start: 2022-05-02

## 2022-05-02 RX ORDER — TRAMADOL HYDROCHLORIDE 50 MG/1
TABLET ORAL
COMMUNITY
Start: 2022-03-11 | End: 2022-05-02 | Stop reason: SDUPTHER

## 2022-05-02 RX ORDER — MELATONIN
1000 DAILY
COMMUNITY
Start: 2022-04-06

## 2022-05-02 RX ORDER — ROSUVASTATIN CALCIUM 5 MG/1
TABLET, COATED ORAL
COMMUNITY
Start: 2022-04-06

## 2022-05-02 RX ORDER — TADALAFIL 5 MG/1
5 TABLET ORAL DAILY
Qty: 30 TABLET | Refills: 6 | Status: SHIPPED | OUTPATIENT
Start: 2022-05-02 | End: 2022-12-01 | Stop reason: SDUPTHER

## 2022-05-02 RX ORDER — CARVEDILOL 12.5 MG/1
TABLET ORAL
COMMUNITY
Start: 2022-03-06 | End: 2023-01-31 | Stop reason: SDUPTHER

## 2022-05-02 RX ORDER — NAPROXEN 500 MG/1
TABLET ORAL
COMMUNITY
Start: 2022-03-11

## 2022-05-02 NOTE — PROGRESS NOTES
"Chief Complaint:          Chief Complaint   Patient presents with   • Hypogonadism       HPI:   36 y.o. male patient returns today for follow-up.  He has been on testosterone replacement therapy.  He reports a dramatic improvement in his Sánchez questionnaire: -SÁNCHEZ-androgen deficiency in the age male questionnaire. The patient was queried regarding the androgen deficiency in the age male questionnaire.  This is a validated questionnaire that was performed on a set of 314 [place] male physicians. When it was positive it correlated directly with a 94% chance of low testosterone.  Patient indicates there is a decrease in libido or sex drive, a lack of energy, decreased  strength and endurance, a decreased \"enjoyment of life\", sad and grumpy feelings with significant difficulty maintaining erections.  There has also been a recent deterioration regarding work performance. He reports weight loss.  He has good facility and the use of subcutaneous and intramuscular injections as well as comfort level and using the medication in a sterile fashion.  He understands he should use only the prescribed dose.  He is here for appropriate lab monitoring regarding this.  He understands this is a controlled substance and therefore must be watched closely, will not be refilled in the medical loss or miscalculation of the dose.  He is very happy with the treatment and therefore wants to continue it.  He is down to 220 pounds he has significant hip pain from a labral tear is anticipating surgical intervention      Past Medical History:        Past Medical History:   Diagnosis Date   • Allergic    • Allergic rhinitis    • Asthma    • Atrial flutter (HCC)    • Back pain    • Diarrhea    • Disturbance of skin sensation    • Elevated cholesterol    • GERD (gastroesophageal reflux disease)    • Gout    • History of MRI of spine 12/16/2014    Done at [facility]    • Hx of exercise stress test 2014    \"IT WAS NORMAL\"     • Hyperlipidemia    • " "Hypertension    • Kidney disease     stage 2   • Lymphadenitis    • Malaise and fatigue    • Osteoarthrosis    • Rash    • Renal disorder    • Right hip pain    • Sleep apnea     NO CPAP   • Wears glasses     TO READ         Current Meds:     Current Outpatient Medications   Medication Sig Dispense Refill   • albuterol sulfate  (90 Base) MCG/ACT inhaler Inhale 2 puffs Every 4 (Four) Hours As Needed for Shortness of Air. 2 inhaler 5   • allopurinol (ZYLOPRIM) 100 MG tablet TAKE 1 TABLET BY MOUTH TWICE DAILY 60 tablet 1   • amitriptyline (ELAVIL) 25 MG tablet TAKE 1 TABLET BY MOUTH AT NIGHT AS NEEDED FOR SLEEP 30 tablet 1   • anastrozole (ARIMIDEX) 1 MG tablet TAKE 1 TABLET BY MOUTH 1 TIME EVERY WEEK FOR 12 WEEKS 12 tablet 3   • azelastine (ASTELIN) 0.1 % nasal spray 2 sprays into the nostril(s) as directed by provider 2 (Two) Times a Day As Needed for Rhinitis. Use in each nostril as directed 1 each 5   • B-D INTEGRA SYRINGE 25G X 5/8\" 3 ML misc See Admin Instructions.  0   • carvedilol (COREG) 3.125 MG tablet TAKE 1 TABLET BY MOUTH TWICE DAILY WITH MEALS 180 tablet 3   • cetirizine (zyrTEC) 10 MG tablet Take 1 tablet by mouth At Night As Needed for Allergies. 30 tablet 5   • citalopram (CeleXA) 20 MG tablet Take 1 tablet by mouth Daily. 90 tablet 2   • cloNIDine (Catapres) 0.1 MG tablet Take 1 tablet by mouth 2 (Two) Times a Day. 60 tablet 5   • clotrimazole-betamethasone (LOTRISONE) 1-0.05 % cream Apply  topically to the appropriate area as directed Every 12 (Twelve) Hours. 45 g 2   • Flovent HFA 44 MCG/ACT inhaler INHALE 2 PUFFS BY MOUTH TWICE DAILY 10.6 g 5   • fluticasone (FLONASE) 50 MCG/ACT nasal spray SHAKE LIQUID AND USE 2 SPRAYS IN EACH NOSTRIL DAILY AS DIRECTED 32 g 5   • lansoprazole (PREVACID) 30 MG capsule Take 30 mg by mouth As Needed.     • methocarbamol (ROBAXIN) 750 MG tablet Take 1 tablet by mouth 4 (Four) Times a Day As Needed for Muscle Spasms. Take on days he works in place of flexeril " "120 tablet 5   • montelukast (SINGULAIR) 10 MG tablet Take 1 tablet by mouth Every Night. 30 tablet 5   • nabumetone (RELAFEN) 500 MG tablet TAKE 1 TABLET BY MOUTH TWICE DAILY AS NEEDED FOR MILD PAIN OR MODERATE PAIN 60 tablet 2   • O2 (OXYGEN) Inhale 2 L/min 1 (One) Time.     • omeprazole (priLOSEC) 40 MG capsule Take 1 capsule by mouth 2 (Two) Times a Day Before Meals. Take a half hour before breakfast 60 capsule 11   • permethrin (ELIMITE) 5 % cream APPLY TOPICALLY TO THE APPROPRIATE AREA AS DIRECTED 60 g 0   • sulfaSALAzine (AZULFIDINE) 500 MG tablet Take 1 tablet by mouth 4 (Four) Times a Day. Written by rheumatology , started back on his script 60 tablet 5   • Suprep Bowel Prep Kit 17.5-3.13-1.6 GM/177ML solution oral solution Dispense one Kit        Sig: Used as Directed 175 mL 0   • Syringe 25G X 5/8\" 3 ML misc Use as directed 2 x weekly 24 each 3   • Syringe 25G X 5/8\" 3 ML misc Use as directed 2 x weekly 24 each 3   • tadalafil (CIALIS) 5 MG tablet Take 1 tablet by mouth Daily. 30 tablet 6   • Testosterone Cypionate (Depo-Testosterone) 200 MG/ML injection He is to use 1/2 cc every Monday and Thursday SQ 10 mL 2   • valsartan-hydrochlorothiazide (DIOVAN-HCT) 160-25 MG per tablet TAKE 1 TABLET BY MOUTH DAILY 30 tablet 5     No current facility-administered medications for this visit.        Allergies:      Allergies   Allergen Reactions   • Erythromycin Rash        Past Surgical History:     Past Surgical History:   Procedure Laterality Date   • ABDOMINAL SURGERY     • BRAVO PROCEDURE N/A 8/4/2017    Procedure: ESOPHAGOGASTRODUODENOSCOPY AND FULLER;  Surgeon: Efra Suero MD;  Location:  [facility] OR;  Service:    • BRAVO PROCEDURE N/A 12/19/2018    Procedure: ESOPHAGOGASTRODUODENOSCOPY AND FULELR;  Surgeon: Efra Suero MD;  Location:  [facility] OR;  Service: Gastroenterology   • CHOLECYSTECTOMY N/A 12/17/2021    Procedure: CHOLECYSTECTOMY LAPAROSCOPIC;  Surgeon: Nghia Miller MD;  Location:  [facility] OR;  " Service: General;  Laterality: N/A;   • COLONOSCOPY  2014   • COLONOSCOPY      UNSURE OF DATE   • COLONOSCOPY N/A 5/3/2018    Procedure: COLONOSCOPY WITH COLD BIOPSIES AND COLD POLYPECTOMIES;  Surgeon: Bang Segundo MD;  Location: [facility] ENDOSCOPY;  Service: Gastroenterology   • ENDOSCOPY     • ENDOSCOPY N/A 3/9/2020    Procedure: ESOPHAGOGASTRODUODENOSCOPY WITH BIOPSY CPT CODE: 24032;  Surgeon: Ricardo Nolasco MD;  Location: [facility] OR;  Service: Gastroenterology;  Laterality: N/A;   • HIP SURGERY Right    • NISSEN FUNDOPLICATION     • NISSEN FUNDOPLICATION N/A 10/11/2017    Procedure: NISSEN FUNDOPLICATION LAPAROSCOPIC;  Surgeon: Efra Suero MD;  Location: [facility] OR;  Service:    • [place] LAP,ESOPHAGOGAST FUNDOPLASTY N/A 10/11/2017    Procedure: HIATAL HERNIA REPAIR LAPAROSCOPIC;  Surgeon: Efra Suero MD;  Location: [facility] OR;  Service: General   • VASECTOMY           Social History:     Social History     Socioeconomic History   • Marital status:      Spouse name: Radha    • Number of children: 2   Tobacco Use   • Smoking status: Never Smoker   • Smokeless tobacco: Never Used   Vaping Use   • Vaping Use: Never used   Substance and Sexual Activity   • Alcohol use: Not Currently     Comment: social   • Drug use: Yes     Types: Marijuana   • Sexual activity: Yes     Partners: Female     Birth control/protection: Surgical       Family History:     Family History   Problem Relation Age of Onset   • Asthma Other    • Cancer Other    • COPD Other    • Diabetes Other    • Stroke Other    • Asthma Mother    • COPD Mother    • Asthma Father    • Depression Father    • Drug abuse Father    • Arthritis Maternal Grandmother    • Cancer Maternal Grandmother    • Diabetes Maternal Grandmother    • Heart disease Maternal Grandmother    • Hyperlipidemia Maternal Grandmother    • Stroke Paternal Grandfather    • Colon cancer Maternal Grandfather         mid 40s       Review of Systems:     Review of  Systems   Constitutional: Negative.    HENT: Negative.    Eyes: Negative.    Respiratory: Negative.    Cardiovascular: Negative.    Gastrointestinal: Negative.    Endocrine: Negative.    Musculoskeletal: Negative.    Allergic/Immunologic: Negative.    Neurological: Negative.    Hematological: Negative.    Psychiatric/Behavioral: Negative.        Physical Exam:     Physical Exam  Vitals and nursing note reviewed.   Constitutional:       Appearance: He is well-developed.   HENT:      Head: Normocephalic and atraumatic.   Eyes:      Conjunctiva/sclera: Conjunctivae normal.      Pupils: Pupils are equal, round, and reactive to light.   Cardiovascular:      Rate and Rhythm: Normal rate and regular rhythm.      Heart sounds: Normal heart sounds.   Pulmonary:      Effort: Pulmonary effort is normal.      Breath sounds: Normal breath sounds.   Abdominal:      General: Bowel sounds are normal.      Palpations: Abdomen is soft.   Musculoskeletal:         General: Normal range of motion.      Cervical back: Normal range of motion.   Skin:     General: Skin is warm and dry.   Neurological:      Mental Status: He is alert and oriented to person, place, and time.      Deep Tendon Reflexes: Reflexes are normal and symmetric.   Psychiatric:         Behavior: Behavior normal.         Thought Content: Thought content normal.         Judgment: Judgment normal.         I have reviewed the following portions of the patient's history: Allergies, current medications, past family history, past medical history, past social history, past surgical history, problem list, and ROS and confirm it is accurate.      Procedure:       Assessment/Plan:   Low testosterone: Patient is here for follow-up.  Since beginning the medication, he has been very pleased.  He reports a dramatic improvement in his erections, ability to achieve and maintain an erection, improvement in libido, increase in frequency of morning erections, and a noticeable weight loss  consistent with the treatment.  No development of breast problems or abnormalities.  He is going to have appropriate safety laboratory parameters checked.   He understands that the new data implicates testosterone with the development of prostate cancer and this is all but been disproven and the medical literature as well as the risks of cardiovascular disease which has actually also been disproven.  He understands that while he is a candidate for topical therapy if he is in contact with children this is not an option because it has been shown to accentuate genitalia development at an early age that is frequently irreversible.  He also understands this it is a controlled substance and as such will not be prescribed without appropriate follow-up and appropriate laboratory investigation.  He understands effects on spermatogenesis including the fact that this is not always completely reversible and not always completely limited his ability to father a child.  He has demonstrated facility in the technique of both intramuscular and subcutaneous injection and has been taught sterility when drawing up the medication.    Erectile dysfunction-we discussed the anatomy and physiology of the penis and the endothelium.  We discussed the various forms of erectile dysfunction including peripheral vascular occlusive disease, postoperative, secondary to radiation treatments of the prostate, and arterial inflow.  We discussed the various treatment options available including oral medication and its various forms.  We discussed the use of both generic and non-generic Viagra.  We discussed Cialis and a longer half-life of 17 hours as well as the other 2 medications.  We discussed cost involved with this including the fact that the generic is much cheaper but is taken as multiple pills because they are 20 mg dosages.  We did discuss the other alternatives including penile injections, vacuum erection devices, and surgical intervention  reserved for only the most severe cases.  We discussed the need for testosterone in about 20% of cases of erectile dysfunction.  Continue PDE-5 inhibition    Hyperestrogenism-we spoke about the role of estrogen metabolism and breakdown in the  presence of testosterone replacement therapy.  We spoke about how high estradiol levels can interfere with the improvement noted in a man on testosterone as well as significant side effects such as pseudogynecomastia.  We discussed the use of the medication Arimidex using a very judicious low-dose fashion to prevent too low of an estradiol which would precipitate bone complications.  Going to check an estradiol level.    Polycythemia-I am going to check a CBC to rule out hemoglobin changes.  We utilized the American Heart Association guidelines for polycythemia which is a hemoglobin greater than 18 and a hematocrit greater than 54.5.  Recommend therapeutic phlebotomy as the treatment.  It is important that we indicate that is the most likely cause of the polycythemia.  We also discussed the possibility of decreasing the dose of testosterone and of stopping it altogether.    Controlled substance-he understands this is a controlled substance and as such is regulated under the state.  I cannot refill it outside the prescribed window.  I stressed the importance of follow-up and appropriate laboratory parameters monitoring.              This document has been electronically signed by STEPHEN BANGURA MD May 2, 2022 13:25 EDT

## 2022-05-03 LAB
DEPRECATED RDW RBC AUTO: 43.5 FL (ref 37–54)
ERYTHROCYTE [DISTWIDTH] IN BLOOD BY AUTOMATED COUNT: 13.5 % (ref 12.3–15.4)
ESTRADIOL SERPL HS-MCNC: 24.4 PG/ML
HCT VFR BLD AUTO: 44.4 % (ref 37.5–51)
HGB BLD-MCNC: 15.6 G/DL (ref 13–17.7)
MCH RBC QN AUTO: 31.5 PG (ref 26.6–33)
MCHC RBC AUTO-ENTMCNC: 35.1 G/DL (ref 31.5–35.7)
MCV RBC AUTO: 89.7 FL (ref 79–97)
PLATELET # BLD AUTO: 191 10*3/MM3 (ref 140–450)
PMV BLD AUTO: 12.3 FL (ref 6–12)
PSA SERPL-MCNC: 0.35 NG/ML (ref 0–4)
RBC # BLD AUTO: 4.95 10*6/MM3 (ref 4.14–5.8)
TESTOST SERPL-MCNC: 550 NG/DL (ref 249–836)
WBC NRBC COR # BLD: 5.89 10*3/MM3 (ref 3.4–10.8)

## 2022-06-15 ENCOUNTER — OFFICE VISIT (OUTPATIENT)
Dept: GASTROENTEROLOGY | Facility: CLINIC | Age: 37
End: 2022-06-15

## 2022-06-15 VITALS — WEIGHT: 222 LBS | BODY MASS INDEX: 31.08 KG/M2 | HEIGHT: 71 IN

## 2022-06-15 DIAGNOSIS — K21.9 GASTROESOPHAGEAL REFLUX DISEASE WITHOUT ESOPHAGITIS: ICD-10-CM

## 2022-06-15 DIAGNOSIS — R10.12 LEFT UPPER QUADRANT ABDOMINAL PAIN: Primary | ICD-10-CM

## 2022-06-15 DIAGNOSIS — Z86.010 HISTORY OF COLON POLYPS: ICD-10-CM

## 2022-06-15 DIAGNOSIS — Z86.010 HISTORY OF COLON POLYPS: Primary | ICD-10-CM

## 2022-06-15 DIAGNOSIS — R10.12 LEFT UPPER QUADRANT ABDOMINAL PAIN: ICD-10-CM

## 2022-06-15 PROCEDURE — 99214 OFFICE O/P EST MOD 30 MIN: CPT | Performed by: PHYSICIAN ASSISTANT

## 2022-06-15 RX ORDER — BISACODYL 5 MG/1
20 TABLET, DELAYED RELEASE ORAL ONCE
Qty: 4 TABLET | Refills: 0 | Status: SHIPPED | OUTPATIENT
Start: 2022-06-15 | End: 2022-06-15

## 2022-06-15 RX ORDER — DEXLANSOPRAZOLE 60 MG/1
60 CAPSULE, DELAYED RELEASE ORAL
Qty: 30 CAPSULE | Refills: 11 | Status: SHIPPED | OUTPATIENT
Start: 2022-06-15 | End: 2022-07-15

## 2022-06-15 RX ORDER — MAGNESIUM CARB/ALUMINUM HYDROX 105-160MG
296 TABLET,CHEWABLE ORAL TAKE AS DIRECTED
Qty: 592 ML | Refills: 0 | Status: SHIPPED | OUTPATIENT
Start: 2022-06-15

## 2022-06-15 NOTE — PROGRESS NOTES
Chief Complaint   Patient presents with   • Nausea   • Vomiting   • Abdominal Cramping       Nghia Roach is a 36 y.o. male who presents to the office today for evaluation of Nausea, Vomiting, and Abdominal Cramping  .    HPI  Patient presents to the clinic today for evaluation of abdominal pain and nausea/vomiting.  Patient states that he has been suffering from these issues for roughly 2 years at this point however things have seemed to get worse over the past 6 or so months.  Patient states that he has been experiencing a lot of crampy abdominal pain that seems to occur mainly in the upper part of the abdomen.  Patient states that he has noticed quite a bit of nausea that seems to be worsened by eating-patient states that at some points when he does eat he will have vomiting episodes.  If he does light meal/snacks he is able to keep them down mainly without difficulty.  Patient states that if he eats normal-sized meals or regular foods he becomes very nauseated and sick.  He experiences a lot of pain that occurs in the right upper quadrant that is described as sharp as well as in the left side.  Certain foods patient has noticed seems to worsen those include milk products such as ice cream or spicy foods.  He is currently taking omeprazole which is not helping a lot.  Patient states that he is also needing to have a colonoscopy performed as his last one was roughly 2 years ago when he had precancerous colon polyps removed.  He does have a maternal grandfather diagnosed with colon cancer in his mid 40s.    Review of Systems   Constitutional: Negative for unexpected weight change.   HENT: Negative for sore throat and trouble swallowing.    Eyes: Negative.    Respiratory: Negative for chest tightness.    Cardiovascular: Negative for chest pain.   Gastrointestinal: Positive for abdominal distention, abdominal pain, constipation, nausea and vomiting. Negative for anal bleeding, blood in stool, diarrhea and rectal  "pain.   Endocrine: Negative.    Genitourinary: Positive for difficulty urinating and urgency.   Musculoskeletal: Positive for back pain.   Skin: Negative.    Allergic/Immunologic: Negative for environmental allergies and food allergies.   Neurological: Negative for dizziness and headaches.   Hematological: Bruises/bleeds easily.   Psychiatric/Behavioral: Positive for sleep disturbance.       ACTIVE PROBLEMS:   Specialty Problems        Gastroenterology Problems    Acid reflux        Esophagitis with gastritis        Eosinophilic esophagitis        Functional diarrhea        Polyp of gallbladder              PAST MEDICAL HISTORY:  Past Medical History:   Diagnosis Date   • Allergic    • Allergic rhinitis    • Asthma    • Atrial flutter (HCC)    • Back pain    • Diarrhea    • Disturbance of skin sensation    • Elevated cholesterol    • GERD (gastroesophageal reflux disease)    • Gout    • History of MRI of spine 12/16/2014    Done at [facility]    • Hx of exercise stress test 2014    \"IT WAS NORMAL\"     • Hyperlipidemia    • Hypertension    • Kidney disease     stage 2   • Lymphadenitis    • Malaise and fatigue    • Osteoarthrosis    • Rash    • Renal disorder    • Right hip pain    • Sleep apnea     NO CPAP   • Wears glasses     TO READ       SURGICAL HISTORY:  Past Surgical History:   Procedure Laterality Date   • ABDOMINAL SURGERY     • BRAVO PROCEDURE N/A 8/4/2017    Procedure: ESOPHAGOGASTRODUODENOSCOPY AND FULLER;  Surgeon: Efra Suero MD;  Location: [facility] OR;  Service:    • BRAVO PROCEDURE N/A 12/19/2018    Procedure: ESOPHAGOGASTRODUODENOSCOPY AND FULLER;  Surgeon: Efra Suero MD;  Location: [facility] OR;  Service: Gastroenterology   • CHOLECYSTECTOMY N/A 12/17/2021    Procedure: CHOLECYSTECTOMY LAPAROSCOPIC;  Surgeon: Nghia Miller MD;  Location: [facility] OR;  Service: General;  Laterality: N/A;   • COLONOSCOPY  2014   • COLONOSCOPY      UNSURE OF DATE   • COLONOSCOPY N/A 5/3/2018    Procedure: " COLONOSCOPY WITH COLD BIOPSIES AND COLD POLYPECTOMIES;  Surgeon: Bang Segundo MD;  Location: [facility] ENDOSCOPY;  Service: Gastroenterology   • ENDOSCOPY     • ENDOSCOPY N/A 3/9/2020    Procedure: ESOPHAGOGASTRODUODENOSCOPY WITH BIOPSY CPT CODE: 62813;  Surgeon: Ricardo Nolasco MD;  Location: [facility] OR;  Service: Gastroenterology;  Laterality: N/A;   • HIP SURGERY Right    • NISSEN FUNDOPLICATION     • NISSEN FUNDOPLICATION N/A 10/11/2017    Procedure: NISSEN FUNDOPLICATION LAPAROSCOPIC;  Surgeon: Efra Suero MD;  Location:  [facility] OR;  Service:    • WA LAP,ESOPHAGOGAST FUNDOPLASTY N/A 10/11/2017    Procedure: HIATAL HERNIA REPAIR LAPAROSCOPIC;  Surgeon: Efra Suero MD;  Location: [facility] OR;  Service: General   • VASECTOMY         FAMILY HISTORY:  Family History   Problem Relation Age of Onset   • Asthma Other    • Cancer Other    • COPD Other    • Diabetes Other    • Stroke Other    • Asthma Mother    • COPD Mother    • Asthma Father    • Depression Father    • Drug abuse Father    • Arthritis Maternal Grandmother    • Cancer Maternal Grandmother    • Diabetes Maternal Grandmother    • Heart disease Maternal Grandmother    • Hyperlipidemia Maternal Grandmother    • Stroke Paternal Grandfather    • Colon cancer Maternal Grandfather         mid 40s       SOCIAL HISTORY:  Social History     Tobacco Use   • Smoking status: Never Smoker   • Smokeless tobacco: Never Used   Substance Use Topics   • Alcohol use: Not Currently     Comment: social       CURRENT MEDICATION:    Current Outpatient Medications:   •  albuterol sulfate  (90 Base) MCG/ACT inhaler, Inhale 2 puffs Every 4 (Four) Hours As Needed for Shortness of Air., Disp: 2 inhaler, Rfl: 5  •  allopurinol (ZYLOPRIM) 100 MG tablet, TAKE 1 TABLET BY MOUTH TWICE DAILY, Disp: 60 tablet, Rfl: 1  •  amitriptyline (ELAVIL) 25 MG tablet, TAKE 1 TABLET BY MOUTH AT NIGHT AS NEEDED FOR SLEEP, Disp: 30 tablet, Rfl: 1  •  anastrozole (ARIMIDEX) 1 MG  "tablet, TAKE 1 TABLET BY MOUTH 1 TIME EVERY WEEK FOR 12 WEEKS, Disp: 12 tablet, Rfl: 3  •  azelastine (ASTELIN) 0.1 % nasal spray, 2 sprays into the nostril(s) as directed by provider 2 (Two) Times a Day As Needed for Rhinitis. Use in each nostril as directed, Disp: 1 each, Rfl: 5  •  B-D IN[employer]RA SYRINGE 25G X 5/8\" 3 ML misc, See Admin Instructions., Disp: , Rfl: 0  •  carvedilol (COREG) 12.5 MG tablet, , Disp: , Rfl:   •  cetirizine (zyrTEC) 10 MG tablet, Take 1 tablet by mouth At Night As Needed for Allergies., Disp: 30 tablet, Rfl: 5  •  cholecalciferol (VITAMIN D3) 25 MCG (1000 UT) tablet, Take 1,000 Units by mouth Daily., Disp: , Rfl:   •  citalopram (CeleXA) 20 MG tablet, Take 1 tablet by mouth Daily., Disp: 90 tablet, Rfl: 2  •  cloNIDine (Catapres) 0.1 MG tablet, Take 1 tablet by mouth 2 (Two) Times a Day., Disp: 60 tablet, Rfl: 5  •  clotrimazole-betamethasone (LOTRISONE) 1-0.05 % cream, Apply  topically to the appropriate area as directed Every 12 (Twelve) Hours., Disp: 45 g, Rfl: 2  •  Flovent HFA 44 MCG/ACT inhaler, INHALE 2 PUFFS BY MOUTH TWICE DAILY, Disp: 10.6 g, Rfl: 5  •  fluticasone (FLONASE) 50 MCG/ACT nasal spray, SHAKE LIQUID AND USE 2 SPRAYS IN EACH NOSTRIL DAILY AS DIRECTED, Disp: 32 g, Rfl: 5  •  methocarbamol (ROBAXIN) 750 MG tablet, Take 1 tablet by mouth 4 (Four) Times a Day As Needed for Muscle Spasms. Take on days he works in place of flexeril, Disp: 120 tablet, Rfl: 5  •  montelukast (SINGULAIR) 10 MG tablet, Take 1 tablet by mouth Every Night., Disp: 30 tablet, Rfl: 5  •  nabumetone (RELAFEN) 500 MG tablet, TAKE 1 TABLET BY MOUTH TWICE DAILY AS NEEDED FOR MILD PAIN OR MODERATE PAIN, Disp: 60 tablet, Rfl: 2  •  naproxen (NAPROSYN) 500 MG tablet, , Disp: , Rfl:   •  O2 (OXYGEN), Inhale 2 L/min 1 (One) Time., Disp: , Rfl:   •  permethrin (ELIMITE) 5 % cream, APPLY TOPICALLY TO THE APPROPRIATE AREA AS DIRECTED, Disp: 60 g, Rfl: 0  •  rosuvastatin (CRESTOR) 5 MG tablet, , Disp: , Rfl:   •  " "sulfaSALAzine (AZULFIDINE) 500 MG tablet, Take 1 tablet by mouth 4 (Four) Times a Day. Written by rheumatology , started back on his script, Disp: 60 tablet, Rfl: 5  •  Syringe 25G X 5/8\" 3 ML misc, Use as directed 2 x weekly, Disp: 24 each, Rfl: 3  •  Syringe 25G X 5/8\" 3 ML misc, Use as directed 2 x weekly, Disp: 24 each, Rfl: 3  •  tadalafil (CIALIS) 5 MG tablet, Take 1 tablet by mouth Daily., Disp: 30 tablet, Rfl: 6  •  Testosterone Cypionate (Depo-Testosterone) 200 MG/ML injection, He is to use 1/2 cc every Monday and Thursday SQ, Disp: 10 mL, Rfl: 2  •  traMADol (ULTRAM) 50 MG tablet, Take 1 tablet by mouth Every 6 (Six) Hours As Needed for Moderate Pain ., Disp: 60 tablet, Rfl: 3  •  Adult Aspirin Regimen 81 MG EC tablet, , Disp: , Rfl:   •  dexlansoprazole (DEXILANT) 60 MG capsule, Take 1 capsule by mouth Every Morning Before Breakfast for 30 days., Disp: 30 capsule, Rfl: 11  •  magnesium citrate 1.745 GM/30ML solution solution, Take 296 mL by mouth Take As Directed for 2 doses. Take one full bottle at 4:00 PM and take second bottle at 10:00 PM., Disp: 592 mL, Rfl: 0  •  Senexon-S 8.6-50 MG per tablet, , Disp: , Rfl:   •  Suprep Bowel Prep Kit 17.5-3.13-1.6 GM/177ML solution oral solution, Dispense one Kit        Sig: Used as Directed, Disp: 175 mL, Rfl: 0  •  valsartan-hydrochlorothiazide (DIOVAN-HCT) 160-25 MG per tablet, TAKE 1 TABLET BY MOUTH DAILY, Disp: 30 tablet, Rfl: 5    ALLERGIES:  Erythromycin    VISIT VITALS:  Ht 180.3 cm (71\")   Wt 101 kg (222 lb)   BMI 30.96 kg/m²   Physical Exam  Constitutional:       General: He is not in acute distress.     Appearance: Normal appearance. He is well-developed.   HENT:      Head: Normocephalic and atraumatic.   Eyes:      Pupils: Pupils are equal, round, and reactive to light.   Cardiovascular:      Rate and Rhythm: Normal rate and regular rhythm.      Heart sounds: Normal heart sounds.   Pulmonary:      Effort: Pulmonary effort is normal. No respiratory " distress.      Breath sounds: Normal breath sounds. No wheezing, rhonchi or rales.   Abdominal:      General: Abdomen is flat. Bowel sounds are normal. There is no distension.      Palpations: Abdomen is soft. There is no mass.      Tenderness: There is no abdominal tenderness. There is no guarding or rebound.      Hernia: No hernia is present.   Musculoskeletal:         General: No swelling. Normal range of motion.      Cervical back: Normal range of motion and neck supple.      Right lower leg: No edema.      Left lower leg: No edema.   Skin:     General: Skin is warm and dry.   Neurological:      Mental Status: He is alert and oriented to person, place, and time.   Psychiatric:         Attention and Perception: Attention normal.         Mood and Affect: Mood normal.         Speech: Speech normal.         Behavior: Behavior normal. Behavior is cooperative.         Thought Content: Thought content normal.         Assessment    Due to the patient's symptoms-I will go ahead and get the patient set up to have a EGD/colonoscopy performed by Dr. Solares.  We will be using a magnesium citrate and Dulcolax colon prep.  Both procedures were thoroughly explained to the patient and he voiced understanding and agreement to the treatment plan.  In the meantime I will also go ahead and get the patient started on Dexilant 60 mg once daily.   Diagnosis Plan   1. History of colon polyps  Case Request    Case Request    bisacodyl (DULCOLAX) 5 MG EC tablet    magnesium citrate 1.745 GM/30ML solution solution   2. Gastroesophageal reflux disease without esophagitis  dexlansoprazole (DEXILANT) 60 MG capsule    Case Request    Case Request    bisacodyl (DULCOLAX) 5 MG EC tablet    magnesium citrate 1.745 GM/30ML solution solution   3. Left upper quadrant abdominal pain  Case Request    Case Request    bisacodyl (DULCOLAX) 5 MG EC tablet    magnesium citrate 1.745 GM/30ML solution solution       Return After procedure.                    This document has been electronically signed by Andie Porter PA-C  June 20, 2022 18:46 EDT    Part of this note may be an electronic transcription/translation of spoken language to printed text using the Dragon Dictation System.

## 2022-07-26 PROBLEM — Z86.0100 HISTORY OF COLON POLYPS: Status: ACTIVE | Noted: 2022-07-26

## 2022-07-26 PROBLEM — R10.12 LEFT UPPER QUADRANT ABDOMINAL PAIN: Status: ACTIVE | Noted: 2022-07-26

## 2022-07-26 PROBLEM — Z86.010 HISTORY OF COLON POLYPS: Status: ACTIVE | Noted: 2022-07-26

## 2022-07-29 ENCOUNTER — LAB (OUTPATIENT)
Dept: LAB | Facility: HOSPITAL | Age: 37
End: 2022-07-29

## 2022-07-29 DIAGNOSIS — R10.12 LEFT UPPER QUADRANT ABDOMINAL PAIN: ICD-10-CM

## 2022-07-29 DIAGNOSIS — K21.9 GASTROESOPHAGEAL REFLUX DISEASE WITHOUT ESOPHAGITIS: ICD-10-CM

## 2022-07-29 DIAGNOSIS — Z86.010 HISTORY OF COLON POLYPS: ICD-10-CM

## 2022-09-30 ENCOUNTER — TRANSCRIBE ORDERS (OUTPATIENT)
Dept: ADMINISTRATIVE | Facility: HOSPITAL | Age: 37
End: 2022-09-30

## 2022-09-30 ENCOUNTER — HOSPITAL ENCOUNTER (OUTPATIENT)
Dept: CT IMAGING | Facility: HOSPITAL | Age: 37
Discharge: HOME | End: 2022-09-30
Admitting: NURSE PRACTITIONER

## 2022-09-30 DIAGNOSIS — R06.02 SHORTNESS OF BREATH: ICD-10-CM

## 2022-09-30 DIAGNOSIS — R06.02 SHORTNESS OF BREATH: Primary | ICD-10-CM

## 2022-09-30 PROCEDURE — 71275 CT ANGIOGRAPHY CHEST: CPT

## 2022-09-30 PROCEDURE — 71275 CT ANGIOGRAPHY CHEST: CPT | Performed by: RADIOLOGY

## 2022-09-30 PROCEDURE — 0 IOPAMIDOL PER 1 ML: Performed by: NURSE PRACTITIONER

## 2022-09-30 RX ADMIN — IOPAMIDOL 84 ML: 755 INJECTION, SOLUTION INTRAVENOUS at 14:37

## 2022-11-10 ENCOUNTER — TELEPHONE (OUTPATIENT)
Dept: UROLOGY | Facility: CLINIC | Age: 37
End: 2022-11-10

## 2022-11-10 NOTE — TELEPHONE ENCOUNTER
Caller: DELROY ELDER        Best call back number:[phone]    Requested Prescriptions: TESTOTERONE AND CIALIS  Requested Prescriptions      No prescriptions requested or ordered in this encounter        Pharmacy where request should be sent:  [facility]    Additional details provided by patient: PT OUT OF BOTH MEDS    Does the patient have less than a 3 day supply:  [x] Yes  [] No    Jayashree Dent Rep   11/10/22 11:14 EST

## 2022-11-10 NOTE — TELEPHONE ENCOUNTER
I tried to call the pt and let him know that we can not call in Testosterone he will have to wait until he is seen in office. If he calls back please convey this message

## 2022-11-30 ENCOUNTER — HOSPITAL ENCOUNTER (EMERGENCY)
Dept: HOSPITAL 94 - ER | Age: 37
Discharge: LEFT BEFORE DISCHARGE | End: 2022-11-30
Payer: MEDICAID

## 2022-11-30 VITALS — BODY MASS INDEX: 27.47 KG/M2 | HEIGHT: 73 IN | WEIGHT: 207.23 LBS

## 2022-11-30 VITALS — SYSTOLIC BLOOD PRESSURE: 116 MMHG | DIASTOLIC BLOOD PRESSURE: 77 MMHG

## 2022-11-30 DIAGNOSIS — L03.011: Primary | ICD-10-CM

## 2022-11-30 PROCEDURE — 99283 EMERGENCY DEPT VISIT LOW MDM: CPT

## 2022-12-01 ENCOUNTER — OFFICE VISIT (OUTPATIENT)
Dept: UROLOGY | Facility: CLINIC | Age: 37
End: 2022-12-01

## 2022-12-01 VITALS — HEIGHT: 71 IN | WEIGHT: 222.66 LBS | BODY MASS INDEX: 31.17 KG/M2

## 2022-12-01 DIAGNOSIS — R79.89 LOW TESTOSTERONE: ICD-10-CM

## 2022-12-01 DIAGNOSIS — R53.83 OTHER FATIGUE: ICD-10-CM

## 2022-12-01 LAB
DEPRECATED RDW RBC AUTO: 39.6 FL (ref 37–54)
ERYTHROCYTE [DISTWIDTH] IN BLOOD BY AUTOMATED COUNT: 12.7 % (ref 12.3–15.4)
ESTRADIOL SERPL HS-MCNC: 43.7 PG/ML
HCT VFR BLD AUTO: 49.2 % (ref 37.5–51)
HGB BLD-MCNC: 17.3 G/DL (ref 13–17.7)
MCH RBC QN AUTO: 30 PG (ref 26.6–33)
MCHC RBC AUTO-ENTMCNC: 35.2 G/DL (ref 31.5–35.7)
MCV RBC AUTO: 85.3 FL (ref 79–97)
PLATELET # BLD AUTO: 194 10*3/MM3 (ref 140–450)
PMV BLD AUTO: 13.2 FL (ref 6–12)
PSA SERPL-MCNC: 0.39 NG/ML (ref 0–4)
RBC # BLD AUTO: 5.77 10*6/MM3 (ref 4.14–5.8)
TESTOST SERPL-MCNC: 562 NG/DL (ref 249–836)
WBC NRBC COR # BLD: 9.12 10*3/MM3 (ref 3.4–10.8)

## 2022-12-01 PROCEDURE — 85027 COMPLETE CBC AUTOMATED: CPT | Performed by: UROLOGY

## 2022-12-01 PROCEDURE — 84153 ASSAY OF PSA TOTAL: CPT | Performed by: UROLOGY

## 2022-12-01 PROCEDURE — 82670 ASSAY OF TOTAL ESTRADIOL: CPT | Performed by: UROLOGY

## 2022-12-01 PROCEDURE — 99214 OFFICE O/P EST MOD 30 MIN: CPT | Performed by: UROLOGY

## 2022-12-01 PROCEDURE — 84403 ASSAY OF TOTAL TESTOSTERONE: CPT | Performed by: UROLOGY

## 2022-12-01 RX ORDER — TESTOSTERONE CYPIONATE 200 MG/ML
INJECTION, SOLUTION INTRAMUSCULAR
Qty: 10 ML | Refills: 2 | Status: SHIPPED | OUTPATIENT
Start: 2022-12-01

## 2022-12-01 RX ORDER — TADALAFIL 5 MG/1
5 TABLET ORAL DAILY
Qty: 30 TABLET | Refills: 6 | Status: SHIPPED | OUTPATIENT
Start: 2022-12-01

## 2022-12-01 NOTE — PROGRESS NOTES
"Chief Complaint:    TRT    HPI:   37 y.o. male patient returns today for follow-up.  He has been on testosterone replacement therapy.  He reports a dramatic improvement in his KALLI questionnaire: -KALLI-androgen deficiency in the age male questionnaire. The patient was queried regarding the androgen deficiency in the age male questionnaire.  This is a validated questionnaire that was performed on a set of 314 [place] male physicians. When it was positive it correlated directly with a 94% chance of low testosterone.  Patient indicates there is a decrease in libido or sex drive, a lack of energy, decreased  strength and endurance, a decreased \"enjoyment of life\", sad and grumpy feelings with significant difficulty maintaining erections.  There has also been a recent deterioration regarding work performance. He reports weight loss.  He has good facility and the use of subcutaneous and intramuscular injections as well as comfort level and using the medication in a sterile fashion.  He understands he should use only the prescribed dose.  He is here for appropriate lab monitoring regarding this.  He understands this is a controlled substance and therefore must be watched closely, will not be refilled in the medical loss or miscalculation of the dose.  He is very happy with the treatment and therefore wants to continue it.    Past Medical History:     Past Medical History:   Diagnosis Date   • Allergic    • Allergic rhinitis    • Asthma    • Atrial flutter (HCC)    • Back pain    • Diarrhea    • Disturbance of skin sensation    • Elevated cholesterol    • GERD (gastroesophageal reflux disease)    • Gout    • History of MRI of spine 12/16/2014    Done at [facility]    • Hx of exercise stress test 2014    \"IT WAS NORMAL\"     • Hyperlipidemia    • Hypertension    • Kidney disease     stage 2   • Lymphadenitis    • Malaise and fatigue    • Osteoarthrosis    • Rash    • Renal disorder    • Right hip pain    • Sleep apnea     " "NO CPAP   • Wears glasses     TO READ       Current Meds:     Current Outpatient Medications   Medication Sig Dispense Refill   • Adult Aspirin Regimen 81 MG EC tablet      • albuterol sulfate  (90 Base) MCG/ACT inhaler Inhale 2 puffs Every 4 (Four) Hours As Needed for Shortness of Air. 2 inhaler 5   • allopurinol (ZYLOPRIM) 100 MG tablet TAKE 1 TABLET BY MOUTH TWICE DAILY 60 tablet 1   • amitriptyline (ELAVIL) 25 MG tablet TAKE 1 TABLET BY MOUTH AT NIGHT AS NEEDED FOR SLEEP 30 tablet 1   • anastrozole (ARIMIDEX) 1 MG tablet TAKE 1 TABLET BY MOUTH 1 TIME EVERY WEEK FOR 12 WEEKS 12 tablet 3   • azelastine (ASTELIN) 0.1 % nasal spray 2 sprays into the nostril(s) as directed by provider 2 (Two) Times a Day As Needed for Rhinitis. Use in each nostril as directed 1 each 5   • B-D INTEGRA SYRINGE 25G X 5/8\" 3 ML misc See Admin Instructions.  0   • carvedilol (COREG) 12.5 MG tablet      • cetirizine (zyrTEC) 10 MG tablet Take 1 tablet by mouth At Night As Needed for Allergies. 30 tablet 5   • cholecalciferol (VITAMIN D3) 25 MCG (1000 UT) tablet Take 1,000 Units by mouth Daily.     • citalopram (CeleXA) 20 MG tablet Take 1 tablet by mouth Daily. 90 tablet 2   • cloNIDine (Catapres) 0.1 MG tablet Take 1 tablet by mouth 2 (Two) Times a Day. 60 tablet 5   • clotrimazole-betamethasone (LOTRISONE) 1-0.05 % cream Apply  topically to the appropriate area as directed Every 12 (Twelve) Hours. 45 g 2   • Flovent HFA 44 MCG/ACT inhaler INHALE 2 PUFFS BY MOUTH TWICE DAILY 10.6 g 5   • fluticasone (FLONASE) 50 MCG/ACT nasal spray SHAKE LIQUID AND USE 2 SPRAYS IN EACH NOSTRIL DAILY AS DIRECTED 32 g 5   • magnesium citrate 1.745 GM/30ML solution solution Take 296 mL by mouth Take As Directed for 2 doses. Take one full bottle at 4:00 PM and take second bottle at 10:00 PM. 592 mL 0   • methocarbamol (ROBAXIN) 750 MG tablet Take 1 tablet by mouth 4 (Four) Times a Day As Needed for Muscle Spasms. Take on days he works in place of " "flexeril 120 tablet 5   • montelukast (SINGULAIR) 10 MG tablet Take 1 tablet by mouth Every Night. 30 tablet 5   • nabumetone (RELAFEN) 500 MG tablet TAKE 1 TABLET BY MOUTH TWICE DAILY AS NEEDED FOR MILD PAIN OR MODERATE PAIN 60 tablet 2   • naproxen (NAPROSYN) 500 MG tablet      • O2 (OXYGEN) Inhale 2 L/min 1 (One) Time.     • permethrin (ELIMITE) 5 % cream APPLY TOPICALLY TO THE APPROPRIATE AREA AS DIRECTED 60 g 0   • rosuvastatin (CRESTOR) 5 MG tablet      • Senexon-S 8.6-50 MG per tablet      • sulfaSALAzine (AZULFIDINE) 500 MG tablet Take 1 tablet by mouth 4 (Four) Times a Day. Written by rheumatology , started back on his script 60 tablet 5   • Suprep Bowel Prep Kit 17.5-3.13-1.6 GM/177ML solution oral solution Dispense one Kit        Sig: Used as Directed 175 mL 0   • Syringe 25G X 5/8\" 3 ML misc Use as directed 2 x weekly 24 each 3   • Syringe 25G X 5/8\" 3 ML misc Use as directed 2 x weekly 24 each 3   • tadalafil (CIALIS) 5 MG tablet Take 1 tablet by mouth Daily. 30 tablet 6   • Testosterone Cypionate (Depo-Testosterone) 200 MG/ML injection He is to use 1/2 cc every Monday and Thursday SQ 10 mL 2   • traMADol (ULTRAM) 50 MG tablet Take 1 tablet by mouth Every 6 (Six) Hours As Needed for Moderate Pain . 60 tablet 3   • valsartan-hydrochlorothiazide (DIOVAN-HCT) 160-25 MG per tablet TAKE 1 TABLET BY MOUTH DAILY 30 tablet 5     No current facility-administered medications for this visit.        Allergies:      Allergies   Allergen Reactions   • Erythromycin Rash        Past Surgical History:     Past Surgical History:   Procedure Laterality Date   • ABDOMINAL SURGERY     • BRAVO PROCEDURE N/A 8/4/2017    Procedure: ESOPHAGOGASTRODUODENOSCOPY AND FULLER;  Surgeon: Efra Suero MD;  Location: [facility] OR;  Service:    • BRAVO PROCEDURE N/A 12/19/2018    Procedure: ESOPHAGOGASTRODUODENOSCOPY AND FULLER;  Surgeon: Efra Suero MD;  Location: [facility] OR;  Service: Gastroenterology   • CHOLECYSTECTOMY N/A " 12/17/2021    Procedure: CHOLECYSTECTOMY LAPAROSCOPIC;  Surgeon: Nghia Miller MD;  Location: [facility] OR;  Service: General;  Laterality: N/A;   • COLONOSCOPY  2014   • COLONOSCOPY      UNSURE OF DATE   • COLONOSCOPY N/A 5/3/2018    Procedure: COLONOSCOPY WITH COLD BIOPSIES AND COLD POLYPECTOMIES;  Surgeon: Bang Segundo MD;  Location: [facility] ENDOSCOPY;  Service: Gastroenterology   • ENDOSCOPY     • ENDOSCOPY N/A 3/9/2020    Procedure: ESOPHAGOGASTRODUODENOSCOPY WITH BIOPSY CPT CODE: 38079;  Surgeon: Ricardo Nolasco MD;  Location: [facility] OR;  Service: Gastroenterology;  Laterality: N/A;   • HIP SURGERY Right    • NISSEN FUNDOPLICATION     • NISSEN FUNDOPLICATION N/A 10/11/2017    Procedure: NISSEN FUNDOPLICATION LAPAROSCOPIC;  Surgeon: Efra Suero MD;  Location: [facility] OR;  Service:    • WI LAP,ESOPHAGOGAST FUNDOPLASTY N/A 10/11/2017    Procedure: HIATAL HERNIA REPAIR LAPAROSCOPIC;  Surgeon: Efra Suero MD;  Location: [facility] OR;  Service: General   • VASECTOMY         Social History:     Social History     Socioeconomic History   • Marital status:      Spouse name: Radha    • Number of children: 2   Tobacco Use   • Smoking status: Never   • Smokeless tobacco: Never   Vaping Use   • Vaping Use: Never used   Substance and Sexual Activity   • Alcohol use: Not Currently     Comment: social   • Drug use: Yes     Types: Marijuana   • Sexual activity: Yes     Partners: Female     Birth control/protection: Surgical       Family History:     Family History   Problem Relation Age of Onset   • Asthma Other    • Cancer Other    • COPD Other    • Diabetes Other    • Stroke Other    • Asthma Mother    • COPD Mother    • Asthma Father    • Depression Father    • Drug abuse Father    • Arthritis Maternal Grandmother    • Cancer Maternal Grandmother    • Diabetes Maternal Grandmother    • Heart disease Maternal Grandmother    • Hyperlipidemia Maternal Grandmother    • Stroke Paternal Grandfather     • Colon cancer Maternal Grandfather         mid 40s       Review of Systems:     Review of Systems   Constitutional: Negative.    HENT: Negative.    Eyes: Negative.    Respiratory: Negative.    Cardiovascular: Negative.    Gastrointestinal: Negative.    Endocrine: Negative.    Musculoskeletal: Negative.    Allergic/Immunologic: Negative.    Neurological: Negative.    Hematological: Negative.    Psychiatric/Behavioral: Negative.        Physical Exam:     Physical Exam  Vitals and nursing note reviewed.   Constitutional:       Appearance: He is well-developed.   HENT:      Head: Normocephalic and atraumatic.   Eyes:      Conjunctiva/sclera: Conjunctivae normal.      Pupils: Pupils are equal, round, and reactive to light.   Cardiovascular:      Rate and Rhythm: Normal rate and regular rhythm.      Heart sounds: Normal heart sounds.   Pulmonary:      Effort: Pulmonary effort is normal.      Breath sounds: Normal breath sounds.   Abdominal:      General: Bowel sounds are normal.      Palpations: Abdomen is soft.   Musculoskeletal:         General: Normal range of motion.      Cervical back: Normal range of motion.   Skin:     General: Skin is warm and dry.   Neurological:      Mental Status: He is alert and oriented to person, place, and time.      Deep Tendon Reflexes: Reflexes are normal and symmetric.   Psychiatric:         Behavior: Behavior normal.         Thought Content: Thought content normal.         Judgment: Judgment normal.         I have reviewed the following portions of the patient's history: Allergies, current medications, past family history, past medical history, past social history, past surgical history, problem list, and ROS and confirm it is accurate.    Recent Image (CT and/or KUB):      CT Abdomen and Pelvis: No results found for this or any previous visit.       CT Stone Protocol: No results found for this or any previous visit.       KUB: No results found for this or any previous visit.        Labs (past 3 months):      No visits with results within 3 Month(s) from this visit.   Latest known visit with results is:   Office Visit on 05/02/2022   Component Date Value Ref Range Status   • Testosterone, Total 05/02/2022 550.00  249.00 - 836.00 ng/dL Final   • PSA 05/02/2022 0.354  0.000 - 4.000 ng/mL Final   • Estradiol 05/02/2022 24.4  pg/mL Final   • WBC 05/02/2022 5.89  3.40 - 10.80 10*3/mm3 Final   • RBC 05/02/2022 4.95  4.14 - 5.80 10*6/mm3 Final   • Hemoglobin 05/02/2022 15.6  13.0 - 17.7 g/dL Final   • Hematocrit 05/02/2022 44.4  37.5 - 51.0 % Final   • MCV 05/02/2022 89.7  79.0 - 97.0 fL Final   • MCH 05/02/2022 31.5  26.6 - 33.0 pg Final   • MCHC 05/02/2022 35.1  31.5 - 35.7 g/dL Final   • RDW 05/02/2022 13.5  12.3 - 15.4 % Final   • RDW-SD 05/02/2022 43.5  37.0 - 54.0 fl Final   • MPV 05/02/2022 12.3 (H)  6.0 - 12.0 fL Final   • Platelets 05/02/2022 191  140 - 450 10*3/mm3 Final        Procedure:       Assessment/Plan:   Low testosterone: Patient is here for follow-up.  Since beginning the medication, he has been very pleased.  He reports a dramatic improvement in his erections, ability to achieve and maintain an erection, improvement in libido, increase in frequency of morning erections, and a noticeable weight loss consistent with the treatment.   He is going to have appropriate safety laboratory parameters checked.   He understands that the new data implicates testosterone with the development of prostate cancer and this is all but been disproven and the medical literature as well as the risks of cardiovascular disease which has actually also been disproven.  He understands that while he is a candidate for topical therapy if he is in contact with children this is not an option because it has been shown to accentuate genitalia development at an early age that is frequently irreversible.  He also understands this it is a controlled substance and as such will not be prescribed without  appropriate follow-up and appropriate laboratory investigation.  He understands effects on spermatogenesis including the fact that this is not always completely reversible and not always completely limited his ability to father a child.  He has demonstrated facility in the technique of both intramuscular and subcutaneous injection and has been taught sterility when drawing up the medication.    Erectile dysfunction-we discussed the anatomy and physiology of the penis and the endothelium.  We discussed the various forms of erectile dysfunction including peripheral vascular occlusive disease, postoperative, secondary to radiation treatments of the prostate, and arterial inflow.  We discussed the various treatment options available including oral medication and its various forms.  We discussed the use of both generic and non-generic Viagra.  We discussed Cialis and a longer half-life of 17 hours as well as the other 2 medications.  We discussed cost involved with this including the fact that the generic is much cheaper but is taken as multiple pills because they are 20 mg dosages.  We did discuss the other alternatives including penile injections, vacuum erection devices, and surgical intervention reserved for only the most severe cases.  We discussed the need for testosterone in about 20% of cases of erectile dysfunction.  Continue PDE-5 inhibition    Hyperestrogenism-we spoke about the role of estrogen metabolism and breakdown in the  presence of testosterone replacement therapy.  We spoke about how high estradiol levels can interfere with the improvement noted in a man on testosterone as well as significant side effects such as pseudogynecomastia.  We discussed the use of the medication Arimidex used in an off label setting and using a very judicious low-dose fashion to prevent too low of an estradiol which would precipitate bone complications.  Going to check an estradiol level.  He is at higher risk for breast  problems due to his replacement    Polycythemia-I am going to check a CBC to rule out hemoglobin changes.  We utilized the American Heart Association guidelines for polycythemia which is a hemoglobin greater than 18 and a hematocrit greater than 54.5.  Recommend therapeutic phlebotomy as the treatment.  It is important that we indicate that is the most likely cause of the polycythemia.  We also discussed the possibility of decreasing the dose of testosterone and of stopping it altogether.    Controlled substance-he understands this is a controlled substance and as such is regulated under the state.  I cannot refill it outside the prescribed window.  I stressed the importance of follow-up and appropriate laboratory parameters monitoring.    Liver function tests-according to the AUA guidelines we will not check liver functions due to the fact this is not an oral alkylated testosterone              This document has been electronically signed by STEPHEN BANGURA MD December 1, 2022 08:31 EST    Dictated Utilizing Dragon Dictation: Part of this note may be an electronic transcription/translation of spoken language to printed text using the Dragon Dictation System.

## 2023-01-31 RX ORDER — CARVEDILOL 12.5 MG/1
12.5 TABLET ORAL 2 TIMES DAILY
Qty: 90 TABLET | Refills: 3 | Status: SHIPPED | OUTPATIENT
Start: 2023-01-31

## 2023-01-31 RX ORDER — CARVEDILOL 3.12 MG/1
TABLET ORAL
Qty: 180 TABLET | Refills: 3 | OUTPATIENT
Start: 2023-01-31

## 2023-01-31 NOTE — TELEPHONE ENCOUNTER
Called patient back today and he said, he is still taking the Carvedilol. Let Dr. Gutierrez know and he said to refill the patients Carvedilol at the current dose.

## 2023-01-31 NOTE — TELEPHONE ENCOUNTER
helenak to the patient regarding the Carvedilol and he states he is no longer taking this medication. Sent the message to Dr. Gutierrez.

## 2023-03-24 DIAGNOSIS — E28.0 ESTRADIOL EXCESS: ICD-10-CM

## 2023-03-24 RX ORDER — ANASTROZOLE 1 MG/1
TABLET ORAL
Qty: 12 TABLET | Refills: 3 | Status: SHIPPED | OUTPATIENT
Start: 2023-03-24

## 2023-04-07 DIAGNOSIS — R53.83 OTHER FATIGUE: ICD-10-CM

## 2023-04-10 RX ORDER — TADALAFIL 5 MG/1
5 TABLET ORAL DAILY
Qty: 30 TABLET | Refills: 6 | Status: SHIPPED | OUTPATIENT
Start: 2023-04-10

## 2023-04-13 ENCOUNTER — TELEPHONE (OUTPATIENT)
Dept: UROLOGY | Facility: CLINIC | Age: 38
End: 2023-04-13
Payer: COMMERCIAL

## 2023-04-13 NOTE — TELEPHONE ENCOUNTER
PA for Tadalafil was sent to patient's insurance company, currently waiting for a reply back.               Sent to Plan today  Drug  Tadalafil 5MG tablets  Form  [employer] [place] Medicaid ePA Form 2017 NCPDP  Original Claim Info  75 TRANS FEE = 0.00PA REQUIRED CALL [phone] FOR ASSISTANCE

## 2023-05-30 ENCOUNTER — OFFICE VISIT (OUTPATIENT)
Dept: CARDIOLOGY | Facility: CLINIC | Age: 38
End: 2023-05-30

## 2023-05-30 VITALS
BODY MASS INDEX: 28.46 KG/M2 | HEIGHT: 70 IN | OXYGEN SATURATION: 97 % | DIASTOLIC BLOOD PRESSURE: 93 MMHG | WEIGHT: 198.8 LBS | SYSTOLIC BLOOD PRESSURE: 142 MMHG | HEART RATE: 80 BPM

## 2023-05-30 DIAGNOSIS — R06.09 DYSPNEA ON EXERTION: ICD-10-CM

## 2023-05-30 DIAGNOSIS — I48.0 AF (PAROXYSMAL ATRIAL FIBRILLATION): Chronic | ICD-10-CM

## 2023-05-30 DIAGNOSIS — E78.2 MIXED HYPERLIPIDEMIA: Primary | Chronic | ICD-10-CM

## 2023-05-30 DIAGNOSIS — I10 ESSENTIAL HYPERTENSION: Chronic | ICD-10-CM

## 2023-05-30 PROCEDURE — 3077F SYST BP >= 140 MM HG: CPT | Performed by: NURSE PRACTITIONER

## 2023-05-30 PROCEDURE — 99214 OFFICE O/P EST MOD 30 MIN: CPT | Performed by: NURSE PRACTITIONER

## 2023-05-30 PROCEDURE — 3080F DIAST BP >= 90 MM HG: CPT | Performed by: NURSE PRACTITIONER

## 2023-05-30 NOTE — PROGRESS NOTES
"Chief Complaint  No chief complaint on file.    Subjective          Nghia Roach presents to [facility] for follow up.    History of Present Illness    Mr. Roach was last seen in clinic on 3/9/2021 by Dr. Hoang.  He was and emergency room follow-up for elevated blood pressure.  He reported associated symptom of chest tightness.  An echocardiogram and stress test were ordered.  Patient did not follow through and have either the stress or the echocardiogram.    He presents today at request of his PCP for evaluation of shortness of breath.  He reports that he had been getting short of breath with little or no exertion.  Since that time he has discovered that he has multiple food allergies and has worked to eliminate the allergens from his diet.  His shortness of breath has decreased significantly as a result of these changes.    He states that his blood pressures at home are usually well controlled with most pressures being under 130 systolically and under 80 diastolically.  He self discontinued valsartan/HCTZ because he was having quite a few blood pressure readings less than 100 systolically.  He takes clonidine only as needed and has rarely taken this medication.  He takes carvedilol regularly.    Objective     Vital Signs:   /93 (BP Location: Left arm, Patient Position: Sitting, Cuff Size: Adult)   Pulse 80   Ht 177.8 cm (70\")   Wt 90.2 kg (198 lb 12.8 oz)   SpO2 97%   BMI 28.52 kg/m²       Physical Exam  Vitals reviewed.   Constitutional:       Appearance: Normal appearance. He is well-developed.   Cardiovascular:      Rate and Rhythm: Normal rate and regular rhythm.      Heart sounds: No murmur heard.    No friction rub. No gallop.   Pulmonary:      Effort: Pulmonary effort is normal. No respiratory distress.      Breath sounds: Wheezing (end expiratory) present. No rales.   Skin:     General: Skin is warm and dry.   Neurological:      Mental Status: He is alert " "and oriented to person, place, and time.   Psychiatric:         Mood and Affect: Mood normal.         Behavior: Behavior normal.          Result Review :                Current Outpatient Medications   Medication Sig Dispense Refill   • albuterol sulfate  (90 Base) MCG/ACT inhaler Inhale 2 puffs Every 4 (Four) Hours As Needed for Shortness of Air. 2 inhaler 5   • allopurinol (ZYLOPRIM) 100 MG tablet TAKE 1 TABLET BY MOUTH TWICE DAILY 60 tablet 1   • amitriptyline (ELAVIL) 25 MG tablet TAKE 1 TABLET BY MOUTH AT NIGHT AS NEEDED FOR SLEEP 30 tablet 1   • anastrozole (ARIMIDEX) 1 MG tablet TAKE 1 TABLET BY MOUTH 1 TIME EVERY WEEK FOR 12 WEEKS 12 tablet 3   • azelastine (ASTELIN) 0.1 % nasal spray 2 sprays into the nostril(s) as directed by provider 2 (Two) Times a Day As Needed for Rhinitis. Use in each nostril as directed 1 each 5   • B-D INTEGRA SYRINGE 25G X 5/8\" 3 ML misc See Admin Instructions.  0   • carvedilol (COREG) 12.5 MG tablet Take 1 tablet by mouth 2 (Two) Times a Day. 90 tablet 3   • cholecalciferol (VITAMIN D3) 25 MCG (1000 UT) tablet Take 1 tablet by mouth Daily.     • cloNIDine (Catapres) 0.1 MG tablet Take 1 tablet by mouth 2 (Two) Times a Day. 60 tablet 5   • clotrimazole-betamethasone (LOTRISONE) 1-0.05 % cream Apply  topically to the appropriate area as directed Every 12 (Twelve) Hours. 45 g 2   • Flovent HFA 44 MCG/ACT inhaler INHALE 2 PUFFS BY MOUTH TWICE DAILY 10.6 g 5   • fluticasone (FLONASE) 50 MCG/ACT nasal spray SHAKE LIQUID AND USE 2 SPRAYS IN EACH NOSTRIL DAILY AS DIRECTED 32 g 5   • magnesium citrate 1.745 GM/30ML solution solution Take 296 mL by mouth Take As Directed for 2 doses. Take one full bottle at 4:00 PM and take second bottle at 10:00 PM. 592 mL 0   • methocarbamol (ROBAXIN) 750 MG tablet Take 1 tablet by mouth 4 (Four) Times a Day As Needed for Muscle Spasms. Take on days he works in place of flexeril 120 tablet 5   • montelukast (SINGULAIR) 10 MG tablet Take 1 tablet " "by mouth Every Night. 30 tablet 5   • nabumetone (RELAFEN) 500 MG tablet TAKE 1 TABLET BY MOUTH TWICE DAILY AS NEEDED FOR MILD PAIN OR MODERATE PAIN 60 tablet 2   • naproxen (NAPROSYN) 500 MG tablet      • O2 (OXYGEN) Inhale 2 L/min 1 (One) Time.     • permethrin (ELIMITE) 5 % cream APPLY TOPICALLY TO THE APPROPRIATE AREA AS DIRECTED 60 g 0   • rosuvastatin (CRESTOR) 5 MG tablet      • Senexon-S 8.6-50 MG per tablet      • sulfaSALAzine (AZULFIDINE) 500 MG tablet Take 1 tablet by mouth 4 (Four) Times a Day. Written by rheumatology , started back on his script 60 tablet 5   • Syringe 25G X 5/8\" 3 ML misc Use as directed 2 x weekly 24 each 3   • Syringe 25G X 5/8\" 3 ML misc Use as directed 2 x weekly 24 each 3   • tadalafil (CIALIS) 5 MG tablet TAKE 1 TABLET BY MOUTH DAILY 30 tablet 6   • Testosterone Cypionate (Depo-Testosterone) 200 MG/ML injection He is to use 1/2 cc every Monday and Thursday SQ 10 mL 2   • Adult Aspirin Regimen 81 MG EC tablet  (Patient not taking: Reported on 5/30/2023)     • cetirizine (zyrTEC) 10 MG tablet Take 1 tablet by mouth At Night As Needed for Allergies. (Patient not taking: Reported on 5/30/2023) 30 tablet 5   • citalopram (CeleXA) 20 MG tablet Take 1 tablet by mouth Daily. (Patient not taking: Reported on 5/30/2023) 90 tablet 2     No current facility-administered medications for this visit.            Assessment and Plan    Problem List Items Addressed This Visit        Cardiac and Vasculature    Mixed hyperlipidemia - Primary (Chronic)    Essential hypertension (Chronic)    AF (paroxysmal atrial fibrillation) (Chronic)   Other Visit Diagnoses     Dyspnea on exertion        Relevant Orders    Adult Transthoracic Echo Complete W/ Cont if Necessary Per Protocol              Follow Up     Medications were reviewed with the patient.    Patient does report a past history of paroxysmal atrial fibrillation.  [id number] Score: 1 (5/30/2023  3:58 PM)    Continue carvedilol for hypertension. "  I have asked him to keep a blood pressure diary.    Continue rosuvastatin for hyperlipidemia.  Labs requested from PCP office.    With regard to his dyspnea, I have ordered echocardiogram for him.    Return in about 4 months (around 9/30/2023).    Patient was given instructions and counseling regarding his condition or for health maintenance advice. Please see specific information pulled into the AVS if appropriate.

## 2023-06-08 DIAGNOSIS — R79.89 LOW TESTOSTERONE: ICD-10-CM

## 2023-06-08 RX ORDER — TESTOSTERONE CYPIONATE 200 MG/ML
INJECTION, SOLUTION INTRAMUSCULAR
Qty: 10 ML | Refills: 2 | OUTPATIENT
Start: 2023-06-08

## 2023-06-08 NOTE — TELEPHONE ENCOUNTER
Caller: Radha Roach    Relationship: Emergency Contact    Best call back number: [phone]    Requested Prescriptions:   Requested Prescriptions     Pending Prescriptions Disp Refills    Testosterone Cypionate (Depo-Testosterone) 200 MG/ML injection 10 mL 2     Sig: He is to use 1/2 cc every Monday and Thursday         Pharmacy where request should be sent:    [facility] [id number] [facility] [address] - [phone]  - [phone]       Last office visit with prescribing clinician: 12/1/2022   Last telemedicine visit with prescribing clinician: Visit date not found   Next office visit with prescribing clinician: 8/1/2023     Additional details provided by patient: PATIENT HAS ONLY 2 SHOTS LEFT.     Does the patient have less than a 3 day supply:  [x] Yes  [] No    Would you like a call back once the refill request has been completed: [x] Yes [] No    If the office needs to give you a call back, can they leave a voicemail: [x] Yes [] No

## 2023-07-23 NOTE — PROGRESS NOTES
Subjective   Nghia Roach is a 32 y.o. male.     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  Paperwork was filled out and scanned into this chart.    The following portions of the patient's history were reviewed and updated as appropriate: allergies, current medications, past family history, past medical history, past social history, past surgical history and problem list.    Review of Systems   Constitutional: Negative for activity change, appetite change and fever.   HENT: Negative for ear pain, sinus pressure and sore throat.    Eyes: Negative for pain and visual disturbance.   Respiratory: Negative for cough and chest tightness.    Cardiovascular: Negative for chest pain and palpitations.   Gastrointestinal: Negative for abdominal pain, constipation, diarrhea, nausea and vomiting.   Endocrine: Negative for polydipsia and polyuria.   Genitourinary: Negative for dysuria and frequency.   Musculoskeletal: Negative for back pain and myalgias.   Skin: Negative for color change and rash.   Allergic/Immunologic: Negative for food allergies and immunocompromised state.   Neurological: Negative for dizziness, syncope and headaches.   Hematological: Negative for adenopathy. Does not bruise/bleed easily.   Psychiatric/Behavioral: Negative for hallucinations and suicidal ideas. The patient is not nervous/anxious.        Objective   Physical Exam   Constitutional: He is oriented to person, place, and time. He appears well-developed and well-nourished.   HENT:   Head: Normocephalic and atraumatic.   Nose: Nose normal.   Mouth/Throat: Oropharynx is clear and moist.   Eyes: Conjunctivae and EOM are normal. Pupils are equal, round, and reactive to light.   Neck: Normal range of motion. Neck supple. No tracheal deviation present. No thyromegaly present.   Cardiovascular: Normal rate, regular rhythm, normal heart sounds and intact distal pulses.    No murmur heard.  Pulmonary/Chest:  Effort normal and breath sounds normal. No respiratory distress. He has no wheezes.   Abdominal: Soft. Bowel sounds are normal. There is no tenderness. There is no guarding.   Musculoskeletal: Normal range of motion. He exhibits no edema or tenderness.   Lymphadenopathy:     He has no cervical adenopathy.   Neurological: He is alert and oriented to person, place, and time.   Skin: Skin is warm and dry. No rash noted.   Psychiatric: He has a normal mood and affect. His behavior is normal.   Nursing note and vitals reviewed.      Assessment/Plan   Diagnoses and all orders for this visit:    Encounter for CDL (commercial driving license) exam  -     POC Urinalysis Dipstick    He passed for 1 year without restrictions.  He was limited to 1 year secondary to controlled hypertension and chronic kidney disease stage II.            Normal for race

## 2023-07-24 RX ORDER — DEXLANSOPRAZOLE 60 MG/1
60 CAPSULE, DELAYED RELEASE ORAL
Qty: 30 CAPSULE | Refills: 11 | Status: SHIPPED | OUTPATIENT
Start: 2023-07-24

## 2023-08-21 ENCOUNTER — HOSPITAL ENCOUNTER (OUTPATIENT)
Dept: GENERAL RADIOLOGY | Facility: HOSPITAL | Age: 38
Discharge: HOME | End: 2023-08-21
Payer: COMMERCIAL

## 2023-09-19 ENCOUNTER — OFFICE VISIT (OUTPATIENT)
Dept: CARDIOLOGY | Facility: CLINIC | Age: 38
End: 2023-09-19
Payer: COMMERCIAL

## 2023-09-19 VITALS
SYSTOLIC BLOOD PRESSURE: 146 MMHG | WEIGHT: 208.4 LBS | HEART RATE: 73 BPM | BODY MASS INDEX: 29.84 KG/M2 | DIASTOLIC BLOOD PRESSURE: 81 MMHG | OXYGEN SATURATION: 97 % | HEIGHT: 70 IN

## 2023-09-19 DIAGNOSIS — R10.13 DYSPEPSIA: ICD-10-CM

## 2023-09-19 DIAGNOSIS — I10 ESSENTIAL HYPERTENSION: Chronic | ICD-10-CM

## 2023-09-19 DIAGNOSIS — E78.2 MIXED HYPERLIPIDEMIA: Primary | Chronic | ICD-10-CM

## 2023-09-19 DIAGNOSIS — I48.0 AF (PAROXYSMAL ATRIAL FIBRILLATION): Chronic | ICD-10-CM

## 2023-09-19 PROCEDURE — 3077F SYST BP >= 140 MM HG: CPT | Performed by: INTERNAL MEDICINE

## 2023-09-19 PROCEDURE — 99213 OFFICE O/P EST LOW 20 MIN: CPT | Performed by: INTERNAL MEDICINE

## 2023-09-19 PROCEDURE — 3079F DIAST BP 80-89 MM HG: CPT | Performed by: INTERNAL MEDICINE

## 2023-09-19 NOTE — PROGRESS NOTES
"     [facility] CARDIOLOGY  [address]  [place] [address]  Phone: [phone]  Fax: [phone]    09/19/2023    Chief Complaint   Patient presents with    Follow-up     Pt denies CP, mild SOA,hand swelling,some fatigue    Med Review     Tolerating all current medications.        History:     Nghia Roach is a 38 y.o. male presenting for  follow-up evaluation   Clinically stable from cardiac standpoint   Symptoms:  CP no  SOB stable    Orthopnea No  Lower extremity edema no   Palpitations no   Compliant with medications no  Claudication no      Past Medical History:   Diagnosis Date    Allergic     Allergic rhinitis     Asthma     Atrial flutter     Back pain     Diarrhea     Disturbance of skin sensation     Elevated cholesterol     GERD (gastroesophageal reflux disease)     Gout     History of MRI of spine 12/16/2014    Done at [facility] Hx of exercise stress test 2014    \"IT WAS NORMAL\"      Hyperlipidemia     Hypertension     Kidney disease     stage 2    Lymphadenitis     Malaise and fatigue     Osteoarthrosis     Rash     Renal disorder     Right hip pain     Sleep apnea     NO CPAP    Wears glasses     TO READ       Past Surgical History:   Procedure Laterality Date    ABDOMINAL SURGERY      BRAVO PROCEDURE N/A 8/4/2017    Procedure: ESOPHAGOGASTRODUODENOSCOPY AND FULLER;  Surgeon: Efra Suero MD;  Location: [facility];  Service:     BRAVO PROCEDURE N/A 12/19/2018    Procedure: ESOPHAGOGASTRODUODENOSCOPY AND FULLER;  Surgeon: Efra Suero MD;  Location: [facility] OR;  Service: Gastroenterology    CHOLECYSTECTOMY N/A 12/17/2021    Procedure: CHOLECYSTECTOMY LAPAROSCOPIC;  Surgeon: Nghia Miller MD;  Location: [facility] OR;  Service: General;  Laterality: N/A;    COLONOSCOPY  2014    COLONOSCOPY      UNSURE OF DATE    COLONOSCOPY N/A 5/3/2018    Procedure: COLONOSCOPY WITH COLD BIOPSIES AND COLD POLYPECTOMIES;  Surgeon: Bang Segundo MD;  Location: [facility]" ENDOSCOPY;  Service: Gastroenterology    ENDOSCOPY      ENDOSCOPY N/A 3/9/2020    Procedure: ESOPHAGOGASTRODUODENOSCOPY WITH BIOPSY CPT CODE: 30107;  Surgeon: Ricardo Nolasco MD;  [facility] OR;  Service: Gastroenterology;  Laterality: N/A;    HIP SURGERY Right     NISSEN FUNDOPLICATION      NISSEN FUNDOPLICATION N/A 10/11/2017    Procedure: NISSEN FUNDOPLICATION LAPAROSCOPIC;  Surgeon: Efra Suero MD;  Location:  [facility] OR;  Service:     NV LAPS SURG ESOPG/GSTR FUNDOPLASTY N/A 10/11/2017    Procedure: HIATAL HERNIA REPAIR LAPAROSCOPIC;  Surgeon: Efra Suero MD;  Location:  [facility] OR;  Service: General    VASECTOMY          Past Social History:  Social History     Socioeconomic History    Marital status:      Spouse name: Radha     Number of children: 2   Tobacco Use    Smoking status: Never    Smokeless tobacco: Never   Vaping Use    Vaping Use: Never used   Substance and Sexual Activity    Alcohol use: Not Currently     Comment: social    Drug use: Yes     Types: Marijuana    Sexual activity: Yes     Partners: Female     Birth control/protection: Surgical       Past Family History:  Family History   Problem Relation Age of Onset    Asthma Other     Cancer Other     COPD Other     Diabetes Other     Stroke Other     Asthma Mother     COPD Mother     Asthma Father     Depression Father     Drug abuse Father     Arthritis Maternal Grandmother     Cancer Maternal Grandmother     Diabetes Maternal Grandmother     Heart disease Maternal Grandmother     Hyperlipidemia Maternal Grandmother     Stroke Paternal Grandfather     Colon cancer Maternal Grandfather         mid 40s       Review of Systems:   ROS       Current Outpatient Medications   Medication Sig Dispense Refill    Adult Aspirin Regimen 81 MG EC tablet       albuterol sulfate  (90 Base) MCG/ACT inhaler Inhale 2 puffs Every 4 (Four) Hours As Needed for Shortness of Air. 2 inhaler 5    allopurinol (ZYLOPRIM) 100 MG tablet  "TAKE 1 TABLET BY MOUTH TWICE DAILY 60 tablet 1    amitriptyline (ELAVIL) 25 MG tablet TAKE 1 TABLET BY MOUTH AT NIGHT AS NEEDED FOR SLEEP 30 tablet 1    anastrozole (ARIMIDEX) 1 MG tablet TAKE 1 TABLET BY MOUTH 1 TIME EVERY WEEK FOR 12 WEEKS 12 tablet 3    azelastine (ASTELIN) 0.1 % nasal spray 2 sprays into the nostril(s) as directed by provider 2 (Two) Times a Day As Needed for Rhinitis. Use in each nostril as directed 1 each 5    B-D INTEGRA SYRINGE 25G X 5/8\" 3 ML misc See Admin Instructions.  0    cetirizine (zyrTEC) 10 MG tablet Take 1 tablet by mouth At Night As Needed for Allergies. 30 tablet 5    cholecalciferol (VITAMIN D3) 25 MCG (1000 UT) tablet Take 1 tablet by mouth Daily.      cloNIDine (Catapres) 0.1 MG tablet Take 1 tablet by mouth 2 (Two) Times a Day. 60 tablet 5    clotrimazole-betamethasone (LOTRISONE) 1-0.05 % cream Apply  topically to the appropriate area as directed Every 12 (Twelve) Hours. 45 g 2    dexlansoprazole (DEXILANT) 60 MG capsule Take 1 capsule by mouth Every Morning Before Breakfast. 30 capsule 11    Flovent HFA 44 MCG/ACT inhaler INHALE 2 PUFFS BY MOUTH TWICE DAILY 10.6 g 5    fluticasone (FLONASE) 50 MCG/ACT nasal spray SHAKE LIQUID AND USE 2 SPRAYS IN EACH NOSTRIL DAILY AS DIRECTED 32 g 5    magnesium citrate 1.745 GM/30ML solution solution Take 296 mL by mouth Take As Directed for 2 doses. Take one full bottle at 4:00 PM and take second bottle at 10:00 PM. 592 mL 0    methocarbamol (ROBAXIN) 750 MG tablet Take 1 tablet by mouth 4 (Four) Times a Day As Needed for Muscle Spasms. Take on days he works in place of flexeril 120 tablet 5    montelukast (SINGULAIR) 10 MG tablet Take 1 tablet by mouth Every Night. 30 tablet 5    nabumetone (RELAFEN) 500 MG tablet TAKE 1 TABLET BY MOUTH TWICE DAILY AS NEEDED FOR MILD PAIN OR MODERATE PAIN 60 tablet 2    naproxen (NAPROSYN) 500 MG tablet       O2 (OXYGEN) Inhale 2 L/min 1 (One) Time.      sulfaSALAzine (AZULFIDINE) 500 MG tablet Take 1 " "tablet by mouth 4 (Four) Times a Day. Written by rheumatology , started back on his script 60 tablet 5    Syringe 25G X 5/8\" 3 ML misc Use as directed 2 x weekly 24 each 3    Syringe 25G X 5/8\" 3 ML misc Use as directed 2 x weekly 24 each 3    tadalafil (CIALIS) 5 MG tablet Take 1 tablet by mouth Daily. 30 tablet 6    Testosterone Cypionate (Depo-Testosterone) 200 MG/ML injection He is to use 1/2 cc every Monday and Thursday SQ 10 mL 2    carvedilol (COREG) 12.5 MG tablet Take 1 tablet by mouth 2 (Two) Times a Day. (Patient not taking: Reported on 9/19/2023) 90 tablet 3    citalopram (CeleXA) 20 MG tablet Take 1 tablet by mouth Daily. (Patient not taking: Reported on 9/19/2023) 90 tablet 2    permethrin (ELIMITE) 5 % cream APPLY TOPICALLY TO THE APPROPRIATE AREA AS DIRECTED (Patient not taking: Reported on 9/19/2023) 60 g 0    rosuvastatin (CRESTOR) 5 MG tablet  (Patient not taking: Reported on 9/19/2023)      Senexon-S 8.6-50 MG per tablet  (Patient not taking: Reported on 9/19/2023)       No current facility-administered medications for this visit.        Allergies   Allergen Reactions    Erythromycin Rash    Nuts Shortness Of Breath     Shortness of breath , neck swelling     Eggs Or Egg-Derived Products Nausea And Vomiting    Milk-Related Compounds GI Intolerance    Soybean-Containing Drug Products Swelling    Wheat GI Intolerance       Objective     /81   Pulse 73   Ht 177.8 cm (70\")   Wt 94.5 kg (208 lb 6.4 oz)   SpO2 97%   BMI 29.90 kg/m²     Physical Exam       DATA:  Results for orders placed during the hospital encounter of 05/18/17    SCANNED - TELEMETRY          Procedures     Lab Results   Component Value Date    CHOL 211 (H) 02/22/2021    CHOL 147 08/06/2018    CHOL 129 11/21/2017    CHLPL 148 11/13/2014     Lab Results   Component Value Date    TRIG 155 (H) 02/22/2021    TRIG 193 (H) 08/06/2018    TRIG 142 11/21/2017     Lab Results   Component Value Date    HDL 34 (L) 02/22/2021    HDL 34 " (L) 08/06/2018    HDL 37 (L) 11/21/2017     Lab Results   Component Value Date     (H) 02/22/2021    LDL 74 08/06/2018    LDL 64 11/21/2017       Lab Results   Component Value Date    TSH 2.610 02/22/2021               Invalid input(s): LABALBU, PROT        Assessment and Plan    Assessment and Plan    Problem List Items Addressed This Visit          Cardiac and Vasculature    Mixed hyperlipidemia - Primary (Chronic)    Essential hypertension (Chronic)    AF (paroxysmal atrial fibrillation) (Chronic)       Gastrointestinal Abdominal     Dyspepsia    Overview     Added automatically from request for surgery [id number]                Recommended increase activity to 30 minutes of walking daily, most days of the week    Thank you for allowing me to participate in the care of Nghia Roach. Feel free to contact me directly with any further questions or concerns.          Lacho Hoang MD, FACC  Interventional Cardiology

## 2023-11-09 ENCOUNTER — HOSPITAL ENCOUNTER (EMERGENCY)
Facility: HOSPITAL | Age: 38
Discharge: HOME | End: 2023-11-10
Attending: STUDENT IN AN ORGANIZED HEALTH CARE EDUCATION/TRAINING PROGRAM
Payer: MEDICARE

## 2023-11-09 DIAGNOSIS — T15.92XA FOREIGN BODY OF LEFT EYE, INITIAL ENCOUNTER: Primary | ICD-10-CM

## 2023-11-09 PROCEDURE — 99283 EMERGENCY DEPT VISIT LOW MDM: CPT

## 2023-11-10 VITALS
BODY MASS INDEX: 27.72 KG/M2 | HEIGHT: 71 IN | WEIGHT: 198 LBS | TEMPERATURE: 98.4 F | SYSTOLIC BLOOD PRESSURE: 157 MMHG | OXYGEN SATURATION: 97 % | DIASTOLIC BLOOD PRESSURE: 97 MMHG | RESPIRATION RATE: 17 BRPM | HEART RATE: 65 BPM

## 2023-11-10 RX ORDER — TETRACAINE HYDROCHLORIDE 5 MG/ML
2 SOLUTION OPHTHALMIC ONCE
Status: COMPLETED | OUTPATIENT
Start: 2023-11-10 | End: 2023-11-10

## 2023-11-10 RX ORDER — CIPROFLOXACIN HYDROCHLORIDE 3.5 MG/ML
2 SOLUTION/ DROPS TOPICAL EVERY 4 HOURS
Qty: 5 ML | Refills: 0 | Status: SHIPPED | OUTPATIENT
Start: 2023-11-10 | End: 2023-11-15

## 2023-11-10 RX ADMIN — FLUORESCEIN SODIUM 1 STRIP: 1 STRIP OPHTHALMIC at 02:37

## 2023-11-10 RX ADMIN — TETRACAINE HYDROCHLORIDE 2 DROP: 5 SOLUTION OPHTHALMIC at 02:36

## 2023-11-10 NOTE — ED PROVIDER NOTES
"Subjective   History of Present Illness    Review of Systems    Past Medical History:   Diagnosis Date    Allergic     Allergic rhinitis     Asthma     Atrial flutter     Back pain     Diarrhea     Disturbance of skin sensation     Elevated cholesterol     GERD (gastroesophageal reflux disease)     Gout     History of MRI of spine 12/16/2014    Done at [facility] Hx of exercise stress test 2014    \"IT WAS NORMAL\"      Hyperlipidemia     Hypertension     Kidney disease     stage 2    Lymphadenitis     Malaise and fatigue     Osteoarthrosis     Rash     Renal disorder     Right hip pain     Sleep apnea     NO CPAP    Wears glasses     TO READ       Allergies   Allergen Reactions    Erythromycin Rash    Nuts Shortness Of Breath     Shortness of breath , neck swelling     Eggs Or Egg-Derived Products Nausea And Vomiting    Milk-Related Compounds GI Intolerance    Soybean-Containing Drug Products Swelling    Wheat GI Intolerance       Past Surgical History:   Procedure Laterality Date    ABDOMINAL SURGERY      BRAVO PROCEDURE N/A 8/4/2017    Procedure: ESOPHAGOGASTRODUODENOSCOPY AND FULLER;  Surgeon: Efra Suero MD;  Location: [facility] OR;  Service:     BRAVO PROCEDURE N/A 12/19/2018    Procedure: ESOPHAGOGASTRODUODENOSCOPY AND FULLER;  Surgeon: Efra Suero MD;  Location: [facility] OR;  Service: Gastroenterology    CHOLECYSTECTOMY N/A 12/17/2021    Procedure: CHOLECYSTECTOMY LAPAROSCOPIC;  Surgeon: Nghia Miller MD;  Location: [facility] OR;  Service: General;  Laterality: N/A;    COLONOSCOPY  2014    COLONOSCOPY      UNSURE OF DATE    COLONOSCOPY N/A 5/3/2018    Procedure: COLONOSCOPY WITH COLD BIOPSIES AND COLD POLYPECTOMIES;  Surgeon: Bang Segundo MD;  Location: [facility] ENDOSCOPY;  Service: Gastroenterology    ENDOSCOPY      ENDOSCOPY N/A 3/9/2020    Procedure: ESOPHAGOGASTRODUODENOSCOPY WITH BIOPSY CPT CODE: 92649;  Surgeon: Ricardo Nolasco MD;  Location: [facility] OR;  Service: Gastroenterology;  " Laterality: N/A;    HIP SURGERY Right     NISSEN FUNDOPLICATION      NISSEN FUNDOPLICATION N/A 10/11/2017    Procedure: NISSEN FUNDOPLICATION LAPAROSCOPIC;  Surgeon: Efra Suero MD;  [facility] OR;  Service:     AL LAPS SURG ESOPG/GSTR FUNDOPLASTY N/A 10/11/2017    Procedure: HIATAL HERNIA REPAIR LAPAROSCOPIC;  Surgeon: Efra Suero MD;  Location:  [facility] OR;  Service: General    VASECTOMY         Family History   Problem Relation Age of Onset    Asthma Other     Cancer Other     COPD Other     Diabetes Other     Stroke Other     Asthma Mother     COPD Mother     Asthma Father     Depression Father     Drug abuse Father     Arthritis Maternal Grandmother     Cancer Maternal Grandmother     Diabetes Maternal Grandmother     Heart disease Maternal Grandmother     Hyperlipidemia Maternal Grandmother     Stroke Paternal Grandfather     Colon cancer Maternal Grandfather         mid 40s       Social History     Socioeconomic History    Marital status:      Spouse name: Radha     Number of children: 2   Tobacco Use    Smoking status: Never    Smokeless tobacco: Never   Vaping Use    Vaping Use: Never used   Substance and Sexual Activity    Alcohol use: Not Currently     Comment: social    Drug use: Yes     Types: Marijuana    Sexual activity: Yes     Partners: Female     Birth control/protection: Surgical           Objective   Physical Exam    Procedures           ED Course                                           Medical Decision Making  Risk  Prescription drug management.        Final diagnoses:   None       ED Disposition  ED Disposition       None            No follow-up provider specified.       Medication List      No changes were made to your prescriptions during this visit.          of foreign body marked on diagram. Lids everted. Fluorescein exam. No other lesions noted.    Neurological:      Mental Status: He is alert.     Procedures           ED Course                                           Medical Decision Making  Problems Addressed:  Foreign body of left eye, initial encounter: complicated acute illness or injury    Risk  Prescription drug management.      Punctate piece of metal dust removed from left eye with cotton swab. Fluorescein exam showed appearance of punctate ulceration with very mild staining of cornea but foreign body appears removed. Area not within patient's visual field. Patient to follow up with optho. Given prescription for cipro drops given tiny residual ulceration.     Final diagnoses:   Foreign body of left eye, initial encounter       ED Disposition  ED Disposition       ED Disposition   Discharge    Condition   Stable    Comment   --               your ophthalmologist               Medication List        ASK your doctor about these medications      ciprofloxacin 0.3 % ophthalmic solution  Commonly known as: CILOXAN  Administer 2 drops into the left eye Every 4 (Four) Hours for 5 days.  Ask about: Should I take this medication?               Where to Get Your Medications        These medications were sent to [facility] #[id number] - [place], [place] - [address] - [phone] PH - [phone]   [address] [address]      Phone: [phone]   ciprofloxacin 0.3 % ophthalmic solution            Allison Urbina MD  12/04/23 [id number]

## 2023-12-19 ENCOUNTER — OFFICE VISIT (OUTPATIENT)
Dept: UROLOGY | Facility: CLINIC | Age: 38
End: 2023-12-19
Payer: MEDICARE

## 2023-12-19 VITALS
HEIGHT: 71 IN | WEIGHT: 209 LBS | BODY MASS INDEX: 29.26 KG/M2 | DIASTOLIC BLOOD PRESSURE: 78 MMHG | HEART RATE: 70 BPM | SYSTOLIC BLOOD PRESSURE: 148 MMHG

## 2023-12-19 DIAGNOSIS — R79.89 LOW TESTOSTERONE: Primary | ICD-10-CM

## 2023-12-19 DIAGNOSIS — N42.9 DISORDER OF PROSTATE: ICD-10-CM

## 2023-12-19 DIAGNOSIS — R53.83 OTHER FATIGUE: ICD-10-CM

## 2023-12-19 PROCEDURE — 82670 ASSAY OF TOTAL ESTRADIOL: CPT | Performed by: UROLOGY

## 2023-12-19 PROCEDURE — 1159F MED LIST DOCD IN RCRD: CPT | Performed by: UROLOGY

## 2023-12-19 PROCEDURE — 99214 OFFICE O/P EST MOD 30 MIN: CPT | Performed by: UROLOGY

## 2023-12-19 PROCEDURE — 84153 ASSAY OF PSA TOTAL: CPT | Performed by: UROLOGY

## 2023-12-19 PROCEDURE — 85027 COMPLETE CBC AUTOMATED: CPT | Performed by: UROLOGY

## 2023-12-19 PROCEDURE — 3078F DIAST BP <80 MM HG: CPT | Performed by: UROLOGY

## 2023-12-19 PROCEDURE — 84403 ASSAY OF TOTAL TESTOSTERONE: CPT | Performed by: UROLOGY

## 2023-12-19 PROCEDURE — 1160F RVW MEDS BY RX/DR IN RCRD: CPT | Performed by: UROLOGY

## 2023-12-19 PROCEDURE — 3077F SYST BP >= 140 MM HG: CPT | Performed by: UROLOGY

## 2023-12-19 RX ORDER — TESTOSTERONE CYPIONATE 200 MG/ML
INJECTION, SOLUTION INTRAMUSCULAR
Qty: 10 ML | Refills: 2 | Status: SHIPPED | OUTPATIENT
Start: 2023-12-19

## 2023-12-19 RX ORDER — TADALAFIL 5 MG/1
5 TABLET ORAL DAILY
Qty: 30 TABLET | Refills: 6 | Status: SHIPPED | OUTPATIENT
Start: 2023-12-19

## 2023-12-19 NOTE — PROGRESS NOTES
"Chief Complaint:      Chief Complaint   Patient presents with    Low testosterone       HPI:   38 y.o. male patient returns today for follow-up.  He has been on testosterone replacement therapy.  He reports a dramatic improvement in his KALLI questionnaire: -KALLI-androgen deficiency in the age male questionnaire. The patient was queried regarding the androgen deficiency in the age male questionnaire.  This is a validated questionnaire that was performed on a set of 314 [place] male physicians. When it was positive it correlated directly with a 94% chance of low testosterone.  Patient indicates there is a decrease in libido or sex drive, a lack of energy, decreased  strength and endurance, a decreased \"enjoyment of life\", sad and grumpy feelings with significant difficulty maintaining erections.  There has also been a recent deterioration regarding work performance. He reports weight loss.  He has good facility and the use of subcutaneous and intramuscular injections as well as comfort level and using the medication in a sterile fashion.  He understands he should use only the prescribed dose.  He is here for appropriate lab monitoring regarding this.  He understands this is a controlled substance and therefore must be watched closely, will not be refilled in the medical loss or miscalculation of the dose.  He is very happy with the treatment and therefore wants to continue it.  He is actually lost over 100 pounds and looks fantastic    Past Medical History:     Past Medical History:   Diagnosis Date    Allergic     Allergic rhinitis     Asthma     Atrial flutter     Back pain     Diarrhea     Disturbance of skin sensation     Elevated cholesterol     GERD (gastroesophageal reflux disease)     Gout     History of MRI of spine 12/16/2014    Done at [facility]     Hx of exercise stress test 2014    \"IT WAS NORMAL\"      Hyperlipidemia     Hypertension     Kidney disease     stage 2    Lymphadenitis     Malaise and " "fatigue     Osteoarthrosis     Rash     Renal disorder     Right hip pain     Sleep apnea     NO CPAP    Wears glasses     TO READ       Current Meds:     Current Outpatient Medications   Medication Sig Dispense Refill    Adult Aspirin Regimen 81 MG EC tablet       albuterol sulfate  (90 Base) MCG/ACT inhaler Inhale 2 puffs Every 4 (Four) Hours As Needed for Shortness of Air. 2 inhaler 5    allopurinol (ZYLOPRIM) 100 MG tablet TAKE 1 TABLET BY MOUTH TWICE DAILY 60 tablet 1    amitriptyline (ELAVIL) 25 MG tablet TAKE 1 TABLET BY MOUTH AT NIGHT AS NEEDED FOR SLEEP 30 tablet 1    anastrozole (ARIMIDEX) 1 MG tablet TAKE 1 TABLET BY MOUTH 1 TIME EVERY WEEK FOR 12 WEEKS 12 tablet 3    azelastine (ASTELIN) 0.1 % nasal spray 2 sprays into the nostril(s) as directed by provider 2 (Two) Times a Day As Needed for Rhinitis. Use in each nostril as directed 1 each 5    B-D INTEGRA SYRINGE 25G X 5/8\" 3 ML misc See Admin Instructions.  0    carvedilol (COREG) 12.5 MG tablet Take 1 tablet by mouth 2 (Two) Times a Day. 90 tablet 3    cetirizine (zyrTEC) 10 MG tablet Take 1 tablet by mouth At Night As Needed for Allergies. 30 tablet 5    cholecalciferol (VITAMIN D3) 25 MCG (1000 UT) tablet Take 1 tablet by mouth Daily.      citalopram (CeleXA) 20 MG tablet Take 1 tablet by mouth Daily. 90 tablet 2    cloNIDine (Catapres) 0.1 MG tablet Take 1 tablet by mouth 2 (Two) Times a Day. 60 tablet 5    clotrimazole-betamethasone (LOTRISONE) 1-0.05 % cream Apply  topically to the appropriate area as directed Every 12 (Twelve) Hours. 45 g 2    dexlansoprazole (DEXILANT) 60 MG capsule Take 1 capsule by mouth Every Morning Before Breakfast. 30 capsule 11    Flovent HFA 44 MCG/ACT inhaler INHALE 2 PUFFS BY MOUTH TWICE DAILY 10.6 g 5    fluticasone (FLONASE) 50 MCG/ACT nasal spray SHAKE LIQUID AND USE 2 SPRAYS IN EACH NOSTRIL DAILY AS DIRECTED 32 g 5    magnesium citrate 1.745 GM/30ML solution solution Take 296 mL by mouth Take As Directed for " "2 doses. Take one full bottle at 4:00 PM and take second bottle at 10:00 PM. 592 mL 0    methocarbamol (ROBAXIN) 750 MG tablet Take 1 tablet by mouth 4 (Four) Times a Day As Needed for Muscle Spasms. Take on days he works in place of flexeril 120 tablet 5    montelukast (SINGULAIR) 10 MG tablet Take 1 tablet by mouth Every Night. 30 tablet 5    nabumetone (RELAFEN) 500 MG tablet TAKE 1 TABLET BY MOUTH TWICE DAILY AS NEEDED FOR MILD PAIN OR MODERATE PAIN 60 tablet 2    naproxen (NAPROSYN) 500 MG tablet       O2 (OXYGEN) Inhale 2 L/min 1 (One) Time.      permethrin (ELIMITE) 5 % cream APPLY TOPICALLY TO THE APPROPRIATE AREA AS DIRECTED 60 g 0    rosuvastatin (CRESTOR) 5 MG tablet       Senexon-S 8.6-50 MG per tablet       sulfaSALAzine (AZULFIDINE) 500 MG tablet Take 1 tablet by mouth 4 (Four) Times a Day. Written by rheumatology , started back on his script 60 tablet 5    Syringe 25G X 5/8\" 3 ML misc Use as directed 2 x weekly 24 each 3    Syringe 25G X 5/8\" 3 ML misc Use as directed 2 x weekly 24 each 3    tadalafil (CIALIS) 5 MG tablet Take 1 tablet by mouth Daily. 30 tablet 6    Testosterone Cypionate (Depo-Testosterone) 200 MG/ML injection He is to use 1/2 cc every Monday and Thursday SQ 10 mL 2     No current facility-administered medications for this visit.        Allergies:      Allergies   Allergen Reactions    Erythromycin Rash    Nuts Shortness Of Breath     Shortness of breath , neck swelling     Eggs Or Egg-Derived Products Nausea And Vomiting    Milk-Related Compounds GI Intolerance    Soybean-Containing Drug Products Swelling    Wheat GI Intolerance        Past Surgical History:     Past Surgical History:   Procedure Laterality Date    ABDOMINAL SURGERY      BRAVO PROCEDURE N/A 8/4/2017    Procedure: ESOPHAGOGASTRODUODENOSCOPY AND FULLER;  Surgeon: Efra Suero MD;  Location: [facility];  Service:     BRAVO PROCEDURE N/A 12/19/2018    Procedure: ESOPHAGOGASTRODUODENOSCOPY AND FULLER;  Surgeon: Pio " Efra OGDEN MD;  Location: [facility] OR;  Service: Gastroenterology    CHOLECYSTECTOMY N/A 12/17/2021    Procedure: CHOLECYSTECTOMY LAPAROSCOPIC;  Surgeon: Nghia Miller MD;  Location: [facility] OR;  Service: General;  Laterality: N/A;    COLONOSCOPY  2014    COLONOSCOPY      UNSURE OF DATE    COLONOSCOPY N/A 5/3/2018    Procedure: COLONOSCOPY WITH COLD BIOPSIES AND COLD POLYPECTOMIES;  Surgeon: Bang Segundo MD;  Location: [facility] ENDOSCOPY;  Service: Gastroenterology    ENDOSCOPY      ENDOSCOPY N/A 3/9/2020    Procedure: ESOPHAGOGASTRODUODENOSCOPY WITH BIOPSY CPT CODE: 60863;  Surgeon: Ricardo Nolasco MD;  Location: [facility] OR;  Service: Gastroenterology;  Laterality: N/A;    HIP SURGERY Right     NISSEN FUNDOPLICATION      NISSEN FUNDOPLICATION N/A 10/11/2017    Procedure: NISSEN FUNDOPLICATION LAPAROSCOPIC;  Surgeon: Efra Suero MD;  Location: [facility] OR;  Service:     GA LAPS SURG ESOPG/GSTR FUNDOPLASTY N/A 10/11/2017    Procedure: HIATAL HERNIA REPAIR LAPAROSCOPIC;  Surgeon: Efra Suero MD;  Location: [facility] OR;  Service: General    VASECTOMY         Social History:     Social History     Socioeconomic History    Marital status:      Spouse name: Radha     Number of children: 2   Tobacco Use    Smoking status: Never    Smokeless tobacco: Never   Vaping Use    Vaping Use: Never used   Substance and Sexual Activity    Alcohol use: Not Currently     Comment: social    Drug use: Yes     Types: Marijuana    Sexual activity: Yes     Partners: Female     Birth control/protection: Surgical       Family History:     Family History   Problem Relation Age of Onset    Asthma Other     Cancer Other     COPD Other     Diabetes Other     Stroke Other     Asthma Mother     COPD Mother     Asthma Father     Depression Father     Drug abuse Father     Arthritis Maternal Grandmother     Cancer Maternal Grandmother     Diabetes Maternal Grandmother     Heart disease Maternal Grandmother     Hyperlipidemia  Maternal Grandmother     Stroke Paternal Grandfather     Colon cancer Maternal Grandfather         mid 40s       Review of Systems:     Review of Systems   Constitutional: Negative.    HENT: Negative.     Eyes: Negative.    Respiratory: Negative.     Cardiovascular: Negative.    Gastrointestinal: Negative.    Endocrine: Negative.    Musculoskeletal: Negative.    Allergic/Immunologic: Negative.    Neurological: Negative.    Hematological: Negative.    Psychiatric/Behavioral: Negative.         Physical Exam:     Physical Exam  Vitals and nursing note reviewed.   Constitutional:       Appearance: He is well-developed.   HENT:      Head: Normocephalic and atraumatic.   Eyes:      Conjunctiva/sclera: Conjunctivae normal.      Pupils: Pupils are equal, round, and reactive to light.   Cardiovascular:      Rate and Rhythm: Normal rate and regular rhythm.      Heart sounds: Normal heart sounds.   Pulmonary:      Effort: Pulmonary effort is normal.      Breath sounds: Normal breath sounds.   Abdominal:      General: Bowel sounds are normal.      Palpations: Abdomen is soft.   Musculoskeletal:         General: Normal range of motion.      Cervical back: Normal range of motion.   Skin:     General: Skin is warm and dry.   Neurological:      Mental Status: He is alert and oriented to person, place, and time.      Deep Tendon Reflexes: Reflexes are normal and symmetric.   Psychiatric:         Behavior: Behavior normal.         Thought Content: Thought content normal.         Judgment: Judgment normal.         I have reviewed the following portions of the patient's history: Allergies, current medications, past family history, past medical history, past social history, past surgical history, problem list, and ROS and confirm it is accurate.    Recent Image (CT and/or KUB):      CT Abdomen and Pelvis: No results found for this or any previous visit.       CT Stone Protocol: No results found for this or any previous visit.       KUB:  No results found for this or any previous visit.       Labs (past 3 months):      No visits with results within 3 Month(s) from this visit.   Latest known visit with results is:   Office Visit on 06/26/2023   Component Date Value Ref Range Status    Testosterone, Total 06/26/2023 287.00  249.00 - 836.00 ng/dL Final    Estradiol 06/26/2023 12.7  pg/mL Final    WBC 06/26/2023 7.54  3.40 - 10.80 10*3/mm3 Final    RBC 06/26/2023 5.82 (H)  4.14 - 5.80 10*6/mm3 Final    Hemoglobin 06/26/2023 17.5  13.0 - 17.7 g/dL Final    Hematocrit 06/26/2023 50.5  37.5 - 51.0 % Final    MCV 06/26/2023 86.8  79.0 - 97.0 fL Final    MCH 06/26/2023 30.1  26.6 - 33.0 pg Final    MCHC 06/26/2023 34.7  31.5 - 35.7 g/dL Final    RDW 06/26/2023 12.2 (L)  12.3 - 15.4 % Final    RDW-SD 06/26/2023 38.3  37.0 - 54.0 fl Final    MPV 06/26/2023 13.5 (H)  6.0 - 12.0 fL Final    Platelets 06/26/2023 183  140 - 450 10*3/mm3 Final    PSA 06/26/2023 0.376  0.000 - 4.000 ng/mL Final        Procedure:       Assessment/Plan:   Low testosterone: Patient is here for follow-up.  Since beginning the medication, he has been very pleased.  He reports a dramatic improvement in his erections, ability to achieve and maintain an erection, improvement in libido, increase in frequency of morning erections, and a noticeable weight loss consistent with the treatment.   He is going to have appropriate safety laboratory parameters checked.   He understands that the new data implicates testosterone with the development of prostate cancer and this is all but been disproven and the medical literature as well as the risks of cardiovascular disease which has actually also been disproven.  He understands that while he is a candidate for topical therapy if he is in contact with children this is not an option because it has been shown to accentuate genitalia development at an early age that is frequently irreversible.  He also understands this it is a controlled substance and as  such will not be prescribed without appropriate follow-up and appropriate laboratory investigation.  He understands effects on spermatogenesis including the fact that this is not always completely reversible and not always completely limited his ability to father a child.  He has demonstrated facility in the technique of both intramuscular and subcutaneous injection and has been taught sterility when drawing up the medication.    Erectile dysfunction-we discussed the anatomy and physiology of the penis and the endothelium.  We discussed the various forms of erectile dysfunction including peripheral vascular occlusive disease, postoperative, secondary to radiation treatments of the prostate, and arterial inflow.  We discussed the various treatment options available including oral medication and its various forms.  We discussed the use of both generic and non-generic Viagra.  We discussed Cialis and a longer half-life of 17 hours as well as the other 2 medications.  We discussed cost involved with this including the fact that the generic is much cheaper but is taken as multiple pills because they are 20 mg dosages.  We did discuss the other alternatives including penile injections, vacuum erection devices, and surgical intervention reserved for only the most severe cases.  We discussed the need for testosterone in about 20% of cases of erectile dysfunction.  Continue PDE-5 inhibition    PSA testing-I am recommending a PSA blood test that stands for prostate specific antigen.  I discussed the pathophysiology of PSA testing indicating its use in the diagnosis and management of prostate cancer.  I discussed the normal range being 0 to 4, but more appropriately being much closer to 0 to 2 in a normal male.  I discussed the fact that after a certain age we don't recommend PSA testing especially in view of numerous comorbidities, that this will not be a useful test.  I discussed many of the things that can artificially  raise PSA including a recent infection, urinary tract infection, and recent sexual intercourse, or even the type of movement such as manipulation of the prostate from riding a bicycle.  After all this is taken into account when the test is reviewed, the most important use of PSA is the velocity measurement.  In other words, the change of PSA with time is a very important factor in the use and that we look for greater than 20% rise over a year to help us make the prediction of prostate cancer.  I also discussed that the use with prostate cancer indicating that after a radical prostatectomy, the PSA should be 0 and any rise indicates an early biochemical recurrence.    Hyperestrogenism-we spoke about the role of estrogen metabolism and breakdown in the  presence of testosterone replacement therapy.  We spoke about how high estradiol levels can interfere with the improvement noted in a man on testosterone as well as significant side effects such as pseudogynecomastia.  We discussed the use of the medication Arimidex used in an off label setting and using a very judicious low-dose fashion to prevent too low of an estradiol which would precipitate bone complications.  Going to check an estradiol level.  He is at higher risk for breast problems due to his replacement    Polycythemia-I am going to check a CBC to rule out hemoglobin changes.  We utilized the American Heart Association guidelines for polycythemia which is a hemoglobin greater than 18 and a hematocrit greater than 54.5.  Recommend therapeutic phlebotomy as the treatment.  It is important that we indicate that is the most likely cause of the polycythemia.  We also discussed the possibility of decreasing the dose of testosterone and of stopping it altogether.    Controlled substance-he understands this is a controlled substance and as such is regulated under the state.  I cannot refill it outside the prescribed window.  I stressed the importance of follow-up  and appropriate laboratory parameters monitoring.    Liver function tests-according to the AUA guidelines we will not check liver functions due to the fact this is not an oral alkylated testosterone          This document has been electronically signed by STEPHEN BANGURA MD December 19, 2023 14:12 EST    Dictated Utilizing Dragon Dictation: Part of this note may be an electronic transcription/translation of spoken language to printed text using the Dragon Dictation System.

## 2023-12-20 LAB
DEPRECATED RDW RBC AUTO: 40.8 FL (ref 37–54)
ERYTHROCYTE [DISTWIDTH] IN BLOOD BY AUTOMATED COUNT: 13.2 % (ref 12.3–15.4)
ESTRADIOL SERPL HS-MCNC: <5 PG/ML
HCT VFR BLD AUTO: 47.6 % (ref 37.5–51)
HGB BLD-MCNC: 17.1 G/DL (ref 13–17.7)
MCH RBC QN AUTO: 30.8 PG (ref 26.6–33)
MCHC RBC AUTO-ENTMCNC: 35.9 G/DL (ref 31.5–35.7)
MCV RBC AUTO: 85.6 FL (ref 79–97)
PLATELET # BLD AUTO: 196 10*3/MM3 (ref 140–450)
PMV BLD AUTO: 13 FL (ref 6–12)
PSA SERPL-MCNC: 0.38 NG/ML (ref 0–4)
RBC # BLD AUTO: 5.56 10*6/MM3 (ref 4.14–5.8)
TESTOST SERPL-MCNC: 245 NG/DL (ref 249–836)
WBC NRBC COR # BLD AUTO: 5.7 10*3/MM3 (ref 3.4–10.8)

## 2024-06-20 ENCOUNTER — OFFICE VISIT (OUTPATIENT)
Dept: UROLOGY | Facility: CLINIC | Age: 39
End: 2024-06-20
Payer: MEDICARE

## 2024-06-20 VITALS
DIASTOLIC BLOOD PRESSURE: 92 MMHG | WEIGHT: 200 LBS | SYSTOLIC BLOOD PRESSURE: 142 MMHG | BODY MASS INDEX: 27.91 KG/M2 | HEART RATE: 73 BPM

## 2024-06-20 DIAGNOSIS — R53.83 OTHER FATIGUE: ICD-10-CM

## 2024-06-20 DIAGNOSIS — R79.89 LOW TESTOSTERONE: ICD-10-CM

## 2024-06-20 DIAGNOSIS — N42.9 DISORDER OF PROSTATE: Primary | ICD-10-CM

## 2024-06-20 PROCEDURE — 99214 OFFICE O/P EST MOD 30 MIN: CPT | Performed by: UROLOGY

## 2024-06-20 PROCEDURE — 3080F DIAST BP >= 90 MM HG: CPT | Performed by: UROLOGY

## 2024-06-20 PROCEDURE — 85027 COMPLETE CBC AUTOMATED: CPT | Performed by: UROLOGY

## 2024-06-20 PROCEDURE — 1160F RVW MEDS BY RX/DR IN RCRD: CPT | Performed by: UROLOGY

## 2024-06-20 PROCEDURE — 82670 ASSAY OF TOTAL ESTRADIOL: CPT | Performed by: UROLOGY

## 2024-06-20 PROCEDURE — 3077F SYST BP >= 140 MM HG: CPT | Performed by: UROLOGY

## 2024-06-20 PROCEDURE — 84403 ASSAY OF TOTAL TESTOSTERONE: CPT | Performed by: UROLOGY

## 2024-06-20 PROCEDURE — 1159F MED LIST DOCD IN RCRD: CPT | Performed by: UROLOGY

## 2024-06-20 PROCEDURE — 84153 ASSAY OF PSA TOTAL: CPT | Performed by: UROLOGY

## 2024-06-20 RX ORDER — TADALAFIL 5 MG/1
5 TABLET ORAL DAILY
Qty: 90 TABLET | Refills: 3 | Status: SHIPPED | OUTPATIENT
Start: 2024-06-20

## 2024-06-20 RX ORDER — TESTOSTERONE CYPIONATE 200 MG/ML
INJECTION, SOLUTION INTRAMUSCULAR
Qty: 10 ML | Refills: 2 | Status: SHIPPED | OUTPATIENT
Start: 2024-06-20

## 2024-06-20 NOTE — PROGRESS NOTES
"Chief Complaint:      Chief Complaint   Patient presents with    Low Testosterone        HPI:   38 y.o. male patient returns today for follow-up.  He has been on testosterone replacement therapy.  He reports a dramatic improvement in his KALLI questionnaire: -KALLI-androgen deficiency in the age male questionnaire. The patient was queried regarding the androgen deficiency in the age male questionnaire.  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 When it was positive it correlated directly with a 94% chance of low testosterone.  Patient indicates there is a decrease in libido or sex drive, a lack of energy, decreased  strength and endurance, a decreased \"enjoyment of life\", sad and grumpy feelings with significant difficulty maintaining erections.  There has also been a recent deterioration regarding work performance. He reports weight loss.  He has good facility and the use of subcutaneous and intramuscular injections as well as comfort level and using the medication in a sterile fashion.  He understands he should use only the prescribed dose.  He is here for appropriate lab monitoring regarding this.  He understands this is a controlled substance and therefore must be watched closely, will not be refilled in the medical loss or miscalculation of the dose.  He is very happy with the treatment and therefore wants to continue it.    Past Medical History:     Past Medical History:   Diagnosis Date    Allergic     Allergic rhinitis     Asthma     Atrial flutter     Back pain     Diarrhea     Disturbance of skin sensation     Elevated cholesterol     GERD (gastroesophageal reflux disease)     Gout     History of MRI of spine 12/16/2014    Done at [facility]     Hx of exercise stress test 2014    \"IT WAS NORMAL\"      Hyperlipidemia     Hypertension     Kidney disease     stage 2    Lymphadenitis     Malaise and fatigue     Osteoarthrosis     Rash     Renal disorder     " "Right hip pain     Sleep apnea     NO CPAP    Wears glasses     TO READ       Current Meds:     Current Outpatient Medications   Medication Sig Dispense Refill    Adult Aspirin Regimen 81 MG EC tablet       albuterol sulfate  (90 Base) MCG/ACT inhaler Inhale 2 puffs Every 4 (Four) Hours As Needed for Shortness of Air. 2 inhaler 5    allopurinol (ZYLOPRIM) 100 MG tablet TAKE 1 TABLET BY MOUTH TWICE DAILY 60 tablet 1    amitriptyline (ELAVIL) 25 MG tablet TAKE 1 TABLET BY MOUTH AT NIGHT AS NEEDED FOR SLEEP 30 tablet 1    anastrozole (ARIMIDEX) 1 MG tablet TAKE 1 TABLET BY MOUTH 1 TIME EVERY WEEK FOR 12 WEEKS 12 tablet 3    azelastine (ASTELIN) 0.1 % nasal spray 2 sprays into the nostril(s) as directed by provider 2 (Two) Times a Day As Needed for Rhinitis. Use in each nostril as directed 1 each 5    B-D INTEGRA SYRINGE 25G X 5/8\" 3 ML misc See Admin Instructions.  0    carvedilol (COREG) 12.5 MG tablet Take 1 tablet by mouth 2 (Two) Times a Day. 90 tablet 3    cetirizine (zyrTEC) 10 MG tablet Take 1 tablet by mouth At Night As Needed for Allergies. 30 tablet 5    cholecalciferol (VITAMIN D3) 25 MCG (1000 UT) tablet Take 1 tablet by mouth Daily.      citalopram (CeleXA) 20 MG tablet Take 1 tablet by mouth Daily. 90 tablet 2    cloNIDine (Catapres) 0.1 MG tablet Take 1 tablet by mouth 2 (Two) Times a Day. 60 tablet 5    clotrimazole-betamethasone (LOTRISONE) 1-0.05 % cream Apply  topically to the appropriate area as directed Every 12 (Twelve) Hours. 45 g 2    dexlansoprazole (DEXILANT) 60 MG capsule Take 1 capsule by mouth Every Morning Before Breakfast. 30 capsule 11    Flovent HFA 44 MCG/ACT inhaler INHALE 2 PUFFS BY MOUTH TWICE DAILY 10.6 g 5    fluticasone (FLONASE) 50 MCG/ACT nasal spray SHAKE LIQUID AND USE 2 SPRAYS IN EACH NOSTRIL DAILY AS DIRECTED 32 g 5    magnesium citrate 1.745 GM/30ML solution solution Take 296 mL by mouth Take As Directed for 2 doses. Take one full bottle at 4:00 PM and take second " "bottle at 10:00 PM. 592 mL 0    methocarbamol (ROBAXIN) 750 MG tablet Take 1 tablet by mouth 4 (Four) Times a Day As Needed for Muscle Spasms. Take on days he works in place of flexeril 120 tablet 5    montelukast (SINGULAIR) 10 MG tablet Take 1 tablet by mouth Every Night. 30 tablet 5    nabumetone (RELAFEN) 500 MG tablet TAKE 1 TABLET BY MOUTH TWICE DAILY AS NEEDED FOR MILD PAIN OR MODERATE PAIN 60 tablet 2    naproxen (NAPROSYN) 500 MG tablet       O2 (OXYGEN) Inhale 2 L/min 1 (One) Time.      permethrin (ELIMITE) 5 % cream APPLY TOPICALLY TO THE APPROPRIATE AREA AS DIRECTED 60 g 0    rosuvastatin (CRESTOR) 5 MG tablet       Senexon-S 8.6-50 MG per tablet       sulfaSALAzine (AZULFIDINE) 500 MG tablet Take 1 tablet by mouth 4 (Four) Times a Day. Written by rheumatology , started back on his script 60 tablet 5    Syringe 25G X 5/8\" 3 ML misc Use as directed 2 x weekly 24 each 3    Syringe 25G X 5/8\" 3 ML misc Use as directed 2 x weekly 24 each 3    tadalafil (CIALIS) 5 MG tablet Take 1 tablet by mouth Daily. 30 tablet 6    Testosterone Cypionate (Depo-Testosterone) 200 MG/ML injection He is to use 1/2 cc every Monday and Thursday SQ 10 mL 2     No current facility-administered medications for this visit.        Allergies:      Allergies   Allergen Reactions    Erythromycin Rash    Nuts Shortness Of Breath     Shortness of breath , neck swelling     Egg-Derived Products Nausea And Vomiting    Milk-Related Compounds GI Intolerance    Soybean-Containing Drug Products Swelling    Wheat GI Intolerance        Past Surgical History:     Past Surgical History:   Procedure Laterality Date    ABDOMINAL SURGERY      BRAVO PROCEDURE N/A 8/4/2017    Procedure: ESOPHAGOGASTRODUODENOSCOPY AND FULLER;  Surgeon: Efra Suero MD;  Location: [facility] OR;  Service:     BRAVO PROCEDURE N/A 12/19/2018    Procedure: ESOPHAGOGASTRODUODENOSCOPY AND FULLER;  Surgeon: Efra Suero MD;  Location: [facility] OR;  Service: Gastroenterology    " CHOLECYSTECTOMY N/A 12/17/2021    Procedure: CHOLECYSTECTOMY LAPAROSCOPIC;  Surgeon: Nghia Miller MD;  Location:  [facility] OR;  Service: General;  Laterality: N/A;    COLONOSCOPY  2014    COLONOSCOPY      UNSURE OF DATE    COLONOSCOPY N/A 5/3/2018    Procedure: COLONOSCOPY WITH COLD BIOPSIES AND COLD POLYPECTOMIES;  Surgeon: Bang Segundo MD;  Location: [facility] ENDOSCOPY;  Service: Gastroenterology    ENDOSCOPY      ENDOSCOPY N/A 3/9/2020    Procedure: ESOPHAGOGASTRODUODENOSCOPY WITH BIOPSY CPT CODE: 30219;  Surgeon: Ricardo Nolasco MD;  Location:  [facility] OR;  Service: Gastroenterology;  Laterality: N/A;    HIP SURGERY Right     NISSEN FUNDOPLICATION      NISSEN FUNDOPLICATION N/A 10/11/2017    Procedure: NISSEN FUNDOPLICATION LAPAROSCOPIC;  Surgeon: Efra Suero MD;  Location:  [facility] OR;  Service:     IN LAPS SURG ESOPG/GSTR FUNDOPLASTY N/A 10/11/2017    Procedure: HIATAL HERNIA REPAIR LAPAROSCOPIC;  Surgeon: Efra Suero MD;  Location: [facility] OR;  Service: General    VASECTOMY         Social History:     Social History     Socioeconomic History    Marital status:      Spouse name: Radha     Number of children: 2   Tobacco Use    Smoking status: Never    Smokeless tobacco: Never   Vaping Use    Vaping status: Never Used   Substance and Sexual Activity    Alcohol use: Not Currently     Comment: social    Drug use: Yes     Types: Marijuana    Sexual activity: Yes     Partners: Female     Birth control/protection: Surgical       Family History:     Family History   Problem Relation Age of Onset    Asthma Other     Cancer Other     COPD Other     Diabetes Other     Stroke Other     Asthma Mother     COPD Mother     Asthma Father     Depression Father     Drug abuse Father     Arthritis Maternal Grandmother     Cancer Maternal Grandmother     Diabetes Maternal Grandmother     Heart disease Maternal Grandmother     Hyperlipidemia Maternal Grandmother     Stroke Paternal Grandfather     Colon  cancer Maternal Grandfather         mid 40s       Review of Systems:     Review of Systems   Constitutional: Negative.    HENT: Negative.     Eyes: Negative.    Respiratory: Negative.     Cardiovascular: Negative.    Gastrointestinal: Negative.    Endocrine: Negative.    Musculoskeletal: Negative.    Allergic/Immunologic: Negative.    Neurological: Negative.    Hematological: Negative.    Psychiatric/Behavioral: Negative.         Physical Exam:     Physical Exam  Vitals and nursing note reviewed.   Constitutional:       Appearance: He is well-developed.   HENT:      Head: Normocephalic and atraumatic.   Eyes:      Conjunctiva/sclera: Conjunctivae normal.      Pupils: Pupils are equal, round, and reactive to light.   Cardiovascular:      Rate and Rhythm: Normal rate and regular rhythm.      Heart sounds: Normal heart sounds.   Pulmonary:      Effort: Pulmonary effort is normal.      Breath sounds: Normal breath sounds.   Abdominal:      General: Bowel sounds are normal.      Palpations: Abdomen is soft.   Musculoskeletal:         General: Normal range of motion.      Cervical back: Normal range of motion.   Skin:     General: Skin is warm and dry.   Neurological:      Mental Status: He is alert and oriented to person, place, and time.      Deep Tendon Reflexes: Reflexes are normal and symmetric.   Psychiatric:         Behavior: Behavior normal.         Thought Content: Thought content normal.         Judgment: Judgment normal.         I have reviewed the following portions of the patient's history: Allergies, current medications, past family history, past medical history, past social history, past surgical history, problem list, and ROS and confirm it is accurate.    Recent Image (CT and/or KUB):      CT Abdomen and Pelvis: No results found for this or any previous visit.       CT Stone Protocol: No results found for this or any previous visit.       KUB: No results found for this or any previous visit.       Labs  (past 3 months):      No visits with results within 3 Month(s) from this visit.   Latest known visit with results is:   Office Visit on 12/19/2023   Component Date Value Ref Range Status    PSA 12/19/2023 0.384  0.000 - 4.000 ng/mL Final    Testosterone, Total 12/19/2023 245.00 (L)  249.00 - 836.00 ng/dL Final    Estradiol 12/19/2023 <5.0  pg/mL Final    WBC 12/19/2023 5.70  3.40 - 10.80 10*3/mm3 Final    RBC 12/19/2023 5.56  4.14 - 5.80 10*6/mm3 Final    Hemoglobin 12/19/2023 17.1  13.0 - 17.7 g/dL Final    Hematocrit 12/19/2023 47.6  37.5 - 51.0 % Final    MCV 12/19/2023 85.6  79.0 - 97.0 fL Final    MCH 12/19/2023 30.8  26.6 - 33.0 pg Final    MCHC 12/19/2023 35.9 (H)  31.5 - 35.7 g/dL Final    RDW 12/19/2023 13.2  12.3 - 15.4 % Final    RDW-SD 12/19/2023 40.8  37.0 - 54.0 fl Final    MPV 12/19/2023 13.0 (H)  6.0 - 12.0 fL Final    Platelets 12/19/2023 196  140 - 450 10*3/mm3 Final        Procedure:       Assessment/Plan:   Low testosterone: Patient is here for follow-up.  Since beginning the medication, he has been very pleased.  He reports a dramatic improvement in his erections, ability to achieve and maintain an erection, improvement in libido, increase in frequency of morning erections, and a noticeable weight loss consistent with the treatment.   He is going to have appropriate safety laboratory parameters checked.   He understands that the new data implicates testosterone with the development of prostate cancer and this is all but been disproven and the medical literature as well as the risks of cardiovascular disease which has actually also been disproven.  He understands that while he is a candidate for topical therapy if he is in contact with children this is not an option because it has been shown to accentuate genitalia development at an early age that is frequently irreversible.  He also understands this it is a controlled substance and as such will not be prescribed without appropriate follow-up and  appropriate laboratory investigation.  He understands effects on spermatogenesis including the fact that this is not always completely reversible and not always completely limited his ability to father a child.  He has demonstrated facility in the technique of both intramuscular and subcutaneous injection and has been taught sterility when drawing up the medication.    Erectile dysfunction-we discussed the anatomy and physiology of the penis and the endothelium.  We discussed the various forms of erectile dysfunction including peripheral vascular occlusive disease, postoperative, secondary to radiation treatments of the prostate, and arterial inflow.  We discussed the various treatment options available including oral medication and its various forms.  We discussed the use of both generic and non-generic Viagra.  We discussed Cialis and a longer half-life of 17 hours as well as the other 2 medications.  We discussed cost involved with this including the fact that the generic is much cheaper but is taken as multiple pills because they are 20 mg dosages.  We did discuss the other alternatives including penile injections, vacuum erection devices, and surgical intervention reserved for only the most severe cases.  We discussed the need for testosterone in about 20% of cases of erectile dysfunction.  Continue PDE-5 inhibition    PSA testing-I am recommending a PSA blood test that stands for prostate specific antigen.  I discussed the pathophysiology of PSA testing indicating its use in the diagnosis and management of prostate cancer.  I discussed the normal range being 0 to 4, but more appropriately being much closer to 0 to 2 in a normal male.  I discussed the fact that after a certain age we don't recommend PSA testing especially in view of numerous comorbidities, that this will not be a useful test.  I discussed many of the things that can artificially raise PSA including a recent infection, urinary tract infection,  and recent sexual intercourse, or even the type of movement such as manipulation of the prostate from riding a bicycle.  After all this is taken into account when the test is reviewed, the most important use of PSA is the velocity measurement.  In other words, the change of PSA with time is a very important factor in the use and that we look for greater than 20% rise over a year to help us make the prediction of prostate cancer.  I also discussed that the use with prostate cancer indicating that after a radical prostatectomy, the PSA should be 0 and any rise indicates an early biochemical recurrence.    Hyperestrogenism-we spoke about the role of estrogen metabolism and breakdown in the  presence of testosterone replacement therapy.  We spoke about how high estradiol levels can interfere with the improvement noted in a man on testosterone as well as significant side effects such as pseudogynecomastia.  We discussed the use of the medication Arimidex used in an off label setting and using a very judicious low-dose fashion to prevent too low of an estradiol which would precipitate bone complications.  Going to check an estradiol level.  He is at higher risk for breast problems due to his replacement    Polycythemia-I am going to check a CBC to rule out hemoglobin changes.  We utilized the American Heart Association guidelines for polycythemia which is a hemoglobin greater than 18 and a hematocrit greater than 54.5.  Recommend therapeutic phlebotomy as the treatment.  It is important that we indicate that is the most likely cause of the polycythemia.  We also discussed the possibility of decreasing the dose of testosterone and of stopping it altogether.    Controlled substance-he understands this is a controlled substance and as such is regulated under the state.  I cannot refill it outside the prescribed window.  I stressed the importance of follow-up and appropriate laboratory parameters monitoring.    Liver function  tests-according to the AUA guidelines we will not check liver functions due to the fact this is not an oral alkylated testosterone.    Penile injection-the patient is interested in an attempt at a pharmacological penile injection for severe organic erectile dysfunction having failed the traditional oral therapy and having been offered a vacuum erection device.  After an appropriate informed consent including the significant risks of priapism, pain, and lack of efficacy, the penis was prepped and draped.  Appropriate anatomy was demonstrated including the dorsal nerve complex at 12 o'clock on the dorsum of the penis and the urethra at 6 o'clock.  We recommended injections between 9 o'clock and 10 o'clock on the right side and to 3 o'clock on the left side up and down the shaft into the corporal body.  We utilized a 27-gauge needle and began our dose of 0.1 mL of Trymex compound, observed for 30 minutes before its results.  We discussed the grading on a 10 scale indicating a 5 would be tumescence with poor axial rigidity.  I indicated that when the patient goes home though be about a 30-40% improvement.  It was emphasized that the erection should last no more than 2 hours and anything more than that should prompt an immediate call to the office.  We also talked about pharmacological manipulation if the erection lasts for prolonged period including oral Sudafed or terbutaline.  We talked about pharmacological reduction of the erection using phenylephrine.  The patient wishes to proceed.            This document has been electronically signed by STEPHEN BANGURA MD June 20, 2024 13:49 EDT    Dictated Utilizing Dragon Dictation: Part of this note may be an electronic transcription/translation of spoken language to printed text using the Dragon Dictation System.

## 2024-06-21 LAB
DEPRECATED RDW RBC AUTO: 38.9 FL (ref 37–54)
ERYTHROCYTE [DISTWIDTH] IN BLOOD BY AUTOMATED COUNT: 12.6 % (ref 12.3–15.4)
ESTRADIOL SERPL HS-MCNC: 60 PG/ML
HCT VFR BLD AUTO: 55 % (ref 37.5–51)
HGB BLD-MCNC: 18.3 G/DL (ref 13–17.7)
MCH RBC QN AUTO: 28.6 PG (ref 26.6–33)
MCHC RBC AUTO-ENTMCNC: 33.3 G/DL (ref 31.5–35.7)
MCV RBC AUTO: 86.1 FL (ref 79–97)
PLATELET # BLD AUTO: 212 10*3/MM3 (ref 140–450)
PMV BLD AUTO: 13 FL (ref 6–12)
PSA SERPL-MCNC: 0.49 NG/ML (ref 0–4)
RBC # BLD AUTO: 6.39 10*6/MM3 (ref 4.14–5.8)
TESTOST SERPL-MCNC: 1013 NG/DL (ref 249–836)
WBC NRBC COR # BLD AUTO: 7.96 10*3/MM3 (ref 3.4–10.8)

## 2024-06-25 DIAGNOSIS — R79.89 LOW TESTOSTERONE: Primary | ICD-10-CM

## 2024-06-25 RX ORDER — ANASTROZOLE 1 MG/1
1 TABLET ORAL WEEKLY
Qty: 12 TABLET | Refills: 3 | Status: SHIPPED | OUTPATIENT
Start: 2024-06-25

## 2024-06-26 DIAGNOSIS — D75.1 POLYCYTHEMIA, SECONDARY: Primary | ICD-10-CM

## 2025-01-07 ENCOUNTER — OFFICE VISIT (OUTPATIENT)
Dept: UROLOGY | Facility: CLINIC | Age: 40
End: 2025-01-07
Payer: MEDICARE

## 2025-01-07 VITALS
SYSTOLIC BLOOD PRESSURE: 155 MMHG | DIASTOLIC BLOOD PRESSURE: 85 MMHG | HEART RATE: 71 BPM | HEIGHT: 71 IN | WEIGHT: 206.2 LBS | BODY MASS INDEX: 28.87 KG/M2

## 2025-01-07 DIAGNOSIS — R53.83 OTHER FATIGUE: ICD-10-CM

## 2025-01-07 DIAGNOSIS — R79.89 LOW TESTOSTERONE: ICD-10-CM

## 2025-01-07 DIAGNOSIS — N42.9 DISORDER OF PROSTATE: ICD-10-CM

## 2025-01-07 PROCEDURE — 99214 OFFICE O/P EST MOD 30 MIN: CPT | Performed by: UROLOGY

## 2025-01-07 PROCEDURE — 3079F DIAST BP 80-89 MM HG: CPT | Performed by: UROLOGY

## 2025-01-07 PROCEDURE — 82670 ASSAY OF TOTAL ESTRADIOL: CPT | Performed by: UROLOGY

## 2025-01-07 PROCEDURE — 3077F SYST BP >= 140 MM HG: CPT | Performed by: UROLOGY

## 2025-01-07 PROCEDURE — 1159F MED LIST DOCD IN RCRD: CPT | Performed by: UROLOGY

## 2025-01-07 PROCEDURE — 85027 COMPLETE CBC AUTOMATED: CPT | Performed by: UROLOGY

## 2025-01-07 PROCEDURE — 36415 COLL VENOUS BLD VENIPUNCTURE: CPT | Performed by: UROLOGY

## 2025-01-07 PROCEDURE — 84403 ASSAY OF TOTAL TESTOSTERONE: CPT | Performed by: UROLOGY

## 2025-01-07 PROCEDURE — 1160F RVW MEDS BY RX/DR IN RCRD: CPT | Performed by: UROLOGY

## 2025-01-07 PROCEDURE — 84153 ASSAY OF PSA TOTAL: CPT | Performed by: UROLOGY

## 2025-01-07 RX ORDER — TESTOSTERONE CYPIONATE 200 MG/ML
INJECTION, SOLUTION INTRAMUSCULAR
Qty: 10 ML | Refills: 2 | Status: SHIPPED | OUTPATIENT
Start: 2025-01-07

## 2025-01-07 RX ORDER — TADALAFIL 5 MG/1
5 TABLET ORAL DAILY
Qty: 90 TABLET | Refills: 3 | Status: SHIPPED | OUTPATIENT
Start: 2025-01-07

## 2025-01-07 NOTE — PROGRESS NOTES
"Chief Complaint:      Chief Complaint   Patient presents with    Low testosterone       HPI:   39 y.o. male patient returns today for follow-up.  He has been on testosterone replacement therapy.  He reports a dramatic improvement in his KALLI questionnaire: -KALLI-androgen deficiency in the age male questionnaire. The patient was queried regarding the androgen deficiency in the age male questionnaire.  This is a validated questionnaire that was performed on a set of 314 [place] male physicians. When it was positive it correlated directly with a 94% chance of low testosterone.  Patient indicates there is a decrease in libido or sex drive, a lack of energy, decreased  strength and endurance, a decreased \"enjoyment of life\", sad and grumpy feelings with significant difficulty maintaining erections.  There has also been a recent deterioration regarding work performance. He reports weight loss.  He has good facility and the use of subcutaneous and intramuscular injections as well as comfort level and using the medication in a sterile fashion.  He understands he should use only the prescribed dose.  He is here for appropriate lab monitoring regarding this.  He understands this is a controlled substance and therefore must be watched closely, will not be refilled in the medical loss or miscalculation of the dose.  He is very happy with the treatment and therefore wants to continue it.    Past Medical History:     Past Medical History:   Diagnosis Date    Allergic     Allergic rhinitis     Asthma     Atrial flutter     Back pain     Diarrhea     Disturbance of skin sensation     Elevated cholesterol     GERD (gastroesophageal reflux disease)     Gout     History of MRI of spine 12/16/2014    Done at [facility]     Hx of exercise stress test 2014    \"IT WAS NORMAL\"      Hyperlipidemia     Hypertension     Kidney disease     stage 2    Lymphadenitis     Malaise and fatigue     Osteoarthrosis     Rash     Renal disorder     " "Right hip pain     Sleep apnea     NO CPAP    Wears glasses     TO READ       Current Meds:     Current Outpatient Medications   Medication Sig Dispense Refill    Adult Aspirin Regimen 81 MG EC tablet       albuterol sulfate  (90 Base) MCG/ACT inhaler Inhale 2 puffs Every 4 (Four) Hours As Needed for Shortness of Air. 2 inhaler 5    allopurinol (ZYLOPRIM) 100 MG tablet TAKE 1 TABLET BY MOUTH TWICE DAILY 60 tablet 1    amitriptyline (ELAVIL) 25 MG tablet TAKE 1 TABLET BY MOUTH AT NIGHT AS NEEDED FOR SLEEP 30 tablet 1    anastrozole (ARIMIDEX) 1 MG tablet TAKE 1 TABLET BY MOUTH 1 TIME EVERY WEEK FOR 12 WEEKS 12 tablet 3    anastrozole (ARIMIDEX) 1 MG tablet Take 1 tablet by mouth 1 (One) Time Per Week. For 12 weeks refills in case another round is needed in the future. 12 tablet 3    azelastine (ASTELIN) 0.1 % nasal spray 2 sprays into the nostril(s) as directed by provider 2 (Two) Times a Day As Needed for Rhinitis. Use in each nostril as directed 1 each 5    B-D INTEGRA SYRINGE 25G X 5/8\" 3 ML misc See Admin Instructions.  0    carvedilol (COREG) 12.5 MG tablet Take 1 tablet by mouth 2 (Two) Times a Day. 90 tablet 3    cetirizine (zyrTEC) 10 MG tablet Take 1 tablet by mouth At Night As Needed for Allergies. 30 tablet 5    cholecalciferol (VITAMIN D3) 25 MCG (1000 UT) tablet Take 1 tablet by mouth Daily.      citalopram (CeleXA) 20 MG tablet Take 1 tablet by mouth Daily. 90 tablet 2    cloNIDine (Catapres) 0.1 MG tablet Take 1 tablet by mouth 2 (Two) Times a Day. 60 tablet 5    clotrimazole-betamethasone (LOTRISONE) 1-0.05 % cream Apply  topically to the appropriate area as directed Every 12 (Twelve) Hours. 45 g 2    dexlansoprazole (DEXILANT) 60 MG capsule Take 1 capsule by mouth Every Morning Before Breakfast. 30 capsule 11    Flovent HFA 44 MCG/ACT inhaler INHALE 2 PUFFS BY MOUTH TWICE DAILY 10.6 g 5    fluticasone (FLONASE) 50 MCG/ACT nasal spray SHAKE LIQUID AND USE 2 SPRAYS IN EACH NOSTRIL DAILY AS " "DIRECTED 32 g 5    magnesium citrate 1.745 GM/30ML solution solution Take 296 mL by mouth Take As Directed for 2 doses. Take one full bottle at 4:00 PM and take second bottle at 10:00 PM. 592 mL 0    methocarbamol (ROBAXIN) 750 MG tablet Take 1 tablet by mouth 4 (Four) Times a Day As Needed for Muscle Spasms. Take on days he works in place of flexeril 120 tablet 5    montelukast (SINGULAIR) 10 MG tablet Take 1 tablet by mouth Every Night. 30 tablet 5    nabumetone (RELAFEN) 500 MG tablet TAKE 1 TABLET BY MOUTH TWICE DAILY AS NEEDED FOR MILD PAIN OR MODERATE PAIN 60 tablet 2    naproxen (NAPROSYN) 500 MG tablet       O2 (OXYGEN) Inhale 2 L/min 1 (One) Time.      permethrin (ELIMITE) 5 % cream APPLY TOPICALLY TO THE APPROPRIATE AREA AS DIRECTED 60 g 0    rosuvastatin (CRESTOR) 5 MG tablet       Senexon-S 8.6-50 MG per tablet       sulfaSALAzine (AZULFIDINE) 500 MG tablet Take 1 tablet by mouth 4 (Four) Times a Day. Written by rheumatology , started back on his script 60 tablet 5    Syringe 25G X 5/8\" 3 ML misc Use as directed 2 x weekly 24 each 3    Syringe 25G X 5/8\" 3 ML misc Use as directed 2 x weekly 24 each 3    tadalafil (CIALIS) 5 MG tablet Take 1 tablet by mouth Daily. 90 tablet 3    Testosterone Cypionate (Depo-Testosterone) 200 MG/ML injection He is to use 1/2 cc every Monday and Thursday SQ 10 mL 2     No current facility-administered medications for this visit.        Allergies:      Allergies   Allergen Reactions    Erythromycin Rash    Nuts Shortness Of Breath     Shortness of breath , neck swelling     Egg-Derived Products Nausea And Vomiting    Milk-Related Compounds GI Intolerance    Soybean-Containing Drug Products Swelling    Wheat GI Intolerance        Past Surgical History:     Past Surgical History:   Procedure Laterality Date    ABDOMINAL SURGERY      BRAVO PROCEDURE N/A 8/4/2017    Procedure: ESOPHAGOGASTRODUODENOSCOPY AND FULLER;  Surgeon: Efra Suero MD;  Location: [facility];  Service:     " BRAVO PROCEDURE N/A 12/19/2018    Procedure: ESOPHAGOGASTRODUODENOSCOPY AND FULLER;  Surgeon: Efra Suero MD;  Location:  [facility];  Service: Gastroenterology    CHOLECYSTECTOMY N/A 12/17/2021    Procedure: CHOLECYSTECTOMY LAPAROSCOPIC;  Surgeon: Nghia Miller MD;  Location:  [facility];  Service: General;  Laterality: N/A;    COLONOSCOPY  2014    COLONOSCOPY      UNSURE OF DATE    COLONOSCOPY N/A 5/3/2018    Procedure: COLONOSCOPY WITH COLD BIOPSIES AND COLD POLYPECTOMIES;  Surgeon: Bang Segundo MD;  Location:  [facility];  Service: Gastroenterology    ENDOSCOPY      ENDOSCOPY N/A 3/9/2020    Procedure: ESOPHAGOGASTRODUODENOSCOPY WITH BIOPSY CPT CODE: 85281;  Surgeon: Ricardo Nolasco MD;  Location: [facility] OR;  Service: Gastroenterology;  Laterality: N/A;    HIP SURGERY Right     NISSEN FUNDOPLICATION      NISSEN FUNDOPLICATION N/A 10/11/2017    Procedure: NISSEN FUNDOPLICATION LAPAROSCOPIC;  Surgeon: Efra Suero MD;  Location:  [facility];  Service:     SD LAPS SURG ESOPG/GSTR FUNDOPLASTY N/A 10/11/2017    Procedure: HIATAL HERNIA REPAIR LAPAROSCOPIC;  Surgeon: Efra Suero MD;  Location: [facility] OR;  Service: General    VASECTOMY         Social History:     Social History     Socioeconomic History    Marital status:      Spouse name: Radha     Number of children: 2   Tobacco Use    Smoking status: Never    Smokeless tobacco: Never   Vaping Use    Vaping status: Never Used   Substance and Sexual Activity    Alcohol use: Not Currently     Comment: social    Drug use: Yes     Types: Marijuana    Sexual activity: Yes     Partners: Female     Birth control/protection: Surgical       Family History:     Family History   Problem Relation Age of Onset    Asthma Other     Cancer Other     COPD Other     Diabetes Other     Stroke Other     Asthma Mother     COPD Mother     Asthma Father     Depression Father     Drug abuse Father     Arthritis Maternal Grandmother     Cancer Maternal  Grandmother     Diabetes Maternal Grandmother     Heart disease Maternal Grandmother     Hyperlipidemia Maternal Grandmother     Stroke Paternal Grandfather     Colon cancer Maternal Grandfather         mid 40s       Review of Systems:     Review of Systems   Constitutional: Negative.    HENT: Negative.     Eyes: Negative.    Respiratory: Negative.     Cardiovascular: Negative.    Gastrointestinal: Negative.    Endocrine: Negative.    Musculoskeletal: Negative.    Allergic/Immunologic: Negative.    Neurological: Negative.    Hematological: Negative.    Psychiatric/Behavioral: Negative.         Physical Exam:     Physical Exam  Vitals and nursing note reviewed.   Constitutional:       Appearance: He is well-developed.   HENT:      Head: Normocephalic and atraumatic.   Eyes:      Conjunctiva/sclera: Conjunctivae normal.      Pupils: Pupils are equal, round, and reactive to light.   Cardiovascular:      Rate and Rhythm: Normal rate and regular rhythm.      Heart sounds: Normal heart sounds.   Pulmonary:      Effort: Pulmonary effort is normal.      Breath sounds: Normal breath sounds.   Abdominal:      General: Bowel sounds are normal.      Palpations: Abdomen is soft.   Musculoskeletal:         General: Normal range of motion.      Cervical back: Normal range of motion.   Skin:     General: Skin is warm and dry.   Neurological:      Mental Status: He is alert and oriented to person, place, and time.      Deep Tendon Reflexes: Reflexes are normal and symmetric.   Psychiatric:         Behavior: Behavior normal.         Thought Content: Thought content normal.         Judgment: Judgment normal.         I have reviewed the following portions of the patient's history: Allergies, current medications, past family history, past medical history, past social history, past surgical history, problem list, and ROS and confirm it is accurate.    Recent Image (CT and/or KUB):      CT Abdomen and Pelvis: No results found for this or  any previous visit.       CT Stone Protocol: No results found for this or any previous visit.       KUB: No results found for this or any previous visit.       Labs (past 3 months):      No visits with results within 3 Month(s) from this visit.   Latest known visit with results is:   Office Visit on 06/20/2024   Component Date Value Ref Range Status    PSA 06/20/2024 0.486  0.000 - 4.000 ng/mL Final    Testosterone, Total 06/20/2024 1,013.00 (H)  249.00 - 836.00 ng/dL Final    Estradiol 06/20/2024 60.0  pg/mL Final    WBC 06/20/2024 7.96  3.40 - 10.80 10*3/mm3 Final    RBC 06/20/2024 6.39 (H)  4.14 - 5.80 10*6/mm3 Final    Hemoglobin 06/20/2024 18.3 (H)  13.0 - 17.7 g/dL Final    Hematocrit 06/20/2024 55.0 (H)  37.5 - 51.0 % Final    MCV 06/20/2024 86.1  79.0 - 97.0 fL Final    MCH 06/20/2024 28.6  26.6 - 33.0 pg Final    MCHC 06/20/2024 33.3  31.5 - 35.7 g/dL Final    RDW 06/20/2024 12.6  12.3 - 15.4 % Final    RDW-SD 06/20/2024 38.9  37.0 - 54.0 fl Final    MPV 06/20/2024 13.0 (H)  6.0 - 12.0 fL Final    Platelets 06/20/2024 212  140 - 450 10*3/mm3 Final        Procedure:       Assessment/Plan:   Low testosterone: Patient is here for follow-up.  Since beginning the medication, he has been very pleased.  He reports a dramatic improvement in his erections, ability to achieve and maintain an erection, improvement in libido, increase in frequency of morning erections, and a noticeable weight loss consistent with the treatment.   He is going to have appropriate safety laboratory parameters checked.   He understands that the new data implicates testosterone with the development of prostate cancer and this is all but been disproven and the medical literature as well as the risks of cardiovascular disease which has actually also been disproven.  He understands that while he is a candidate for topical therapy if he is in contact with children this is not an option because it has been shown to accentuate genitalia  development at an early age that is frequently irreversible.  He also understands this it is a controlled substance and as such will not be prescribed without appropriate follow-up and appropriate laboratory investigation.  He understands effects on spermatogenesis including the fact that this is not always completely reversible and not always completely limited his ability to father a child.  He has demonstrated facility in the technique of both intramuscular and subcutaneous injection and has been taught sterility when drawing up the medication.    Erectile dysfunction-we discussed the anatomy and physiology of the penis and the endothelium.  We discussed the various forms of erectile dysfunction including peripheral vascular occlusive disease, postoperative, secondary to radiation treatments of the prostate, and arterial inflow.  We discussed the various treatment options available including oral medication and its various forms.  We discussed the use of both generic and non-generic Viagra.  We discussed Cialis and a longer half-life of 17 hours as well as the other 2 medications.  We discussed cost involved with this including the fact that the generic is much cheaper but is taken as multiple pills because they are 20 mg dosages.  We did discuss the other alternatives including penile injections, vacuum erection devices, and surgical intervention reserved for only the most severe cases.  We discussed the need for testosterone in about 20% of cases of erectile dysfunction.  Continue PDE-5 inhibition    PSA testing-I am recommending a PSA blood test that stands for prostate specific antigen.  I discussed the pathophysiology of PSA testing indicating its use in the diagnosis and management of prostate cancer.  I discussed the normal range being 0 to 4, but more appropriately being much closer to 0 to 2 in a normal male.  I discussed the fact that after a certain age we don't recommend PSA testing especially in view  of numerous comorbidities, that this will not be a useful test.  I discussed many of the things that can artificially raise PSA including a recent infection, urinary tract infection, and recent sexual intercourse, or even the type of movement such as manipulation of the prostate from riding a bicycle.  After all this is taken into account when the test is reviewed, the most important use of PSA is the velocity measurement.  In other words, the change of PSA with time is a very important factor in the use and that we look for greater than 20% rise over a year to help us make the prediction of prostate cancer.  I also discussed that the use with prostate cancer indicating that after a radical prostatectomy, the PSA should be 0 and any rise indicates an early biochemical recurrence.    Hyperestrogenism-we spoke about the role of estrogen metabolism and breakdown in the  presence of testosterone replacement therapy.  We spoke about how high estradiol levels can interfere with the improvement noted in a man on testosterone as well as significant side effects such as pseudogynecomastia.  We discussed the use of the medication Arimidex used in an off label setting and using a very judicious low-dose fashion to prevent too low of an estradiol which would precipitate bone complications.  Going to check an estradiol level.  He is at higher risk for breast problems due to his replacement    Polycythemia-I am going to check a CBC to rule out hemoglobin changes.  We utilized the American Heart Association guidelines for polycythemia which is a hemoglobin greater than 18 and a hematocrit greater than 54.5.  Recommend therapeutic phlebotomy as the treatment.  It is important that we indicate that is the most likely cause of the polycythemia.  We also discussed the possibility of decreasing the dose of testosterone and of stopping it altogether.    Controlled substance-he understands this is a controlled substance and as such is  regulated under the state.  I cannot refill it outside the prescribed window.  I stressed the importance of follow-up and appropriate laboratory parameters monitoring.    Liver function tests-according to the AUA guidelines we will not check liver functions due to the fact this is not an oral alkylated testosterone            This document has been electronically signed by STEPHEN BANGURA MD January 7, 2025 14:48 EST    Dictated Utilizing Dragon Dictation: Part of this note may be an electronic transcription/translation of spoken language to printed text using the Dragon Dictation System.

## 2025-01-08 LAB
DEPRECATED RDW RBC AUTO: 40.3 FL (ref 37–54)
ERYTHROCYTE [DISTWIDTH] IN BLOOD BY AUTOMATED COUNT: 13 % (ref 12.3–15.4)
ESTRADIOL SERPL HS-MCNC: 36.5 PG/ML
HCT VFR BLD AUTO: 46.6 % (ref 37.5–51)
HGB BLD-MCNC: 16.4 G/DL (ref 13–17.7)
MCH RBC QN AUTO: 29.8 PG (ref 26.6–33)
MCHC RBC AUTO-ENTMCNC: 35.2 G/DL (ref 31.5–35.7)
MCV RBC AUTO: 84.7 FL (ref 79–97)
PLATELET # BLD AUTO: 206 10*3/MM3 (ref 140–450)
PMV BLD AUTO: 12.8 FL (ref 6–12)
PSA SERPL-MCNC: 0.34 NG/ML (ref 0–4)
RBC # BLD AUTO: 5.5 10*6/MM3 (ref 4.14–5.8)
TESTOST SERPL-MCNC: 1057 NG/DL (ref 249–836)
WBC NRBC COR # BLD AUTO: 8.01 10*3/MM3 (ref 3.4–10.8)

## 2025-06-26 ENCOUNTER — OFFICE VISIT (OUTPATIENT)
Dept: UROLOGY | Facility: CLINIC | Age: 40
End: 2025-06-26
Payer: MEDICARE

## 2025-06-26 ENCOUNTER — TELEPHONE (OUTPATIENT)
Dept: UROLOGY | Facility: CLINIC | Age: 40
End: 2025-06-26

## 2025-06-26 VITALS
WEIGHT: 204 LBS | HEIGHT: 71 IN | SYSTOLIC BLOOD PRESSURE: 161 MMHG | HEART RATE: 67 BPM | DIASTOLIC BLOOD PRESSURE: 103 MMHG | BODY MASS INDEX: 28.56 KG/M2

## 2025-06-26 DIAGNOSIS — N42.9 DISORDER OF PROSTATE: Primary | ICD-10-CM

## 2025-06-26 DIAGNOSIS — R53.83 OTHER FATIGUE: ICD-10-CM

## 2025-06-26 DIAGNOSIS — R79.89 LOW TESTOSTERONE: ICD-10-CM

## 2025-06-26 PROCEDURE — 84153 ASSAY OF PSA TOTAL: CPT | Performed by: UROLOGY

## 2025-06-26 PROCEDURE — 85027 COMPLETE CBC AUTOMATED: CPT | Performed by: UROLOGY

## 2025-06-26 PROCEDURE — 82670 ASSAY OF TOTAL ESTRADIOL: CPT | Performed by: UROLOGY

## 2025-06-26 PROCEDURE — 84403 ASSAY OF TOTAL TESTOSTERONE: CPT | Performed by: UROLOGY

## 2025-06-26 RX ORDER — SILDENAFIL 100 MG/1
100 TABLET, FILM COATED ORAL AS NEEDED
Qty: 40 TABLET | Refills: 6 | Status: SHIPPED | OUTPATIENT
Start: 2025-06-26 | End: 2025-06-27 | Stop reason: SDUPTHER

## 2025-06-26 RX ORDER — TESTOSTERONE CYPIONATE 200 MG/ML
INJECTION, SOLUTION INTRAMUSCULAR
Qty: 10 ML | Refills: 2 | Status: SHIPPED | OUTPATIENT
Start: 2025-06-26 | End: 2025-06-27 | Stop reason: SDUPTHER

## 2025-06-26 RX ORDER — TADALAFIL 5 MG/1
5 TABLET ORAL DAILY
Qty: 90 TABLET | Refills: 3 | Status: SHIPPED | OUTPATIENT
Start: 2025-06-26 | End: 2025-06-27 | Stop reason: SDUPTHER

## 2025-06-26 NOTE — TELEPHONE ENCOUNTER
The patient was seen today and we sent his medicine to the wrong pharmacy. It needs to go to [facility]

## 2025-06-26 NOTE — PROGRESS NOTES
"Chief Complaint:      Chief Complaint   Patient presents with    Low Testosterone     6 month       HPI:   39 y.o. male patient returns today for follow-up.  He has been on testosterone replacement therapy.  He reports a dramatic improvement in his KALLI questionnaire: -KALLI-androgen deficiency in the age male questionnaire. The patient was queried regarding the androgen deficiency in the age male questionnaire.  This is a validated questionnaire that was performed on a set of 314 [place] male physicians. When it was positive it correlated directly with a 94% chance of low testosterone.  Patient indicates there is a decrease in libido or sex drive, a lack of energy, decreased  strength and endurance, a decreased \"enjoyment of life\", sad and grumpy feelings with significant difficulty maintaining erections.  There has also been a recent deterioration regarding work performance. He reports weight loss.  He has good facility and the use of subcutaneous and intramuscular injections as well as comfort level and using the medication in a sterile fashion.  He understands he should use only the prescribed dose.  He is here for appropriate lab monitoring regarding this.  He understands this is a controlled substance and therefore must be watched closely, will not be refilled in the medical loss or miscalculation of the dose.  He is very happy with the treatment and therefore wants to continue it.    Past Medical History:     Past Medical History:   Diagnosis Date    Allergic     Allergic rhinitis     Asthma     Atrial flutter     Back pain     Diarrhea     Disturbance of skin sensation     Elevated cholesterol     GERD (gastroesophageal reflux disease)     Gout     History of MRI of spine 12/16/2014    Done at [facility]     Hx of exercise stress test 2014    \"IT WAS NORMAL\"      Hyperlipidemia     Hypertension     Kidney disease     stage 2    Lymphadenitis     Malaise and fatigue     Osteoarthrosis     Rash     Renal " "disorder     Right hip pain     Sleep apnea     NO CPAP    Wears glasses     TO READ       Current Meds:     Current Outpatient Medications   Medication Sig Dispense Refill    Adult Aspirin Regimen 81 MG EC tablet       albuterol sulfate  (90 Base) MCG/ACT inhaler Inhale 2 puffs Every 4 (Four) Hours As Needed for Shortness of Air. 2 inhaler 5    allopurinol (ZYLOPRIM) 100 MG tablet TAKE 1 TABLET BY MOUTH TWICE DAILY 60 tablet 1    amitriptyline (ELAVIL) 25 MG tablet TAKE 1 TABLET BY MOUTH AT NIGHT AS NEEDED FOR SLEEP 30 tablet 1    anastrozole (ARIMIDEX) 1 MG tablet TAKE 1 TABLET BY MOUTH 1 TIME EVERY WEEK FOR 12 WEEKS 12 tablet 3    anastrozole (ARIMIDEX) 1 MG tablet Take 1 tablet by mouth 1 (One) Time Per Week. For 12 weeks refills in case another round is needed in the future. 12 tablet 3    azelastine (ASTELIN) 0.1 % nasal spray 2 sprays into the nostril(s) as directed by provider 2 (Two) Times a Day As Needed for Rhinitis. Use in each nostril as directed 1 each 5    B-D INTEGRA SYRINGE 25G X 5/8\" 3 ML misc See Admin Instructions.  0    carvedilol (COREG) 12.5 MG tablet Take 1 tablet by mouth 2 (Two) Times a Day. 90 tablet 3    cetirizine (zyrTEC) 10 MG tablet Take 1 tablet by mouth At Night As Needed for Allergies. 30 tablet 5    cholecalciferol (VITAMIN D3) 25 MCG (1000 UT) tablet Take 1 tablet by mouth Daily.      citalopram (CeleXA) 20 MG tablet Take 1 tablet by mouth Daily. 90 tablet 2    cloNIDine (Catapres) 0.1 MG tablet Take 1 tablet by mouth 2 (Two) Times a Day. 60 tablet 5    clotrimazole-betamethasone (LOTRISONE) 1-0.05 % cream Apply  topically to the appropriate area as directed Every 12 (Twelve) Hours. 45 g 2    dexlansoprazole (DEXILANT) 60 MG capsule Take 1 capsule by mouth Every Morning Before Breakfast. 30 capsule 11    Flovent HFA 44 MCG/ACT inhaler INHALE 2 PUFFS BY MOUTH TWICE DAILY 10.6 g 5    fluticasone (FLONASE) 50 MCG/ACT nasal spray SHAKE LIQUID AND USE 2 SPRAYS IN EACH NOSTRIL " "DAILY AS DIRECTED 32 g 5    magnesium citrate 1.745 GM/30ML solution solution Take 296 mL by mouth Take As Directed for 2 doses. Take one full bottle at 4:00 PM and take second bottle at 10:00 PM. 592 mL 0    methocarbamol (ROBAXIN) 750 MG tablet Take 1 tablet by mouth 4 (Four) Times a Day As Needed for Muscle Spasms. Take on days he works in place of flexeril 120 tablet 5    montelukast (SINGULAIR) 10 MG tablet Take 1 tablet by mouth Every Night. 30 tablet 5    nabumetone (RELAFEN) 500 MG tablet TAKE 1 TABLET BY MOUTH TWICE DAILY AS NEEDED FOR MILD PAIN OR MODERATE PAIN 60 tablet 2    naproxen (NAPROSYN) 500 MG tablet       O2 (OXYGEN) Inhale 2 L/min 1 (One) Time.      permethrin (ELIMITE) 5 % cream APPLY TOPICALLY TO THE APPROPRIATE AREA AS DIRECTED 60 g 0    rosuvastatin (CRESTOR) 5 MG tablet       Senexon-S 8.6-50 MG per tablet       sulfaSALAzine (AZULFIDINE) 500 MG tablet Take 1 tablet by mouth 4 (Four) Times a Day. Written by rheumatology , started back on his script 60 tablet 5    Syringe 25G X 5/8\" 3 ML misc Use as directed 2 x weekly 24 each 3    Syringe 25G X 5/8\" 3 ML misc Use as directed 2 x weekly 24 each 3    tadalafil (CIALIS) 5 MG tablet Take 1 tablet by mouth Daily. 90 tablet 3    Testosterone Cypionate (Depo-Testosterone) 200 MG/ML injection He is to use 1/2 cc every Monday and Thursday SQ 10 mL 2     No current facility-administered medications for this visit.        Allergies:      Allergies   Allergen Reactions    Erythromycin Rash    Nuts Shortness Of Breath     Shortness of breath , neck swelling     Egg-Derived Products Nausea And Vomiting    Milk-Related Compounds GI Intolerance    Soybean-Containing Drug Products Swelling    Wheat GI Intolerance        Past Surgical History:     Past Surgical History:   Procedure Laterality Date    ABDOMINAL SURGERY      BRAVO PROCEDURE N/A 8/4/2017    Procedure: ESOPHAGOGASTRODUODENOSCOPY AND FULLER;  Surgeon: Efra Suero MD;  Location:  [facility] OR;  " Service:     BRAVO PROCEDURE N/A 12/19/2018    Procedure: ESOPHAGOGASTRODUODENOSCOPY AND FULLER;  Surgeon: Efra Suero MD;  Location: [facility] OR;  Service: Gastroenterology    CHOLECYSTECTOMY N/A 12/17/2021    Procedure: CHOLECYSTECTOMY LAPAROSCOPIC;  Surgeon: Nghia Miller MD;  Location:  [facility] OR;  Service: General;  Laterality: N/A;    COLONOSCOPY  2014    COLONOSCOPY      UNSURE OF DATE    COLONOSCOPY N/A 5/3/2018    Procedure: COLONOSCOPY WITH COLD BIOPSIES AND COLD POLYPECTOMIES;  Surgeon: Bang Segundo MD;  Location: [facility] ENDOSCOPY;  Service: Gastroenterology    ENDOSCOPY      ENDOSCOPY N/A 3/9/2020    Procedure: ESOPHAGOGASTRODUODENOSCOPY WITH BIOPSY CPT CODE: 70050;  Surgeon: Ricardo Nolasco MD;  Location: [facility] OR;  Service: Gastroenterology;  Laterality: N/A;    HIP SURGERY Right     NISSEN FUNDOPLICATION      NISSEN FUNDOPLICATION N/A 10/11/2017    Procedure: NISSEN FUNDOPLICATION LAPAROSCOPIC;  Surgeon: Efra Suero MD;  Location: [facility] OR;  Service:     HI LAPS SURG ESOPG/GSTR FUNDOPLASTY N/A 10/11/2017    Procedure: HIATAL HERNIA REPAIR LAPAROSCOPIC;  Surgeon: Efra Suero MD;  Location: [facility] OR;  Service: General    VASECTOMY         Social History:     Social History     Socioeconomic History    Marital status:      Spouse name: Radha     Number of children: 2   Tobacco Use    Smoking status: Never    Smokeless tobacco: Never   Vaping Use    Vaping status: Never Used   Substance and Sexual Activity    Alcohol use: Not Currently     Comment: social    Drug use: Yes     Types: Marijuana    Sexual activity: Yes     Partners: Female     Birth control/protection: Surgical       Family History:     Family History   Problem Relation Age of Onset    Asthma Other     Cancer Other     COPD Other     Diabetes Other     Stroke Other     Asthma Mother     COPD Mother     Asthma Father     Depression Father     Drug abuse Father     Arthritis Maternal Grandmother      Cancer Maternal Grandmother     Diabetes Maternal Grandmother     Heart disease Maternal Grandmother     Hyperlipidemia Maternal Grandmother     Stroke Paternal Grandfather     Colon cancer Maternal Grandfather         mid 40s       Review of Systems:     Review of Systems   Constitutional: Negative.    HENT: Negative.     Eyes: Negative.    Respiratory: Negative.     Cardiovascular: Negative.    Gastrointestinal: Negative.    Endocrine: Negative.    Musculoskeletal: Negative.    Allergic/Immunologic: Negative.    Neurological: Negative.    Hematological: Negative.    Psychiatric/Behavioral: Negative.         Physical Exam:     Physical Exam  Vitals and nursing note reviewed.   Constitutional:       Appearance: He is well-developed.   HENT:      Head: Normocephalic and atraumatic.   Eyes:      Conjunctiva/sclera: Conjunctivae normal.      Pupils: Pupils are equal, round, and reactive to light.   Cardiovascular:      Rate and Rhythm: Normal rate and regular rhythm.      Heart sounds: Normal heart sounds.   Pulmonary:      Effort: Pulmonary effort is normal.      Breath sounds: Normal breath sounds.   Abdominal:      General: Bowel sounds are normal.      Palpations: Abdomen is soft.   Musculoskeletal:         General: Normal range of motion.      Cervical back: Normal range of motion.   Skin:     General: Skin is warm and dry.   Neurological:      Mental Status: He is alert and oriented to person, place, and time.      Deep Tendon Reflexes: Reflexes are normal and symmetric.   Psychiatric:         Behavior: Behavior normal.         Thought Content: Thought content normal.         Judgment: Judgment normal.         I have reviewed the following portions of the patient's history: Allergies, current medications, past family history, past medical history, past social history, past surgical history, problem list, and ROS and confirm it is accurate.    Recent Image (CT and/or KUB):      CT Abdomen and Pelvis: No results  found for this or any previous visit.       CT Stone Protocol: No results found for this or any previous visit.       KUB: No results found for this or any previous visit.       Labs (past 3 months):      No visits with results within 3 Month(s) from this visit.   Latest known visit with results is:   Office Visit on 01/07/2025   Component Date Value Ref Range Status    PSA 01/07/2025 0.335  0.000 - 4.000 ng/mL Final    Testosterone, Total 01/07/2025 1,057.00 (H)  249.00 - 836.00 ng/dL Final    Estradiol 01/07/2025 36.5  pg/mL Final    WBC 01/07/2025 8.01  3.40 - 10.80 10*3/mm3 Final    RBC 01/07/2025 5.50  4.14 - 5.80 10*6/mm3 Final    Hemoglobin 01/07/2025 16.4  13.0 - 17.7 g/dL Final    Hematocrit 01/07/2025 46.6  37.5 - 51.0 % Final    MCV 01/07/2025 84.7  79.0 - 97.0 fL Final    MCH 01/07/2025 29.8  26.6 - 33.0 pg Final    MCHC 01/07/2025 35.2  31.5 - 35.7 g/dL Final    RDW 01/07/2025 13.0  12.3 - 15.4 % Final    RDW-SD 01/07/2025 40.3  37.0 - 54.0 fl Final    MPV 01/07/2025 12.8 (H)  6.0 - 12.0 fL Final    Platelets 01/07/2025 206  140 - 450 10*3/mm3 Final        Procedure:       Assessment/Plan:   Low testosterone: Patient is here for follow-up.  Since beginning the medication, he has been very pleased.  He reports a dramatic improvement in his erections, ability to achieve and maintain an erection, improvement in libido, increase in frequency of morning erections, and a noticeable weight loss consistent with the treatment.   He is going to have appropriate safety laboratory parameters checked.   He understands that the new data implicates testosterone with the development of prostate cancer and this is all but been disproven and the medical literature as well as the risks of cardiovascular disease which has actually also been disproven.  He understands that while he is a candidate for topical therapy if he is in contact with children this is not an option because it has been shown to accentuate genitalia  development at an early age that is frequently irreversible.  He also understands this it is a controlled substance and as such will not be prescribed without appropriate follow-up and appropriate laboratory investigation.  He understands effects on spermatogenesis including the fact that this is not always completely reversible and not always completely limited his ability to father a child.  He has demonstrated facility in the technique of both intramuscular and subcutaneous injection and has been taught sterility when drawing up the medication.    Erectile dysfunction-we discussed the anatomy and physiology of the penis and the endothelium.  We discussed the various forms of erectile dysfunction including peripheral vascular occlusive disease, postoperative, secondary to radiation treatments of the prostate, and arterial inflow.  We discussed the various treatment options available including oral medication and its various forms.  We discussed the use of both generic and non-generic Viagra.  We discussed Cialis and a longer half-life of 17 hours as well as the other 2 medications.  We discussed cost involved with this including the fact that the generic is much cheaper but is taken as multiple pills because they are 20 mg dosages.  We did discuss the other alternatives including penile injections, vacuum erection devices, and surgical intervention reserved for only the most severe cases.  We discussed the need for testosterone in about 20% of cases of erectile dysfunction.  Continue PDE-5 inhibition    PSA testing-I am recommending a PSA blood test that stands for prostate specific antigen.  I discussed the pathophysiology of PSA testing indicating its use in the diagnosis and management of prostate cancer.  I discussed the normal range being 0 to 4, but more appropriately being much closer to 0 to 2 in a normal male.  I discussed the fact that after a certain age we don't recommend PSA testing especially in view  of numerous comorbidities, that this will not be a useful test.  I discussed many of the things that can artificially raise PSA including a recent infection, urinary tract infection, and recent sexual intercourse, or even the type of movement such as manipulation of the prostate from riding a bicycle.  After all this is taken into account when the test is reviewed, the most important use of PSA is the velocity measurement.  In other words, the change of PSA with time is a very important factor in the use and that we look for greater than 20% rise over a year to help us make the prediction of prostate cancer.  I also discussed that the use with prostate cancer indicating that after a radical prostatectomy, the PSA should be 0 and any rise indicates an early biochemical recurrence.    Hyperestrogenism-we spoke about the role of estrogen metabolism and breakdown in the  presence of testosterone replacement therapy.  We spoke about how high estradiol levels can interfere with the improvement noted in a man on testosterone as well as significant side effects such as pseudogynecomastia.  We discussed the use of the medication Arimidex used in an off label setting and using a very judicious low-dose fashion to prevent too low of an estradiol which would precipitate bone complications.  Going to check an estradiol level.  He is at higher risk for breast problems due to his replacement    Polycythemia-I am going to check a CBC to rule out hemoglobin changes.  We utilized the American Heart Association guidelines for polycythemia which is a hemoglobin greater than 18 and a hematocrit greater than 54.5.  Recommend therapeutic phlebotomy as the treatment.  It is important that we indicate that is the most likely cause of the polycythemia.  We also discussed the possibility of decreasing the dose of testosterone and of stopping it altogether. We also discussed the concomitant diagnosis of sleep apnea in patients with  polycythemia in the range of 50% and spoke about sleep studies.    Controlled substance-he understands this is a controlled substance and as such is regulated under the state.  I cannot refill it outside the prescribed window.  I stressed the importance of follow-up and appropriate laboratory parameters monitoring.    Liver function tests-according to the AUA guidelines we will not check liver functions due to the fact this is not an oral alkylated testosterone          This document has been electronically signed by STEPHEN BANGURA MD June 26, 2025 12:50 EDT    Dictated Utilizing Dragon Dictation: Part of this note may be an electronic transcription/translation of spoken language to printed text using the Dragon Dictation System.

## 2025-06-27 DIAGNOSIS — R79.89 LOW TESTOSTERONE: ICD-10-CM

## 2025-06-27 DIAGNOSIS — R53.83 OTHER FATIGUE: ICD-10-CM

## 2025-06-27 LAB
DEPRECATED RDW RBC AUTO: 40.8 FL (ref 37–54)
ERYTHROCYTE [DISTWIDTH] IN BLOOD BY AUTOMATED COUNT: 12.6 % (ref 12.3–15.4)
ESTRADIOL SERPL HS-MCNC: 19.6 PG/ML
HCT VFR BLD AUTO: 47.2 % (ref 37.5–51)
HGB BLD-MCNC: 15.7 G/DL (ref 13–17.7)
MCH RBC QN AUTO: 29.3 PG (ref 26.6–33)
MCHC RBC AUTO-ENTMCNC: 33.3 G/DL (ref 31.5–35.7)
MCV RBC AUTO: 88.1 FL (ref 79–97)
PLATELET # BLD AUTO: 208 10*3/MM3 (ref 140–450)
PMV BLD AUTO: 13.1 FL (ref 6–12)
PSA SERPL-MCNC: 0.3 NG/ML (ref 0–4)
RBC # BLD AUTO: 5.36 10*6/MM3 (ref 4.14–5.8)
TESTOST SERPL-MCNC: 414 NG/DL (ref 249–836)
WBC NRBC COR # BLD AUTO: 5.7 10*3/MM3 (ref 3.4–10.8)

## 2025-06-27 RX ORDER — SILDENAFIL 100 MG/1
100 TABLET, FILM COATED ORAL AS NEEDED
Qty: 40 TABLET | Refills: 6 | Status: SHIPPED | OUTPATIENT
Start: 2025-06-27

## 2025-06-27 RX ORDER — TADALAFIL 5 MG/1
5 TABLET ORAL DAILY
Qty: 90 TABLET | Refills: 3 | Status: SHIPPED | OUTPATIENT
Start: 2025-06-27

## 2025-06-27 RX ORDER — TESTOSTERONE CYPIONATE 200 MG/ML
INJECTION, SOLUTION INTRAMUSCULAR
Qty: 10 ML | Refills: 2 | Status: SHIPPED | OUTPATIENT
Start: 2025-06-27

## (undated) DEVICE — SYR LUERLOK 30CC

## (undated) DEVICE — SINGLE PORT MANIFOLD: Brand: NEPTUNE 2

## (undated) DEVICE — ENDOGATOR AUXILIARY WATER JET CONNECTOR: Brand: ENDOGATOR

## (undated) DEVICE — TP DETACH LIGHTED INNERVISION 56F

## (undated) DEVICE — HOLDER: Brand: DEROYAL

## (undated) DEVICE — SUT MNCRYL PLS ANTIB UD 4/0 PS2 18IN

## (undated) DEVICE — DRP UTIL 2/LAYR W/TP 15X26IN STRL PK/4

## (undated) DEVICE — CYSTO/BLADDER IRRIGATION SET, REGULATING CLAMP

## (undated) DEVICE — ENDOPATH XCEL BLADELESS TROCARS WITH STABILITY SLEEVES: Brand: ENDOPATH XCEL

## (undated) DEVICE — JELLY,LUBE,STERILE,FLIP TOP,TUBE,2-OZ: Brand: MEDLINE

## (undated) DEVICE — ENDOPATH XCEL BLUNT TIP TROCARS WITH SMOOTH SLEEVES: Brand: ENDOPATH XCEL

## (undated) DEVICE — Device

## (undated) DEVICE — HARMONIC ACE +7 LAPAROSCOPIC SHEARS ADVANCED HEMOSTASIS 5MM DIAMETER 36CM SHAFT LENGTH  FOR USE WITH GRAY HAND PIECE ONLY: Brand: HARMONIC ACE

## (undated) DEVICE — ENDOPATH ELECTROSURGERY PROBE PLUS II PISTOL HAND CONTROL PISTOL GRIP HANDLES WITH SUCTION AND IRRRIGATION FOR HAND CONTROL MONOPOLAR ELCTROSURGERY USE ONLY WITH PROBE PLUS II SHAFTS: Brand: ENDOPATH

## (undated) DEVICE — PK LAP GEN 70

## (undated) DEVICE — ENDOPATH ELECTROSURGERY PROBE PLUS II 5MM RIGHT ANGLE MONOPOLAR ELECTROSURGERY SUCTION AND IRRRIGATION SHAFTS WITH RIGHT ANGLE ELECTRODE - USE ONLY WITH PROBE PLUS II HANDLES: Brand: ENDOPATH

## (undated) DEVICE — LAPAROSCOPIC SCOPE WARMER: Brand: DEROYAL

## (undated) DEVICE — DRN PENRS XRAY DECT 1/2X12IN STRL LTX

## (undated) DEVICE — APPL CHLORAPREP HI/LITE 26ML ORNG

## (undated) DEVICE — PDS II VLT 0 107CM AG ST3: Brand: ENDOLOOP

## (undated) DEVICE — ENDOCUT SCISSOR TIP, DISPOSABLE: Brand: RENEW

## (undated) DEVICE — TROCAR: Brand: KII® SLEEVE

## (undated) DEVICE — MONOPOLAR METZENBAUM SCISSOR TIP, DISPOSABLE: Brand: MONOPOLAR METZENBAUM SCISSOR TIP, DISPOSABLE

## (undated) DEVICE — 40595 XL TRENDELENBURG POSITIONING KIT: Brand: 40595 XL TRENDELENBURG POSITIONING KIT

## (undated) DEVICE — PATIENT RETURN ELECTRODE, SINGLE-USE, CONTACT QUALITY MONITORING, ADULT, WITH 9FT CORD, FOR PATIENTS WEIGING OVER 33LBS. (15KG): Brand: MEGADYNE

## (undated) DEVICE — VICRYL VLT 0 45CM ENDOLOOP: Brand: ENDOLOOP

## (undated) DEVICE — THE BITE BLOCK MAXI, LATEX FREE STRAP IS USED TO PROTECT THE ENDOSCOPE INSERTION TUBE FROM BEING BITTEN BY THE PATIENT.

## (undated) DEVICE — ANTIBACTERIAL UNDYED BRAIDED (POLYGLACTIN 910), SYNTHETIC ABSORBABLE SUTURE: Brand: COATED VICRYL

## (undated) DEVICE — TUBING, SUCTION, 1/4" X 20', STRAIGHT: Brand: MEDLINE INDUSTRIES, INC.

## (undated) DEVICE — CAPS TRANSMTR ENDOSC PH MONTR BRAVO

## (undated) DEVICE — SUT MNCRYL 4/0 PS2 18 IN

## (undated) DEVICE — DRAPE, LAVH, STERILE: Brand: MEDLINE

## (undated) DEVICE — GOWN,REINF,POLY,ECL,PP SLV,XL: Brand: MEDLINE

## (undated) DEVICE — POLYESTER SINGLE STITCH RELOAD: Brand: SURGIDAC

## (undated) DEVICE — [HIGH FLOW INSUFFLATOR,  DO NOT USE IF PACKAGE IS DAMAGED,  KEEP DRY,  KEEP AWAY FROM SUNLIGHT,  PROTECT FROM HEAT AND RADIOACTIVE SOURCES.]: Brand: PNEUMOSURE

## (undated) DEVICE — LARYNG GLIDESCOPE LOPRO S3 TI SPECTR

## (undated) DEVICE — FRCP BX RADJAW4 NDL 2.8 240CM LG OG BX40

## (undated) DEVICE — 2, DISPOSABLE SUCTION/IRRIGATOR WITH DISPOSABLE TIP: Brand: STRYKEFLOW

## (undated) DEVICE — ST TBG PNEUMOCLEAR EVAC SMOKE HIFLO

## (undated) DEVICE — FRCP BIOP COLD ENDOJAW ALLGTR W/NDL 2.8X2300MM BLU

## (undated) DEVICE — JACKSON-PRATT 100CC BULB RESERVOIR: Brand: CARDINAL HEALTH

## (undated) DEVICE — SUT VIC 2/0 UR6 27IN J602H

## (undated) DEVICE — Device: Brand: DEFENDO AIR/WATER/SUCTION AND BIOPSY VALVE

## (undated) DEVICE — SUT VIC 0/0 UR6 27IN DYED J603H

## (undated) DEVICE — GLV SURG PREMIERPRO MIC LTX PF SZ7.5 BRN

## (undated) DEVICE — ENCORE® LATEX MICRO SIZE 7.5, STERILE LATEX POWDER-FREE SURGICAL GLOVE: Brand: ENCORE

## (undated) DEVICE — SKIN AFFIX SURG ADHESIVE 72/CS 0.55ML: Brand: MEDLINE

## (undated) DEVICE — ESOPHAGEAL BALLOON DILATATION CATHETER: Brand: CRE FIXED WIRE

## (undated) DEVICE — DEV INFL ALLIANCE2 SYS

## (undated) DEVICE — TROCAR: Brand: KII SLEEVE

## (undated) DEVICE — SUTURING DEVICE: Brand: ENDO STITCH

## (undated) DEVICE — ENDOGATOR TUBING FOR ENDOGATOR EGP-100 IRRIGATION PUMP,OLYMPUS OFP PUMP, OLYMPUS AFU-100 PUMP AND ERBE EIP2 PUMP: Brand: ENDOGATOR

## (undated) DEVICE — TROCAR: Brand: KII FIOS FIRST ENTRY

## (undated) DEVICE — APPL COTN TP PLSTC 6IN STRL LF PK/2